# Patient Record
Sex: FEMALE | Race: WHITE | NOT HISPANIC OR LATINO | Employment: OTHER | URBAN - NONMETROPOLITAN AREA
[De-identification: names, ages, dates, MRNs, and addresses within clinical notes are randomized per-mention and may not be internally consistent; named-entity substitution may affect disease eponyms.]

---

## 2020-02-18 ENCOUNTER — APPOINTMENT (OUTPATIENT)
Dept: GENERAL RADIOLOGY | Facility: HOSPITAL | Age: 70
End: 2020-02-18

## 2020-02-18 ENCOUNTER — HOSPITAL ENCOUNTER (EMERGENCY)
Facility: HOSPITAL | Age: 70
Discharge: HOME | End: 2020-02-18
Attending: EMERGENCY MEDICINE | Admitting: EMERGENCY MEDICINE

## 2020-02-18 VITALS
HEART RATE: 92 BPM | DIASTOLIC BLOOD PRESSURE: 83 MMHG | RESPIRATION RATE: 16 BRPM | SYSTOLIC BLOOD PRESSURE: 153 MMHG | HEIGHT: 67 IN | OXYGEN SATURATION: 93 % | BODY MASS INDEX: 40.02 KG/M2 | WEIGHT: 255 LBS | TEMPERATURE: 98.1 F

## 2020-02-18 DIAGNOSIS — M25.551 PAIN OF RIGHT HIP JOINT: Primary | ICD-10-CM

## 2020-02-18 PROCEDURE — 25010000002 KETOROLAC TROMETHAMINE PER 15 MG: Performed by: EMERGENCY MEDICINE

## 2020-02-18 PROCEDURE — 96374 THER/PROPH/DIAG INJ IV PUSH: CPT

## 2020-02-18 PROCEDURE — 25010000002 ORPHENADRINE CITRATE PER 60 MG: Performed by: EMERGENCY MEDICINE

## 2020-02-18 PROCEDURE — 99284 EMERGENCY DEPT VISIT MOD MDM: CPT

## 2020-02-18 PROCEDURE — 96375 TX/PRO/DX INJ NEW DRUG ADDON: CPT

## 2020-02-18 PROCEDURE — 25010000002 MORPHINE PER 10 MG: Performed by: EMERGENCY MEDICINE

## 2020-02-18 PROCEDURE — 73502 X-RAY EXAM HIP UNI 2-3 VIEWS: CPT

## 2020-02-18 RX ORDER — KETOROLAC TROMETHAMINE 30 MG/ML
10 INJECTION, SOLUTION INTRAMUSCULAR; INTRAVENOUS ONCE
Status: COMPLETED | OUTPATIENT
Start: 2020-02-18 | End: 2020-02-18

## 2020-02-18 RX ORDER — LIDOCAINE 50 MG/G
1 PATCH TOPICAL ONCE
Status: DISCONTINUED | OUTPATIENT
Start: 2020-02-18 | End: 2020-02-18 | Stop reason: HOSPADM

## 2020-02-18 RX ORDER — HYDROCODONE BITARTRATE AND ACETAMINOPHEN 5; 325 MG/1; MG/1
1 TABLET ORAL EVERY 6 HOURS PRN
Qty: 10 TABLET | Refills: 0 | Status: ON HOLD | OUTPATIENT
Start: 2020-02-18 | End: 2020-02-20

## 2020-02-18 RX ORDER — ORPHENADRINE CITRATE 30 MG/ML
30 INJECTION INTRAMUSCULAR; INTRAVENOUS ONCE
Status: COMPLETED | OUTPATIENT
Start: 2020-02-18 | End: 2020-02-18

## 2020-02-18 RX ORDER — TIZANIDINE 4 MG/1
8 TABLET ORAL NIGHTLY PRN
COMMUNITY
End: 2020-03-02 | Stop reason: HOSPADM

## 2020-02-18 RX ORDER — CYCLOBENZAPRINE HCL 10 MG
10 TABLET ORAL 3 TIMES DAILY PRN
COMMUNITY
End: 2020-02-18

## 2020-02-18 RX ORDER — AMITRIPTYLINE HYDROCHLORIDE 50 MG/1
75 TABLET, FILM COATED ORAL NIGHTLY
COMMUNITY
End: 2020-03-30

## 2020-02-18 RX ORDER — CYCLOBENZAPRINE HCL 5 MG
5 TABLET ORAL 3 TIMES DAILY PRN
Qty: 15 TABLET | Refills: 0 | Status: ON HOLD | OUTPATIENT
Start: 2020-02-18 | End: 2020-02-20

## 2020-02-18 RX ORDER — AMLODIPINE BESYLATE 10 MG/1
5 TABLET ORAL DAILY
COMMUNITY
End: 2020-03-30

## 2020-02-18 RX ORDER — LIDOCAINE 50 MG/G
1 PATCH TOPICAL EVERY 24 HOURS
Qty: 6 EACH | Refills: 0 | Status: SHIPPED | OUTPATIENT
Start: 2020-02-18 | End: 2020-03-02 | Stop reason: HOSPADM

## 2020-02-18 RX ORDER — PROPRANOLOL HYDROCHLORIDE 20 MG/1
80 TABLET ORAL 2 TIMES DAILY
COMMUNITY
End: 2020-03-02 | Stop reason: HOSPADM

## 2020-02-18 RX ORDER — MORPHINE SULFATE 4 MG/ML
4 INJECTION, SOLUTION INTRAMUSCULAR; INTRAVENOUS ONCE
Status: COMPLETED | OUTPATIENT
Start: 2020-02-18 | End: 2020-02-18

## 2020-02-18 RX ADMIN — LIDOCAINE 1 PATCH: 50 PATCH CUTANEOUS at 05:52

## 2020-02-18 RX ADMIN — MORPHINE SULFATE 4 MG: 4 INJECTION, SOLUTION INTRAMUSCULAR; INTRAVENOUS at 06:39

## 2020-02-18 RX ADMIN — ORPHENADRINE CITRATE 30 MG: 30 INJECTION INTRAMUSCULAR; INTRAVENOUS at 08:09

## 2020-02-18 RX ADMIN — KETOROLAC TROMETHAMINE 10 MG: 30 INJECTION, SOLUTION INTRAMUSCULAR; INTRAVENOUS at 06:38

## 2020-02-18 NOTE — ED PROVIDER NOTES
TRIAGE CHIEF COMPLAINT:     Nursing and triage notes reviewed    Chief Complaint   Patient presents with   • Hip Pain      HPI: Shannon Servin is a 69 y.o. female who presents to the emergency department complaining of right hip pain.  Patient states she was at home when she bent over and felt a pop in her right hip and had a sudden onset of pain.  Patient has not been able to walk since this occurred and has had severe pain with any movement of her hip.  She denies any trauma.  Denies any numbness or tingling.    REVIEW OF SYSTEMS: All other systems reviewed and are negative     PAST MEDICAL HISTORY:   Past Medical History:   Diagnosis Date   • Arthritis    • Diabetes mellitus (CMS/HCC)    • Hypertension         FAMILY HISTORY:   History reviewed. No pertinent family history.     SOCIAL HISTORY:   Social History     Tobacco Use   • Smoking status: Never Smoker   Substance and Sexual Activity   • Alcohol use: Never     Frequency: Never   • Drug use: Never   • Sexual activity: Defer        SURGICAL HISTORY:   Past Surgical History:   Procedure Laterality Date   • HYSTERECTOMY     • TOTAL ABDOMINAL HYSTERECTOMY WITH SALPINGO OOPHORECTOMY          CURRENT MEDICATIONS:      Medication List      ASK your doctor about these medications    amitriptyline 50 MG tablet  Commonly known as:  ELAVIL     amLODIPine 10 MG tablet  Commonly known as:  NORVASC     cyclobenzaprine 10 MG tablet  Commonly known as:  FLEXERIL     insulin regular 100 UNIT/ML injection  Commonly known as:  humuLIN R,novoLIN R     propranolol 20 MG tablet  Commonly known as:  INDERAL           ALLERGIES: Patient has no known allergies.     PHYSICAL EXAM:   VITAL SIGNS:   Vitals:    02/18/20 0438   BP: 153/83   Pulse: 92   Resp: 16   Temp: 98.1 °F (36.7 °C)   SpO2: 92%      CONSTITUTIONAL: Awake, oriented, appears non-toxic   HENT: Atraumatic, normocephalic, oral mucosa pink and moist, airway patent. Nares patent without drainage. External ears normal.    EYES: Conjunctiva clear   NECK: Trachea midline, non-tender, supple   CARDIOVASCULAR: Normal heart rate, Normal rhythm, No murmurs, rubs, gallops   PULMONARY/CHEST: Clear to auscultation, no rhonchi, wheezes, or rales. Symmetrical breath sounds.  ABDOMINAL: Non-distended, soft, non-tender - no rebound or guarding.   NEUROLOGIC: Non-focal, moving all four extremities, no gross sensory or motor deficits.   EXTREMITIES: No clubbing, cyanosis, or edema.  There is mild pain with palpation of the greater trochanter, there is significant pain with any attempted range of motion of the right hip.  Distal pulses are intact.  There is normal strength in the lower extremity.  SKIN: Warm, Dry, No erythema, No rash     ED COURSE / MEDICAL DECISION MAKING:   Shannon Servin is a 69 y.o. female who presents to the emergency department for evaluation of right hip discomfort.  Patient is uncomfortable appearing on arrival but nontoxic.  Exam reveals pain with range of motion.  The leg is neurovascularly intact.  It does not appear shortened or rotated.  X-rays were obtained and per radiology interpretation does not reveal any acute bony abnormality, only some degenerative changes.  Patient was given several doses of pain medication with improvement in her symptoms.  Will continue to treat her symptomatically with close outpatient follow-up.    DECISION TO DISCHARGE/ADMIT: see ED care timeline     FINAL IMPRESSION:   1 --hip pain  2 --   3 --     Electronically signed by: Ashleigh Delatorre MD, 2/18/2020 4:50 AM       Ashleigh Delatorre MD  02/18/20 0758

## 2020-02-19 ENCOUNTER — HOSPITAL ENCOUNTER (INPATIENT)
Facility: HOSPITAL | Age: 70
LOS: 11 days | Discharge: NURSING, REHAB OR LONG-TERM CARE | End: 2020-03-02
Attending: STUDENT IN AN ORGANIZED HEALTH CARE EDUCATION/TRAINING PROGRAM | Admitting: UROLOGY

## 2020-02-19 ENCOUNTER — APPOINTMENT (OUTPATIENT)
Dept: CT IMAGING | Facility: HOSPITAL | Age: 70
End: 2020-02-19

## 2020-02-19 ENCOUNTER — APPOINTMENT (OUTPATIENT)
Dept: GENERAL RADIOLOGY | Facility: HOSPITAL | Age: 70
End: 2020-02-19

## 2020-02-19 DIAGNOSIS — A41.9 SEPSIS DUE TO URINARY TRACT INFECTION (HCC): Primary | ICD-10-CM

## 2020-02-19 DIAGNOSIS — N39.0 SEPSIS DUE TO URINARY TRACT INFECTION (HCC): Primary | ICD-10-CM

## 2020-02-19 DIAGNOSIS — Z74.09 IMPAIRED MOBILITY AND ADLS: ICD-10-CM

## 2020-02-19 DIAGNOSIS — Z78.9 IMPAIRED MOBILITY AND ADLS: ICD-10-CM

## 2020-02-19 DIAGNOSIS — D70.9 NEUTROPENIA, UNSPECIFIED TYPE (HCC): ICD-10-CM

## 2020-02-19 DIAGNOSIS — N20.1 LEFT URETERAL STONE: ICD-10-CM

## 2020-02-19 LAB
A-A DO2: 30.2 MMHG
ALBUMIN SERPL-MCNC: 4 G/DL (ref 3.5–5.2)
ALBUMIN/GLOB SERPL: 1.2 G/DL
ALP SERPL-CCNC: 69 U/L (ref 39–117)
ALT SERPL W P-5'-P-CCNC: 23 U/L (ref 1–33)
ANION GAP SERPL CALCULATED.3IONS-SCNC: 12.7 MMOL/L (ref 5–15)
ARTERIAL PATENCY WRIST A: POSITIVE
AST SERPL-CCNC: 25 U/L (ref 1–32)
ATMOSPHERIC PRESS: 745 MMHG
BACTERIA UR QL AUTO: ABNORMAL /HPF
BASE EXCESS BLDA CALC-SCNC: -1.2 MMOL/L (ref 0–2)
BDY SITE: ABNORMAL
BILIRUB SERPL-MCNC: 0.7 MG/DL (ref 0.2–1.2)
BILIRUB UR QL STRIP: NEGATIVE
BUN BLD-MCNC: 18 MG/DL (ref 8–23)
BUN/CREAT SERPL: 11.4 (ref 7–25)
CALCIUM SPEC-SCNC: 9.6 MG/DL (ref 8.6–10.5)
CHLORIDE SERPL-SCNC: 97 MMOL/L (ref 98–107)
CLARITY UR: ABNORMAL
CO2 SERPL-SCNC: 24.3 MMOL/L (ref 22–29)
COHGB MFR BLD: 0.8 % (ref 0–2)
COLOR UR: YELLOW
CREAT BLD-MCNC: 1.58 MG/DL (ref 0.57–1)
D-LACTATE SERPL-SCNC: 2.1 MMOL/L (ref 0.5–2)
DEPRECATED RDW RBC AUTO: 42.1 FL (ref 37–54)
EOSINOPHIL # BLD MANUAL: 0.02 10*3/MM3 (ref 0–0.4)
EOSINOPHIL NFR BLD MANUAL: 1 % (ref 0.3–6.2)
ERYTHROCYTE [DISTWIDTH] IN BLOOD BY AUTOMATED COUNT: 12.3 % (ref 12.3–15.4)
FLUAV AG NPH QL: NEGATIVE
FLUBV AG NPH QL IA: NEGATIVE
GAS FLOW AIRWAY: 2 LPM
GFR SERPL CREATININE-BSD FRML MDRD: 32 ML/MIN/1.73
GLOBULIN UR ELPH-MCNC: 3.3 GM/DL
GLUCOSE BLD-MCNC: 142 MG/DL (ref 65–99)
GLUCOSE UR STRIP-MCNC: NEGATIVE MG/DL
HCO3 BLDA-SCNC: 24.5 MMOL/L (ref 22–28)
HCT VFR BLD AUTO: 45 % (ref 34–46.6)
HCT VFR BLD CALC: 46.8 %
HGB BLD-MCNC: 15.1 G/DL (ref 12–15.9)
HGB BLDA-MCNC: 15.3 G/DL (ref 12–18)
HGB UR QL STRIP.AUTO: ABNORMAL
HYALINE CASTS UR QL AUTO: ABNORMAL /LPF
KETONES UR QL STRIP: NEGATIVE
LEUKOCYTE ESTERASE UR QL STRIP.AUTO: ABNORMAL
LYMPHOCYTES # BLD MANUAL: 0.37 10*3/MM3 (ref 0.7–3.1)
LYMPHOCYTES NFR BLD MANUAL: 23 % (ref 19.6–45.3)
LYMPHOCYTES NFR BLD MANUAL: 3 % (ref 5–12)
MCH RBC QN AUTO: 30.9 PG (ref 26.6–33)
MCHC RBC AUTO-ENTMCNC: 33.6 G/DL (ref 31.5–35.7)
MCV RBC AUTO: 92.2 FL (ref 79–97)
METAMYELOCYTES NFR BLD MANUAL: 1 % (ref 0–0)
METHGB BLD QL: 1 % (ref 0–1.5)
MODALITY: ABNORMAL
MONOCYTES # BLD AUTO: 0.05 10*3/MM3 (ref 0.1–0.9)
NEUTROPHILS # BLD AUTO: 1.16 10*3/MM3 (ref 1.7–7)
NEUTROPHILS NFR BLD MANUAL: 52 % (ref 42.7–76)
NEUTS BAND NFR BLD MANUAL: 20 % (ref 0–5)
NITRITE UR QL STRIP: POSITIVE
NOTE: ABNORMAL
NT-PROBNP SERPL-MCNC: 873.7 PG/ML (ref 5–900)
OXYHGB MFR BLDV: 91.2 % (ref 94–99)
PCO2 BLDA: 43.4 MM HG (ref 35–45)
PCO2 TEMP ADJ BLD: ABNORMAL MM[HG]
PH BLDA: 7.36 PH UNITS (ref 7.3–7.5)
PH UR STRIP.AUTO: 5.5 [PH] (ref 5–8)
PH, TEMP CORRECTED: ABNORMAL
PLATELET # BLD AUTO: 91 10*3/MM3 (ref 140–450)
PMV BLD AUTO: 10.5 FL (ref 6–12)
PO2 BLDA: 67.4 MM HG (ref 75–100)
PO2 TEMP ADJ BLD: ABNORMAL MM[HG]
POTASSIUM BLD-SCNC: 4.3 MMOL/L (ref 3.5–5.2)
PROCALCITONIN SERPL-MCNC: 6.39 NG/ML (ref 0.1–0.25)
PROT SERPL-MCNC: 7.3 G/DL (ref 6–8.5)
PROT UR QL STRIP: ABNORMAL
RBC # BLD AUTO: 4.88 10*6/MM3 (ref 3.77–5.28)
RBC # UR: ABNORMAL /HPF
RBC MORPH BLD: NORMAL
REF LAB TEST METHOD: ABNORMAL
SAO2 % BLDCOA: 92.9 % (ref 94–100)
SCAN SLIDE: NORMAL
SMALL PLATELETS BLD QL SMEAR: ABNORMAL
SODIUM BLD-SCNC: 134 MMOL/L (ref 136–145)
SP GR UR STRIP: 1.02 (ref 1–1.03)
SQUAMOUS #/AREA URNS HPF: ABNORMAL /HPF
TROPONIN T SERPL-MCNC: <0.01 NG/ML (ref 0–0.03)
UROBILINOGEN UR QL STRIP: ABNORMAL
VENTILATOR MODE: ABNORMAL
WBC MORPH BLD: NORMAL
WBC NRBC COR # BLD: 1.61 10*3/MM3 (ref 3.4–10.8)
WBC UR QL AUTO: ABNORMAL /HPF

## 2020-02-19 PROCEDURE — 82375 ASSAY CARBOXYHB QUANT: CPT

## 2020-02-19 PROCEDURE — 36600 WITHDRAWAL OF ARTERIAL BLOOD: CPT

## 2020-02-19 PROCEDURE — 70450 CT HEAD/BRAIN W/O DYE: CPT

## 2020-02-19 PROCEDURE — 71250 CT THORAX DX C-: CPT

## 2020-02-19 PROCEDURE — 80053 COMPREHEN METABOLIC PANEL: CPT | Performed by: STUDENT IN AN ORGANIZED HEALTH CARE EDUCATION/TRAINING PROGRAM

## 2020-02-19 PROCEDURE — 87804 INFLUENZA ASSAY W/OPTIC: CPT | Performed by: STUDENT IN AN ORGANIZED HEALTH CARE EDUCATION/TRAINING PROGRAM

## 2020-02-19 PROCEDURE — P9612 CATHETERIZE FOR URINE SPEC: HCPCS

## 2020-02-19 PROCEDURE — 93005 ELECTROCARDIOGRAM TRACING: CPT | Performed by: STUDENT IN AN ORGANIZED HEALTH CARE EDUCATION/TRAINING PROGRAM

## 2020-02-19 PROCEDURE — 74176 CT ABD & PELVIS W/O CONTRAST: CPT

## 2020-02-19 PROCEDURE — 83880 ASSAY OF NATRIURETIC PEPTIDE: CPT | Performed by: STUDENT IN AN ORGANIZED HEALTH CARE EDUCATION/TRAINING PROGRAM

## 2020-02-19 PROCEDURE — 87086 URINE CULTURE/COLONY COUNT: CPT | Performed by: STUDENT IN AN ORGANIZED HEALTH CARE EDUCATION/TRAINING PROGRAM

## 2020-02-19 PROCEDURE — 87040 BLOOD CULTURE FOR BACTERIA: CPT | Performed by: STUDENT IN AN ORGANIZED HEALTH CARE EDUCATION/TRAINING PROGRAM

## 2020-02-19 PROCEDURE — 83050 HGB METHEMOGLOBIN QUAN: CPT

## 2020-02-19 PROCEDURE — 84484 ASSAY OF TROPONIN QUANT: CPT | Performed by: STUDENT IN AN ORGANIZED HEALTH CARE EDUCATION/TRAINING PROGRAM

## 2020-02-19 PROCEDURE — 87077 CULTURE AEROBIC IDENTIFY: CPT | Performed by: STUDENT IN AN ORGANIZED HEALTH CARE EDUCATION/TRAINING PROGRAM

## 2020-02-19 PROCEDURE — 99285 EMERGENCY DEPT VISIT HI MDM: CPT

## 2020-02-19 PROCEDURE — 87186 SC STD MICRODIL/AGAR DIL: CPT | Performed by: STUDENT IN AN ORGANIZED HEALTH CARE EDUCATION/TRAINING PROGRAM

## 2020-02-19 PROCEDURE — 81001 URINALYSIS AUTO W/SCOPE: CPT | Performed by: STUDENT IN AN ORGANIZED HEALTH CARE EDUCATION/TRAINING PROGRAM

## 2020-02-19 PROCEDURE — 84145 PROCALCITONIN (PCT): CPT | Performed by: STUDENT IN AN ORGANIZED HEALTH CARE EDUCATION/TRAINING PROGRAM

## 2020-02-19 PROCEDURE — 71045 X-RAY EXAM CHEST 1 VIEW: CPT

## 2020-02-19 PROCEDURE — 85007 BL SMEAR W/DIFF WBC COUNT: CPT | Performed by: STUDENT IN AN ORGANIZED HEALTH CARE EDUCATION/TRAINING PROGRAM

## 2020-02-19 PROCEDURE — 25010000002 CEFTRIAXONE SODIUM-DEXTROSE 1-3.74 GM-%(50ML) RECONSTITUTED SOLUTION: Performed by: STUDENT IN AN ORGANIZED HEALTH CARE EDUCATION/TRAINING PROGRAM

## 2020-02-19 PROCEDURE — 82805 BLOOD GASES W/O2 SATURATION: CPT

## 2020-02-19 PROCEDURE — 85025 COMPLETE CBC W/AUTO DIFF WBC: CPT | Performed by: STUDENT IN AN ORGANIZED HEALTH CARE EDUCATION/TRAINING PROGRAM

## 2020-02-19 PROCEDURE — 83605 ASSAY OF LACTIC ACID: CPT | Performed by: STUDENT IN AN ORGANIZED HEALTH CARE EDUCATION/TRAINING PROGRAM

## 2020-02-19 RX ORDER — ACETAMINOPHEN 650 MG/1
650 SUPPOSITORY RECTAL ONCE
Status: COMPLETED | OUTPATIENT
Start: 2020-02-19 | End: 2020-02-19

## 2020-02-19 RX ORDER — CEFTRIAXONE 1 G/50ML
1 INJECTION, SOLUTION INTRAVENOUS ONCE
Status: COMPLETED | OUTPATIENT
Start: 2020-02-19 | End: 2020-02-19

## 2020-02-19 RX ADMIN — SODIUM CHLORIDE 1000 ML: 9 INJECTION, SOLUTION INTRAVENOUS at 22:43

## 2020-02-19 RX ADMIN — CEFTRIAXONE 1 G: 1 INJECTION, SOLUTION INTRAVENOUS at 23:13

## 2020-02-19 RX ADMIN — ACETAMINOPHEN 650 MG: 650 SUPPOSITORY RECTAL at 22:35

## 2020-02-19 RX ADMIN — SODIUM CHLORIDE 1000 ML: 9 INJECTION, SOLUTION INTRAVENOUS at 23:45

## 2020-02-20 ENCOUNTER — ANESTHESIA EVENT (OUTPATIENT)
Dept: PERIOP | Facility: HOSPITAL | Age: 70
End: 2020-02-20

## 2020-02-20 ENCOUNTER — ANESTHESIA (OUTPATIENT)
Dept: PERIOP | Facility: HOSPITAL | Age: 70
End: 2020-02-20

## 2020-02-20 PROBLEM — N39.0 SEPSIS DUE TO URINARY TRACT INFECTION (HCC): Status: ACTIVE | Noted: 2020-02-20

## 2020-02-20 PROBLEM — A41.9 SEPSIS DUE TO URINARY TRACT INFECTION: Status: ACTIVE | Noted: 2020-02-20

## 2020-02-20 PROBLEM — N20.1 LEFT URETERAL STONE: Status: ACTIVE | Noted: 2020-02-19

## 2020-02-20 LAB
ALBUMIN SERPL-MCNC: 3.4 G/DL (ref 3.5–5.2)
ALBUMIN/GLOB SERPL: 1.2 G/DL
ALP SERPL-CCNC: 63 U/L (ref 39–117)
ALT SERPL W P-5'-P-CCNC: 19 U/L (ref 1–33)
ANION GAP SERPL CALCULATED.3IONS-SCNC: 12.9 MMOL/L (ref 5–15)
AST SERPL-CCNC: 23 U/L (ref 1–32)
BACTERIA UR QL AUTO: ABNORMAL /HPF
BILIRUB SERPL-MCNC: 0.4 MG/DL (ref 0.2–1.2)
BILIRUB UR QL STRIP: ABNORMAL
BUN BLD-MCNC: 19 MG/DL (ref 8–23)
BUN/CREAT SERPL: 11.6 (ref 7–25)
CALCIUM SPEC-SCNC: 8.4 MG/DL (ref 8.6–10.5)
CHLORIDE SERPL-SCNC: 101 MMOL/L (ref 98–107)
CLARITY UR: ABNORMAL
CO2 SERPL-SCNC: 23.1 MMOL/L (ref 22–29)
COARSE GRAN CASTS URNS QL MICRO: ABNORMAL /LPF
COLOR UR: YELLOW
CREAT BLD-MCNC: 1.64 MG/DL (ref 0.57–1)
D-LACTATE SERPL-SCNC: 1.4 MMOL/L (ref 0.5–2)
D-LACTATE SERPL-SCNC: 2.1 MMOL/L (ref 0.5–2)
DEPRECATED RDW RBC AUTO: 43.9 FL (ref 37–54)
EOSINOPHIL # BLD MANUAL: 0.12 10*3/MM3 (ref 0–0.4)
EOSINOPHIL NFR BLD MANUAL: 1 % (ref 0.3–6.2)
ERYTHROCYTE [DISTWIDTH] IN BLOOD BY AUTOMATED COUNT: 12.8 % (ref 12.3–15.4)
GFR SERPL CREATININE-BSD FRML MDRD: 31 ML/MIN/1.73
GLOBULIN UR ELPH-MCNC: 2.9 GM/DL
GLUCOSE BLD-MCNC: 151 MG/DL (ref 65–99)
GLUCOSE BLDC GLUCOMTR-MCNC: 116 MG/DL (ref 70–130)
GLUCOSE BLDC GLUCOMTR-MCNC: 125 MG/DL (ref 70–130)
GLUCOSE BLDC GLUCOMTR-MCNC: 132 MG/DL (ref 70–130)
GLUCOSE BLDC GLUCOMTR-MCNC: 146 MG/DL (ref 70–130)
GLUCOSE UR STRIP-MCNC: NEGATIVE MG/DL
HCT VFR BLD AUTO: 41 % (ref 34–46.6)
HGB BLD-MCNC: 13.4 G/DL (ref 12–15.9)
HGB UR QL STRIP.AUTO: ABNORMAL
HYALINE CASTS UR QL AUTO: ABNORMAL /LPF
KETONES UR QL STRIP: ABNORMAL
LACTATE HOLD SPECIMEN: NORMAL
LEUKOCYTE ESTERASE UR QL STRIP.AUTO: ABNORMAL
LYMPHOCYTES # BLD MANUAL: 0.81 10*3/MM3 (ref 0.7–3.1)
LYMPHOCYTES NFR BLD MANUAL: 3 % (ref 5–12)
LYMPHOCYTES NFR BLD MANUAL: 7 % (ref 19.6–45.3)
MCH RBC QN AUTO: 30.5 PG (ref 26.6–33)
MCHC RBC AUTO-ENTMCNC: 32.7 G/DL (ref 31.5–35.7)
MCV RBC AUTO: 93.2 FL (ref 79–97)
METAMYELOCYTES NFR BLD MANUAL: 8 % (ref 0–0)
MONOCYTES # BLD AUTO: 0.35 10*3/MM3 (ref 0.1–0.9)
NEUTROPHILS # BLD AUTO: 9.37 10*3/MM3 (ref 1.7–7)
NEUTROPHILS NFR BLD MANUAL: 64 % (ref 42.7–76)
NEUTS BAND NFR BLD MANUAL: 17 % (ref 0–5)
NITRITE UR QL STRIP: NEGATIVE
PH UR STRIP.AUTO: 6 [PH] (ref 5–8)
PLATELET # BLD AUTO: 83 10*3/MM3 (ref 140–450)
PMV BLD AUTO: 10.6 FL (ref 6–12)
POTASSIUM BLD-SCNC: 3.9 MMOL/L (ref 3.5–5.2)
PROCALCITONIN SERPL-MCNC: 25.04 NG/ML (ref 0.1–0.25)
PROT SERPL-MCNC: 6.3 G/DL (ref 6–8.5)
PROT UR QL STRIP: ABNORMAL
RBC # BLD AUTO: 4.4 10*6/MM3 (ref 3.77–5.28)
RBC # UR: ABNORMAL /HPF
RBC MORPH BLD: NORMAL
REF LAB TEST METHOD: ABNORMAL
RENAL EPI CELLS #/AREA URNS HPF: ABNORMAL /HPF
SCAN SLIDE: NORMAL
SMALL PLATELETS BLD QL SMEAR: ABNORMAL
SODIUM BLD-SCNC: 137 MMOL/L (ref 136–145)
SP GR UR STRIP: 1.03 (ref 1–1.03)
SQUAMOUS #/AREA URNS HPF: ABNORMAL /HPF
TRANS CELLS #/AREA URNS HPF: ABNORMAL /HPF
UROBILINOGEN UR QL STRIP: ABNORMAL
WBC CLUMPS # UR AUTO: ABNORMAL /HPF
WBC MORPH BLD: NORMAL
WBC NRBC COR # BLD: 11.57 10*3/MM3 (ref 3.4–10.8)
WBC UR QL AUTO: ABNORMAL /HPF

## 2020-02-20 PROCEDURE — C2617 STENT, NON-COR, TEM W/O DEL: HCPCS | Performed by: UROLOGY

## 2020-02-20 PROCEDURE — 25010000002 MORPHINE PER 10 MG: Performed by: UROLOGY

## 2020-02-20 PROCEDURE — 82962 GLUCOSE BLOOD TEST: CPT

## 2020-02-20 PROCEDURE — 87086 URINE CULTURE/COLONY COUNT: CPT | Performed by: UROLOGY

## 2020-02-20 PROCEDURE — 80053 COMPREHEN METABOLIC PANEL: CPT | Performed by: INTERNAL MEDICINE

## 2020-02-20 PROCEDURE — 83605 ASSAY OF LACTIC ACID: CPT | Performed by: STUDENT IN AN ORGANIZED HEALTH CARE EDUCATION/TRAINING PROGRAM

## 2020-02-20 PROCEDURE — 52332 CYSTOSCOPY AND TREATMENT: CPT | Performed by: UROLOGY

## 2020-02-20 PROCEDURE — 99222 1ST HOSP IP/OBS MODERATE 55: CPT | Performed by: INTERNAL MEDICINE

## 2020-02-20 PROCEDURE — 25010000002 IOPAMIDOL 61 % SOLUTION: Performed by: UROLOGY

## 2020-02-20 PROCEDURE — 84145 PROCALCITONIN (PCT): CPT | Performed by: INTERNAL MEDICINE

## 2020-02-20 PROCEDURE — 99221 1ST HOSP IP/OBS SF/LOW 40: CPT | Performed by: UROLOGY

## 2020-02-20 PROCEDURE — 94660 CPAP INITIATION&MGMT: CPT

## 2020-02-20 PROCEDURE — 81001 URINALYSIS AUTO W/SCOPE: CPT | Performed by: UROLOGY

## 2020-02-20 PROCEDURE — 63710000001 INSULIN REGULAR HUMAN PER 5 UNITS: Performed by: INTERNAL MEDICINE

## 2020-02-20 PROCEDURE — 25010000002 LEVOFLOXACIN PER 250 MG: Performed by: UROLOGY

## 2020-02-20 PROCEDURE — 25010000002 MIDAZOLAM PER 1MG: Performed by: NURSE ANESTHETIST, CERTIFIED REGISTERED

## 2020-02-20 PROCEDURE — 94799 UNLISTED PULMONARY SVC/PX: CPT

## 2020-02-20 PROCEDURE — BT1F1ZZ FLUOROSCOPY OF LEFT KIDNEY, URETER AND BLADDER USING LOW OSMOLAR CONTRAST: ICD-10-PCS | Performed by: UROLOGY

## 2020-02-20 PROCEDURE — C1758 CATHETER, URETERAL: HCPCS | Performed by: UROLOGY

## 2020-02-20 PROCEDURE — 0T778DZ DILATION OF LEFT URETER WITH INTRALUMINAL DEVICE, VIA NATURAL OR ARTIFICIAL OPENING ENDOSCOPIC: ICD-10-PCS | Performed by: UROLOGY

## 2020-02-20 PROCEDURE — 25010000002 PIPERACILLIN SOD-TAZOBACTAM PER 1 G: Performed by: INTERNAL MEDICINE

## 2020-02-20 PROCEDURE — 83605 ASSAY OF LACTIC ACID: CPT | Performed by: INTERNAL MEDICINE

## 2020-02-20 PROCEDURE — 85025 COMPLETE CBC W/AUTO DIFF WBC: CPT | Performed by: INTERNAL MEDICINE

## 2020-02-20 DEVICE — URETERAL STENT
Type: IMPLANTABLE DEVICE | Site: URETER | Status: FUNCTIONAL
Brand: CONTOUR™

## 2020-02-20 RX ORDER — PROMETHAZINE HYDROCHLORIDE 25 MG/1
25 TABLET ORAL EVERY 6 HOURS PRN
COMMUNITY
End: 2020-03-02 | Stop reason: HOSPADM

## 2020-02-20 RX ORDER — MORPHINE SULFATE 4 MG/ML
4 INJECTION, SOLUTION INTRAMUSCULAR; INTRAVENOUS ONCE
Status: COMPLETED | OUTPATIENT
Start: 2020-02-20 | End: 2020-02-20

## 2020-02-20 RX ORDER — LEVOCETIRIZINE DIHYDROCHLORIDE 5 MG/1
5 TABLET, FILM COATED ORAL EVERY EVENING
COMMUNITY
End: 2020-03-02 | Stop reason: HOSPADM

## 2020-02-20 RX ORDER — SODIUM CHLORIDE 9 MG/ML
100 INJECTION, SOLUTION INTRAVENOUS CONTINUOUS
Status: DISCONTINUED | OUTPATIENT
Start: 2020-02-20 | End: 2020-02-25

## 2020-02-20 RX ORDER — SODIUM CHLORIDE, SODIUM LACTATE, POTASSIUM CHLORIDE, CALCIUM CHLORIDE 600; 310; 30; 20 MG/100ML; MG/100ML; MG/100ML; MG/100ML
1000 INJECTION, SOLUTION INTRAVENOUS CONTINUOUS
Status: DISCONTINUED | OUTPATIENT
Start: 2020-02-20 | End: 2020-02-20

## 2020-02-20 RX ORDER — CHOLECALCIFEROL (VITAMIN D3) 125 MCG
5 CAPSULE ORAL NIGHTLY PRN
Status: DISCONTINUED | OUTPATIENT
Start: 2020-02-20 | End: 2020-03-02 | Stop reason: HOSPADM

## 2020-02-20 RX ORDER — KETAMINE HCL IN NACL, ISO-OSM 100MG/10ML
SYRINGE (ML) INJECTION AS NEEDED
Status: DISCONTINUED | OUTPATIENT
Start: 2020-02-20 | End: 2020-02-20 | Stop reason: SURG

## 2020-02-20 RX ORDER — LIDOCAINE 50 MG/G
1 PATCH TOPICAL EVERY 24 HOURS
Status: DISCONTINUED | OUTPATIENT
Start: 2020-02-20 | End: 2020-02-24

## 2020-02-20 RX ORDER — SODIUM CHLORIDE 0.9 % (FLUSH) 0.9 %
10 SYRINGE (ML) INJECTION EVERY 12 HOURS SCHEDULED
Status: DISCONTINUED | OUTPATIENT
Start: 2020-02-20 | End: 2020-03-02 | Stop reason: HOSPADM

## 2020-02-20 RX ORDER — FLUCONAZOLE 150 MG/1
150 TABLET ORAL ONCE
Status: ON HOLD | COMMUNITY
End: 2020-02-20

## 2020-02-20 RX ORDER — MIDAZOLAM HYDROCHLORIDE 2 MG/2ML
INJECTION, SOLUTION INTRAMUSCULAR; INTRAVENOUS AS NEEDED
Status: DISCONTINUED | OUTPATIENT
Start: 2020-02-20 | End: 2020-02-20 | Stop reason: SURG

## 2020-02-20 RX ORDER — TRAMADOL HYDROCHLORIDE 50 MG/1
50 TABLET ORAL EVERY 6 HOURS PRN
Status: DISCONTINUED | OUTPATIENT
Start: 2020-02-20 | End: 2020-02-24

## 2020-02-20 RX ORDER — SODIUM CHLORIDE 0.9 % (FLUSH) 0.9 %
10 SYRINGE (ML) INJECTION AS NEEDED
Status: DISCONTINUED | OUTPATIENT
Start: 2020-02-20 | End: 2020-03-02 | Stop reason: HOSPADM

## 2020-02-20 RX ORDER — FLUCONAZOLE 100 MG/1
150 TABLET ORAL ONCE
Status: COMPLETED | OUTPATIENT
Start: 2020-02-20 | End: 2020-02-20

## 2020-02-20 RX ORDER — NICOTINE POLACRILEX 4 MG
1 LOZENGE BUCCAL
Status: DISCONTINUED | OUTPATIENT
Start: 2020-02-20 | End: 2020-03-02 | Stop reason: HOSPADM

## 2020-02-20 RX ORDER — TRAMADOL HYDROCHLORIDE 50 MG/1
50 TABLET ORAL EVERY 6 HOURS PRN
COMMUNITY
End: 2022-06-20 | Stop reason: SDUPTHER

## 2020-02-20 RX ORDER — MORPHINE SULFATE 4 MG/ML
4 INJECTION, SOLUTION INTRAMUSCULAR; INTRAVENOUS EVERY 4 HOURS PRN
Status: DISCONTINUED | OUTPATIENT
Start: 2020-02-20 | End: 2020-02-24

## 2020-02-20 RX ORDER — LEVOFLOXACIN 5 MG/ML
500 INJECTION, SOLUTION INTRAVENOUS
Status: DISCONTINUED | OUTPATIENT
Start: 2020-02-21 | End: 2020-02-20 | Stop reason: SDUPTHER

## 2020-02-20 RX ORDER — MAGNESIUM HYDROXIDE 1200 MG/15ML
LIQUID ORAL AS NEEDED
Status: DISCONTINUED | OUTPATIENT
Start: 2020-02-20 | End: 2020-03-02 | Stop reason: HOSPADM

## 2020-02-20 RX ORDER — SODIUM CHLORIDE 0.9 % (FLUSH) 0.9 %
10 SYRINGE (ML) INJECTION AS NEEDED
Status: DISCONTINUED | OUTPATIENT
Start: 2020-02-20 | End: 2020-02-20 | Stop reason: HOSPADM

## 2020-02-20 RX ORDER — LEVOFLOXACIN 5 MG/ML
500 INJECTION, SOLUTION INTRAVENOUS
Status: COMPLETED | OUTPATIENT
Start: 2020-02-20 | End: 2020-02-20

## 2020-02-20 RX ORDER — NEOMYCIN AND POLYMYXIN B SULFATES 40; 200000 MG/ML; [USP'U]/ML
SOLUTION IRRIGATION AS NEEDED
Status: DISCONTINUED | OUTPATIENT
Start: 2020-02-20 | End: 2020-02-20 | Stop reason: HOSPADM

## 2020-02-20 RX ORDER — ACETAMINOPHEN 650 MG/1
650 SUPPOSITORY RECTAL EVERY 4 HOURS PRN
Status: DISCONTINUED | OUTPATIENT
Start: 2020-02-20 | End: 2020-03-02 | Stop reason: HOSPADM

## 2020-02-20 RX ORDER — DOCUSATE SODIUM 100 MG/1
100 CAPSULE, LIQUID FILLED ORAL 2 TIMES DAILY
Status: DISCONTINUED | OUTPATIENT
Start: 2020-02-20 | End: 2020-02-24

## 2020-02-20 RX ORDER — CETIRIZINE HYDROCHLORIDE 10 MG/1
5 TABLET ORAL DAILY
Status: DISCONTINUED | OUTPATIENT
Start: 2020-02-20 | End: 2020-02-25

## 2020-02-20 RX ORDER — ACETAMINOPHEN 325 MG/1
650 TABLET ORAL EVERY 4 HOURS PRN
Status: DISCONTINUED | OUTPATIENT
Start: 2020-02-20 | End: 2020-03-02 | Stop reason: HOSPADM

## 2020-02-20 RX ORDER — PROPRANOLOL HYDROCHLORIDE 20 MG/1
80 TABLET ORAL 2 TIMES DAILY
Status: DISCONTINUED | OUTPATIENT
Start: 2020-02-20 | End: 2020-02-25

## 2020-02-20 RX ORDER — SODIUM CHLORIDE 9 MG/ML
INJECTION, SOLUTION INTRAVENOUS CONTINUOUS PRN
Status: DISCONTINUED | OUTPATIENT
Start: 2020-02-20 | End: 2020-02-20 | Stop reason: SURG

## 2020-02-20 RX ORDER — ACETAMINOPHEN 160 MG/5ML
650 SOLUTION ORAL EVERY 4 HOURS PRN
Status: DISCONTINUED | OUTPATIENT
Start: 2020-02-20 | End: 2020-03-02 | Stop reason: HOSPADM

## 2020-02-20 RX ORDER — ONDANSETRON 2 MG/ML
4 INJECTION INTRAMUSCULAR; INTRAVENOUS EVERY 6 HOURS PRN
Status: DISCONTINUED | OUTPATIENT
Start: 2020-02-20 | End: 2020-03-02 | Stop reason: HOSPADM

## 2020-02-20 RX ORDER — ONDANSETRON 4 MG/1
4 TABLET, FILM COATED ORAL EVERY 6 HOURS PRN
Status: DISCONTINUED | OUTPATIENT
Start: 2020-02-20 | End: 2020-03-02 | Stop reason: HOSPADM

## 2020-02-20 RX ORDER — DEXTROSE MONOHYDRATE 25 G/50ML
25 INJECTION, SOLUTION INTRAVENOUS
Status: DISCONTINUED | OUTPATIENT
Start: 2020-02-20 | End: 2020-03-02 | Stop reason: HOSPADM

## 2020-02-20 RX ORDER — CEFTRIAXONE 2 G/50ML
2 INJECTION, SOLUTION INTRAVENOUS EVERY 24 HOURS
Status: DISCONTINUED | OUTPATIENT
Start: 2020-02-20 | End: 2020-02-20

## 2020-02-20 RX ADMIN — Medication 10 MG: at 12:54

## 2020-02-20 RX ADMIN — SODIUM CHLORIDE 100 ML/HR: 9 INJECTION, SOLUTION INTRAVENOUS at 04:26

## 2020-02-20 RX ADMIN — LIDOCAINE 1 PATCH: 50 PATCH CUTANEOUS at 04:27

## 2020-02-20 RX ADMIN — ACETAMINOPHEN 650 MG: 325 TABLET, FILM COATED ORAL at 04:35

## 2020-02-20 RX ADMIN — SODIUM CHLORIDE 1000 ML: 9 INJECTION, SOLUTION INTRAVENOUS at 16:00

## 2020-02-20 RX ADMIN — MIDAZOLAM HYDROCHLORIDE 1 MG: 1 INJECTION, SOLUTION INTRAMUSCULAR; INTRAVENOUS at 13:06

## 2020-02-20 RX ADMIN — SODIUM CHLORIDE, POTASSIUM CHLORIDE, SODIUM LACTATE AND CALCIUM CHLORIDE 1000 ML: 600; 310; 30; 20 INJECTION, SOLUTION INTRAVENOUS at 11:02

## 2020-02-20 RX ADMIN — FLUCONAZOLE 150 MG: 100 TABLET ORAL at 04:26

## 2020-02-20 RX ADMIN — SODIUM CHLORIDE, PRESERVATIVE FREE 10 ML: 5 INJECTION INTRAVENOUS at 20:46

## 2020-02-20 RX ADMIN — TRAMADOL HYDROCHLORIDE 50 MG: 50 TABLET, FILM COATED ORAL at 04:26

## 2020-02-20 RX ADMIN — Medication 10 MG: at 13:06

## 2020-02-20 RX ADMIN — SODIUM CHLORIDE: 9 INJECTION, SOLUTION INTRAVENOUS at 12:50

## 2020-02-20 RX ADMIN — MORPHINE SULFATE 4 MG: 4 INJECTION INTRAVENOUS at 09:59

## 2020-02-20 RX ADMIN — TAZOBACTAM SODIUM AND PIPERACILLIN SODIUM 4.5 G: 500; 4 INJECTION, SOLUTION INTRAVENOUS at 17:07

## 2020-02-20 RX ADMIN — MIDAZOLAM HYDROCHLORIDE 1 MG: 1 INJECTION, SOLUTION INTRAMUSCULAR; INTRAVENOUS at 12:54

## 2020-02-20 RX ADMIN — LEVOFLOXACIN 500 MG: 5 INJECTION, SOLUTION INTRAVENOUS at 12:53

## 2020-02-20 RX ADMIN — DOCUSATE SODIUM 100 MG: 100 CAPSULE, LIQUID FILLED ORAL at 20:41

## 2020-02-20 RX ADMIN — SODIUM CHLORIDE 100 ML/HR: 9 INJECTION, SOLUTION INTRAVENOUS at 17:07

## 2020-02-20 RX ADMIN — HUMAN INSULIN 15 UNITS: 100 INJECTION, SOLUTION SUBCUTANEOUS at 06:39

## 2020-02-20 RX ADMIN — MORPHINE SULFATE 4 MG: 4 INJECTION, SOLUTION INTRAMUSCULAR; INTRAVENOUS at 20:35

## 2020-02-20 RX ADMIN — SODIUM CHLORIDE, PRESERVATIVE FREE 10 ML: 5 INJECTION INTRAVENOUS at 09:50

## 2020-02-20 NOTE — ANESTHESIA PREPROCEDURE EVALUATION
Anesthesia Evaluation     Patient summary reviewed and Nursing notes reviewed   NPO Solid Status: > 8 hours  NPO Liquid Status: > 8 hours           Airway   Mallampati: II  TM distance: >3 FB  Neck ROM: limited  Possible difficult intubation  Dental - normal exam   (+) poor dentition    Pulmonary - normal exam   (+) shortness of breath,   Cardiovascular - normal exam  Exercise tolerance: good (4-7 METS)    (+) hypertension,       Neuro/Psych- negative ROS  GI/Hepatic/Renal/Endo    (+) morbid obesity,  renal disease ARF, diabetes mellitus,     Musculoskeletal     (+) back pain, chronic pain,   Abdominal   (+) obese,     Bowel sounds: normal.   Substance History - negative use     OB/GYN negative ob/gyn ROS         Other   arthritis,      ROS/Med Hx Other: Sepsis due to urinary tract infection (CMS/HCC)     1. Sepsis, due to unknown infectious organism, POA  2. Complicated UTI, due to # 3, POA  3. Left ureteral stone with mild hydronephrosis, POA  4. Acute kidney injury, likely related to # 1, POA  5. Leukopenia/thrombocytopenia, likely related to # 1  6. Acute hypoxic respiratory failure, likely multifactorial in the setting of sepsis and obesity hypoventilation syndrome  7. Type 2 DM, insulin dependent  8. Obesity  9. Chronic debility  10. Chronic back pain                Anesthesia Plan    ASA 3 - emergent     general     intravenous induction     Anesthetic plan, all risks, benefits, and alternatives have been provided, discussed and informed consent has been obtained with: patient.    Plan discussed with attending.

## 2020-02-20 NOTE — ANESTHESIA POSTPROCEDURE EVALUATION
Patient: Shannon Servin    Procedure Summary     Date:  02/20/20 Room / Location:  [facility] /  [facility]    Anesthesia Start:  1245 Anesthesia Stop:  1339    Procedure:  URETEROSCOPY, LEFT RETROGRADE PYLEOGERAM WITH STENT INSERTION (Left ) Diagnosis:       Sepsis due to urinary tract infection (CMS/HCC)      Left ureteral stone      (Sepsis due to urinary tract infection (CMS/HCC) [A41.9, N39.0])      (Left ureteral stone [N20.1])    Surgeon:  Mulugeta Saldana MD Provider:  Daron Viera CRNA    Anesthesia Type:  general ASA Status:  3 - Emergent          Anesthesia Type: general    Vitals  Vitals Value Taken Time   /35 2/20/2020  1:36 PM   Temp     Pulse 117 2/20/2020  1:39 PM   Resp     SpO2 99 % 2/20/2020  1:39 PM   Vitals shown include unvalidated device data.        Post Anesthesia Care and Evaluation    Patient location during evaluation: bedside  Patient participation: complete - patient participated  Level of consciousness: awake and alert  Pain score: 0  Pain management: adequate  Airway patency: patent  Anesthetic complications: No anesthetic complications  PONV Status: none  Cardiovascular status: acceptable  Respiratory status: acceptable  Hydration status: acceptable

## 2020-02-21 ENCOUNTER — APPOINTMENT (OUTPATIENT)
Dept: GENERAL RADIOLOGY | Facility: HOSPITAL | Age: 70
End: 2020-02-21

## 2020-02-21 LAB
A-A DO2: 81.7 MMHG
ALBUMIN SERPL-MCNC: 3.1 G/DL (ref 3.5–5.2)
ALBUMIN/GLOB SERPL: 1 G/DL
ALP SERPL-CCNC: 91 U/L (ref 39–117)
ALT SERPL W P-5'-P-CCNC: 18 U/L (ref 1–33)
ANION GAP SERPL CALCULATED.3IONS-SCNC: 13.5 MMOL/L (ref 5–15)
ANISOCYTOSIS BLD QL: ABNORMAL
ARTERIAL PATENCY WRIST A: POSITIVE
AST SERPL-CCNC: 23 U/L (ref 1–32)
ATMOSPHERIC PRESS: 749 MMHG
BACTERIA SPEC AEROBE CULT: ABNORMAL
BACTERIA SPEC AEROBE CULT: NO GROWTH
BASE EXCESS BLDA CALC-SCNC: -3.9 MMOL/L (ref 0–2)
BDY SITE: ABNORMAL
BILIRUB SERPL-MCNC: 0.3 MG/DL (ref 0.2–1.2)
BUN BLD-MCNC: 22 MG/DL (ref 8–23)
BUN/CREAT SERPL: 19.8 (ref 7–25)
CALCIUM SPEC-SCNC: 7.9 MG/DL (ref 8.6–10.5)
CHLORIDE SERPL-SCNC: 105 MMOL/L (ref 98–107)
CO2 SERPL-SCNC: 20.5 MMOL/L (ref 22–29)
COHGB MFR BLD: 0.6 % (ref 0–2)
CREAT BLD-MCNC: 1.11 MG/DL (ref 0.57–1)
DEPRECATED RDW RBC AUTO: 47.8 FL (ref 37–54)
EPAP: 8
ERYTHROCYTE [DISTWIDTH] IN BLOOD BY AUTOMATED COUNT: 13.2 % (ref 12.3–15.4)
GFR SERPL CREATININE-BSD FRML MDRD: 49 ML/MIN/1.73
GLOBULIN UR ELPH-MCNC: 3.1 GM/DL
GLUCOSE BLD-MCNC: 127 MG/DL (ref 65–99)
GLUCOSE BLDC GLUCOMTR-MCNC: 113 MG/DL (ref 70–130)
GLUCOSE BLDC GLUCOMTR-MCNC: 119 MG/DL (ref 70–130)
GLUCOSE BLDC GLUCOMTR-MCNC: 124 MG/DL (ref 70–130)
GLUCOSE BLDC GLUCOMTR-MCNC: 133 MG/DL (ref 70–130)
HCO3 BLDA-SCNC: 23.9 MMOL/L (ref 22–28)
HCT VFR BLD AUTO: 41 % (ref 34–46.6)
HCT VFR BLD CALC: 40.3 %
HGB BLD-MCNC: 13.1 G/DL (ref 12–15.9)
HGB BLDA-MCNC: 13.1 G/DL (ref 12–18)
HOROWITZ INDEX BLD+IHG-RTO: 30 %
IPAP: 16
LYMPHOCYTES # BLD MANUAL: 0.92 10*3/MM3 (ref 0.7–3.1)
LYMPHOCYTES NFR BLD MANUAL: 1 % (ref 5–12)
LYMPHOCYTES NFR BLD MANUAL: 5 % (ref 19.6–45.3)
MCH RBC QN AUTO: 31.2 PG (ref 26.6–33)
MCHC RBC AUTO-ENTMCNC: 32 G/DL (ref 31.5–35.7)
MCV RBC AUTO: 97.6 FL (ref 79–97)
METAMYELOCYTES NFR BLD MANUAL: 1 % (ref 0–0)
METHGB BLD QL: 1.1 % (ref 0–1.5)
MODALITY: ABNORMAL
MONOCYTES # BLD AUTO: 0.18 10*3/MM3 (ref 0.1–0.9)
MYELOCYTES NFR BLD MANUAL: 1 % (ref 0–0)
NEUTROPHILS # BLD AUTO: 16.98 10*3/MM3 (ref 1.7–7)
NEUTROPHILS NFR BLD MANUAL: 73 % (ref 42.7–76)
NEUTS BAND NFR BLD MANUAL: 19 % (ref 0–5)
NOTE: ABNORMAL
OXYHGB MFR BLDV: 92.3 % (ref 94–99)
PCO2 BLDA: 53.9 MM HG (ref 35–45)
PCO2 TEMP ADJ BLD: ABNORMAL MM[HG]
PH BLDA: 7.25 PH UNITS (ref 7.3–7.5)
PH, TEMP CORRECTED: ABNORMAL
PLATELET # BLD AUTO: 56 10*3/MM3 (ref 140–450)
PMV BLD AUTO: 11.5 FL (ref 6–12)
PO2 BLDA: 68.9 MM HG (ref 75–100)
PO2 TEMP ADJ BLD: ABNORMAL MM[HG]
POTASSIUM BLD-SCNC: 4.8 MMOL/L (ref 3.5–5.2)
PROT SERPL-MCNC: 6.2 G/DL (ref 6–8.5)
RBC # BLD AUTO: 4.2 10*6/MM3 (ref 3.77–5.28)
SAO2 % BLDCOA: 93.9 % (ref 94–100)
SCAN SLIDE: NORMAL
SET MECH RESP RATE: 14
SMALL PLATELETS BLD QL SMEAR: ABNORMAL
SODIUM BLD-SCNC: 139 MMOL/L (ref 136–145)
VENTILATOR MODE: ABNORMAL
WBC MORPH BLD: NORMAL
WBC NRBC COR # BLD: 18.46 10*3/MM3 (ref 3.4–10.8)

## 2020-02-21 PROCEDURE — 71045 X-RAY EXAM CHEST 1 VIEW: CPT

## 2020-02-21 PROCEDURE — 36600 WITHDRAWAL OF ARTERIAL BLOOD: CPT

## 2020-02-21 PROCEDURE — 94799 UNLISTED PULMONARY SVC/PX: CPT

## 2020-02-21 PROCEDURE — 83050 HGB METHEMOGLOBIN QUAN: CPT

## 2020-02-21 PROCEDURE — 85007 BL SMEAR W/DIFF WBC COUNT: CPT | Performed by: INTERNAL MEDICINE

## 2020-02-21 PROCEDURE — 80053 COMPREHEN METABOLIC PANEL: CPT | Performed by: INTERNAL MEDICINE

## 2020-02-21 PROCEDURE — 25010000002 PIPERACILLIN SOD-TAZOBACTAM PER 1 G: Performed by: INTERNAL MEDICINE

## 2020-02-21 PROCEDURE — 82962 GLUCOSE BLOOD TEST: CPT

## 2020-02-21 PROCEDURE — 25010000002 MORPHINE PER 10 MG: Performed by: INTERNAL MEDICINE

## 2020-02-21 PROCEDURE — 82375 ASSAY CARBOXYHB QUANT: CPT

## 2020-02-21 PROCEDURE — 85025 COMPLETE CBC W/AUTO DIFF WBC: CPT | Performed by: INTERNAL MEDICINE

## 2020-02-21 PROCEDURE — 82805 BLOOD GASES W/O2 SATURATION: CPT

## 2020-02-21 PROCEDURE — 97162 PT EVAL MOD COMPLEX 30 MIN: CPT

## 2020-02-21 PROCEDURE — 99232 SBSQ HOSP IP/OBS MODERATE 35: CPT | Performed by: UROLOGY

## 2020-02-21 PROCEDURE — 25010000002 MORPHINE PER 10 MG: Performed by: UROLOGY

## 2020-02-21 PROCEDURE — 99232 SBSQ HOSP IP/OBS MODERATE 35: CPT | Performed by: INTERNAL MEDICINE

## 2020-02-21 PROCEDURE — 97166 OT EVAL MOD COMPLEX 45 MIN: CPT

## 2020-02-21 PROCEDURE — 94660 CPAP INITIATION&MGMT: CPT

## 2020-02-21 RX ORDER — METOPROLOL TARTRATE 5 MG/5ML
5 INJECTION INTRAVENOUS EVERY 6 HOURS PRN
Status: DISCONTINUED | OUTPATIENT
Start: 2020-02-21 | End: 2020-03-02 | Stop reason: HOSPADM

## 2020-02-21 RX ORDER — SUMATRIPTAN 50 MG/1
50 TABLET, FILM COATED ORAL
Status: DISCONTINUED | OUTPATIENT
Start: 2020-02-21 | End: 2020-02-25

## 2020-02-21 RX ORDER — RIZATRIPTAN BENZOATE 10 MG/1
10 TABLET ORAL
Status: DISCONTINUED | OUTPATIENT
Start: 2020-02-21 | End: 2020-02-21

## 2020-02-21 RX ORDER — SUMATRIPTAN 50 MG/1
50 TABLET, FILM COATED ORAL
Status: DISCONTINUED | OUTPATIENT
Start: 2020-02-21 | End: 2020-02-21 | Stop reason: CLARIF

## 2020-02-21 RX ADMIN — DOCUSATE SODIUM 100 MG: 100 CAPSULE, LIQUID FILLED ORAL at 22:01

## 2020-02-21 RX ADMIN — TAZOBACTAM SODIUM AND PIPERACILLIN SODIUM 4.5 G: 500; 4 INJECTION, SOLUTION INTRAVENOUS at 00:06

## 2020-02-21 RX ADMIN — PROPRANOLOL HYDROCHLORIDE 80 MG: 20 TABLET ORAL at 22:01

## 2020-02-21 RX ADMIN — TAZOBACTAM SODIUM AND PIPERACILLIN SODIUM 4.5 G: 500; 4 INJECTION, SOLUTION INTRAVENOUS at 06:17

## 2020-02-21 RX ADMIN — SODIUM CHLORIDE, PRESERVATIVE FREE 10 ML: 5 INJECTION INTRAVENOUS at 08:15

## 2020-02-21 RX ADMIN — LIDOCAINE 1 PATCH: 50 PATCH CUTANEOUS at 05:38

## 2020-02-21 RX ADMIN — SODIUM CHLORIDE 100 ML/HR: 9 INJECTION, SOLUTION INTRAVENOUS at 02:45

## 2020-02-21 RX ADMIN — TAZOBACTAM SODIUM AND PIPERACILLIN SODIUM 4.5 G: 500; 4 INJECTION, SOLUTION INTRAVENOUS at 16:00

## 2020-02-21 RX ADMIN — METOPROLOL TARTRATE 5 MG: 1 INJECTION, SOLUTION INTRAVENOUS at 09:55

## 2020-02-21 RX ADMIN — SODIUM CHLORIDE 50 ML/HR: 9 INJECTION, SOLUTION INTRAVENOUS at 16:32

## 2020-02-21 RX ADMIN — TAZOBACTAM SODIUM AND PIPERACILLIN SODIUM 4.5 G: 500; 4 INJECTION, SOLUTION INTRAVENOUS at 22:12

## 2020-02-21 RX ADMIN — MORPHINE SULFATE 4 MG: 4 INJECTION, SOLUTION INTRAMUSCULAR; INTRAVENOUS at 10:46

## 2020-02-21 RX ADMIN — Medication 1 SPRAY: at 05:38

## 2020-02-21 RX ADMIN — MORPHINE SULFATE 4 MG: 4 INJECTION, SOLUTION INTRAMUSCULAR; INTRAVENOUS at 04:05

## 2020-02-21 RX ADMIN — MORPHINE SULFATE 4 MG: 4 INJECTION, SOLUTION INTRAMUSCULAR; INTRAVENOUS at 16:29

## 2020-02-21 RX ADMIN — SODIUM CHLORIDE, PRESERVATIVE FREE 10 ML: 5 INJECTION INTRAVENOUS at 22:02

## 2020-02-22 LAB
ANION GAP SERPL CALCULATED.3IONS-SCNC: 9.8 MMOL/L (ref 5–15)
BACTERIA SPEC AEROBE CULT: ABNORMAL
BACTERIA SPEC AEROBE CULT: ABNORMAL
BUN BLD-MCNC: 22 MG/DL (ref 8–23)
BUN/CREAT SERPL: 23.7 (ref 7–25)
CALCIUM SPEC-SCNC: 8.7 MG/DL (ref 8.6–10.5)
CHLORIDE SERPL-SCNC: 105 MMOL/L (ref 98–107)
CO2 SERPL-SCNC: 25.2 MMOL/L (ref 22–29)
CREAT BLD-MCNC: 0.93 MG/DL (ref 0.57–1)
DEPRECATED RDW RBC AUTO: 47.7 FL (ref 37–54)
ERYTHROCYTE [DISTWIDTH] IN BLOOD BY AUTOMATED COUNT: 13.4 % (ref 12.3–15.4)
GFR SERPL CREATININE-BSD FRML MDRD: 60 ML/MIN/1.73
GLUCOSE BLD-MCNC: 143 MG/DL (ref 65–99)
GLUCOSE BLDC GLUCOMTR-MCNC: 121 MG/DL (ref 70–130)
GLUCOSE BLDC GLUCOMTR-MCNC: 122 MG/DL (ref 70–130)
GLUCOSE BLDC GLUCOMTR-MCNC: 127 MG/DL (ref 70–130)
GLUCOSE BLDC GLUCOMTR-MCNC: 135 MG/DL (ref 70–130)
GRAM STN SPEC: ABNORMAL
HCT VFR BLD AUTO: 41.1 % (ref 34–46.6)
HGB BLD-MCNC: 13 G/DL (ref 12–15.9)
ISOLATED FROM: ABNORMAL
ISOLATED FROM: ABNORMAL
LYMPHOCYTES # BLD MANUAL: 1 10*3/MM3 (ref 0.7–3.1)
LYMPHOCYTES NFR BLD MANUAL: 3 % (ref 5–12)
LYMPHOCYTES NFR BLD MANUAL: 6 % (ref 19.6–45.3)
MCH RBC QN AUTO: 30.5 PG (ref 26.6–33)
MCHC RBC AUTO-ENTMCNC: 31.6 G/DL (ref 31.5–35.7)
MCV RBC AUTO: 96.5 FL (ref 79–97)
METAMYELOCYTES NFR BLD MANUAL: 7 % (ref 0–0)
MONOCYTES # BLD AUTO: 0.5 10*3/MM3 (ref 0.1–0.9)
NEUTROPHILS # BLD AUTO: 13.87 10*3/MM3 (ref 1.7–7)
NEUTROPHILS NFR BLD MANUAL: 70 % (ref 42.7–76)
NEUTS BAND NFR BLD MANUAL: 13 % (ref 0–5)
PLATELET # BLD AUTO: 74 10*3/MM3 (ref 140–450)
PMV BLD AUTO: 11.7 FL (ref 6–12)
POTASSIUM BLD-SCNC: 4.7 MMOL/L (ref 3.5–5.2)
RBC # BLD AUTO: 4.26 10*6/MM3 (ref 3.77–5.28)
RBC MORPH BLD: NORMAL
SCAN SLIDE: NORMAL
SMALL PLATELETS BLD QL SMEAR: ABNORMAL
SODIUM BLD-SCNC: 140 MMOL/L (ref 136–145)
VARIANT LYMPHS NFR BLD MANUAL: 1 % (ref 0–5)
WBC MORPH BLD: NORMAL
WBC NRBC COR # BLD: 16.71 10*3/MM3 (ref 3.4–10.8)

## 2020-02-22 PROCEDURE — 85007 BL SMEAR W/DIFF WBC COUNT: CPT | Performed by: INTERNAL MEDICINE

## 2020-02-22 PROCEDURE — 94660 CPAP INITIATION&MGMT: CPT

## 2020-02-22 PROCEDURE — 94799 UNLISTED PULMONARY SVC/PX: CPT

## 2020-02-22 PROCEDURE — 99232 SBSQ HOSP IP/OBS MODERATE 35: CPT | Performed by: INTERNAL MEDICINE

## 2020-02-22 PROCEDURE — 85025 COMPLETE CBC W/AUTO DIFF WBC: CPT | Performed by: INTERNAL MEDICINE

## 2020-02-22 PROCEDURE — 80048 BASIC METABOLIC PNL TOTAL CA: CPT | Performed by: INTERNAL MEDICINE

## 2020-02-22 PROCEDURE — 25010000002 PIPERACILLIN SOD-TAZOBACTAM PER 1 G: Performed by: INTERNAL MEDICINE

## 2020-02-22 PROCEDURE — 82962 GLUCOSE BLOOD TEST: CPT

## 2020-02-22 RX ORDER — IPRATROPIUM BROMIDE AND ALBUTEROL SULFATE 2.5; .5 MG/3ML; MG/3ML
3 SOLUTION RESPIRATORY (INHALATION) EVERY 6 HOURS PRN
Status: DISCONTINUED | OUTPATIENT
Start: 2020-02-22 | End: 2020-03-02 | Stop reason: HOSPADM

## 2020-02-22 RX ADMIN — TAZOBACTAM SODIUM AND PIPERACILLIN SODIUM 4.5 G: 500; 4 INJECTION, SOLUTION INTRAVENOUS at 23:00

## 2020-02-22 RX ADMIN — IPRATROPIUM BROMIDE AND ALBUTEROL SULFATE 3 ML: .5; 3 SOLUTION RESPIRATORY (INHALATION) at 18:25

## 2020-02-22 RX ADMIN — SODIUM CHLORIDE, PRESERVATIVE FREE 10 ML: 5 INJECTION INTRAVENOUS at 09:42

## 2020-02-22 RX ADMIN — TAZOBACTAM SODIUM AND PIPERACILLIN SODIUM 4.5 G: 500; 4 INJECTION, SOLUTION INTRAVENOUS at 06:05

## 2020-02-22 RX ADMIN — TRAMADOL HYDROCHLORIDE 50 MG: 50 TABLET, FILM COATED ORAL at 06:05

## 2020-02-22 RX ADMIN — LIDOCAINE 1 PATCH: 50 PATCH CUTANEOUS at 04:26

## 2020-02-22 RX ADMIN — TAZOBACTAM SODIUM AND PIPERACILLIN SODIUM 4.5 G: 500; 4 INJECTION, SOLUTION INTRAVENOUS at 16:26

## 2020-02-22 RX ADMIN — SODIUM CHLORIDE, PRESERVATIVE FREE 10 ML: 5 INJECTION INTRAVENOUS at 21:15

## 2020-02-23 ENCOUNTER — APPOINTMENT (OUTPATIENT)
Dept: CT IMAGING | Facility: HOSPITAL | Age: 70
End: 2020-02-23

## 2020-02-23 LAB
A-A DO2: 100.7 MMHG
ANION GAP SERPL CALCULATED.3IONS-SCNC: 10.3 MMOL/L (ref 5–15)
ARTERIAL PATENCY WRIST A: ABNORMAL
ATMOSPHERIC PRESS: 738 MMHG
BASE EXCESS BLDA CALC-SCNC: 5.6 MMOL/L (ref 0–2)
BASOPHILS # BLD AUTO: 0.05 10*3/MM3 (ref 0–0.2)
BASOPHILS NFR BLD AUTO: 0.3 % (ref 0–1.5)
BDY SITE: ABNORMAL
BUN BLD-MCNC: 17 MG/DL (ref 8–23)
BUN/CREAT SERPL: 22.7 (ref 7–25)
CALCIUM SPEC-SCNC: 8.9 MG/DL (ref 8.6–10.5)
CHLORIDE SERPL-SCNC: 106 MMOL/L (ref 98–107)
CO2 SERPL-SCNC: 27.7 MMOL/L (ref 22–29)
COHGB MFR BLD: 1.3 % (ref 0–2)
CREAT BLD-MCNC: 0.75 MG/DL (ref 0.57–1)
DEPRECATED RDW RBC AUTO: 43.7 FL (ref 37–54)
EOSINOPHIL # BLD AUTO: 0.01 10*3/MM3 (ref 0–0.4)
EOSINOPHIL NFR BLD AUTO: 0.1 % (ref 0.3–6.2)
ERYTHROCYTE [DISTWIDTH] IN BLOOD BY AUTOMATED COUNT: 13 % (ref 12.3–15.4)
GAS FLOW AIRWAY: 3 LPM
GFR SERPL CREATININE-BSD FRML MDRD: 76 ML/MIN/1.73
GLUCOSE BLD-MCNC: 152 MG/DL (ref 65–99)
GLUCOSE BLDC GLUCOMTR-MCNC: 122 MG/DL (ref 70–130)
GLUCOSE BLDC GLUCOMTR-MCNC: 130 MG/DL (ref 70–130)
GLUCOSE BLDC GLUCOMTR-MCNC: 133 MG/DL (ref 70–130)
GLUCOSE BLDC GLUCOMTR-MCNC: 143 MG/DL (ref 70–130)
HCO3 BLDA-SCNC: 32 MMOL/L (ref 22–28)
HCT VFR BLD AUTO: 39.9 % (ref 34–46.6)
HCT VFR BLD CALC: 39.2 %
HGB BLD-MCNC: 13.2 G/DL (ref 12–15.9)
HGB BLDA-MCNC: 12.8 G/DL (ref 12–18)
HOROWITZ INDEX BLD+IHG-RTO: 32 %
IMM GRANULOCYTES # BLD AUTO: 0.07 10*3/MM3 (ref 0–0.05)
IMM GRANULOCYTES NFR BLD AUTO: 0.4 % (ref 0–0.5)
LYMPHOCYTES # BLD AUTO: 1.37 10*3/MM3 (ref 0.7–3.1)
LYMPHOCYTES NFR BLD AUTO: 8.6 % (ref 19.6–45.3)
MCH RBC QN AUTO: 30.2 PG (ref 26.6–33)
MCHC RBC AUTO-ENTMCNC: 33.1 G/DL (ref 31.5–35.7)
MCV RBC AUTO: 91.3 FL (ref 79–97)
METHGB BLD QL: 0.8 % (ref 0–1.5)
MODALITY: ABNORMAL
MONOCYTES # BLD AUTO: 1.04 10*3/MM3 (ref 0.1–0.9)
MONOCYTES NFR BLD AUTO: 6.5 % (ref 5–12)
NEUTROPHILS # BLD AUTO: 13.38 10*3/MM3 (ref 1.7–7)
NEUTROPHILS NFR BLD AUTO: 84.1 % (ref 42.7–76)
NOTE: ABNORMAL
NRBC BLD AUTO-RTO: 0 /100 WBC (ref 0–0.2)
OXYHGB MFR BLDV: 90.9 % (ref 94–99)
PCO2 BLDA: 54 MM HG (ref 35–45)
PCO2 TEMP ADJ BLD: ABNORMAL MM[HG]
PH BLDA: 7.38 PH UNITS (ref 7.3–7.5)
PH, TEMP CORRECTED: ABNORMAL
PLATELET # BLD AUTO: 54 10*3/MM3 (ref 140–450)
PMV BLD AUTO: 11.8 FL (ref 6–12)
PO2 BLDA: 60.4 MM HG (ref 75–100)
PO2 TEMP ADJ BLD: ABNORMAL MM[HG]
POTASSIUM BLD-SCNC: 4 MMOL/L (ref 3.5–5.2)
RBC # BLD AUTO: 4.37 10*6/MM3 (ref 3.77–5.28)
RBC MORPH BLD: NORMAL
SAO2 % BLDCOA: 92.8 % (ref 94–100)
SMALL PLATELETS BLD QL SMEAR: NORMAL
SODIUM BLD-SCNC: 144 MMOL/L (ref 136–145)
VENTILATOR MODE: ABNORMAL
WBC MORPH BLD: NORMAL
WBC NRBC COR # BLD: 15.92 10*3/MM3 (ref 3.4–10.8)

## 2020-02-23 PROCEDURE — 85025 COMPLETE CBC W/AUTO DIFF WBC: CPT | Performed by: INTERNAL MEDICINE

## 2020-02-23 PROCEDURE — 25010000002 MORPHINE PER 10 MG: Performed by: INTERNAL MEDICINE

## 2020-02-23 PROCEDURE — 83050 HGB METHEMOGLOBIN QUAN: CPT

## 2020-02-23 PROCEDURE — 36600 WITHDRAWAL OF ARTERIAL BLOOD: CPT

## 2020-02-23 PROCEDURE — 94660 CPAP INITIATION&MGMT: CPT

## 2020-02-23 PROCEDURE — 70450 CT HEAD/BRAIN W/O DYE: CPT

## 2020-02-23 PROCEDURE — 94799 UNLISTED PULMONARY SVC/PX: CPT

## 2020-02-23 PROCEDURE — 82962 GLUCOSE BLOOD TEST: CPT

## 2020-02-23 PROCEDURE — 99232 SBSQ HOSP IP/OBS MODERATE 35: CPT | Performed by: INTERNAL MEDICINE

## 2020-02-23 PROCEDURE — 71250 CT THORAX DX C-: CPT

## 2020-02-23 PROCEDURE — 25010000002 PIPERACILLIN SOD-TAZOBACTAM PER 1 G: Performed by: INTERNAL MEDICINE

## 2020-02-23 PROCEDURE — 82805 BLOOD GASES W/O2 SATURATION: CPT

## 2020-02-23 PROCEDURE — 85007 BL SMEAR W/DIFF WBC COUNT: CPT | Performed by: INTERNAL MEDICINE

## 2020-02-23 PROCEDURE — 74176 CT ABD & PELVIS W/O CONTRAST: CPT

## 2020-02-23 PROCEDURE — 82375 ASSAY CARBOXYHB QUANT: CPT

## 2020-02-23 PROCEDURE — 80048 BASIC METABOLIC PNL TOTAL CA: CPT | Performed by: INTERNAL MEDICINE

## 2020-02-23 PROCEDURE — 25010000002 CEFTRIAXONE SODIUM-DEXTROSE 1-3.74 GM-%(50ML) RECONSTITUTED SOLUTION: Performed by: INTERNAL MEDICINE

## 2020-02-23 RX ORDER — LEVOFLOXACIN 5 MG/ML
750 INJECTION, SOLUTION INTRAVENOUS EVERY 24 HOURS
Status: DISCONTINUED | OUTPATIENT
Start: 2020-02-23 | End: 2020-02-23

## 2020-02-23 RX ORDER — CEFTRIAXONE 1 G/50ML
1 INJECTION, SOLUTION INTRAVENOUS EVERY 24 HOURS
Status: DISCONTINUED | OUTPATIENT
Start: 2020-02-23 | End: 2020-02-24

## 2020-02-23 RX ORDER — LEVOFLOXACIN 750 MG/1
750 TABLET ORAL EVERY 24 HOURS
Status: DISCONTINUED | OUTPATIENT
Start: 2020-02-23 | End: 2020-02-23

## 2020-02-23 RX ADMIN — CEFTRIAXONE 1 G: 1 INJECTION, SOLUTION INTRAVENOUS at 13:22

## 2020-02-23 RX ADMIN — LIDOCAINE 1 PATCH: 50 PATCH CUTANEOUS at 04:30

## 2020-02-23 RX ADMIN — IPRATROPIUM BROMIDE AND ALBUTEROL SULFATE 3 ML: .5; 3 SOLUTION RESPIRATORY (INHALATION) at 19:27

## 2020-02-23 RX ADMIN — SODIUM CHLORIDE 50 ML/HR: 9 INJECTION, SOLUTION INTRAVENOUS at 05:15

## 2020-02-23 RX ADMIN — MORPHINE SULFATE 4 MG: 4 INJECTION, SOLUTION INTRAMUSCULAR; INTRAVENOUS at 05:55

## 2020-02-23 RX ADMIN — IPRATROPIUM BROMIDE AND ALBUTEROL SULFATE 3 ML: .5; 3 SOLUTION RESPIRATORY (INHALATION) at 05:53

## 2020-02-23 RX ADMIN — MORPHINE SULFATE 4 MG: 4 INJECTION, SOLUTION INTRAMUSCULAR; INTRAVENOUS at 13:22

## 2020-02-23 RX ADMIN — TAZOBACTAM SODIUM AND PIPERACILLIN SODIUM 4.5 G: 500; 4 INJECTION, SOLUTION INTRAVENOUS at 06:30

## 2020-02-24 ENCOUNTER — APPOINTMENT (OUTPATIENT)
Dept: CT IMAGING | Facility: HOSPITAL | Age: 70
End: 2020-02-24

## 2020-02-24 ENCOUNTER — APPOINTMENT (OUTPATIENT)
Dept: MRI IMAGING | Facility: HOSPITAL | Age: 70
End: 2020-02-24

## 2020-02-24 LAB
A-A DO2: 97.3 MMHG
ANION GAP SERPL CALCULATED.3IONS-SCNC: 8.2 MMOL/L (ref 5–15)
ARTERIAL PATENCY WRIST A: ABNORMAL
ATMOSPHERIC PRESS: 728 MMHG
BASE EXCESS BLDA CALC-SCNC: 8 MMOL/L (ref 0–2)
BASOPHILS # BLD AUTO: 0.03 10*3/MM3 (ref 0–0.2)
BASOPHILS NFR BLD AUTO: 0.3 % (ref 0–1.5)
BDY SITE: ABNORMAL
BUN BLD-MCNC: 16 MG/DL (ref 8–23)
BUN/CREAT SERPL: 24.6 (ref 7–25)
CALCIUM SPEC-SCNC: 8.8 MG/DL (ref 8.6–10.5)
CHLORIDE SERPL-SCNC: 107 MMOL/L (ref 98–107)
CO2 SERPL-SCNC: 31.8 MMOL/L (ref 22–29)
COHGB MFR BLD: 1.6 % (ref 0–2)
CREAT BLD-MCNC: 0.65 MG/DL (ref 0.57–1)
DEPRECATED RDW RBC AUTO: 45 FL (ref 37–54)
EOSINOPHIL # BLD AUTO: 0.08 10*3/MM3 (ref 0–0.4)
EOSINOPHIL NFR BLD AUTO: 0.7 % (ref 0.3–6.2)
ERYTHROCYTE [DISTWIDTH] IN BLOOD BY AUTOMATED COUNT: 13.2 % (ref 12.3–15.4)
GAS FLOW AIRWAY: 4 LPM
GFR SERPL CREATININE-BSD FRML MDRD: 90 ML/MIN/1.73
GLUCOSE BLD-MCNC: 140 MG/DL (ref 65–99)
GLUCOSE BLDC GLUCOMTR-MCNC: 118 MG/DL (ref 70–130)
GLUCOSE BLDC GLUCOMTR-MCNC: 121 MG/DL (ref 70–130)
GLUCOSE BLDC GLUCOMTR-MCNC: 123 MG/DL (ref 70–130)
HCO3 BLDA-SCNC: 36.5 MMOL/L (ref 22–28)
HCT VFR BLD AUTO: 38.8 % (ref 34–46.6)
HCT VFR BLD CALC: 42.7 %
HGB BLD-MCNC: 12.8 G/DL (ref 12–15.9)
HGB BLDA-MCNC: 13.9 G/DL (ref 12–18)
HOROWITZ INDEX BLD+IHG-RTO: 36 %
IMM GRANULOCYTES # BLD AUTO: 0.14 10*3/MM3 (ref 0–0.05)
IMM GRANULOCYTES NFR BLD AUTO: 1.3 % (ref 0–0.5)
LYMPHOCYTES # BLD AUTO: 1.82 10*3/MM3 (ref 0.7–3.1)
LYMPHOCYTES NFR BLD AUTO: 16.8 % (ref 19.6–45.3)
MCH RBC QN AUTO: 30.4 PG (ref 26.6–33)
MCHC RBC AUTO-ENTMCNC: 33 G/DL (ref 31.5–35.7)
MCV RBC AUTO: 92.2 FL (ref 79–97)
METHGB BLD QL: 0.7 % (ref 0–1.5)
MODALITY: ABNORMAL
MONOCYTES # BLD AUTO: 1.21 10*3/MM3 (ref 0.1–0.9)
MONOCYTES NFR BLD AUTO: 11.1 % (ref 5–12)
NEUTROPHILS # BLD AUTO: 7.58 10*3/MM3 (ref 1.7–7)
NEUTROPHILS NFR BLD AUTO: 69.8 % (ref 42.7–76)
NOTE: ABNORMAL
NRBC BLD AUTO-RTO: 0 /100 WBC (ref 0–0.2)
OXYHGB MFR BLDV: 92.3 % (ref 94–99)
PCO2 BLDA: 68.3 MM HG (ref 35–45)
PCO2 TEMP ADJ BLD: ABNORMAL MM[HG]
PH BLDA: 7.34 PH UNITS (ref 7.3–7.5)
PH, TEMP CORRECTED: ABNORMAL
PLATELET # BLD AUTO: 87 10*3/MM3 (ref 140–450)
PMV BLD AUTO: 11.2 FL (ref 6–12)
PO2 BLDA: 72.6 MM HG (ref 75–100)
PO2 TEMP ADJ BLD: ABNORMAL MM[HG]
POTASSIUM BLD-SCNC: 3.6 MMOL/L (ref 3.5–5.2)
RBC # BLD AUTO: 4.21 10*6/MM3 (ref 3.77–5.28)
SAO2 % BLDCOA: 94.5 % (ref 94–100)
SODIUM BLD-SCNC: 147 MMOL/L (ref 136–145)
VENTILATOR MODE: ABNORMAL
WBC NRBC COR # BLD: 10.86 10*3/MM3 (ref 3.4–10.8)

## 2020-02-24 PROCEDURE — 94799 UNLISTED PULMONARY SVC/PX: CPT

## 2020-02-24 PROCEDURE — 82962 GLUCOSE BLOOD TEST: CPT

## 2020-02-24 PROCEDURE — 99232 SBSQ HOSP IP/OBS MODERATE 35: CPT | Performed by: FAMILY MEDICINE

## 2020-02-24 PROCEDURE — 36600 WITHDRAWAL OF ARTERIAL BLOOD: CPT

## 2020-02-24 PROCEDURE — 25010000002 MORPHINE PER 10 MG: Performed by: INTERNAL MEDICINE

## 2020-02-24 PROCEDURE — 80048 BASIC METABOLIC PNL TOTAL CA: CPT | Performed by: INTERNAL MEDICINE

## 2020-02-24 PROCEDURE — 83050 HGB METHEMOGLOBIN QUAN: CPT

## 2020-02-24 PROCEDURE — 85025 COMPLETE CBC W/AUTO DIFF WBC: CPT | Performed by: INTERNAL MEDICINE

## 2020-02-24 PROCEDURE — 87040 BLOOD CULTURE FOR BACTERIA: CPT | Performed by: FAMILY MEDICINE

## 2020-02-24 PROCEDURE — 94660 CPAP INITIATION&MGMT: CPT

## 2020-02-24 PROCEDURE — 25010000002 CEFTRIAXONE SODIUM-DEXTROSE 2-2.22 GM-%(50ML) RECONSTITUTED SOLUTION: Performed by: FAMILY MEDICINE

## 2020-02-24 PROCEDURE — 82805 BLOOD GASES W/O2 SATURATION: CPT

## 2020-02-24 PROCEDURE — 70450 CT HEAD/BRAIN W/O DYE: CPT

## 2020-02-24 PROCEDURE — 82375 ASSAY CARBOXYHB QUANT: CPT

## 2020-02-24 PROCEDURE — 25010000002 ONDANSETRON PER 1 MG: Performed by: INTERNAL MEDICINE

## 2020-02-24 RX ORDER — POLYETHYLENE GLYCOL 3350 17 G/17G
17 POWDER, FOR SOLUTION ORAL DAILY PRN
Status: DISCONTINUED | OUTPATIENT
Start: 2020-02-24 | End: 2020-03-02 | Stop reason: HOSPADM

## 2020-02-24 RX ORDER — MORPHINE SULFATE 2 MG/ML
1 INJECTION, SOLUTION INTRAMUSCULAR; INTRAVENOUS EVERY 4 HOURS PRN
Status: DISCONTINUED | OUTPATIENT
Start: 2020-02-24 | End: 2020-02-24

## 2020-02-24 RX ORDER — CEFTRIAXONE 2 G/50ML
2 INJECTION, SOLUTION INTRAVENOUS EVERY 24 HOURS
Status: DISCONTINUED | OUTPATIENT
Start: 2020-02-24 | End: 2020-02-25

## 2020-02-24 RX ORDER — MENTHOL AND METHYL SALICYLATE 10; 30 G/100G; G/100G
CREAM TOPICAL 3 TIMES DAILY PRN
Status: DISCONTINUED | OUTPATIENT
Start: 2020-02-24 | End: 2020-03-02 | Stop reason: HOSPADM

## 2020-02-24 RX ORDER — BISACODYL 10 MG
10 SUPPOSITORY, RECTAL RECTAL DAILY PRN
Status: DISCONTINUED | OUTPATIENT
Start: 2020-02-24 | End: 2020-03-02 | Stop reason: HOSPADM

## 2020-02-24 RX ORDER — TRAMADOL HYDROCHLORIDE 50 MG/1
25 TABLET ORAL EVERY 6 HOURS PRN
Status: DISCONTINUED | OUTPATIENT
Start: 2020-02-24 | End: 2020-02-24

## 2020-02-24 RX ADMIN — ONDANSETRON 4 MG: 2 INJECTION INTRAMUSCULAR; INTRAVENOUS at 09:15

## 2020-02-24 RX ADMIN — CEFTRIAXONE 2 G: 2 INJECTION, SOLUTION INTRAVENOUS at 12:26

## 2020-02-24 RX ADMIN — SODIUM CHLORIDE, PRESERVATIVE FREE 10 ML: 5 INJECTION INTRAVENOUS at 09:07

## 2020-02-24 RX ADMIN — SODIUM CHLORIDE 100 ML/HR: 9 INJECTION, SOLUTION INTRAVENOUS at 17:49

## 2020-02-24 RX ADMIN — MORPHINE SULFATE 4 MG: 4 INJECTION, SOLUTION INTRAMUSCULAR; INTRAVENOUS at 05:33

## 2020-02-24 RX ADMIN — DOCUSATE SODIUM 100 MG: 100 CAPSULE, LIQUID FILLED ORAL at 09:06

## 2020-02-24 RX ADMIN — CETIRIZINE HYDROCHLORIDE 5 MG: 10 TABLET, FILM COATED ORAL at 09:06

## 2020-02-24 RX ADMIN — MORPHINE SULFATE 4 MG: 4 INJECTION, SOLUTION INTRAMUSCULAR; INTRAVENOUS at 09:15

## 2020-02-24 RX ADMIN — MORPHINE SULFATE 4 MG: 4 INJECTION, SOLUTION INTRAMUSCULAR; INTRAVENOUS at 01:13

## 2020-02-24 RX ADMIN — LIDOCAINE 1 PATCH: 50 PATCH CUTANEOUS at 05:45

## 2020-02-24 RX ADMIN — PROPRANOLOL HYDROCHLORIDE 80 MG: 20 TABLET ORAL at 09:06

## 2020-02-24 RX ADMIN — IPRATROPIUM BROMIDE AND ALBUTEROL SULFATE 3 ML: .5; 3 SOLUTION RESPIRATORY (INHALATION) at 07:26

## 2020-02-25 ENCOUNTER — APPOINTMENT (OUTPATIENT)
Dept: GENERAL RADIOLOGY | Facility: HOSPITAL | Age: 70
End: 2020-02-25

## 2020-02-25 ENCOUNTER — APPOINTMENT (OUTPATIENT)
Dept: CT IMAGING | Facility: HOSPITAL | Age: 70
End: 2020-02-25

## 2020-02-25 PROBLEM — M54.50 ACUTE LOW BACK PAIN: Status: ACTIVE | Noted: 2020-02-25

## 2020-02-25 PROBLEM — G93.41 METABOLIC ENCEPHALOPATHY: Status: ACTIVE | Noted: 2020-02-25

## 2020-02-25 LAB
ALBUMIN SERPL-MCNC: 3 G/DL (ref 3.5–5.2)
ALBUMIN/GLOB SERPL: 0.9 G/DL
ALP SERPL-CCNC: 76 U/L (ref 39–117)
ALT SERPL W P-5'-P-CCNC: 32 U/L (ref 1–33)
ANION GAP SERPL CALCULATED.3IONS-SCNC: 7.5 MMOL/L (ref 5–15)
AST SERPL-CCNC: 50 U/L (ref 1–32)
BILIRUB SERPL-MCNC: 1.1 MG/DL (ref 0.2–1.2)
BUN BLD-MCNC: 16 MG/DL (ref 8–23)
BUN/CREAT SERPL: 24.2 (ref 7–25)
CALCIUM SPEC-SCNC: 8.4 MG/DL (ref 8.6–10.5)
CHLORIDE SERPL-SCNC: 108 MMOL/L (ref 98–107)
CO2 SERPL-SCNC: 35.5 MMOL/L (ref 22–29)
CREAT BLD-MCNC: 0.66 MG/DL (ref 0.57–1)
D DIMER PPP FEU-MCNC: 9.88 MCGFEU/ML (ref 0–0.57)
DEPRECATED RDW RBC AUTO: 47 FL (ref 37–54)
EOSINOPHIL # BLD MANUAL: 0.11 10*3/MM3 (ref 0–0.4)
EOSINOPHIL NFR BLD MANUAL: 1 % (ref 0.3–6.2)
ERYTHROCYTE [DISTWIDTH] IN BLOOD BY AUTOMATED COUNT: 13.4 % (ref 12.3–15.4)
GFR SERPL CREATININE-BSD FRML MDRD: 89 ML/MIN/1.73
GLOBULIN UR ELPH-MCNC: 3.3 GM/DL
GLUCOSE BLD-MCNC: 138 MG/DL (ref 65–99)
GLUCOSE BLDC GLUCOMTR-MCNC: 112 MG/DL (ref 70–130)
GLUCOSE BLDC GLUCOMTR-MCNC: 117 MG/DL (ref 70–130)
GLUCOSE BLDC GLUCOMTR-MCNC: 126 MG/DL (ref 70–130)
GLUCOSE BLDC GLUCOMTR-MCNC: 128 MG/DL (ref 70–130)
GLUCOSE BLDC GLUCOMTR-MCNC: 151 MG/DL (ref 70–130)
HCT VFR BLD AUTO: 40.4 % (ref 34–46.6)
HGB BLD-MCNC: 13 G/DL (ref 12–15.9)
L PNEUMO1 AG UR QL IA: NEGATIVE
LYMPHOCYTES # BLD MANUAL: 1.85 10*3/MM3 (ref 0.7–3.1)
LYMPHOCYTES NFR BLD MANUAL: 17 % (ref 19.6–45.3)
LYMPHOCYTES NFR BLD MANUAL: 4 % (ref 5–12)
MCH RBC QN AUTO: 30.5 PG (ref 26.6–33)
MCHC RBC AUTO-ENTMCNC: 32.2 G/DL (ref 31.5–35.7)
MCV RBC AUTO: 94.8 FL (ref 79–97)
MONOCYTES # BLD AUTO: 0.43 10*3/MM3 (ref 0.1–0.9)
NEUTROPHILS # BLD AUTO: 8.47 10*3/MM3 (ref 1.7–7)
NEUTROPHILS NFR BLD MANUAL: 71 % (ref 42.7–76)
NEUTS BAND NFR BLD MANUAL: 7 % (ref 0–5)
PLATELET # BLD AUTO: 107 10*3/MM3 (ref 140–450)
PMV BLD AUTO: 11.2 FL (ref 6–12)
POTASSIUM BLD-SCNC: 3.7 MMOL/L (ref 3.5–5.2)
PROCALCITONIN SERPL-MCNC: 1.65 NG/ML (ref 0.1–0.25)
PROT SERPL-MCNC: 6.3 G/DL (ref 6–8.5)
RBC # BLD AUTO: 4.26 10*6/MM3 (ref 3.77–5.28)
RBC MORPH BLD: NORMAL
S PNEUM AG SPEC QL LA: NEGATIVE
SCAN SLIDE: NORMAL
SMALL PLATELETS BLD QL SMEAR: ADEQUATE
SODIUM BLD-SCNC: 151 MMOL/L (ref 136–145)
WBC MORPH BLD: NORMAL
WBC NRBC COR # BLD: 10.86 10*3/MM3 (ref 3.4–10.8)

## 2020-02-25 PROCEDURE — 85379 FIBRIN DEGRADATION QUANT: CPT | Performed by: FAMILY MEDICINE

## 2020-02-25 PROCEDURE — 25010000002 PIPERACILLIN SOD-TAZOBACTAM PER 1 G: Performed by: FAMILY MEDICINE

## 2020-02-25 PROCEDURE — 85025 COMPLETE CBC W/AUTO DIFF WBC: CPT | Performed by: FAMILY MEDICINE

## 2020-02-25 PROCEDURE — 25010000002 ENOXAPARIN PER 10 MG: Performed by: FAMILY MEDICINE

## 2020-02-25 PROCEDURE — 71046 X-RAY EXAM CHEST 2 VIEWS: CPT

## 2020-02-25 PROCEDURE — 85007 BL SMEAR W/DIFF WBC COUNT: CPT | Performed by: FAMILY MEDICINE

## 2020-02-25 PROCEDURE — 97530 THERAPEUTIC ACTIVITIES: CPT

## 2020-02-25 PROCEDURE — 72220 X-RAY EXAM SACRUM TAILBONE: CPT

## 2020-02-25 PROCEDURE — 25010000002 FUROSEMIDE PER 20 MG: Performed by: FAMILY MEDICINE

## 2020-02-25 PROCEDURE — 99232 SBSQ HOSP IP/OBS MODERATE 35: CPT | Performed by: FAMILY MEDICINE

## 2020-02-25 PROCEDURE — 94799 UNLISTED PULMONARY SVC/PX: CPT

## 2020-02-25 PROCEDURE — 25010000002 CEFTRIAXONE SODIUM-DEXTROSE 2-2.22 GM-%(50ML) RECONSTITUTED SOLUTION: Performed by: FAMILY MEDICINE

## 2020-02-25 PROCEDURE — 87899 AGENT NOS ASSAY W/OPTIC: CPT | Performed by: FAMILY MEDICINE

## 2020-02-25 PROCEDURE — 87641 MR-STAPH DNA AMP PROBE: CPT | Performed by: FAMILY MEDICINE

## 2020-02-25 PROCEDURE — 84145 PROCALCITONIN (PCT): CPT | Performed by: FAMILY MEDICINE

## 2020-02-25 PROCEDURE — 25010000002 IOPAMIDOL 61 % SOLUTION: Performed by: FAMILY MEDICINE

## 2020-02-25 PROCEDURE — 71275 CT ANGIOGRAPHY CHEST: CPT

## 2020-02-25 PROCEDURE — 72100 X-RAY EXAM L-S SPINE 2/3 VWS: CPT

## 2020-02-25 PROCEDURE — 94660 CPAP INITIATION&MGMT: CPT

## 2020-02-25 PROCEDURE — 80053 COMPREHEN METABOLIC PANEL: CPT | Performed by: FAMILY MEDICINE

## 2020-02-25 PROCEDURE — 25010000002 AZITHROMYCIN 500 MG/250 ML: Performed by: FAMILY MEDICINE

## 2020-02-25 PROCEDURE — 82962 GLUCOSE BLOOD TEST: CPT

## 2020-02-25 RX ORDER — IPRATROPIUM BROMIDE AND ALBUTEROL SULFATE 2.5; .5 MG/3ML; MG/3ML
3 SOLUTION RESPIRATORY (INHALATION)
Status: DISCONTINUED | OUTPATIENT
Start: 2020-02-25 | End: 2020-02-26

## 2020-02-25 RX ORDER — IPRATROPIUM BROMIDE AND ALBUTEROL SULFATE 2.5; .5 MG/3ML; MG/3ML
3 SOLUTION RESPIRATORY (INHALATION) EVERY 4 HOURS PRN
Status: DISCONTINUED | OUTPATIENT
Start: 2020-02-25 | End: 2020-03-02 | Stop reason: HOSPADM

## 2020-02-25 RX ORDER — DEXTROSE MONOHYDRATE 50 MG/ML
50 INJECTION, SOLUTION INTRAVENOUS CONTINUOUS
Status: DISCONTINUED | OUTPATIENT
Start: 2020-02-25 | End: 2020-02-26

## 2020-02-25 RX ORDER — SODIUM CHLORIDE FOR INHALATION 3 %
4 VIAL, NEBULIZER (ML) INHALATION ONCE
Status: COMPLETED | OUTPATIENT
Start: 2020-02-25 | End: 2020-02-25

## 2020-02-25 RX ORDER — DEXTROSE AND SODIUM CHLORIDE 5; .9 G/100ML; G/100ML
50 INJECTION, SOLUTION INTRAVENOUS CONTINUOUS
Status: DISCONTINUED | OUTPATIENT
Start: 2020-02-25 | End: 2020-02-25

## 2020-02-25 RX ORDER — FUROSEMIDE 10 MG/ML
20 INJECTION INTRAMUSCULAR; INTRAVENOUS ONCE
Status: COMPLETED | OUTPATIENT
Start: 2020-02-25 | End: 2020-02-25

## 2020-02-25 RX ORDER — NICOTINE POLACRILEX 4 MG
1 LOZENGE BUCCAL
Status: DISCONTINUED | OUTPATIENT
Start: 2020-02-25 | End: 2020-03-02 | Stop reason: HOSPADM

## 2020-02-25 RX ORDER — DEXTROSE MONOHYDRATE 25 G/50ML
25 INJECTION, SOLUTION INTRAVENOUS
Status: DISCONTINUED | OUTPATIENT
Start: 2020-02-25 | End: 2020-03-02 | Stop reason: HOSPADM

## 2020-02-25 RX ORDER — DEXTROSE AND SODIUM CHLORIDE 5; .45 G/100ML; G/100ML
50 INJECTION, SOLUTION INTRAVENOUS CONTINUOUS
Status: DISCONTINUED | OUTPATIENT
Start: 2020-02-25 | End: 2020-02-25

## 2020-02-25 RX ORDER — ALBUTEROL SULFATE 2.5 MG/3ML
2.5 SOLUTION RESPIRATORY (INHALATION) ONCE AS NEEDED
Status: DISCONTINUED | OUTPATIENT
Start: 2020-02-25 | End: 2020-03-02 | Stop reason: HOSPADM

## 2020-02-25 RX ORDER — PROPRANOLOL HYDROCHLORIDE 20 MG/1
40 TABLET ORAL 2 TIMES DAILY
Status: DISCONTINUED | OUTPATIENT
Start: 2020-02-25 | End: 2020-02-25

## 2020-02-25 RX ADMIN — IPRATROPIUM BROMIDE AND ALBUTEROL SULFATE 3 ML: .5; 3 SOLUTION RESPIRATORY (INHALATION) at 06:54

## 2020-02-25 RX ADMIN — TAZOBACTAM SODIUM AND PIPERACILLIN SODIUM 4.5 G: 500; 4 INJECTION, SOLUTION INTRAVENOUS at 18:56

## 2020-02-25 RX ADMIN — SODIUM CHLORIDE, PRESERVATIVE FREE 10 ML: 5 INJECTION INTRAVENOUS at 09:47

## 2020-02-25 RX ADMIN — IPRATROPIUM BROMIDE AND ALBUTEROL SULFATE 3 ML: .5; 3 SOLUTION RESPIRATORY (INHALATION) at 19:04

## 2020-02-25 RX ADMIN — DEXTROSE MONOHYDRATE 50 ML/HR: 50 INJECTION, SOLUTION INTRAVENOUS at 11:25

## 2020-02-25 RX ADMIN — AZITHROMYCIN 500 MG: 500 INJECTION, POWDER, LYOPHILIZED, FOR SOLUTION INTRAVENOUS at 20:28

## 2020-02-25 RX ADMIN — CEFTRIAXONE 2 G: 2 INJECTION, SOLUTION INTRAVENOUS at 11:25

## 2020-02-25 RX ADMIN — ENOXAPARIN SODIUM 40 MG: 40 INJECTION SUBCUTANEOUS at 11:25

## 2020-02-25 RX ADMIN — FUROSEMIDE 20 MG: 10 INJECTION, SOLUTION INTRAMUSCULAR; INTRAVENOUS at 09:47

## 2020-02-25 RX ADMIN — TAZOBACTAM SODIUM AND PIPERACILLIN SODIUM 4.5 G: 500; 4 INJECTION, SOLUTION INTRAVENOUS at 14:12

## 2020-02-25 RX ADMIN — ACETAMINOPHEN 650 MG: 325 TABLET, FILM COATED ORAL at 11:25

## 2020-02-25 RX ADMIN — DEXTROSE AND SODIUM CHLORIDE 50 ML/HR: 5; 450 INJECTION, SOLUTION INTRAVENOUS at 09:52

## 2020-02-25 RX ADMIN — IOPAMIDOL 100 ML: 612 INJECTION, SOLUTION INTRAVENOUS at 13:30

## 2020-02-25 RX ADMIN — SODIUM CHLORIDE 30 MG/ML INHALATION SOLUTION 4 ML: 30 SOLUTION INHALANT at 19:04

## 2020-02-26 ENCOUNTER — APPOINTMENT (OUTPATIENT)
Dept: CARDIOLOGY | Facility: HOSPITAL | Age: 70
End: 2020-02-26

## 2020-02-26 ENCOUNTER — APPOINTMENT (OUTPATIENT)
Dept: GENERAL RADIOLOGY | Facility: HOSPITAL | Age: 70
End: 2020-02-26

## 2020-02-26 LAB
ALBUMIN SERPL-MCNC: 2.8 G/DL (ref 3.5–5.2)
ALBUMIN/GLOB SERPL: 0.8 G/DL
ALP SERPL-CCNC: 72 U/L (ref 39–117)
ALT SERPL W P-5'-P-CCNC: 51 U/L (ref 1–33)
ANION GAP SERPL CALCULATED.3IONS-SCNC: 9.7 MMOL/L (ref 5–15)
AST SERPL-CCNC: 60 U/L (ref 1–32)
BASOPHILS # BLD MANUAL: 0.13 10*3/MM3 (ref 0–0.2)
BASOPHILS NFR BLD AUTO: 1 % (ref 0–1.5)
BILIRUB SERPL-MCNC: 0.8 MG/DL (ref 0.2–1.2)
BUN BLD-MCNC: 12 MG/DL (ref 8–23)
BUN/CREAT SERPL: 19 (ref 7–25)
CALCIUM SPEC-SCNC: 8.8 MG/DL (ref 8.6–10.5)
CHLORIDE SERPL-SCNC: 98 MMOL/L (ref 98–107)
CO2 SERPL-SCNC: 36.3 MMOL/L (ref 22–29)
CREAT BLD-MCNC: 0.63 MG/DL (ref 0.57–1)
DEPRECATED RDW RBC AUTO: 44.8 FL (ref 37–54)
EOSINOPHIL # BLD MANUAL: 0.5 10*3/MM3 (ref 0–0.4)
EOSINOPHIL NFR BLD MANUAL: 4 % (ref 0.3–6.2)
ERYTHROCYTE [DISTWIDTH] IN BLOOD BY AUTOMATED COUNT: 13 % (ref 12.3–15.4)
GFR SERPL CREATININE-BSD FRML MDRD: 93 ML/MIN/1.73
GLOBULIN UR ELPH-MCNC: 3.3 GM/DL
GLUCOSE BLD-MCNC: 177 MG/DL (ref 65–99)
GLUCOSE BLDC GLUCOMTR-MCNC: 141 MG/DL (ref 70–130)
GLUCOSE BLDC GLUCOMTR-MCNC: 173 MG/DL (ref 70–130)
GLUCOSE BLDC GLUCOMTR-MCNC: 182 MG/DL (ref 70–130)
GLUCOSE BLDC GLUCOMTR-MCNC: 204 MG/DL (ref 70–130)
HCT VFR BLD AUTO: 41.4 % (ref 34–46.6)
HGB BLD-MCNC: 13.7 G/DL (ref 12–15.9)
LV EF 2D ECHO EST: 55 %
LYMPHOCYTES # BLD MANUAL: 1.13 10*3/MM3 (ref 0.7–3.1)
LYMPHOCYTES NFR BLD MANUAL: 3 % (ref 5–12)
LYMPHOCYTES NFR BLD MANUAL: 9 % (ref 19.6–45.3)
MAGNESIUM SERPL-MCNC: 1.8 MG/DL (ref 1.6–2.4)
MAXIMAL PREDICTED HEART RATE: 150 BPM
MCH RBC QN AUTO: 31.1 PG (ref 26.6–33)
MCHC RBC AUTO-ENTMCNC: 33.1 G/DL (ref 31.5–35.7)
MCV RBC AUTO: 93.9 FL (ref 79–97)
MONOCYTES # BLD AUTO: 0.38 10*3/MM3 (ref 0.1–0.9)
MRSA DNA SPEC QL NAA+PROBE: NORMAL
NEUTROPHILS # BLD AUTO: 9.91 10*3/MM3 (ref 1.7–7)
NEUTROPHILS NFR BLD MANUAL: 77 % (ref 42.7–76)
NEUTS BAND NFR BLD MANUAL: 2 % (ref 0–5)
NT-PROBNP SERPL-MCNC: 2428 PG/ML (ref 5–900)
PLATELET # BLD AUTO: 128 10*3/MM3 (ref 140–450)
PMV BLD AUTO: 11.3 FL (ref 6–12)
POTASSIUM BLD-SCNC: 3.1 MMOL/L (ref 3.5–5.2)
PROT SERPL-MCNC: 6.1 G/DL (ref 6–8.5)
RBC # BLD AUTO: 4.41 10*6/MM3 (ref 3.77–5.28)
RBC MORPH BLD: NORMAL
SCAN SLIDE: NORMAL
SMALL PLATELETS BLD QL SMEAR: ABNORMAL
SMALL PLATELETS BLD QL SMEAR: NORMAL
SODIUM BLD-SCNC: 144 MMOL/L (ref 136–145)
STRESS TARGET HR: 128 BPM
TROPONIN T SERPL-MCNC: <0.01 NG/ML (ref 0–0.03)
VARIANT LYMPHS NFR BLD MANUAL: 4 % (ref 0–5)
WBC MORPH BLD: NORMAL
WBC NRBC COR # BLD: 12.54 10*3/MM3 (ref 3.4–10.8)

## 2020-02-26 PROCEDURE — 83735 ASSAY OF MAGNESIUM: CPT | Performed by: FAMILY MEDICINE

## 2020-02-26 PROCEDURE — 93307 TTE W/O DOPPLER COMPLETE: CPT | Performed by: INTERNAL MEDICINE

## 2020-02-26 PROCEDURE — 97530 THERAPEUTIC ACTIVITIES: CPT

## 2020-02-26 PROCEDURE — 94660 CPAP INITIATION&MGMT: CPT

## 2020-02-26 PROCEDURE — 25010000002 ENOXAPARIN PER 10 MG: Performed by: INTERNAL MEDICINE

## 2020-02-26 PROCEDURE — 25010000002 PIPERACILLIN SOD-TAZOBACTAM PER 1 G: Performed by: FAMILY MEDICINE

## 2020-02-26 PROCEDURE — 94799 UNLISTED PULMONARY SVC/PX: CPT

## 2020-02-26 PROCEDURE — 83880 ASSAY OF NATRIURETIC PEPTIDE: CPT | Performed by: INTERNAL MEDICINE

## 2020-02-26 PROCEDURE — 80053 COMPREHEN METABOLIC PANEL: CPT | Performed by: FAMILY MEDICINE

## 2020-02-26 PROCEDURE — 63710000001 INSULIN ASPART PER 5 UNITS: Performed by: INTERNAL MEDICINE

## 2020-02-26 PROCEDURE — 71045 X-RAY EXAM CHEST 1 VIEW: CPT

## 2020-02-26 PROCEDURE — 25010000002 ENOXAPARIN PER 10 MG: Performed by: FAMILY MEDICINE

## 2020-02-26 PROCEDURE — 93306 TTE W/DOPPLER COMPLETE: CPT

## 2020-02-26 PROCEDURE — 84484 ASSAY OF TROPONIN QUANT: CPT | Performed by: INTERNAL MEDICINE

## 2020-02-26 PROCEDURE — 99222 1ST HOSP IP/OBS MODERATE 55: CPT | Performed by: INTERNAL MEDICINE

## 2020-02-26 PROCEDURE — 25010000002 PIPERACILLIN SOD-TAZOBACTAM PER 1 G: Performed by: INTERNAL MEDICINE

## 2020-02-26 PROCEDURE — 85007 BL SMEAR W/DIFF WBC COUNT: CPT | Performed by: FAMILY MEDICINE

## 2020-02-26 PROCEDURE — 82962 GLUCOSE BLOOD TEST: CPT

## 2020-02-26 PROCEDURE — 99291 CRITICAL CARE FIRST HOUR: CPT | Performed by: INTERNAL MEDICINE

## 2020-02-26 PROCEDURE — 99231 SBSQ HOSP IP/OBS SF/LOW 25: CPT | Performed by: UROLOGY

## 2020-02-26 PROCEDURE — 85025 COMPLETE CBC W/AUTO DIFF WBC: CPT | Performed by: FAMILY MEDICINE

## 2020-02-26 PROCEDURE — 93005 ELECTROCARDIOGRAM TRACING: CPT | Performed by: INTERNAL MEDICINE

## 2020-02-26 RX ORDER — ASPIRIN 81 MG/1
324 TABLET, CHEWABLE ORAL DAILY
Status: DISCONTINUED | OUTPATIENT
Start: 2020-02-26 | End: 2020-02-28

## 2020-02-26 RX ORDER — SODIUM CHLORIDE FOR INHALATION 3 %
4 VIAL, NEBULIZER (ML) INHALATION ONCE
Status: COMPLETED | OUTPATIENT
Start: 2020-02-26 | End: 2020-02-26

## 2020-02-26 RX ORDER — HYDROCODONE BITARTRATE AND ACETAMINOPHEN 5; 325 MG/1; MG/1
1 TABLET ORAL EVERY 6 HOURS PRN
Status: DISCONTINUED | OUTPATIENT
Start: 2020-02-26 | End: 2020-02-26

## 2020-02-26 RX ORDER — POTASSIUM CHLORIDE 1.5 G/1.77G
40 POWDER, FOR SOLUTION ORAL ONCE
Status: COMPLETED | OUTPATIENT
Start: 2020-02-26 | End: 2020-02-26

## 2020-02-26 RX ORDER — HYDROCODONE BITARTRATE AND ACETAMINOPHEN 5; 325 MG/1; MG/1
0.5 TABLET ORAL EVERY 6 HOURS PRN
Status: DISCONTINUED | OUTPATIENT
Start: 2020-02-26 | End: 2020-03-02 | Stop reason: HOSPADM

## 2020-02-26 RX ORDER — HYDRALAZINE HYDROCHLORIDE 25 MG/1
25 TABLET, FILM COATED ORAL ONCE
Status: COMPLETED | OUTPATIENT
Start: 2020-02-26 | End: 2020-02-26

## 2020-02-26 RX ORDER — SOTALOL HYDROCHLORIDE 80 MG/1
80 TABLET ORAL EVERY 12 HOURS SCHEDULED
Status: DISCONTINUED | OUTPATIENT
Start: 2020-02-26 | End: 2020-03-02 | Stop reason: HOSPADM

## 2020-02-26 RX ADMIN — INSULIN ASPART 3 UNITS: 100 INJECTION, SOLUTION INTRAVENOUS; SUBCUTANEOUS at 12:06

## 2020-02-26 RX ADMIN — INSULIN ASPART 2 UNITS: 100 INJECTION, SOLUTION INTRAVENOUS; SUBCUTANEOUS at 21:11

## 2020-02-26 RX ADMIN — SOTALOL HYDROCHLORIDE 80 MG: 80 TABLET ORAL at 21:07

## 2020-02-26 RX ADMIN — IPRATROPIUM BROMIDE 0.5 MG: 0.5 SOLUTION RESPIRATORY (INHALATION) at 15:38

## 2020-02-26 RX ADMIN — METOPROLOL TARTRATE 5 MG: 1 INJECTION, SOLUTION INTRAVENOUS at 02:38

## 2020-02-26 RX ADMIN — HYDRALAZINE HYDROCHLORIDE 25 MG: 25 TABLET ORAL at 20:08

## 2020-02-26 RX ADMIN — HYDROCODONE BITARTRATE AND ACETAMINOPHEN 1 TABLET: 5; 325 TABLET ORAL at 12:29

## 2020-02-26 RX ADMIN — ASPIRIN 81 MG 324 MG: 81 TABLET ORAL at 09:22

## 2020-02-26 RX ADMIN — DEXTROSE MONOHYDRATE 50 ML/HR: 50 INJECTION, SOLUTION INTRAVENOUS at 13:17

## 2020-02-26 RX ADMIN — POLYETHYLENE GLYCOL 3350 17 G: 17 POWDER, FOR SOLUTION ORAL at 09:27

## 2020-02-26 RX ADMIN — HYDROCODONE BITARTRATE AND ACETAMINOPHEN 0.5 TABLET: 5; 325 TABLET ORAL at 19:07

## 2020-02-26 RX ADMIN — ENOXAPARIN SODIUM 80 MG: 80 INJECTION SUBCUTANEOUS at 06:44

## 2020-02-26 RX ADMIN — TAZOBACTAM SODIUM AND PIPERACILLIN SODIUM 4.5 G: 500; 4 INJECTION, SOLUTION INTRAVENOUS at 03:30

## 2020-02-26 RX ADMIN — SODIUM CHLORIDE, PRESERVATIVE FREE 10 ML: 5 INJECTION INTRAVENOUS at 21:20

## 2020-02-26 RX ADMIN — TAZOBACTAM SODIUM AND PIPERACILLIN SODIUM 4.5 G: 500; 4 INJECTION, SOLUTION INTRAVENOUS at 17:53

## 2020-02-26 RX ADMIN — METOPROLOL TARTRATE 5 MG: 1 INJECTION, SOLUTION INTRAVENOUS at 18:55

## 2020-02-26 RX ADMIN — SODIUM CHLORIDE 30 MG/ML INHALATION SOLUTION 4 ML: 30 SOLUTION INHALANT at 06:49

## 2020-02-26 RX ADMIN — IPRATROPIUM BROMIDE 0.5 MG: 0.5 SOLUTION RESPIRATORY (INHALATION) at 06:49

## 2020-02-26 RX ADMIN — TAZOBACTAM SODIUM AND PIPERACILLIN SODIUM 4.5 G: 500; 4 INJECTION, SOLUTION INTRAVENOUS at 11:09

## 2020-02-26 RX ADMIN — POTASSIUM CHLORIDE 40 MEQ: 1.5 POWDER, FOR SOLUTION ORAL at 12:11

## 2020-02-26 RX ADMIN — SOTALOL HYDROCHLORIDE 80 MG: 80 TABLET ORAL at 09:22

## 2020-02-26 RX ADMIN — ENOXAPARIN SODIUM 40 MG: 40 INJECTION SUBCUTANEOUS at 01:37

## 2020-02-26 RX ADMIN — ENOXAPARIN SODIUM 120 MG: 120 INJECTION SUBCUTANEOUS at 17:53

## 2020-02-26 RX ADMIN — IPRATROPIUM BROMIDE AND ALBUTEROL SULFATE 3 ML: .5; 3 SOLUTION RESPIRATORY (INHALATION) at 00:19

## 2020-02-26 RX ADMIN — INSULIN ASPART 2 UNITS: 100 INJECTION, SOLUTION INTRAVENOUS; SUBCUTANEOUS at 17:54

## 2020-02-27 ENCOUNTER — APPOINTMENT (OUTPATIENT)
Dept: CT IMAGING | Facility: HOSPITAL | Age: 70
End: 2020-02-27

## 2020-02-27 LAB
ANION GAP SERPL CALCULATED.3IONS-SCNC: 8.7 MMOL/L (ref 5–15)
BASOPHILS # BLD AUTO: 0.04 10*3/MM3 (ref 0–0.2)
BASOPHILS NFR BLD AUTO: 0.3 % (ref 0–1.5)
BUN BLD-MCNC: 9 MG/DL (ref 8–23)
BUN/CREAT SERPL: 15.3 (ref 7–25)
CALCIUM SPEC-SCNC: 8.7 MG/DL (ref 8.6–10.5)
CHLORIDE SERPL-SCNC: 97 MMOL/L (ref 98–107)
CO2 SERPL-SCNC: 37.3 MMOL/L (ref 22–29)
CREAT BLD-MCNC: 0.59 MG/DL (ref 0.57–1)
DEPRECATED RDW RBC AUTO: 44.3 FL (ref 37–54)
EOSINOPHIL # BLD AUTO: 0.24 10*3/MM3 (ref 0–0.4)
EOSINOPHIL NFR BLD AUTO: 1.8 % (ref 0.3–6.2)
ERYTHROCYTE [DISTWIDTH] IN BLOOD BY AUTOMATED COUNT: 12.9 % (ref 12.3–15.4)
GFR SERPL CREATININE-BSD FRML MDRD: 101 ML/MIN/1.73
GLUCOSE BLD-MCNC: 172 MG/DL (ref 65–99)
GLUCOSE BLDC GLUCOMTR-MCNC: 127 MG/DL (ref 70–130)
GLUCOSE BLDC GLUCOMTR-MCNC: 151 MG/DL (ref 70–130)
GLUCOSE BLDC GLUCOMTR-MCNC: 168 MG/DL (ref 70–130)
GLUCOSE BLDC GLUCOMTR-MCNC: 191 MG/DL (ref 70–130)
HCT VFR BLD AUTO: 42.1 % (ref 34–46.6)
HGB BLD-MCNC: 13.6 G/DL (ref 12–15.9)
IMM GRANULOCYTES # BLD AUTO: 0.14 10*3/MM3 (ref 0–0.05)
IMM GRANULOCYTES NFR BLD AUTO: 1.1 % (ref 0–0.5)
LYMPHOCYTES # BLD AUTO: 2.12 10*3/MM3 (ref 0.7–3.1)
LYMPHOCYTES NFR BLD AUTO: 16 % (ref 19.6–45.3)
MCH RBC QN AUTO: 30.2 PG (ref 26.6–33)
MCHC RBC AUTO-ENTMCNC: 32.3 G/DL (ref 31.5–35.7)
MCV RBC AUTO: 93.6 FL (ref 79–97)
MONOCYTES # BLD AUTO: 0.64 10*3/MM3 (ref 0.1–0.9)
MONOCYTES NFR BLD AUTO: 4.8 % (ref 5–12)
NEUTROPHILS # BLD AUTO: 10.05 10*3/MM3 (ref 1.7–7)
NEUTROPHILS NFR BLD AUTO: 76 % (ref 42.7–76)
NRBC BLD AUTO-RTO: 0 /100 WBC (ref 0–0.2)
PLATELET # BLD AUTO: 155 10*3/MM3 (ref 140–450)
PMV BLD AUTO: 11.5 FL (ref 6–12)
POTASSIUM BLD-SCNC: 3.6 MMOL/L (ref 3.5–5.2)
RBC # BLD AUTO: 4.5 10*6/MM3 (ref 3.77–5.28)
SODIUM BLD-SCNC: 143 MMOL/L (ref 136–145)
WBC NRBC COR # BLD: 13.23 10*3/MM3 (ref 3.4–10.8)

## 2020-02-27 PROCEDURE — 80048 BASIC METABOLIC PNL TOTAL CA: CPT | Performed by: FAMILY MEDICINE

## 2020-02-27 PROCEDURE — 97535 SELF CARE MNGMENT TRAINING: CPT

## 2020-02-27 PROCEDURE — 94799 UNLISTED PULMONARY SVC/PX: CPT

## 2020-02-27 PROCEDURE — 72131 CT LUMBAR SPINE W/O DYE: CPT

## 2020-02-27 PROCEDURE — 97110 THERAPEUTIC EXERCISES: CPT

## 2020-02-27 PROCEDURE — 97530 THERAPEUTIC ACTIVITIES: CPT

## 2020-02-27 PROCEDURE — 93005 ELECTROCARDIOGRAM TRACING: CPT | Performed by: INTERNAL MEDICINE

## 2020-02-27 PROCEDURE — 82962 GLUCOSE BLOOD TEST: CPT

## 2020-02-27 PROCEDURE — 99232 SBSQ HOSP IP/OBS MODERATE 35: CPT | Performed by: FAMILY MEDICINE

## 2020-02-27 PROCEDURE — 25010000002 ENOXAPARIN PER 10 MG: Performed by: INTERNAL MEDICINE

## 2020-02-27 PROCEDURE — 85025 COMPLETE CBC W/AUTO DIFF WBC: CPT | Performed by: FAMILY MEDICINE

## 2020-02-27 PROCEDURE — 93005 ELECTROCARDIOGRAM TRACING: CPT | Performed by: FAMILY MEDICINE

## 2020-02-27 PROCEDURE — 25010000002 PIPERACILLIN SOD-TAZOBACTAM PER 1 G: Performed by: INTERNAL MEDICINE

## 2020-02-27 PROCEDURE — 63710000001 INSULIN ASPART PER 5 UNITS: Performed by: INTERNAL MEDICINE

## 2020-02-27 PROCEDURE — 25010000002 FUROSEMIDE PER 20 MG: Performed by: FAMILY MEDICINE

## 2020-02-27 PROCEDURE — 94660 CPAP INITIATION&MGMT: CPT

## 2020-02-27 RX ORDER — AMLODIPINE BESYLATE 5 MG/1
10 TABLET ORAL
Status: DISCONTINUED | OUTPATIENT
Start: 2020-02-27 | End: 2020-03-02 | Stop reason: HOSPADM

## 2020-02-27 RX ORDER — ALPRAZOLAM 0.25 MG/1
0.25 TABLET ORAL 3 TIMES DAILY PRN
Status: DISCONTINUED | OUTPATIENT
Start: 2020-02-27 | End: 2020-03-02 | Stop reason: HOSPADM

## 2020-02-27 RX ORDER — SPIRONOLACTONE 25 MG/1
25 TABLET ORAL DAILY
Status: DISCONTINUED | OUTPATIENT
Start: 2020-02-28 | End: 2020-03-02 | Stop reason: HOSPADM

## 2020-02-27 RX ORDER — HYDRALAZINE HYDROCHLORIDE 25 MG/1
25 TABLET, FILM COATED ORAL EVERY 6 HOURS PRN
Status: DISCONTINUED | OUTPATIENT
Start: 2020-02-27 | End: 2020-03-02 | Stop reason: HOSPADM

## 2020-02-27 RX ORDER — POTASSIUM CHLORIDE 1.5 G/1.77G
40 POWDER, FOR SOLUTION ORAL ONCE
Status: COMPLETED | OUTPATIENT
Start: 2020-02-27 | End: 2020-02-27

## 2020-02-27 RX ORDER — FUROSEMIDE 10 MG/ML
40 INJECTION INTRAMUSCULAR; INTRAVENOUS ONCE
Status: COMPLETED | OUTPATIENT
Start: 2020-02-27 | End: 2020-02-27

## 2020-02-27 RX ADMIN — IPRATROPIUM BROMIDE 0.5 MG: 0.5 SOLUTION RESPIRATORY (INHALATION) at 20:18

## 2020-02-27 RX ADMIN — ENOXAPARIN SODIUM 120 MG: 120 INJECTION SUBCUTANEOUS at 06:27

## 2020-02-27 RX ADMIN — SOTALOL HYDROCHLORIDE 80 MG: 80 TABLET ORAL at 20:34

## 2020-02-27 RX ADMIN — INSULIN ASPART 2 UNITS: 100 INJECTION, SOLUTION INTRAVENOUS; SUBCUTANEOUS at 21:07

## 2020-02-27 RX ADMIN — ASPIRIN 81 MG 324 MG: 81 TABLET ORAL at 08:37

## 2020-02-27 RX ADMIN — FUROSEMIDE 40 MG: 10 INJECTION, SOLUTION INTRAMUSCULAR; INTRAVENOUS at 09:05

## 2020-02-27 RX ADMIN — POTASSIUM CHLORIDE 40 MEQ: 1.5 POWDER, FOR SOLUTION ORAL at 09:05

## 2020-02-27 RX ADMIN — SOTALOL HYDROCHLORIDE 80 MG: 80 TABLET ORAL at 08:37

## 2020-02-27 RX ADMIN — ENOXAPARIN SODIUM 120 MG: 120 INJECTION SUBCUTANEOUS at 17:52

## 2020-02-27 RX ADMIN — IPRATROPIUM BROMIDE 0.5 MG: 0.5 SOLUTION RESPIRATORY (INHALATION) at 11:48

## 2020-02-27 RX ADMIN — TAZOBACTAM SODIUM AND PIPERACILLIN SODIUM 4.5 G: 500; 4 INJECTION, SOLUTION INTRAVENOUS at 17:53

## 2020-02-27 RX ADMIN — TAZOBACTAM SODIUM AND PIPERACILLIN SODIUM 4.5 G: 500; 4 INJECTION, SOLUTION INTRAVENOUS at 10:37

## 2020-02-27 RX ADMIN — TAZOBACTAM SODIUM AND PIPERACILLIN SODIUM 4.5 G: 500; 4 INJECTION, SOLUTION INTRAVENOUS at 03:00

## 2020-02-27 RX ADMIN — IPRATROPIUM BROMIDE 0.5 MG: 0.5 SOLUTION RESPIRATORY (INHALATION) at 07:02

## 2020-02-27 RX ADMIN — HYDRALAZINE HYDROCHLORIDE 25 MG: 25 TABLET, FILM COATED ORAL at 00:50

## 2020-02-27 RX ADMIN — METOPROLOL TARTRATE 5 MG: 1 INJECTION, SOLUTION INTRAVENOUS at 03:39

## 2020-02-27 RX ADMIN — AMLODIPINE BESYLATE 10 MG: 5 TABLET ORAL at 17:52

## 2020-02-27 RX ADMIN — INSULIN ASPART 2 UNITS: 100 INJECTION, SOLUTION INTRAVENOUS; SUBCUTANEOUS at 11:44

## 2020-02-27 RX ADMIN — SODIUM CHLORIDE, PRESERVATIVE FREE 10 ML: 5 INJECTION INTRAVENOUS at 20:34

## 2020-02-27 RX ADMIN — HYDRALAZINE HYDROCHLORIDE 25 MG: 25 TABLET, FILM COATED ORAL at 08:41

## 2020-02-27 RX ADMIN — SODIUM CHLORIDE, PRESERVATIVE FREE 10 ML: 5 INJECTION INTRAVENOUS at 08:42

## 2020-02-27 RX ADMIN — INSULIN ASPART 2 UNITS: 100 INJECTION, SOLUTION INTRAVENOUS; SUBCUTANEOUS at 06:30

## 2020-02-27 RX ADMIN — HYDROCODONE BITARTRATE AND ACETAMINOPHEN 0.5 TABLET: 5; 325 TABLET ORAL at 08:41

## 2020-02-27 RX ADMIN — HYDROCODONE BITARTRATE AND ACETAMINOPHEN 0.5 TABLET: 5; 325 TABLET ORAL at 20:33

## 2020-02-28 LAB
ALBUMIN SERPL-MCNC: 2.9 G/DL (ref 3.5–5.2)
ALBUMIN/GLOB SERPL: 0.8 G/DL
ALP SERPL-CCNC: 64 U/L (ref 39–117)
ALT SERPL W P-5'-P-CCNC: 31 U/L (ref 1–33)
ANION GAP SERPL CALCULATED.3IONS-SCNC: 10 MMOL/L (ref 5–15)
AST SERPL-CCNC: 22 U/L (ref 1–32)
BASOPHILS # BLD AUTO: 0.03 10*3/MM3 (ref 0–0.2)
BASOPHILS NFR BLD AUTO: 0.3 % (ref 0–1.5)
BILIRUB SERPL-MCNC: 0.5 MG/DL (ref 0.2–1.2)
BUN BLD-MCNC: 8 MG/DL (ref 8–23)
BUN/CREAT SERPL: 12.5 (ref 7–25)
CALCIUM SPEC-SCNC: 8.9 MG/DL (ref 8.6–10.5)
CHLORIDE SERPL-SCNC: 97 MMOL/L (ref 98–107)
CO2 SERPL-SCNC: 35 MMOL/L (ref 22–29)
CREAT BLD-MCNC: 0.64 MG/DL (ref 0.57–1)
DEPRECATED RDW RBC AUTO: 42.8 FL (ref 37–54)
EOSINOPHIL # BLD AUTO: 0.2 10*3/MM3 (ref 0–0.4)
EOSINOPHIL NFR BLD AUTO: 1.7 % (ref 0.3–6.2)
ERYTHROCYTE [DISTWIDTH] IN BLOOD BY AUTOMATED COUNT: 12.7 % (ref 12.3–15.4)
GFR SERPL CREATININE-BSD FRML MDRD: 92 ML/MIN/1.73
GLOBULIN UR ELPH-MCNC: 3.7 GM/DL
GLUCOSE BLD-MCNC: 157 MG/DL (ref 65–99)
GLUCOSE BLDC GLUCOMTR-MCNC: 141 MG/DL (ref 70–130)
GLUCOSE BLDC GLUCOMTR-MCNC: 159 MG/DL (ref 70–130)
GLUCOSE BLDC GLUCOMTR-MCNC: 180 MG/DL (ref 70–130)
GLUCOSE BLDC GLUCOMTR-MCNC: 259 MG/DL (ref 70–130)
HCT VFR BLD AUTO: 39.8 % (ref 34–46.6)
HGB BLD-MCNC: 13 G/DL (ref 12–15.9)
IMM GRANULOCYTES # BLD AUTO: 0.11 10*3/MM3 (ref 0–0.05)
IMM GRANULOCYTES NFR BLD AUTO: 0.9 % (ref 0–0.5)
LYMPHOCYTES # BLD AUTO: 1.58 10*3/MM3 (ref 0.7–3.1)
LYMPHOCYTES NFR BLD AUTO: 13.4 % (ref 19.6–45.3)
MAGNESIUM SERPL-MCNC: 2.1 MG/DL (ref 1.6–2.4)
MCH RBC QN AUTO: 30.2 PG (ref 26.6–33)
MCHC RBC AUTO-ENTMCNC: 32.7 G/DL (ref 31.5–35.7)
MCV RBC AUTO: 92.3 FL (ref 79–97)
MONOCYTES # BLD AUTO: 0.58 10*3/MM3 (ref 0.1–0.9)
MONOCYTES NFR BLD AUTO: 4.9 % (ref 5–12)
NEUTROPHILS # BLD AUTO: 9.27 10*3/MM3 (ref 1.7–7)
NEUTROPHILS NFR BLD AUTO: 78.8 % (ref 42.7–76)
NRBC BLD AUTO-RTO: 0 /100 WBC (ref 0–0.2)
PLATELET # BLD AUTO: 175 10*3/MM3 (ref 140–450)
PMV BLD AUTO: 11.2 FL (ref 6–12)
POTASSIUM BLD-SCNC: 3.5 MMOL/L (ref 3.5–5.2)
PROCALCITONIN SERPL-MCNC: 0.51 NG/ML (ref 0.1–0.25)
PROT SERPL-MCNC: 6.6 G/DL (ref 6–8.5)
RBC # BLD AUTO: 4.31 10*6/MM3 (ref 3.77–5.28)
SODIUM BLD-SCNC: 142 MMOL/L (ref 136–145)
WBC NRBC COR # BLD: 11.77 10*3/MM3 (ref 3.4–10.8)

## 2020-02-28 PROCEDURE — 63710000001 INSULIN ASPART PER 5 UNITS: Performed by: INTERNAL MEDICINE

## 2020-02-28 PROCEDURE — 84145 PROCALCITONIN (PCT): CPT | Performed by: FAMILY MEDICINE

## 2020-02-28 PROCEDURE — 97116 GAIT TRAINING THERAPY: CPT

## 2020-02-28 PROCEDURE — 97535 SELF CARE MNGMENT TRAINING: CPT

## 2020-02-28 PROCEDURE — 94799 UNLISTED PULMONARY SVC/PX: CPT

## 2020-02-28 PROCEDURE — 83735 ASSAY OF MAGNESIUM: CPT | Performed by: FAMILY MEDICINE

## 2020-02-28 PROCEDURE — 99232 SBSQ HOSP IP/OBS MODERATE 35: CPT | Performed by: FAMILY MEDICINE

## 2020-02-28 PROCEDURE — 80053 COMPREHEN METABOLIC PANEL: CPT | Performed by: FAMILY MEDICINE

## 2020-02-28 PROCEDURE — 85025 COMPLETE CBC W/AUTO DIFF WBC: CPT | Performed by: FAMILY MEDICINE

## 2020-02-28 PROCEDURE — 99231 SBSQ HOSP IP/OBS SF/LOW 25: CPT | Performed by: INTERNAL MEDICINE

## 2020-02-28 PROCEDURE — 25010000002 ENOXAPARIN PER 10 MG: Performed by: INTERNAL MEDICINE

## 2020-02-28 PROCEDURE — 97530 THERAPEUTIC ACTIVITIES: CPT

## 2020-02-28 PROCEDURE — 82962 GLUCOSE BLOOD TEST: CPT

## 2020-02-28 PROCEDURE — 25010000002 PIPERACILLIN SOD-TAZOBACTAM PER 1 G: Performed by: INTERNAL MEDICINE

## 2020-02-28 PROCEDURE — 93005 ELECTROCARDIOGRAM TRACING: CPT | Performed by: INTERNAL MEDICINE

## 2020-02-28 RX ORDER — ENALAPRIL MALEATE 5 MG/1
10 TABLET ORAL EVERY 12 HOURS SCHEDULED
Status: DISCONTINUED | OUTPATIENT
Start: 2020-02-28 | End: 2020-02-29

## 2020-02-28 RX ORDER — ASPIRIN 81 MG/1
81 TABLET, CHEWABLE ORAL DAILY
Status: DISCONTINUED | OUTPATIENT
Start: 2020-02-29 | End: 2020-03-02 | Stop reason: HOSPADM

## 2020-02-28 RX ADMIN — ENOXAPARIN SODIUM 120 MG: 120 INJECTION SUBCUTANEOUS at 06:46

## 2020-02-28 RX ADMIN — HYDROCODONE BITARTRATE AND ACETAMINOPHEN 0.5 TABLET: 5; 325 TABLET ORAL at 21:13

## 2020-02-28 RX ADMIN — INSULIN ASPART 2 UNITS: 100 INJECTION, SOLUTION INTRAVENOUS; SUBCUTANEOUS at 11:50

## 2020-02-28 RX ADMIN — SOTALOL HYDROCHLORIDE 80 MG: 80 TABLET ORAL at 21:13

## 2020-02-28 RX ADMIN — HYDROCODONE BITARTRATE AND ACETAMINOPHEN 0.5 TABLET: 5; 325 TABLET ORAL at 04:43

## 2020-02-28 RX ADMIN — IPRATROPIUM BROMIDE 0.5 MG: 0.5 SOLUTION RESPIRATORY (INHALATION) at 12:57

## 2020-02-28 RX ADMIN — TAZOBACTAM SODIUM AND PIPERACILLIN SODIUM 4.5 G: 500; 4 INJECTION, SOLUTION INTRAVENOUS at 02:50

## 2020-02-28 RX ADMIN — MELATONIN TAB 5 MG 5 MG: 5 TAB at 21:13

## 2020-02-28 RX ADMIN — TAZOBACTAM SODIUM AND PIPERACILLIN SODIUM 4.5 G: 500; 4 INJECTION, SOLUTION INTRAVENOUS at 10:00

## 2020-02-28 RX ADMIN — IPRATROPIUM BROMIDE 0.5 MG: 0.5 SOLUTION RESPIRATORY (INHALATION) at 06:54

## 2020-02-28 RX ADMIN — APIXABAN 5 MG: 5 TABLET, FILM COATED ORAL at 21:13

## 2020-02-28 RX ADMIN — ASPIRIN 81 MG 324 MG: 81 TABLET ORAL at 09:54

## 2020-02-28 RX ADMIN — IPRATROPIUM BROMIDE 0.5 MG: 0.5 SOLUTION RESPIRATORY (INHALATION) at 19:53

## 2020-02-28 RX ADMIN — SPIRONOLACTONE 25 MG: 25 TABLET ORAL at 09:56

## 2020-02-28 RX ADMIN — INSULIN ASPART 2 UNITS: 100 INJECTION, SOLUTION INTRAVENOUS; SUBCUTANEOUS at 17:45

## 2020-02-28 RX ADMIN — INSULIN ASPART 4 UNITS: 100 INJECTION, SOLUTION INTRAVENOUS; SUBCUTANEOUS at 21:38

## 2020-02-28 RX ADMIN — SODIUM CHLORIDE, PRESERVATIVE FREE 10 ML: 5 INJECTION INTRAVENOUS at 09:56

## 2020-02-28 RX ADMIN — ENALAPRIL MALEATE 10 MG: 5 TABLET ORAL at 21:13

## 2020-02-28 RX ADMIN — SOTALOL HYDROCHLORIDE 80 MG: 80 TABLET ORAL at 10:00

## 2020-02-28 RX ADMIN — AMLODIPINE BESYLATE 10 MG: 5 TABLET ORAL at 09:54

## 2020-02-28 RX ADMIN — SODIUM CHLORIDE, PRESERVATIVE FREE 10 ML: 5 INJECTION INTRAVENOUS at 21:15

## 2020-02-28 RX ADMIN — TAZOBACTAM SODIUM AND PIPERACILLIN SODIUM 4.5 G: 500; 4 INJECTION, SOLUTION INTRAVENOUS at 17:45

## 2020-02-29 LAB
BACTERIA SPEC AEROBE CULT: NORMAL
BACTERIA SPEC AEROBE CULT: NORMAL
GLUCOSE BLDC GLUCOMTR-MCNC: 132 MG/DL (ref 70–130)
GLUCOSE BLDC GLUCOMTR-MCNC: 136 MG/DL (ref 70–130)
GLUCOSE BLDC GLUCOMTR-MCNC: 180 MG/DL (ref 70–130)
GLUCOSE BLDC GLUCOMTR-MCNC: 181 MG/DL (ref 70–130)

## 2020-02-29 PROCEDURE — 82962 GLUCOSE BLOOD TEST: CPT

## 2020-02-29 PROCEDURE — 25010000002 PIPERACILLIN SOD-TAZOBACTAM PER 1 G: Performed by: INTERNAL MEDICINE

## 2020-02-29 PROCEDURE — 94799 UNLISTED PULMONARY SVC/PX: CPT

## 2020-02-29 PROCEDURE — 93005 ELECTROCARDIOGRAM TRACING: CPT | Performed by: INTERNAL MEDICINE

## 2020-02-29 PROCEDURE — 63710000001 INSULIN ASPART PER 5 UNITS: Performed by: INTERNAL MEDICINE

## 2020-02-29 PROCEDURE — 94660 CPAP INITIATION&MGMT: CPT

## 2020-02-29 PROCEDURE — 97530 THERAPEUTIC ACTIVITIES: CPT

## 2020-02-29 PROCEDURE — 99232 SBSQ HOSP IP/OBS MODERATE 35: CPT | Performed by: FAMILY MEDICINE

## 2020-02-29 RX ORDER — FUROSEMIDE 20 MG/1
20 TABLET ORAL DAILY
Status: DISCONTINUED | OUTPATIENT
Start: 2020-02-29 | End: 2020-03-02 | Stop reason: HOSPADM

## 2020-02-29 RX ORDER — ENALAPRIL MALEATE 20 MG/1
20 TABLET ORAL EVERY 12 HOURS SCHEDULED
Status: DISCONTINUED | OUTPATIENT
Start: 2020-02-29 | End: 2020-03-02 | Stop reason: HOSPADM

## 2020-02-29 RX ORDER — POTASSIUM CHLORIDE 750 MG/1
20 CAPSULE, EXTENDED RELEASE ORAL ONCE
Status: COMPLETED | OUTPATIENT
Start: 2020-02-29 | End: 2020-02-29

## 2020-02-29 RX ORDER — MENTHOL AND METHYL SALICYLATE 10; 30 G/100G; G/100G
CREAM TOPICAL 3 TIMES DAILY PRN
Status: DISCONTINUED | OUTPATIENT
Start: 2020-02-29 | End: 2020-02-29

## 2020-02-29 RX ORDER — ENALAPRIL MALEATE 5 MG/1
10 TABLET ORAL ONCE
Status: COMPLETED | OUTPATIENT
Start: 2020-02-29 | End: 2020-02-29

## 2020-02-29 RX ADMIN — MENTHOL AND METHYL SALICYLATE: 10; 30 CREAM TOPICAL at 15:47

## 2020-02-29 RX ADMIN — INSULIN ASPART 2 UNITS: 100 INJECTION, SOLUTION INTRAVENOUS; SUBCUTANEOUS at 18:30

## 2020-02-29 RX ADMIN — IPRATROPIUM BROMIDE 0.5 MG: 0.5 SOLUTION RESPIRATORY (INHALATION) at 07:08

## 2020-02-29 RX ADMIN — MELATONIN TAB 5 MG 5 MG: 5 TAB at 21:00

## 2020-02-29 RX ADMIN — ENALAPRIL MALEATE 10 MG: 5 TABLET ORAL at 09:03

## 2020-02-29 RX ADMIN — POTASSIUM CHLORIDE 20 MEQ: 750 CAPSULE, EXTENDED RELEASE ORAL at 12:08

## 2020-02-29 RX ADMIN — ENALAPRIL MALEATE 10 MG: 5 TABLET ORAL at 12:07

## 2020-02-29 RX ADMIN — AMLODIPINE BESYLATE 10 MG: 5 TABLET ORAL at 09:04

## 2020-02-29 RX ADMIN — SOTALOL HYDROCHLORIDE 80 MG: 80 TABLET ORAL at 21:01

## 2020-02-29 RX ADMIN — SODIUM CHLORIDE, PRESERVATIVE FREE 10 ML: 5 INJECTION INTRAVENOUS at 21:01

## 2020-02-29 RX ADMIN — IPRATROPIUM BROMIDE 0.5 MG: 0.5 SOLUTION RESPIRATORY (INHALATION) at 20:02

## 2020-02-29 RX ADMIN — APIXABAN 5 MG: 5 TABLET, FILM COATED ORAL at 09:04

## 2020-02-29 RX ADMIN — HYDROCODONE BITARTRATE AND ACETAMINOPHEN 0.5 TABLET: 5; 325 TABLET ORAL at 11:00

## 2020-02-29 RX ADMIN — IPRATROPIUM BROMIDE 0.5 MG: 0.5 SOLUTION RESPIRATORY (INHALATION) at 00:19

## 2020-02-29 RX ADMIN — SPIRONOLACTONE 25 MG: 25 TABLET ORAL at 09:04

## 2020-02-29 RX ADMIN — ENALAPRIL MALEATE 20 MG: 20 TABLET ORAL at 21:01

## 2020-02-29 RX ADMIN — SOTALOL HYDROCHLORIDE 80 MG: 80 TABLET ORAL at 09:03

## 2020-02-29 RX ADMIN — ASPIRIN 81 MG 81 MG: 81 TABLET ORAL at 09:04

## 2020-02-29 RX ADMIN — INSULIN ASPART 2 UNITS: 100 INJECTION, SOLUTION INTRAVENOUS; SUBCUTANEOUS at 12:08

## 2020-02-29 RX ADMIN — TAZOBACTAM SODIUM AND PIPERACILLIN SODIUM 4.5 G: 500; 4 INJECTION, SOLUTION INTRAVENOUS at 17:59

## 2020-02-29 RX ADMIN — TAZOBACTAM SODIUM AND PIPERACILLIN SODIUM 4.5 G: 500; 4 INJECTION, SOLUTION INTRAVENOUS at 02:41

## 2020-02-29 RX ADMIN — SODIUM CHLORIDE, PRESERVATIVE FREE 10 ML: 5 INJECTION INTRAVENOUS at 09:04

## 2020-02-29 RX ADMIN — APIXABAN 5 MG: 5 TABLET, FILM COATED ORAL at 21:01

## 2020-02-29 RX ADMIN — FUROSEMIDE 20 MG: 20 TABLET ORAL at 09:03

## 2020-02-29 RX ADMIN — TAZOBACTAM SODIUM AND PIPERACILLIN SODIUM 4.5 G: 500; 4 INJECTION, SOLUTION INTRAVENOUS at 11:00

## 2020-02-29 RX ADMIN — HYDROCODONE BITARTRATE AND ACETAMINOPHEN 0.5 TABLET: 5; 325 TABLET ORAL at 17:59

## 2020-02-29 RX ADMIN — IPRATROPIUM BROMIDE 0.5 MG: 0.5 SOLUTION RESPIRATORY (INHALATION) at 13:51

## 2020-03-01 LAB
ANION GAP SERPL CALCULATED.3IONS-SCNC: 8.2 MMOL/L (ref 5–15)
BASOPHILS # BLD AUTO: 0.02 10*3/MM3 (ref 0–0.2)
BASOPHILS NFR BLD AUTO: 0.2 % (ref 0–1.5)
BUN BLD-MCNC: 10 MG/DL (ref 8–23)
BUN/CREAT SERPL: 14.5 (ref 7–25)
CALCIUM SPEC-SCNC: 8.9 MG/DL (ref 8.6–10.5)
CHLORIDE SERPL-SCNC: 100 MMOL/L (ref 98–107)
CO2 SERPL-SCNC: 31.8 MMOL/L (ref 22–29)
CREAT BLD-MCNC: 0.69 MG/DL (ref 0.57–1)
DEPRECATED RDW RBC AUTO: 43.3 FL (ref 37–54)
EOSINOPHIL # BLD AUTO: 0.22 10*3/MM3 (ref 0–0.4)
EOSINOPHIL NFR BLD AUTO: 2.1 % (ref 0.3–6.2)
ERYTHROCYTE [DISTWIDTH] IN BLOOD BY AUTOMATED COUNT: 12.8 % (ref 12.3–15.4)
GFR SERPL CREATININE-BSD FRML MDRD: 84 ML/MIN/1.73
GLUCOSE BLD-MCNC: 178 MG/DL (ref 65–99)
GLUCOSE BLDC GLUCOMTR-MCNC: 121 MG/DL (ref 70–130)
GLUCOSE BLDC GLUCOMTR-MCNC: 147 MG/DL (ref 70–130)
GLUCOSE BLDC GLUCOMTR-MCNC: 204 MG/DL (ref 70–130)
GLUCOSE BLDC GLUCOMTR-MCNC: 219 MG/DL (ref 70–130)
HCT VFR BLD AUTO: 39 % (ref 34–46.6)
HGB BLD-MCNC: 12.5 G/DL (ref 12–15.9)
IMM GRANULOCYTES # BLD AUTO: 0.03 10*3/MM3 (ref 0–0.05)
IMM GRANULOCYTES NFR BLD AUTO: 0.3 % (ref 0–0.5)
LYMPHOCYTES # BLD AUTO: 1.37 10*3/MM3 (ref 0.7–3.1)
LYMPHOCYTES NFR BLD AUTO: 13.3 % (ref 19.6–45.3)
MCH RBC QN AUTO: 29.8 PG (ref 26.6–33)
MCHC RBC AUTO-ENTMCNC: 32.1 G/DL (ref 31.5–35.7)
MCV RBC AUTO: 92.9 FL (ref 79–97)
MONOCYTES # BLD AUTO: 0.63 10*3/MM3 (ref 0.1–0.9)
MONOCYTES NFR BLD AUTO: 6.1 % (ref 5–12)
NEUTROPHILS # BLD AUTO: 8.02 10*3/MM3 (ref 1.7–7)
NEUTROPHILS NFR BLD AUTO: 78 % (ref 42.7–76)
NRBC BLD AUTO-RTO: 0 /100 WBC (ref 0–0.2)
PLATELET # BLD AUTO: 189 10*3/MM3 (ref 140–450)
PMV BLD AUTO: 11.3 FL (ref 6–12)
POTASSIUM BLD-SCNC: 3.9 MMOL/L (ref 3.5–5.2)
RBC # BLD AUTO: 4.2 10*6/MM3 (ref 3.77–5.28)
SODIUM BLD-SCNC: 140 MMOL/L (ref 136–145)
WBC NRBC COR # BLD: 10.29 10*3/MM3 (ref 3.4–10.8)

## 2020-03-01 PROCEDURE — 82962 GLUCOSE BLOOD TEST: CPT

## 2020-03-01 PROCEDURE — 25010000002 PIPERACILLIN SOD-TAZOBACTAM PER 1 G: Performed by: INTERNAL MEDICINE

## 2020-03-01 PROCEDURE — 99232 SBSQ HOSP IP/OBS MODERATE 35: CPT | Performed by: FAMILY MEDICINE

## 2020-03-01 PROCEDURE — 94799 UNLISTED PULMONARY SVC/PX: CPT

## 2020-03-01 PROCEDURE — 63710000001 INSULIN ASPART PER 5 UNITS: Performed by: INTERNAL MEDICINE

## 2020-03-01 PROCEDURE — 80048 BASIC METABOLIC PNL TOTAL CA: CPT | Performed by: FAMILY MEDICINE

## 2020-03-01 PROCEDURE — 85025 COMPLETE CBC W/AUTO DIFF WBC: CPT | Performed by: FAMILY MEDICINE

## 2020-03-01 RX ADMIN — AMLODIPINE BESYLATE 10 MG: 5 TABLET ORAL at 08:49

## 2020-03-01 RX ADMIN — TAZOBACTAM SODIUM AND PIPERACILLIN SODIUM 4.5 G: 500; 4 INJECTION, SOLUTION INTRAVENOUS at 02:40

## 2020-03-01 RX ADMIN — INSULIN ASPART 3 UNITS: 100 INJECTION, SOLUTION INTRAVENOUS; SUBCUTANEOUS at 11:28

## 2020-03-01 RX ADMIN — ACETAMINOPHEN 650 MG: 325 TABLET, FILM COATED ORAL at 12:45

## 2020-03-01 RX ADMIN — SPIRONOLACTONE 25 MG: 25 TABLET ORAL at 08:49

## 2020-03-01 RX ADMIN — SOTALOL HYDROCHLORIDE 80 MG: 80 TABLET ORAL at 08:49

## 2020-03-01 RX ADMIN — IPRATROPIUM BROMIDE 0.5 MG: 0.5 SOLUTION RESPIRATORY (INHALATION) at 19:21

## 2020-03-01 RX ADMIN — IPRATROPIUM BROMIDE 0.5 MG: 0.5 SOLUTION RESPIRATORY (INHALATION) at 07:31

## 2020-03-01 RX ADMIN — HYDROCODONE BITARTRATE AND ACETAMINOPHEN 0.5 TABLET: 5; 325 TABLET ORAL at 09:14

## 2020-03-01 RX ADMIN — SODIUM CHLORIDE, PRESERVATIVE FREE 10 ML: 5 INJECTION INTRAVENOUS at 08:49

## 2020-03-01 RX ADMIN — ENALAPRIL MALEATE 20 MG: 20 TABLET ORAL at 20:52

## 2020-03-01 RX ADMIN — FUROSEMIDE 20 MG: 20 TABLET ORAL at 08:49

## 2020-03-01 RX ADMIN — TAZOBACTAM SODIUM AND PIPERACILLIN SODIUM 4.5 G: 500; 4 INJECTION, SOLUTION INTRAVENOUS at 11:53

## 2020-03-01 RX ADMIN — APIXABAN 5 MG: 5 TABLET, FILM COATED ORAL at 20:52

## 2020-03-01 RX ADMIN — ENALAPRIL MALEATE 20 MG: 20 TABLET ORAL at 08:49

## 2020-03-01 RX ADMIN — HYDROCODONE BITARTRATE AND ACETAMINOPHEN 0.5 TABLET: 5; 325 TABLET ORAL at 17:11

## 2020-03-01 RX ADMIN — IPRATROPIUM BROMIDE 0.5 MG: 0.5 SOLUTION RESPIRATORY (INHALATION) at 12:39

## 2020-03-01 RX ADMIN — MENTHOL AND METHYL SALICYLATE: 10; 30 CREAM TOPICAL at 04:40

## 2020-03-01 RX ADMIN — SODIUM CHLORIDE, PRESERVATIVE FREE 10 ML: 5 INJECTION INTRAVENOUS at 20:52

## 2020-03-01 RX ADMIN — ASPIRIN 81 MG 81 MG: 81 TABLET ORAL at 08:49

## 2020-03-01 RX ADMIN — INSULIN ASPART 3 UNITS: 100 INJECTION, SOLUTION INTRAVENOUS; SUBCUTANEOUS at 20:52

## 2020-03-01 RX ADMIN — SOTALOL HYDROCHLORIDE 80 MG: 80 TABLET ORAL at 20:52

## 2020-03-01 RX ADMIN — APIXABAN 5 MG: 5 TABLET, FILM COATED ORAL at 08:49

## 2020-03-01 NOTE — PLAN OF CARE
Vss on RA today.  Pt c/o headache.  Refused PT and refused to get up to chair today.  Tolerating meals.  No adverse events.  Will  continue to monitor.

## 2020-03-01 NOTE — PLAN OF CARE
Problem: NPPV/CPAP (Adult)  Goal: Signs and Symptoms of Listed Potential Problems Will be Absent, Minimized or Managed (NPPV/CPAP)  3/1/2020 1715 by Des George, RRT  Outcome: Ongoing (interventions implemented as appropriate)  Flowsheets (Taken 3/1/2020 1715)  Problems Assessed (NPPV/CPAP): all  Problems Present (NPPV/CPAP): none  3/1/2020 1050 by Des George, RRT  Outcome: Ongoing (interventions implemented as appropriate)  Flowsheets (Taken 3/1/2020 1050)  Problems Assessed (NPPV/CPAP): all  Problems Present (NPPV/CPAP): none

## 2020-03-01 NOTE — THERAPY TREATMENT NOTE
PTA attempt x2 to see pt with pt stating this AM at 1105 back was hurting and to return in couple hrs PTA returned at 12:27 and pt stated she had a headache and requested medicine from nurse and pt to have nursing to assist OOB to chair later today if PTA unavailable. PT to f/u with pt tomorrow

## 2020-03-01 NOTE — PROGRESS NOTES
[facility]    Name:  Shannon Servin   Age:  69 y.o.  Sex:  female  :  1950  MRN:  [id number]   Visit Number:  [id number]  Admission Date:  2020  Date Of Service:  20  Primary Care Physician:  Marichuy Cordova MD     LOS: 10 days :  Patient Care Team:  Marichuy Cordova MD as PCP - General (Internal Medicine):    Chief Complaint: Follow-up  respiratory failure with pneumonia  Subjective / Interval History:       The patient was seen again today.  She was sitting up in bed watching television and talking to her .  Her cheeks are no longer flushed and overall her blood pressure is improving.  She is less edematous after Lasix.  She states she is feeling better and has been getting stronger over the past 2 days.  She is still requiring increased nursing assistance though and would benefit from further inpatient physical and occupational therapy and acute rehab stay.  Case management is following.  Patient denies chest pain, shortness of breath, abdominal pain.  She is having improved hypoxia and when she accidentally removes her oxygen she is still intermittently only 90% on room air.    Patient is a 69 year old female with medical history of insulin dependent type 2 DM, obesity, diffuse osteoarthritis, sciatica, chronic debility and hypertension, who was brought in to the ER by EMS for evaluation of altered mentation.  Patient was found to have a left sided renal stone with evidence of a UTI.  Patient underwent cystoscopy 2020 per Dr. Saldana.  Cystoscopy with stent placement was successful.  Patient transferred to the ICU post procedure for observation overnight as she was febrile and slightly hypotensive though did respond to fluid challenge.    Patient did well and was transferred out of the ICU to medical floor.  She previously had several days of lethargy which has since resolved.  She required BiPAP initially and would  require a home trilogy unit on discharge.  She was initially requiring 5 L nasal cannula and has weaned down to 2 L nasal cannula.  She was treated for pneumonia.  Her bacteremia has resolved.  She is on her last day of Zosyn.    She denies chest pain, shortness of breath, cough.  Her hypoxic respiratory failure has been improving with treatment of pneumonia.  She will continue to require a noninvasive ventilator when she is discharged home.  A noninvasive ventilator machine as needed over the home BiPAP due to adjustable pressure support needed.  She has obesity hypoventilation and acute on chronic hypoxic respiratory failure.  She will need to use the trilogy machine during the nighttime and daytime with settings: Max pressure 32, PS min 6, PS max 26, EPAP 5 min, EPAP max 12, tidal volume 500, respiratory rate auto, with 2 L/min to be bled into the noninvasive ventilator machine to use both in the daytime and at night.    Patient remains in the hospital for further improvement of A. fib with RVR as well as elevated hypertension.  The patient was seen by cardiology and started on Eliquis with sotalol and continued on Lasix and Spironolactone for edema.  Overall she has continued to improve.  She is getting benefit out of inpatient physical and occupational therapy.    Review of Systems:     General ROS: Patient denies any fevers, chills or loss of consciousness.  Improved alertness and improving generalized weakness  Respiratory ROS: Denies cough or shortness of breath.  Improved oxygen needs  Cardiovascular ROS: Denies chest pain or palpitations. No history of exertional chest pain.  Gastrointestinal ROS: Denies nausea and vomiting. Denies any abdominal pain. No diarrhea.  Denies constipation  Neurological ROS: Denies any focal weakness. No loss of consciousness. Denies any numbness.  Stable improved chronic low back pain, resolved confusion  Dermatological ROS: Denies any redness or pruritis.  Back:  Improved  Vital Signs:    Temp:  [97.4 °F (36.3 °C)-98.9 °F (37.2 °C)] 98 °F (36.7 °C)  Heart Rate:  [66-74] 67  Resp:  [16-19] 18  BP: (147-165)/(58-84) 147/84    Intake and output:    I/O last 3 completed shifts:  In: 1000 [P.O.:1000]  Out: -   I/O this shift:  In: 360 [P.O.:360]  Out: -     Physical Examination: Examined again today    General Appearance:   Obese lady.  Sitting up in bed watching TV.  No acute distress   Head:  Atraumatic and normocephalic, without obvious abnormality.   Eyes:          PERRLA, EOMI, no conjunctivitis   Neck: Supple, trachea midline,short wide neck   Lungs:    Improved air entry.  No crackles.  No wheeze.  No distress   Heart:   Regular rate and rhythm without murmur r   Abdomen:    Obese, soft, nontender, nondistended, improved ascites, positive bowel sounds   Extremities:  Daily improving anasarca edema, without cyanosis, no clubbing.   Skin: No bleeding, bruising or rash.  Flushed face resolved   Neurologic:  Follows commands.  No tremor.  Moves all 4 extremities equally.  Diffuse lethargy        Laboratory results:    Results from last 7 days   Lab Units 03/01/20  0531 02/28/20  0545 02/27/20  0551 02/26/20  0405 02/25/20  0556   SODIUM mmol/L 140 142 143 144 151*   POTASSIUM mmol/L 3.9 3.5 3.6 3.1* 3.7   CHLORIDE mmol/L 100 97* 97* 98 108*   CO2 mmol/L 31.8* 35.0* 37.3* 36.3* 35.5*   BUN mg/dL 10 8 9 12 16   CREATININE mg/dL 0.69 0.64 0.59 0.63 0.66   CALCIUM mg/dL 8.9 8.9 8.7 8.8 8.4*   BILIRUBIN mg/dL  --  0.5  --  0.8 1.1   ALK PHOS U/L  --  64  --  72 76   ALT (SGPT) U/L  --  31  --  51* 32   AST (SGOT) U/L  --  22  --  60* 50*   GLUCOSE mg/dL 178* 157* 172* 177* 138*     Results from last 7 days   Lab Units 03/01/20  0531 02/28/20  0545 02/27/20  0551   WBC 10*3/mm3 10.29 11.77* 13.23*   HEMOGLOBIN g/dL 12.5 13.0 13.6   HEMATOCRIT % 39.0 39.8 42.1   PLATELETS 10*3/mm3 189 175 155         Results from last 7 days   Lab Units 02/26/20  1425 02/26/20  0757 02/26/20  0405    TROPONIN T ng/mL <0.010 <0.010 <0.010     Results from last 7 days   Lab Units 02/24/20  1520 02/24/20  1515   BLOODCX  No growth at 5 days No growth at 5 days       I have reviewed the patient's laboratory results.    Radiology results:    Imaging Results (Last 24 Hours)     ** No results found for the last 24 hours. **          I have reviewed the patient's radiology reports.    Medication Review:     I have reviewed the patients active and prn medications.       Sepsis due to urinary tract infection (CMS/HCC)    Left ureteral stone    Metabolic encephalopathy    Acute low back pain      Assessment:    Sepsis, due to gram-negative bacteremia Proteus, improved  Complicated UTI, due to # 3, POA, with Ecoli, treated  Bilateral lower lobe pneumonia, uncertain if prior to admission, consider possible aspiration, without sepsis, improving  acute on chronic hypoxic and hypercapnic respiratory failure, with pneumonia, and hypoventilation obesity syndrome, ordered her a home noninvasive ventilator machine  Left ureteral stone with mild hydronephrosis, POA, post stent placement  Acute kidney injury, likely related to # 1, POA, improved  Leukopenia/thrombocytopenia, likely related to # 1  Acute hypoxic respiratory failure, likely multifactorial in the setting of sepsis and obesity hypoventilation syndrome  Type 2 DM, insulin dependent  Obesity  Chronic debility  Acute back pain post fall prior to admission  Suspected severe sleep apnea with resultant severe respiratory failure and encephalopathy  Obesity hypoventilation syndrome  Fatty liver with anasarca  Acute fluid overload, improved  2D echo: EF 55%     Plan:  Continue to monitor inpatient.  Her blood pressure and heart rate are better controlled.  Dr Shirley and Zeeshan followed in consultation. Continue the higher dose of Vasotec with Lasix and Spironolactone.  She is getting her last dose of Zosyn today.  This will complete antibiotic therapy.  Repeat blood cultures  are negative.  The patient and  are very happy with the clinical improvement thus far.  As noted above, with obesity hypoventilation, severe respiratory failure acute on chronic hypoxia, a home noninvasive ventilator prescription has been requested and case management is assisting with this.  The patient was encouraged to go to acute rehabilitation but is unsure if she will want to go tomorrow.  She is considering whether she will go to rehab versus home with home health.  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  Additionally, Dr. Saldana did see the patient in consultation. He  recommended to see her outpatient for stent removal in 3 months or the stents will become encrusted and will not be able to be removed.  If the stent is removed without fragmenting the calculi she will most likely redevelop obstruction and possibly sepsis as noted by Dr. Saldana.    Previously she had cognitive dysfunction and palliative care had been initially recommended.  After starting the patient on BiPAP/noninvasive ventilator machine, the patient has improved alertness and has been treated for pneumonia with possible aspiration event as well.  Her clinical status also improved with diuresis of acute fluid overload in a patient that was cognitively impaired and requiring IV fluids as she was unable to take in adequate oral intake.    Karen Auguste,   03/01/20  1:08 PM    Dictated utilizing Dragon dictation.

## 2020-03-01 NOTE — PLAN OF CARE
Problem: NPPV/CPAP (Adult)  Goal: Signs and Symptoms of Listed Potential Problems Will be Absent, Minimized or Managed (NPPV/CPAP)  Outcome: Ongoing (interventions implemented as appropriate)  Flowsheets (Taken 3/1/2020 1050)  Problems Assessed (NPPV/CPAP): all  Problems Present (NPPV/CPAP): none

## 2020-03-01 NOTE — PLAN OF CARE
Problem: Patient Care Overview  Goal: Plan of Care Review  Outcome: Ongoing (interventions implemented as appropriate)  Flowsheets (Taken 3/1/2020 6423)  Plan of Care Reviewed With: patient  Outcome Summary: Pt much stronger this shift.Rested well.Pain control effective with po medication

## 2020-03-02 VITALS
WEIGHT: 257 LBS | SYSTOLIC BLOOD PRESSURE: 145 MMHG | TEMPERATURE: 98.6 F | HEART RATE: 76 BPM | DIASTOLIC BLOOD PRESSURE: 58 MMHG | RESPIRATION RATE: 18 BRPM | BODY MASS INDEX: 40.34 KG/M2 | HEIGHT: 67 IN | OXYGEN SATURATION: 92 %

## 2020-03-02 LAB
GLUCOSE BLDC GLUCOMTR-MCNC: 164 MG/DL (ref 70–130)
GLUCOSE BLDC GLUCOMTR-MCNC: 183 MG/DL (ref 70–130)

## 2020-03-02 PROCEDURE — 63710000001 INSULIN ASPART PER 5 UNITS: Performed by: INTERNAL MEDICINE

## 2020-03-02 PROCEDURE — 97535 SELF CARE MNGMENT TRAINING: CPT

## 2020-03-02 PROCEDURE — 97116 GAIT TRAINING THERAPY: CPT

## 2020-03-02 PROCEDURE — 94799 UNLISTED PULMONARY SVC/PX: CPT

## 2020-03-02 PROCEDURE — 99239 HOSP IP/OBS DSCHRG MGMT >30: CPT | Performed by: INTERNAL MEDICINE

## 2020-03-02 PROCEDURE — 97530 THERAPEUTIC ACTIVITIES: CPT

## 2020-03-02 PROCEDURE — 82962 GLUCOSE BLOOD TEST: CPT

## 2020-03-02 RX ORDER — HYDRALAZINE HYDROCHLORIDE 25 MG/1
25 TABLET, FILM COATED ORAL EVERY 6 HOURS PRN
Qty: 30 TABLET | Refills: 0 | Status: SHIPPED | OUTPATIENT
Start: 2020-03-02 | End: 2020-03-30

## 2020-03-02 RX ORDER — SOTALOL HYDROCHLORIDE 80 MG/1
80 TABLET ORAL EVERY 12 HOURS SCHEDULED
Qty: 60 TABLET | Refills: 0 | Status: SHIPPED | OUTPATIENT
Start: 2020-03-02 | End: 2022-10-26 | Stop reason: SDUPTHER

## 2020-03-02 RX ORDER — ASPIRIN 81 MG/1
81 TABLET, CHEWABLE ORAL DAILY
Qty: 30 TABLET | Refills: 0 | Status: SHIPPED | OUTPATIENT
Start: 2020-03-03 | End: 2021-04-07 | Stop reason: ALTCHOICE

## 2020-03-02 RX ORDER — FUROSEMIDE 20 MG/1
20 TABLET ORAL DAILY
Qty: 30 TABLET | Refills: 0 | Status: SHIPPED | OUTPATIENT
Start: 2020-03-03 | End: 2021-09-14 | Stop reason: SDUPTHER

## 2020-03-02 RX ORDER — SPIRONOLACTONE 25 MG/1
25 TABLET ORAL DAILY
Qty: 30 TABLET | Refills: 0 | Status: SHIPPED | OUTPATIENT
Start: 2020-03-03 | End: 2022-08-09 | Stop reason: SDUPTHER

## 2020-03-02 RX ORDER — ENALAPRIL MALEATE 20 MG/1
20 TABLET ORAL EVERY 12 HOURS SCHEDULED
Qty: 30 TABLET | Refills: 0 | Status: SHIPPED | OUTPATIENT
Start: 2020-03-02 | End: 2022-05-02 | Stop reason: SDUPTHER

## 2020-03-02 RX ADMIN — FUROSEMIDE 20 MG: 20 TABLET ORAL at 09:42

## 2020-03-02 RX ADMIN — ASPIRIN 81 MG 81 MG: 81 TABLET ORAL at 09:41

## 2020-03-02 RX ADMIN — ENALAPRIL MALEATE 20 MG: 20 TABLET ORAL at 09:42

## 2020-03-02 RX ADMIN — SOTALOL HYDROCHLORIDE 80 MG: 80 TABLET ORAL at 09:40

## 2020-03-02 RX ADMIN — IPRATROPIUM BROMIDE 0.5 MG: 0.5 SOLUTION RESPIRATORY (INHALATION) at 07:06

## 2020-03-02 RX ADMIN — APIXABAN 5 MG: 5 TABLET, FILM COATED ORAL at 09:41

## 2020-03-02 RX ADMIN — SODIUM CHLORIDE, PRESERVATIVE FREE 10 ML: 5 INJECTION INTRAVENOUS at 09:00

## 2020-03-02 RX ADMIN — HYDROCODONE BITARTRATE AND ACETAMINOPHEN 0.5 TABLET: 5; 325 TABLET ORAL at 09:43

## 2020-03-02 RX ADMIN — AMLODIPINE BESYLATE 10 MG: 5 TABLET ORAL at 09:39

## 2020-03-02 RX ADMIN — INSULIN ASPART 2 UNITS: 100 INJECTION, SOLUTION INTRAVENOUS; SUBCUTANEOUS at 12:15

## 2020-03-02 RX ADMIN — IPRATROPIUM BROMIDE 0.5 MG: 0.5 SOLUTION RESPIRATORY (INHALATION) at 00:26

## 2020-03-02 RX ADMIN — SPIRONOLACTONE 25 MG: 25 TABLET ORAL at 09:41

## 2020-03-02 RX ADMIN — INSULIN ASPART 2 UNITS: 100 INJECTION, SOLUTION INTRAVENOUS; SUBCUTANEOUS at 06:53

## 2020-03-02 NOTE — PLAN OF CARE
Problem: Patient Care Overview  Goal: Plan of Care Review  Flowsheets (Taken 3/2/2020 9769)  Outcome Summary: Pt tolerating increased activity with ambulating 59', 40'  and 23' cga with rw. Pt c/o dizziness and therefore requires 2 seated rests  that were of 2-3 min or more in time. Pt was encouraged to increase activity daily.  See flowsheet for details.

## 2020-03-02 NOTE — DISCHARGE SUMMARY
[facility]   DISCHARGE SUMMARY      Name:  Shannon Servin   Age:  69 y.o.  Sex:  female  :  1950  MRN:  [id number]   Visit Number:  [id number]    Admission Date:  2020  Date of Discharge:  3/2/2020  Primary Care Physician:  Marichuy Cordova MD    Discharge Diagnoses:       Sepsis due to urinary tract infection (CMS/[facility])    Left ureteral stone    Metabolic encephalopathy    Acute low back pain      Presenting Problem:    Sepsis due to urinary tract infection (CMS/HCC) [A41.9, N39.0]  A-fib (CMS/[facility]) [I48.91]     Consults:     Consults     Date and Time Order Name Status Description    2020 0732 Inpatient Cardiology Consult Completed     2020 0849 Inpatient Urology Consult          Consulting Physician(s)     Provider Relationship Specialty    Mulugeta Saldana MD Consulting Physician Urology    Kevin Shirley MD Consulting Physician Cardiology    Reddy Mitchell DO Consulting Physician Internal Medicine          Procedures Performed:    Procedure(s):  URETEROSCOPY, LEFT RETROGRADE PYLEOGERAM WITH STENT INSERTION       History of presenting illness:    Patient is a 69 year old female with medical history of insulin dependent type 2 DM, obesity, diffuse osteoarthritis, sciatica, chronic debility and hypertension, who was brought in to the ER by EMS for evaluation of altered mentation. Patient is very sleepy, only wakes up for a few seconds at a time. She is currently oriented but difficult to obtain history due to encephalopathy. Her  is at the bedside and history obtained from speaking to him and the ER endorsement/documentation. Patient was evaluated here in the ER on 20 for pain in her right hip which occurred while she was getting off the toilet at home. She heard a pop associated with sudden onset pain. She had xrays done which did not reveal any fracture so she was discharged home on pain medications. She took a couple of doses  of hydrocodone but felt poorly with burning sensation in her abdomen, so went to see her PCP. She had hydrocodone changed to tramadol as her PCP thought it may be too strong for her. They returned home and her  went to  her medications, when he returned home, he found her to be on the floor wedged between the bed and the wall. He was not able to get her up, so called EMS again. Patient apparently has been complaining of some dysuria over the last week or so for which she has been taking herbal supplements. She does have a history of frequent UTIs. She reports subjective fever and chills but did not check temperature at home. She has been slightly short of breath but this is a chronic issue for her. She denies any chest pain, palpitations, headache or dizziness. Her  has not noted any diarrhea, but she does have some constipation intermittently.      Patient noted to be febrile with temperature max at 104.7 rectally, tachycardic, /91 and hypoxic on RA to 89%. Labs notable for WBC 1.6, platelets 91, normal H &H, 20% bands. BUN/Cr 18/1.58, no known baseline. Procalcitonin 6.39. Negative troponin, normal BNP. ABG shows pH of 7.3, normal CO2 and pO2 67 on 2L. Rapid flu screen negative. Urinalysis shows moderate leukocytes, positive nitrite, 6-12 RBCs and 21-30 WBCs and 3+ bacteria. Lactic acid elevated at 2.1. CT head was negative. CT chest does not show any acute cardiopulmonary process. CT abdomen/pelvis shows a 6 mm proximal left ureteral stone with mild left hydronephrosis with multiple left renal stones. She was given a total of 2L NS boluses and Ceftriaxone.  Blood cultures were drawn. She is being admitted to the hospitalist service.  Per H&P    Hospital Course:    Patient is a 69 year old female with medical history of insulin dependent type 2 DM, obesity, diffuse osteoarthritis, sciatica, chronic debility and hypertension, who was brought in to the ER by EMS for evaluation of altered  mentation.  Patient was found to have a left sided renal stone with evidence of a UTI.  Patient underwent cystoscopy 2/20/2020 per Dr. Saldana.  Cystoscopy with stent placement was successful.  Patient transferred to the ICU post procedure for observation overnight as she was febrile and slightly hypotensive though did respond to fluid challenge.  Patient was transferred to the medical floor within 24 hours.  She remained hemodynamically stable however still with lethargy.  Patient did initially require BiPAP and was set up for home trilogy unit at discharge.  Patient will need trilogy unit as an outpatient.  Discussed with patient that she likely has obstructive sleep apnea and will need further studies done as an outpatient and needs follow-up with pulmonology.  Patient was also found to have pneumonia as well as Proteus bacteremia.  She was treated with antibiotics and completed a full course while inpatient for both pneumonia and bacteremia.  Her some more oxygen was able to be titrated down to 2 L and even room air at time of discharge.  Patient also developed A. fib with RVR and associated hypertension secondary to underlying infectious process.  She was seen by cardiology and started on Eliquis with sotalol and continued on Lasix and spironolactone for edema.  Patient has continued to improve and would greatly benefit from further physical and Occupational Therapy.  She will need to use the trilogy machine during the nighttime and daytime with settings: Max pressure 32, PS min 6, PS max 26, EPAP 5 min, EPAP max 12, tidal volume 500, respiratory rate auto, with 2 L/min to be bled into the noninvasive ventilator machine to use both in the daytime and at night.    Vital Signs:    Temp:  [97.9 °F (36.6 °C)-98.6 °F (37 °C)] 98.6 °F (37 °C)  Heart Rate:  [69-76] 76  Resp:  [16-20] 18  BP: (145-178)/(58-73) 145/58    Physical Exam:    General Appearance:  Alert and cooperative, not in any acute distress.   Head:   Atraumatic and normocephalic, without obvious abnormality.   Eyes:          conjunctivae and sclerae normal, no Icterus. No pallor. Extraocular movements are within normal limits.   Ears:  Ears appear intact with no abnormalities noted.   Throat: No oral lesions, no thrush,    Neck: Supple, trachea midline,        Lungs:   Chest shape is normal. Breath sounds heard bilaterally equally.  No crackles or wheezing.    Heart:  Normal S1 and S2, no murmur   Abdomen:   Normal bowel sounds, Soft, non-tender, non-distended, no guarding, no rebound tenderness.   Extremities: Moves all extremities well, no edema, no cyanosis, no clubbing.   Pulses: Pulses palpable and equal bilaterally.   Skin: No bleeding, bruising or rash.   Lymph nodes: No palpable adenopathy.   Neurologic: Alert and oriented x 3. Moves all four limbs equally. No tremors.      Pertinent Lab Results:     Results from last 7 days   Lab Units 03/01/20  0531 02/28/20  0545 02/27/20  0551 02/26/20  0405 02/25/20  0556   SODIUM mmol/L 140 142 143 144 151*   POTASSIUM mmol/L 3.9 3.5 3.6 3.1* 3.7   CHLORIDE mmol/L 100 97* 97* 98 108*   CO2 mmol/L 31.8* 35.0* 37.3* 36.3* 35.5*   BUN mg/dL 10 8 9 12 16   CREATININE mg/dL 0.69 0.64 0.59 0.63 0.66   CALCIUM mg/dL 8.9 8.9 8.7 8.8 8.4*   BILIRUBIN mg/dL  --  0.5  --  0.8 1.1   ALK PHOS U/L  --  64  --  72 76   ALT (SGPT) U/L  --  31  --  51* 32   AST (SGOT) U/L  --  22  --  60* 50*   GLUCOSE mg/dL 178* 157* 172* 177* 138*     Results from last 7 days   Lab Units 03/01/20  0531 02/28/20  0545 02/27/20  0551   WBC 10*3/mm3 10.29 11.77* 13.23*   HEMOGLOBIN g/dL 12.5 13.0 13.6   HEMATOCRIT % 39.0 39.8 42.1   PLATELETS 10*3/mm3 189 175 155         Results from last 7 days   Lab Units 02/26/20  1425 02/26/20  0757 02/26/20  0405   TROPONIN T ng/mL <0.010 <0.010 <0.010     Results from last 7 days   Lab Units 02/26/20  0405   PROBNP pg/mL 2,428.0*             Results from last 7 days   Lab Units 02/24/20  1501   PH, ARTERIAL pH  units 7.337   PO2 ART mm Hg 72.6*   PCO2, ARTERIAL mm Hg 68.3*   HCO3 ART mmol/L 36.5*           Invalid input(s): USDES,  BLOODU, NITRITITE, BACT, EP  Pain Management Panel     There is no flowsheet data to display.        Results from last 7 days   Lab Units 02/24/20  1520 02/24/20  1515   BLOODCX  No growth at 5 days No growth at 5 days       Pertinent Radiology Results:    Imaging Results (All)     Procedure Component Value Units Date/Time    CT Lumbar Spine Without Contrast [[id number]] Collected:  02/27/20 1816     Updated:  02/27/20 1817    Narrative:       FINAL REPORT    CLINICAL HISTORY:  Low back pain, prior surgery, new or progressive sx    FINDINGS:  CT LUMBAR SPINE  TECHNIQUE: Thin section axial CT with sagittal  and coronal reconstructions  FINDINGS: No fracture is present.  Alignment is normal.   Multilevel degenerative canal stenosis is  present.  Findings are most pronounced L3-4 and L4-5 with  moderate canal stenosis.  Multilevel neural foraminal narrowing  is seen most pronounced at L4-5 and L5-S1.      Impression:       1.  Negative CT evaluation of the lumbar spine for acute bony  injury.  2.  Advanced degenerative changes greatest L3-4 and  L4-5.  MR may be considered given the extent of degenerative  changes.  This study was performed using automated techniques to  achieve radiation exposure as low as reasonably achievable    Authenticated by FERNANDO Gu MD on 02/27/2020 06:16:37 PM    XR Chest 1 View [[id number]] Collected:  02/26/20 0836     Updated:  02/26/20 0838    Narrative:       PROCEDURE: XR CHEST 1 VW-     HISTORY: DYSPNEA CHRONIC, NO XRAY     COMPARISON: 02/25/2020.     FINDINGS: The heart is normal in size. The mediastinum is unremarkable.  There is diffuse bilateral airspace disease and effusions which are  unchanged. There is no pneumothorax.  There are no acute osseous  abnormalities.       Impression:       No change in the diffuse bilateral airspace disease  and  effusions.     Continued followup is recommended.     This report was finalized on 2/26/2020 8:36 AM by Cass Moses M.D..    CT Chest Pulmonary Embolism [[id number]] Collected:  02/25/20 1247     Updated:  02/25/20 1251    Narrative:       PROCEDURE: CT CHEST PULMONARY EMBOLISM-     HISTORY: Shortness of breath     COMPARISON: 02/23/2020.     TECHNIQUE: Multiple axial CT images were obtained from the thoracic  inlet through the upper abdomen following the administration of Isovue  300 per the CT PE protocol. Coronal and oblique 3D MIP images were  reconstructed from the original axial data set.      FINDINGS: There are no filling defects within the pulmonary arteries to  suggest an embolus. The thoracic aorta is normal in caliber with no  evidence of dissection. The heart is normal in size. There are bilateral  pleural effusions. There is no pericardial effusion. There is no  adenopathy. Lung windows reveal airspace disease in the right greater  than left upper lobes consistent with a pneumonia. There is atelectasis  in both lung bases. The visualized upper abdomen is unremarkable. Bone  windows reveal no acute osseous abnormalities.       Impression:       1. No evidence of pulmonary embolus or dissection.  2. Small bilateral pleural effusions and bilateral airspace disease  consistent with a pneumonia.           452.82 mGy.cm        This study was performed with techniques to keep radiation doses as low  as reasonably achievable (ALARA). Individualized dose reduction  techniques using automated exposure control or adjustment of mA and/or  kV according to the patient size were employed.      This report was finalized on 2/25/2020 12:49 PM by Cass Moses M.D..    XR Chest PA & Lateral [[id number]] Collected:  02/25/20 1131     Updated:  02/25/20 1133    Narrative:       PROCEDURE: XR CHEST PA AND LATERAL-        HISTORY: hypoxic respiratory failure; A41.9-Sepsis, unspecified  organism;  N39.0-Urinary tract infection, site not specified;  N20.1-Calculus of ureter; D70.9-Neutropenia, unspecified; Z74.09-Other  reduced mobility     COMPARISON: 02/21/2020.     FINDINGS: The heart is normal in size. The mediastinum is unremarkable.  There is right greater than left airspace disease consistent with a  pneumonia with small effusions. There is no pneumothorax. There are no  acute osseous abnormalities.       Impression:       Right greater than left airspace disease and small effusions  consistent with a pneumonia.           This report was finalized on 2/25/2020 11:31 AM by Cass Moses M.D..    XR Spine Lumbar AP & Lateral [[id number]] Collected:  02/25/20 1130     Updated:  02/25/20 1133    Narrative:       PROCEDURE: XR SPINE LUMBAR AP AND LATERAL-     HISTORY: back pain after fall at home; A41.9-Sepsis, unspecified  organism; N39.0-Urinary tract infection, site not specified;  N20.1-Calculus of ureter; D70.9-Neutropenia, unspecified; Z74.09-Other  reduced mobility     FINDINGS: 3 views of the lumbar spine were obtained. The pedicles are  intact. There are diffuse degenerative changes throughout the lumbar  spine with loss of disc space height and facet arthropathy. There is no  acute fracture or acute malalignment. There is no significant  subluxation .       Impression:       Diffuse degenerative change with no acute osseous  abnormalities.     This report was finalized on 2/25/2020 11:30 AM by Cass Moses M.D..    XR Sacrum & Coccyx [[id number]] Collected:  02/25/20 1129     Updated:  02/25/20 1132    Narrative:       PROCEDURE: XR SACRUM AND COCCYX-     HISTORY: back pain; A41.9-Sepsis, unspecified organism; N39.0-Urinary  tract infection, site not specified; N20.1-Calculus of ureter;  D70.9-Neutropenia, unspecified; Z74.09-Other reduced mobility     COMPARISON: None.     FINDINGS: There is no fracture or malalignment. Degenerative changes are  present within both hips and the  sacroiliac joints. The pubic symphysis  and SI joints are intact. A left ureteral stent is in place.       Impression:       No fracture or malalignment.     This report was finalized on 2/25/2020 11:30 AM by Cass Moses M.D..    CT Head Without Contrast Stroke Protocol [[id number]] Collected:  02/24/20 1600     Updated:  02/24/20 1602    Narrative:       PROCEDURE: CT HEAD WO CONTRAST STROKE PROTOCOL-     HISTORY: Confusion/delirium, altered LOC, unexplained; A41.9-Sepsis,  unspecified organism; N39.0-Urinary tract infection, site not specified;  N20.1-Calculus of ureter; D70.9-Neutropenia, unspecified; Z74.09-Other  reduced mobility     COMPARISON: 02/23/2020.     TECHNIQUE: Multiple axial CT images were performed from the foramen  magnum to the vertex without enhancement.      FINDINGS: There is significant motion artifact. No gross hemorrhage is  evident. There is no midline shift.  There is no hydrocephalus. The  paranasal sinuses are clear. Bone windows reveal no acute osseous  abnormalities.       Impression:       Limited exam secondary to extensive motion artifact with no  gross abnormality identified.           1741.63 mGy.cm        This study was performed with techniques to keep radiation doses as low  as reasonably achievable (ALARA). Individualized dose reduction  techniques using automated exposure control or adjustment of mA and/or  kV according to the patient size were employed.      This report was finalized on 2/24/2020 4:00 PM by Cass Moses M.D..    CT Abdomen Pelvis Without Contrast [[id number]] Collected:  02/23/20 1330     Updated:  02/23/20 1335    Narrative:       CT ABDOMEN AND PELVIS WITHOUT CONTRAST-     HISTORY: Abdominal pain, unspecified; A41.9-Sepsis, unspecified  organism; N39.0-Urinary tract infection, site not specified;  N20.1-Calculus of ureter; D70.9-Neutropenia, unspecified; Z74.09-Other  reduced mobility.     PROCEDURE: Axial images were obtained from the lung  bases to the pubic  symphysis by computed tomography.     Comparison with 02/19/2020. Since previous, there has been placement of  a left ureteral stent with proximal pigtail in the mid kidney and distal  pigtail near the level of the ureterovesicular junction, probably in the  bladder, which is largely decompressed by a Caraballo catheter. Previously  seen hydronephrosis has resolved. Allowing for some artifact from  patient motion, multiple nonobstructing stones are present within the  left kidney. The largest of these measures approximately 5 or 6 mm.  Questionable proximal ureteral stone versus artifact from patient  motion. No other calcified stone along the course of the ureter. There  is some mild left periureteral and perinephric fat stranding. Solid  organ evaluation is limited without the administration of intravenous  contrast. Allowing for this, the other abdominal solid organs  demonstrate no other acute abnormality. Gallbladder has definite  evidence of acute inflammatory change. Mild anasarca. Evaluation of  bowel is limited without oral contrast. No bowel obstruction identified.  Some soft tissue thickening and fat stranding in the anterior pelvic  wall is similar to previous and could be related to scarring. Some  localized ill-defined infectious or inflammatory process not entirely  excluded. No focal fluid collection or abscess. Uterus is absent. Please  see separate dictated report for CT chest. Moderate degenerative changes  throughout the spine and the pelvis.       Impression:       1. Interval placement of left ureteral stent with resolution of  previously seen hydronephrosis.  2. Multiple nonobstructing left renal stones.  3. Anasarca.  4. Other chronic-appearing findings. Please see separate report for CT  chest.        This study was performed with techniques to keep radiation doses as low  as reasonably achievable (ALARA). Individualized dose reduction  techniques using automated exposure  control or adjustment of mA and/or  kV according to the patient size were employed.      This report was finalized on 2/23/2020 1:33 PM by Rod Quigley MD.    CT Chest Without Contrast [[id number]] Collected:  02/23/20 1307     Updated:  02/23/20 1316    Narrative:       CT CHEST     INDICATION: Shortness of breath.     FINDINGS: Axial imaging through the chest without intravenous contrast,  compared with 02/19/2020. This study was performed with techniques to  keep radiation doses as low as reasonably achievable, (ALARA).     Exam is somewhat limited by patient motion artifact and lack of  intravenous contrast. Heart size is normal. No pericardial effusion.  Scattered vascular calcifications. Numerous calcifications are scattered  throughout each breast. Interval development of tiny bilateral pleural  effusions. There are also patchy dependent densities in both lower lobes  and the upper lobes which may represent areas of atelectasis. Developing  infiltrates from pneumonia thought somewhat less likely but not  excluded. Scattered degenerative changes within the spine. Probable  minimal anasarca.       Impression:       1. Interval development of bilateral dependent densities which may  represent areas of atelectasis. Developing infiltrates from pneumonia  less likely but not excluded. Follow-up recommended.  2. Tiny pleural effusions.     This report was finalized on 2/23/2020 1:14 PM by Rod Quigley MD.    CT Head Without Contrast [[id number]] Collected:  02/23/20 1259     Updated:  02/23/20 1303    Narrative:       PROCEDURE: CT HEAD WO CONTRAST-     HISTORY: Confusion/delirium, altered LOC, unexplained; A41.9-Sepsis,  unspecified organism; N39.0-Urinary tract infection, site not specified;  N20.1-Calculus of ureter; D70.9-Neutropenia, unspecified; Z74.09-Other  reduced mobility     COMPARISON: 02/19/2020     FINDINGS: Axial imaging through the head without contrast.     Some of the images are slightly degraded by  patient motion. No  hemorrhage, mass effect or midline shift. Basal cisterns are patent.  Ventricles are not enlarged. Visualized portions of the paranasal  sinuses and mastoid air cells are aerated and clear. No acute skull  fracture identified.       Impression:       Allowing for patient motion, no acute intracranial findings.  No significant change compared with 4 days prior. Consider MRI if  symptoms persist.           This study was performed with techniques to keep radiation doses as low  as reasonably achievable (ALARA). Individualized dose reduction  techniques using automated exposure control or adjustment of mA and/or  kV according to the patient size were employed.      This report was finalized on 2/23/2020 1:00 PM by Rod Quigley MD.    XR Chest 1 View [[id number]] Collected:  02/21/20 1005     Updated:  02/21/20 1007    Narrative:       PROCEDURE: XR CHEST 1 VW-     HISTORY: shortness of breathe; A41.9-Sepsis, unspecified organism;  N39.0-Urinary tract infection, site not specified; N20.1-Calculus of  ureter; D70.9-Neutropenia, unspecified     COMPARISON: 02/19/2020.     FINDINGS: The heart is normal in size. The mediastinum is unremarkable.  The lungs are clear. There is no pneumothorax.  There are no acute  osseous abnormalities.       Impression:       No acute cardiopulmonary process.     Continued followup is recommended.     This report was finalized on 2/21/2020 10:05 AM by Cass Moses M.D..    CT Chest Without Contrast [[id number]] Collected:  02/20/20 1022     Updated:  02/20/20 1122    Narrative:       PROCEDURE: CT CHEST WO CONTRAST-     HISTORY: hypoxia     COMPARISON:  None .     PROCEDURE:  Multiple axial CT images were obtained from the thoracic  inlet through the upper abdomen without the use of contrast.      FINDINGS:   Soft tissue windows reveal no axillary, hilar or mediastinal adenopathy.  There are coarse calcifications in the breasts bilaterally. Heart size  is normal. The  aorta is normal in caliber. There are no pleural or  pericardial effusions. Lung windows demonstrate no suspicious infiltrate  or nodules . The visualized upper abdomen is unremarkable. Bone windows  reveal no acute osseous abnormalities.       Impression:       No acute process.     1512.50 mGy.cm  17.11 mGy     This study was performed with techniques to keep radiation doses as low  as reasonably achievable (ALARA). Individualized dose reduction  techniques using automated exposure control or adjustment of mA and/or  kV according to the patient size were employed.               Images were reviewed, interpreted, and dictated by Dr. Cass Moses M.D.  Transcribed by Estee Mcbride PA-C.     This report was finalized on 2/20/2020 11:20 AM by Cass Moses M.D..    CT Abdomen Pelvis Without Contrast [[id number]] Collected:  02/20/20 0909     Updated:  02/20/20 1122    Narrative:       PROCEDURE: CT ABDOMEN PELVIS WO CONTRAST-     HISTORY: lower abd pain     COMPARISON: None .     PROCEDURE: Axial images were obtained from the lung bases through the  pubic symphysis without intravenous contrast.     FINDINGS:      ABDOMEN: The limited noncontrast images of the liver are fatty  infiltrated. The spleen is normal. No adrenal masses are seen.  The  pancreas has an unremarkable unenhanced appearance.. The aorta is normal  in caliber. There is no significant free fluid or adenopathy. There are  nonobstructing left renal stones. There is mild left hydronephrosis  secondary to a 6 mm stone in the proximal left ureter. There is no  hydronephrosis. Limited noncontrast images of the bowel are  unremarkable.     PELVIS: The appendix is not identified. The urinary bladder contains a  Caraballo catheter and is collapsed. There is no significant fluid or  adenopathy.       Impression:       Mild left hydronephrosis secondary to a 6 mm stone in the  proximal left ureter.     Fatty infiltration of the liver.           This  study was performed with techniques to keep radiation doses as low  as reasonably achievable (ALARA). Individualized dose reduction  techniques using automated exposure control or adjustment of mA and/or  kV according to the patient size were employed.         Images were reviewed, interpreted, and dictated by Dr. Moses.  Transcribed by Estee Mcbride PA-C.     This report was finalized on 2/20/2020 11:20 AM by Cass Moses M.D..    XR Chest 1 View [[id number]] Collected:  02/20/20 0743     Updated:  02/20/20 0746    Narrative:       PROCEDURE: XR CHEST 1 VW-     HISTORY: ams     COMPARISON: None.     FINDINGS: The heart is normal in size. The mediastinum is unremarkable.  There are low lung volumes, however there are no focal opacities were  effusions. There is no pneumothorax.  There are no acute osseous  abnormalities.       Impression:       No acute cardiopulmonary process.     Continued followup is recommended.     This report was finalized on 2/20/2020 7:44 AM by Cass Moses M.D..    CT Head Without Contrast [[id number]] Collected:  02/19/20 2332     Updated:  02/19/20 2333    Narrative:       FINAL REPORT    TECHNIQUE:  Routine axial images through the head were obtained without  contrast.    CLINICAL HISTORY:  ams, stroke protocol    FINDINGS:  The ventricles are normal.  There is no mass or other abnormal  hypodensity.  There is no shift of midline structures.  There is  no intracranial hemorrhage.  No acute sinus or osseous  abnormality is seen.      Impression:       Unremarkable.    Authenticated by Jean-Pierre Ruvalcaba M.D. on 02/19/2020 11:32:45 PM          Condition on Discharge:      Stable.    Code status during the hospital stay:    Code Status and Medical Interventions:   Ordered at: 02/20/20 0353     Limited Support to NOT Include:    Intubation     Level Of Support Discussed With:    Patient    Next of Kin (If No Surrogate)     Code Status:    No CPR     Medical Interventions (Level of  Support Prior to Arrest):    Limited       Discharge Disposition:    Rehab Facility or Unit (DC - External)    Discharge Medications:       Discharge Medications      New Medications      Instructions Start Date   apixaban 5 MG tablet tablet  Commonly known as:  ELIQUIS   5 mg, Oral, Every 12 Hours Scheduled      aspirin 81 MG chewable tablet   81 mg, Oral, Daily   Start Date:  March 3, 2020     enalapril 20 MG tablet  Commonly known as:  VASOTEC   20 mg, Oral, Every 12 Hours Scheduled      furosemide 20 MG tablet  Commonly known as:  LASIX   20 mg, Oral, Daily   Start Date:  March 3, 2020     hydrALAZINE 25 MG tablet  Commonly known as:  APRESOLINE   25 mg, Oral, Every 6 Hours PRN      sotalol 80 MG tablet  Commonly known as:  BETAPACE   80 mg, Oral, Every 12 Hours Scheduled      spironolactone 25 MG tablet  Commonly known as:  ALDACTONE   25 mg, Oral, Daily   Start Date:  March 3, 2020        Continue These Medications      Instructions Start Date   amitriptyline 50 MG tablet  Commonly known as:  ELAVIL   75 mg, Oral, Nightly      amLODIPine 10 MG tablet  Commonly known as:  NORVASC   5 mg, Oral, Daily      traMADol 50 MG tablet  Commonly known as:  ULTRAM   50 mg, Oral, Every 6 Hours PRN         Stop These Medications    insulin regular 100 UNIT/ML injection  Commonly known as:  humuLIN R,novoLIN R     levocetirizine 5 MG tablet  Commonly known as:  XYZAL     lidocaine 5 %  Commonly known as:  LIDODERM     promethazine 25 MG tablet  Commonly known as:  PHENERGAN     propranolol 20 MG tablet  Commonly known as:  INDERAL     tiZANidine 4 MG tablet  Commonly known as:  ZANAFLEX            Discharge Diet:         Activity at Discharge:         Follow-up Appointments:    Additional Instructions for the Follow-ups that You Need to Schedule     Discharge Follow-up with PCP   As directed       Currently Documented PCP:    Marichuy Cordova MD    PCP Phone Number:    [phone]     Follow Up Details:  1 week          Discharge Follow-up with Specified Provider: dr fiore; 2 Weeks   As directed      To:  dr fiore    Follow Up:  2 Weeks           Follow-up Information     Marichuy Cordova MD .    Specialty:  Internal Medicine  Why:  1 week  Contact information:  [address] [place] [address]  [phone]                   No future appointments.    Additional Instructions for the Follow-ups that You Need to Schedule     Discharge Follow-up with PCP   As directed       Currently Documented PCP:    Marichuy Cordova MD    PCP Phone Number:    [phone]     Follow Up Details:  1 week         Discharge Follow-up with Specified Provider: dr fiore; 2 Weeks   As directed      To:  dr fiore    Follow Up:  2 Weeks               Test Results Pending at Discharge:           Reddy Mitchell DO  03/02/20  1:44 PM    Time spent: Greater than 30 minutes    Dictated utilizing Dragon dictation.

## 2020-03-02 NOTE — PLAN OF CARE
Problem: Patient Care Overview  Goal: Plan of Care Review  3/2/2020 1310 by Elizabeth López OT  Outcome: Ongoing (interventions implemented as appropriate)  Flowsheets (Taken 3/2/2020 0317)  Outcome Summary: OT  tx completed. Patient completed toileting task with mod A, encouraged pt to continue using commode or BSC; improved performance with functional mobility, walking 59' + 40' + 23' using RW with CGA, 2 assist for chair follow and management of equipment. Continue OT POC

## 2020-03-02 NOTE — THERAPY TREATMENT NOTE
Acute Care - Physical Therapy Treatment Note  [facility]     Patient Name: Shannon Servin  : 1950  MRN: [id number]  Today's Date: 3/2/2020  Onset of Illness/Injury or Date of Surgery: 20     Referring Physician: Dr. Mitchell    Admit Date: 2020    Visit Dx:    ICD-10-CM ICD-9-CM   1. Sepsis due to urinary tract infection (CMS/HCC) A41.9 038.9    N39.0 995.91     599.0   2. Left ureteral stone N20.1 592.1   3. Neutropenia, unspecified type (CMS/HCC) D70.9 288.00   4. Impaired mobility and ADLs Z74.09 799.89     Patient Active Problem List   Diagnosis   • Sepsis due to urinary tract infection (CMS/HCC)   • Left ureteral stone   • Metabolic encephalopathy   • Acute low back pain       Therapy Treatment    Rehabilitation Treatment Summary     Row Name 20 0927             Treatment Time/Intention    Discipline  physical therapy assistant  -RM      Document Type  therapy note (daily note)  -RM      Subjective Information  complains of;weakness  -RM      Mode of Treatment  physical therapy  -RM      Care Plan Review  care plan/treatment goals reviewed  -RM      Patient Effort  adequate  -RM      Existing Precautions/Restrictions  fall  -RM      Recorded by [RM] Arthur Faith, PTA 20 1044      Row Name 20 0927             Vital Signs    Pre Systolic BP Rehab  146  -RM      Pre Treatment Diastolic BP  63  -RM      Post Systolic BP Rehab  159  -RM      Post Treatment Diastolic BP  90  -RM      Intratreatment Heart Rate (beats/min)  86  -RM      Posttreatment Heart Rate (beats/min)  72  -RM      Pre SpO2 (%)  92  -RM      O2 Delivery Pre Treatment  room air  -RM      Intra SpO2 (%)  93  -RM      O2 Delivery Intra Treatment  room air  -RM      Post SpO2 (%)  94  -RM      O2 Delivery Post Treatment  room air  -RM      Pre Patient Position  Supine  -RM      Intra Patient Position  Standing  -RM      Post Patient Position  Sitting  -RM      Recorded by [RM] Arthur Faith, PTA 20  1044      Row Name 03/02/20 0927             Safety Issues, Functional Mobility    Safety Issues Affecting Function (Mobility)  insight into deficits/self awareness;positioning of assistive device;safety precaution awareness;safety precautions follow-through/compliance;sequencing abilities  -RM      Impairments Affecting Function (Mobility)  balance;endurance/activity tolerance;pain;shortness of breath;strength  -RM      Recorded by [] Arthur Faith, PTA 03/02/20 1044      Row Name 03/02/20 0927             Bed Mobility Assessment/Treatment    Supine-Sit [place] (Bed Mobility)  minimum assist (75% patient effort);verbal cues  -RM      Bed Mobility, Safety Issues  decreased use of arms for pushing/pulling;decreased use of legs for bridging/pushing  -RM      Assistive Device (Bed Mobility)  bed rails;head of bed elevated  -RM      Recorded by [] Arthur Faith, PTA 03/02/20 1044      Row Name 03/02/20 0927             Sit-Stand Transfer    Sit-Stand [place] (Transfers)  contact guard;verbal cues  -RM      Assistive Device (Sit-Stand Transfers)  walker, front-wheeled  -RM      Recorded by [] Arthur Faith, PTA 03/02/20 1044      Row Name 03/02/20 0927             Stand-Sit Transfer    Stand-Sit [place] (Transfers)  contact guard;verbal cues  -RM      Assistive Device (Stand-Sit Transfers)  walker, front-wheeled  -RM      Recorded by [] Arthur Faith, PTA 03/02/20 1044      Row Name 03/02/20 0927             Gait/Stairs Assessment/Training    [place] Level (Gait)  contact guard;verbal cues  -RM      Assistive Device (Gait)  walker, front-wheeled  -RM      Distance in Feet (Gait)  59+40+23  -RM      Pattern (Gait)  step-to;step-through  -RM      Deviations/Abnormal Patterns (Gait)  brunilda decreased;stride length decreased;base of support, wide  -RM      Bilateral Gait Deviations  forward flexed posture;heel strike decreased  -RM      Recorded by [RM] Arthur Faith, PTA  03/02/20 1044      Row Name 03/02/20 0927             Static Sitting Balance    Level of [place] (Unsupported Sitting, Static Balance)  conditional independence  -RM      Sitting Position (Unsupported Sitting, Static Balance)  sitting on edge of bed  -RM      Time Able to Maintain Position (Unsupported Sitting, Static Balance)  more than 5 minutes  -RM      Recorded by [RM] Arthur Faith, PTA 03/02/20 1044      Row Name 03/02/20 0927             Static Standing Balance    Level of [place] (Supported Standing, Static Balance)  contact guard assist  -RM      Time Able to Maintain Position (Supported Standing, Static Balance)  1 to 2 minutes  -RM      Assistive Device Utilized (Supported Standing, Static Balance)  walker, rolling  -RM      Recorded by [RM] Arthur Faith, PTA 03/02/20 1044      Row Name 03/02/20 0927             Dynamic Standing Balance    Level of [place], Reaches Outside Midline (Standing, Dynamic Balance)  contact guard assist  -RM      Time Able to Maintain Position, Reaches Outside Midline (Standing, Dynamic Balance)  more than 5 minutes  -RM      Assistive Device Utilized (Supported Standing, Dynamic Balance)  walker, rolling  -RM      Recorded by [RM] Arthur Faith, PTA 03/02/20 1044      Row Name 03/02/20 0927             Positioning and Restraints    Pre-Treatment Position  in bed  -RM      Post Treatment Position  chair  -RM      In Chair  reclined;call light within reach;encouraged to call for assist;with nsg  -RM      Recorded by [RM] Arthur Faith, PTA 03/02/20 1044      Row Name 03/02/20 0927             Pain Scale: Numbers Pre/Post-Treatment    Pain Scale: Numbers, Pretreatment  6/10  -RM      Pain Scale: Numbers, Post-Treatment  5/10  -RM      Pain Location - Orientation  lower  -RM      Pain Location  back  -RM      Pain Intervention(s)  Repositioned;Ambulation/increased activity  -RM      Recorded by [RM] Arthur Faith, PTA 03/02/20 1044      Row Name                 Wound 02/27/20 1538 Other (See comments) sacral spine    Wound - Properties Group Date first assessed: 02/27/20 [CA] Time first assessed: 1538 [CA] Side: Other (See comments) [CA] Location: sacral spine [CA] Recorded by:  [CA] Yamilex Holt RN 02/27/20 1539    Row Name 03/02/20 0927             Plan of Care Review    Plan of Care Reviewed With  patient  -RM      Progress  improving  -RM      Outcome Summary  Pt tolerating increased activity with ambulating 59', 40'  and 23' cga with rw. Pt c/o dizziness and therefore requires 2 seated rests  that were of 2-3 min or more in time. Pt was encouraged to increase activity daily.  See flowsheet for details.    -RM      Recorded by [] Arthur Faith, PTA 03/02/20 1044      Row Name 03/02/20 0927             Outcome Summary/Treatment Plan (PT)    Daily Summary of Progress (PT)  progress towards functional goals is fair  -      Plan for Continued Treatment (PT)  Cont PT per POC.   -RM      Recorded by [] Arthur Faith, PTA 03/02/20 1044        User Key  (r) = Recorded By, (t) = Taken By, (c) = Cosigned By    Initials Name Effective Dates Discipline    CA Yamilex Holt RN 10/26/16 -  Nurse     Arthur Faith, PTA 03/07/18 -  PT          Wound 02/27/20 1538 Other (See comments) sacral spine (Active)   Dressing Appearance dry;intact 3/2/2020  8:00 AM   Periwound dry 3/2/2020  8:00 AM   [place] Temperature warm 3/2/2020  8:00 AM   [place]iwound Skin Turgor soft 3/2/2020  8:00 AM               PT Recommendation and Plan     Outcome Summary/Treatment Plan (PT)  Daily Summary of Progress (PT): progress towards functional goals is fair  Plan for Continued Treatment (PT): Cont PT per POC.   Plan of Care Reviewed With: patient  Progress: improving  Outcome Summary: Pt tolerating increased activity with ambulating 59', 40'  and 23' cga with rw. Pt c/o dizziness and therefore requires 2 seated rests  that were of 2-3 min or more in time. Pt  was encouraged to increase activity daily.  See flowsheet for details.    Outcome Measures     Row Name 03/02/20 0927             How much help from another person do you currently need...    Turning from your back to your side while in flat bed without using bedrails?  2  -RM      Moving from lying on back to sitting on the side of a flat bed without bedrails?  2  -RM      Moving to and from a bed to a chair (including a wheelchair)?  3  -RM      Standing up from a chair using your arms (e.g., wheelchair, bedside chair)?  3  -RM      Climbing 3-5 steps with a railing?  1  -RM      To walk in hospital room?  3  -RM      AM-PAC 6 Clicks Score (PT)  14  -RM         Functional Assessment    Outcome Measure Options  AM-PAC 6 Clicks Basic Mobility (PT)  -RM        User Key  (r) = Recorded By, (t) = Taken By, (c) = Cosigned By    Initials Name Provider Type    Arthur Rivas PTA Physical Therapy Assistant         Time Calculation:   PT Charges     Row Name 03/02/20 1045             Time Calculation    Start Time  0927  -RM      Stop Time  1030  -RM      Time Calculation (min)  63 min  -RM      PT Received On  03/02/20  -RM      PT Goal Re-Cert Due Date  03/03/20  -RM         Time Calculation- PT    Total Timed Code Minutes- PT  30 minute(s)  -RM         Timed Charges    70861 - Gait Training Minutes   30  -RM        User Key  (r) = Recorded By, (t) = Taken By, (c) = Cosigned By    Initials Name Provider Type    Arthur Rivas PTA Physical Therapy Assistant        Therapy Charges for Today     Code Description Service Date Service Provider Modifiers Qty    [id number] HC GAIT TRAINING EA 15 MIN 3/2/2020 Arthur Faith PTA GP 2          PT G-Codes  Outcome Measure Options: AM-PAC 6 Clicks Basic Mobility (PT)  AM-PAC 6 Clicks Score (PT): 14  AM-PAC 6 Clicks Score (OT): 15    Arthur Faith PTA  3/2/2020

## 2020-03-02 NOTE — THERAPY TREATMENT NOTE
Acute Care - Occupational Therapy Treatment Note  [facility]     Patient Name: Shannon Servin  : 1950  MRN: [id number]  Today's Date: 3/2/2020  Onset of Illness/Injury or Date of Surgery: 20  Date of Referral to OT: 20  Referring Physician: Dr. Mitchell    Admit Date: 2020       ICD-10-CM ICD-9-CM   1. Sepsis due to urinary tract infection (CMS/HCC) A41.9 038.9    N39.0 995.91     599.0   2. Left ureteral stone N20.1 592.1   3. Neutropenia, unspecified type (CMS/HCC) D70.9 288.00   4. Impaired mobility and ADLs Z74.09 799.89     Patient Active Problem List   Diagnosis   • Sepsis due to urinary tract infection (CMS/HCC)   • Left ureteral stone   • Metabolic encephalopathy   • Acute low back pain     Past Medical History:   Diagnosis Date   • Arthritis    • Diabetes mellitus (CMS/HCC)    • Hypertension    • Impaired functional mobility, balance, gait, and endurance      Past Surgical History:   Procedure Laterality Date   • HYSTERECTOMY     • TOTAL ABDOMINAL HYSTERECTOMY WITH SALPINGO OOPHORECTOMY     • URETEROSCOPY LASER LITHOTRIPSY WITH STENT INSERTION Left 2020    Procedure: URETEROSCOPY, LEFT RETROGRADE PYLEOGERAM WITH STENT INSERTION;  Surgeon: Mulugeta Saldana MD;  Location: [facility];  Service: Urology;  Laterality: Left;       Therapy Treatment    Rehabilitation Treatment Summary     Row Name 20 0927 20 0926          Treatment Time/Intention    Discipline  physical therapy assistant  -RM  occupational therapist  -SD     Document Type  therapy note (daily note)  -RM  therapy note (daily note)  -SD     Subjective Information  complains of;weakness  -RM  complains of;weakness  -SD     Mode of Treatment  physical therapy  -RM  occupational therapy  -SD     Patient/Family Observations  --  Nursing student and instructor present  -SD     Care Plan Review  care plan/treatment goals reviewed  -RM  care plan/treatment goals reviewed  -SD     Care Plan Review, Other  Participant(s)  --  spouse  -SD     Patient Effort  adequate  -RM  good  -SD     Existing Precautions/Restrictions  fall  -RM  fall  -SD     Recorded by [RM] Arthur Faith, PTA 03/02/20 1044 [SD] Elizabeth López, OT 03/02/20 1235     Row Name 03/02/20 0927 03/02/20 0926          Vital Signs    Pre Systolic BP Rehab  146  -RM  --     Pre Treatment Diastolic BP  63  -RM  --     Post Systolic BP Rehab  159  -RM  155  -SD     Post Treatment Diastolic BP  90  -RM  90 notified RN  -SD     Intratreatment Heart Rate (beats/min)  86  -RM  --     Posttreatment Heart Rate (beats/min)  72  -RM  --     Pre SpO2 (%)  92  -RM  94  -SD     O2 Delivery Pre Treatment  room air  -RM  room air  -SD     Intra SpO2 (%)  93  -RM  93  -SD     O2 Delivery Intra Treatment  room air  -RM  room air  -SD     Post SpO2 (%)  94  -RM  93  -SD     O2 Delivery Post Treatment  room air  -RM  room air  -SD     Pre Patient Position  Supine  -RM  --     Intra Patient Position  Standing  -RM  --     Post Patient Position  Sitting  -RM  --     Recorded by [RM] Arthur Faith, PTA 03/02/20 1044 [SD] Elizabeth López, OT 03/02/20 1307     Row Name 03/02/20 0927             Safety Issues, Functional Mobility    Safety Issues Affecting Function (Mobility)  insight into deficits/self awareness;positioning of assistive device;safety precaution awareness;safety precautions follow-through/compliance;sequencing abilities  -RM      Impairments Affecting Function (Mobility)  balance;endurance/activity tolerance;pain;shortness of breath;strength  -RM      Recorded by [RM] Arthur Faith, PTA 03/02/20 1044      Row Name 03/02/20 0927 03/02/20 0926          Bed Mobility Assessment/Treatment    Supine-Sit [place] (Bed Mobility)  minimum assist (75% patient effort);verbal cues  -RM  minimum assist (75% patient effort)  -SD     Bed Mobility, Safety Issues  decreased use of arms for pushing/pulling;decreased use of legs for bridging/pushing  -RM  --      Assistive Device (Bed Mobility)  bed rails;head of bed elevated  -RM  bed rails;head of bed elevated  -SD     Recorded by [RM] Arthur Faith, PTA 03/02/20 1044 [SD] Elizabeth López, OT 03/02/20 1307     Row Name 03/02/20 0926             Functional Mobility    Functional Mobility- Ind. Level  contact guard assist  -SD      Functional Mobility- Device  rolling walker  -SD      Functional Mobility-Distance (Feet)  59 + 40, +23  -SD      Recorded by [SD] Elizabeth López, OT 03/02/20 1307      Row Name 03/02/20 0926             Transfer Assessment/Treatment    Transfer Assessment/Treatment  sit-stand transfer;stand-sit transfer;toilet transfer  -SD      Recorded by [SD] Elizabeth López OT 03/02/20 1307      Row Name 03/02/20 0927 03/02/20 0926          Sit-Stand Transfer    Sit-Stand [place] (Transfers)  contact guard;verbal cues  -RM  contact guard  -SD     Assistive Device (Sit-Stand Transfers)  walker, front-wheeled  -RM  walker, front-wheeled  -SD     Recorded by [RM] Arthur Faith, PTA 03/02/20 1044 [SD] Elizabeth López, OT 03/02/20 1307     Row Name 03/02/20 0927 03/02/20 0926          Stand-Sit Transfer    Stand-Sit [place] (Transfers)  contact guard;verbal cues  -RM  contact guard  -SD     Assistive Device (Stand-Sit Transfers)  walker, front-wheeled  -RM  walker, front-wheeled  -SD     Recorded by [RM] Arthur Faith, PTA 03/02/20 1044 [SD] Elizabeth López, OT 03/02/20 1307     Row Name 03/02/20 0926             Toilet Transfer    Type (Toilet Transfer)  stand pivot/stand step  -SD      [place] Level (Toilet Transfer)  contact guard  -SD      Assistive Device (Toilet Transfer)  commode;grab bars/safety frame  -SD      Recorded by [SD] Elizabeth López, OT 03/02/20 1307      Row Name 03/02/20 0927             Gait/Stairs Assessment/Training    [place] Level (Gait)  contact guard;verbal cues  -RM      Assistive Device (Gait)  walker, front-wheeled  -RM      Distance in Feet  (Gait)  59+40+23  -RM      Pattern (Gait)  step-to;step-through  -RM      Deviations/Abnormal Patterns (Gait)  brunilda decreased;stride length decreased;base of support, wide  -RM      Bilateral Gait Deviations  forward flexed posture;heel strike decreased  -RM      Recorded by [RM] Arthur Faith, PTA 03/02/20 1044      Row Name 03/02/20 0926             Lower Body Dressing Assessment/Training    Lower Body Dressing [place] Level  don;shoes/slippers;supervision  -SD      Recorded by [SD] Elizabeth López OT 03/02/20 1307      Row Name 03/02/20 0926             Grooming Assessment/Training    [place] Level (Grooming)  hair care, combing/brushing;wash face, hands;set up  -SD      Recorded by [SD] Elizabeth López OT 03/02/20 1307      Row Name 03/02/20 0926             Toileting Assessment/Training    [place] Level (Toileting)  toileting skills;moderate assist (50% patient effort)  -SD      Recorded by [SD] Elizabeth López OT 03/02/20 1307      Row Name 03/02/20 0927             Static Sitting Balance    Level of [place] (Unsupported Sitting, Static Balance)  conditional independence  -RM      Sitting Position (Unsupported Sitting, Static Balance)  sitting on edge of bed  -RM      Time Able to Maintain Position (Unsupported Sitting, Static Balance)  more than 5 minutes  -RM      Recorded by [RM] Arthur Faith, PTA 03/02/20 1044      Row Name 03/02/20 0927             Static Standing Balance    Level of [place] (Supported Standing, Static Balance)  contact guard assist  -RM      Time Able to Maintain Position (Supported Standing, Static Balance)  1 to 2 minutes  -RM      Assistive Device Utilized (Supported Standing, Static Balance)  walker, rolling  -RM      Recorded by [RM] Arthur Faith, PTA 03/02/20 1044      Row Name 03/02/20 0927             Dynamic Standing Balance    Level of [place], Reaches Outside Midline (Standing, Dynamic Balance)  contact guard assist  -RM       Time Able to Maintain Position, Reaches Outside Midline (Standing, Dynamic Balance)  more than 5 minutes  -RM      Assistive Device Utilized (Supported Standing, Dynamic Balance)  walker, rolling  -RM      Recorded by [RM] Arthur Faith, PTA 03/02/20 1044      Row Name 03/02/20 0927 03/02/20 0926          Positioning and Restraints    Pre-Treatment Position  in bed  -RM  in bed  -SD     Post Treatment Position  chair  -RM  chair  -SD     In Chair  reclined;call light within reach;encouraged to call for assist;with nsg  -RM  reclined;call light within reach;encouraged to call for assist;with nsg  -SD     Recorded by [RM] Arthur Faith, PTA 03/02/20 1044 [SD] Elizabeth López OT 03/02/20 1307     Row Name 03/02/20 0927 03/02/20 0926          Pain Scale: Numbers Pre/Post-Treatment    Pain Scale: Numbers, Pretreatment  6/10  -RM  6/10  -SD     Pain Scale: Numbers, Post-Treatment  5/10  -RM  5/10  -SD     Pain Location - Orientation  lower  -RM  lower  -SD     Pain Location  back  -RM  back  -SD     Pain Intervention(s)  Repositioned;Ambulation/increased activity  -RM  Repositioned;Ambulation/increased activity  -SD     Recorded by [RM] Arthur Faith, PTA 03/02/20 1044 [SD] Elizabeth López OT 03/02/20 1307     Row Name                Wound 02/27/20 1538 Other (See comments) sacral spine    Wound - Properties Group Date first assessed: 02/27/20 [CA] Time first assessed: 1538 [CA] Side: Other (See comments) [CA] Location: sacral spine [CA] Recorded by:  [CA] Yamilex Holt RN 02/27/20 1539    Row Name 03/02/20 0926             Coping    Observed Emotional State  calm;cooperative  -SD      Verbalized Emotional State  acceptance  -SD      Recorded by [SD] Elizabeth López OT 03/02/20 1309      Row Name 03/02/20 0927 03/02/20 0926          Plan of Care Review    Plan of Care Reviewed With  patient  -RM  patient  -SD     Progress  improving  -RM  improving  -SD     Outcome Summary  Pt tolerating  increased activity with ambulating 59', 40'  and 23' cga with rw. Pt c/o dizziness and therefore requires 2 seated rests  that were of 2-3 min or more in time. Pt was encouraged to increase activity daily.  See flowsheet for details.    -RM  --     Recorded by [RM] Arthur Faith, PTA 03/02/20 1044 [SD] Elizabeth López, OT 03/02/20 1309     Row Name 03/02/20 0973             Outcome Summary/Treatment Plan (OT)    Daily Summary of Progress (OT)  progress toward functional goals as expected  -SD      Anticipated Discharge Disposition (OT)  inpatient rehabilitation facility  -SD      Recorded by [SD] Elizabeth López OT 03/02/20 1309      Row Name 03/02/20 0975             Outcome Summary/Treatment Plan (PT)    Daily Summary of Progress (PT)  progress towards functional goals is fair  -RM      Plan for Continued Treatment (PT)  Cont PT per POC.   -RM      Recorded by [RM] Arthur Faith, PTA 03/02/20 1044        User Key  (r) = Recorded By, (t) = Taken By, (c) = Cosigned By    Initials Name Effective Dates Discipline    CA Yamilex Holt RN 10/26/16 -  Nurse    Arthur Rivas, PTA 03/07/18 -  PT    SD Elizabeth López OT 03/07/18 -  OT        Wound 02/27/20 1538 Other (See comments) sacral spine (Active)   Dressing Appearance dry;intact 3/2/2020  8:00 AM   Periwound dry 3/2/2020  8:00 AM   [place] Temperature warm 3/2/2020  8:00 AM   [place]iwound Skin Turgor soft 3/2/2020  8:00 AM           OT Recommendation and Plan  Outcome Summary/Treatment Plan (OT)  Daily Summary of Progress (OT): progress toward functional goals as expected  Anticipated Discharge Disposition (OT): inpatient rehabilitation facility  Planned Therapy Interventions (OT Eval): activity tolerance training, adaptive equipment training, BADL retraining, passive ROM/stretching, strengthening exercise, transfer/mobility retraining, patient/caregiver education/training  Therapy Frequency (OT Eval): 3 times/wk(5 times if  indicated)  Daily Summary of Progress (OT): progress toward functional goals as expected  Plan of Care Review  Plan of Care Reviewed With: patient  Plan of Care Reviewed With: patient  Outcome Summary: OT  tx completed. Patient completed toileting task with mod A, encouraged pt to continue using commode or BSC; improved performance with functional mobility, walking 59' + 40' + 23' using RW with CGA, 2 assist for chair follow and management of equipment. Continue OT POC  Outcome Measures     Row Name 03/02/20 0927 03/02/20 0926          How much help from another person do you currently need...    Turning from your back to your side while in flat bed without using bedrails?  2  -RM  --     Moving from lying on back to sitting on the side of a flat bed without bedrails?  2  -RM  --     Moving to and from a bed to a chair (including a wheelchair)?  3  -RM  --     Standing up from a chair using your arms (e.g., wheelchair, bedside chair)?  3  -RM  --     Climbing 3-5 steps with a railing?  1  -RM  --     To walk in hospital room?  3  -RM  --     AM-PAC 6 Clicks Score (PT)  14  -RM  --        How much help from another is currently needed...    Putting on and taking off regular lower body clothing?  --  2  -SD     Bathing (including washing, rinsing, and drying)  --  2  -SD     Toileting (which includes using toilet bed pan or urinal)  --  2  -SD     Putting on and taking off regular upper body clothing  --  3  -SD     Taking care of personal grooming (such as brushing teeth)  --  3  -SD     Eating meals  --  3  -SD     AM-PAC 6 Clicks Score (OT)  --  15  -SD        Functional Assessment    Outcome Measure Options  AM-PAC 6 Clicks Basic Mobility (PT)  -RM  AM-PAC 6 Clicks Daily Activity (OT)  -SD       User Key  (r) = Recorded By, (t) = Taken By, (c) = Cosigned By    Initials Name Provider Type    Arthur Rivas, PTA Physical Therapy Assistant    Elizabeth Trejo, OT Occupational Therapist           Time  Calculation:   Time Calculation- OT     Row Name 03/02/20 1311 03/02/20 1045          Time Calculation- OT    OT Start Time  0926  -SD  --     Total Timed Code Minutes- OT  55 minute(s)  -SD  --     OT Received On  03/02/20  -SD  --     OT Goal Re-Cert Due Date  03/03/20  -SD  --        Timed Charges    08157 - Gait Training Minutes   --  30  -RM     34881 - OT Therapeutic Activity Minutes  15  -SD  --     83862 - OT Self Care/Mgmt Minutes  15  -SD  --       User Key  (r) = Recorded By, (t) = Taken By, (c) = Cosigned By    Initials Name Provider Type    Arthur Rivas, PTA Physical Therapy Assistant    SD Elizabeth López, OT Occupational Therapist        Therapy Charges for Today     Code Description Service Date Service Provider Modifiers Qty    06009300692  OT THERAPEUTIC ACT EA 15 MIN 3/2/2020 Elizabeth López OT GO 1    [id number] HC OT SELF CARE/MGMT/TRAIN EA 15 MIN 3/2/2020 Elizabeth López OT GO 1               Elizabeth López OT  3/2/2020

## 2020-03-02 NOTE — PROGRESS NOTES
Continued Stay Note   [facility]     Patient Name: Shannon Servin  MRN: [id number]  Today's Date: 3/2/2020    Admit Date: 2/19/2020    Discharge Plan     Row Name 03/02/20 0751       Plan    Plan  Spoke to Teagan Jha stating pt had decided wanted to go home with HH.  Spoke to pt in room and did state she wanted to go home.  States  will be here shortly and can discuss DCP with him.  Imm explained and signed.  CM no left on board for  to call when arrives.  Updated PT notes sent to [facility].     Spoke to  in room and again outside of room, acknowledges pt does want to go home but feels she has to do more for herself before going.  Spoke with PT and will evaluate today and discuss rehab vs home with HH.  Spoke with Dr Mitchell and will also speak with pt.  Received call from Nia at [facility] and has submitted update to Brandee.  Nia will call when gets approval.     Per Dr Mitchell pt has agreed to go to rehab.  Received call back from Nia at [facility] and has approval from Brandee and can come today.  Spoke with  Kevin and informed.   will transport.   given a card with no. For contacting [facility] who is working on Trilogy which will be needed at discharge from [facility].  Nia at [facility] also informed [facility] status of precert for Tr[facility] as [facility] will provide Trilogy while there.  Message left with Guero to inform pt discharging and to contact [facility] if needs anything else and for discharge date.  Teagan JHA informed Call report to [phone] and fax dcs to [phone]          Discharge Codes    No documentation.       Expected Discharge Date and Time     Expected Discharge Date Expected Discharge Time    Mar 2, 2020             Lori Wesley RN

## 2020-03-03 NOTE — PROGRESS NOTES
Case Management Discharge Note           Provided Post Acute Provider List?: Yes  Post Acute Provider List: DME Supplier  Delivered To: Support Person  Support Person: Kevin Servin  Method of Delivery: In person    Destination - Selection Complete      Service Provider Request Status Selected Services Address Phone Number Fax Number    [facility] [address] [place] [address] [phone]              Transportation Services  Private: Car    Final Discharge Disposition Code: 61 - hospital-based [facility]

## 2020-03-05 NOTE — PAYOR COMM NOTE
"Reference number:[id number]  [facility]  [phone], fax [phone]  Shannon Servin (69 y.o. Female)     Date of Birth Social Security Number Address Home Phone MRN    1950  [address] [place] [address] [phone] [id number]    [employer] Status          None        Admission Date Admission Type Admitting Provider Attending Provider Department, Room/Bed    2/19/20 Emergency Jose Francisco Whatley MD  [facility] MED SURG  3, 308/1    Discharge Date Discharge Disposition Discharge Destination        3/2/2020 Rehab Facility or Unit (DC - External)              Attending Provider:  (none)   Allergies:  No Known Allergies    Isolation:  None   Infection:  None   Code Status:  Prior    Ht:  170.2 cm (67\")   Wt:  117 kg (257 lb)    Admission Cmt:  None   Principal Problem:  Sepsis due to urinary tract infection (CMS/[facility]) [A41.9,N39.0]                 Active Insurance as of 2/19/2020     Primary Coverage     Payor Plan Insurance Group Employer/Plan Group    ANTHEM MEDICARE REPLACEMENT ANTHEM MEDICARE ADVANTAGE [id number]     Payor Plan Address Payor Plan Phone Number Payor Plan Fax Number Effective Dates    [address] [phone]  1/1/2020 - None Entered    [facility] [id number]       Subscriber Name Subscriber Birth Date Member ID       SHANNON SERVIN 1950 [id number]                 Emergency Contacts      (Rel.) Home Phone Work Phone Mobile Phone    TISHA SERVIN (Spouse) [phone] -- --               Discharge Summary      Mulugeta Saldana MD at 02/26/20 1010        Urology discharge summary    This patient was admitted with multiple medical problems including apparent  Sepsis from urinary tract infection.  Initial urine culture grew E. coli and she subsequently had positive blood cultures for Proteus.  There is a question of pneumonia on 1 CT scan of her chest but otherwise no other obvious source was found.    The patient did have a 6 mm stone " obstructing her left proximal ureter causing hydronephrosis and therefore she was taken to the OR where under sedation the stone was pushed back up into the kidney and a ureteral stent inserted.  She also has other stones in the kidney that are nonobstructing including one that is too large to pass.  The original plan was to return.  The patient to the operating room for ESWL to fragment all the stones into smaller pieces.  Unfortunately she is had significant cognitive dysfunction along with other medical problems including recent development of atrial fib and now the need for anticoagulation therapy.  The patient will soon be discharged and possibly to a palliative care only situation.  Since she has an indwelling ureteral stent this will need to be changed in 3 months or it will become so encrusted it can't be removed.  If the stent is removed without fragmenting the calculi she most likely will redevelop obstruction and possibly sepsis.    Electronically signed by Mulugeta Saldana MD at 20 1016     Reddy Mitchell DO at 20 1343              [facility]   DISCHARGE SUMMARY      Name:  Shannon Servin   Age:  69 y.o.  Sex:  female  :  1950  MRN:  [id number]   Visit Number:  [id number]    Admission Date:  2020  Date of Discharge:  3/2/2020  Primary Care Physician:  Marichuy Cordova MD    Discharge Diagnoses:       Sepsis due to urinary tract infection (CMS/[facility])    Left ureteral stone    Metabolic encephalopathy    Acute low back pain      Presenting Problem:    Sepsis due to urinary tract infection (CMS/[facility]) [A41.9, N39.0]  A-fib (CMS/[facility]) [I48.91]     Consults:     Consults     Date and Time Order Name Status Description    2020 0732 Inpatient Cardiology Consult Completed     2020 0867 Inpatient Urology Consult          Consulting Physician(s)     Provider Relationship Specialty    Mulugeta Saldana MD Consulting Physician Urology     Kevin Shirley MD Consulting Physician Cardiology    Reddy Mitchell DO Consulting Physician Internal Medicine          Procedures Performed:    Procedure(s):  URETEROSCOPY, LEFT RETROGRADE PYLEOGERAM WITH STENT INSERTION       History of presenting illness:    Patient is a 69 year old female with medical history of insulin dependent type 2 DM, obesity, diffuse osteoarthritis, sciatica, chronic debility and hypertension, who was brought in to the ER by EMS for evaluation of altered mentation. Patient is very sleepy, only wakes up for a few seconds at a time. She is currently oriented but difficult to obtain history due to encephalopathy. Her  is at the bedside and history obtained from speaking to him and the ER endorsement/documentation. Patient was evaluated here in the ER on 2/18/20 for pain in her right hip which occurred while she was getting off the toilet at home. She heard a pop associated with sudden onset pain. She had xrays done which did not reveal any fracture so she was discharged home on pain medications. She took a couple of doses of hydrocodone but felt poorly with burning sensation in her abdomen, so went to see her PCP. She had hydrocodone changed to tramadol as her PCP thought it may be too strong for her. They returned home and her  went to  her medications, when he returned home, he found her to be on the floor wedged between the bed and the wall. He was not able to get her up, so called EMS again. Patient apparently has been complaining of some dysuria over the last week or so for which she has been taking herbal supplements. She does have a history of frequent UTIs. She reports subjective fever and chills but did not check temperature at home. She has been slightly short of breath but this is a chronic issue for her. She denies any chest pain, palpitations, headache or dizziness. Her  has not noted any diarrhea, but she does have some constipation  intermittently.      Patient noted to be febrile with temperature max at 104.7 rectally, tachycardic, /91 and hypoxic on RA to 89%. Labs notable for WBC 1.6, platelets 91, normal H &H, 20% bands. BUN/Cr 18/1.58, no known baseline. Procalcitonin 6.39. Negative troponin, normal BNP. ABG shows pH of 7.3, normal CO2 and pO2 67 on 2L. Rapid flu screen negative. Urinalysis shows moderate leukocytes, positive nitrite, 6-12 RBCs and 21-30 WBCs and 3+ bacteria. Lactic acid elevated at 2.1. CT head was negative. CT chest does not show any acute cardiopulmonary process. CT abdomen/pelvis shows a 6 mm proximal left ureteral stone with mild left hydronephrosis with multiple left renal stones. She was given a total of 2L NS boluses and Ceftriaxone.  Blood cultures were drawn. She is being admitted to the hospitalist service.   Per H&P    Hospital Course:    Patient is a 69 year old female with medical history of insulin dependent type 2 DM, obesity, diffuse osteoarthritis, sciatica, chronic debility and hypertension, who was brought in to the ER by EMS for evaluation of altered mentation.  Patient was found to have a left sided renal stone with evidence of a UTI.  Patient underwent cystoscopy 2/20/2020 per Dr. Saldana.  Cystoscopy with stent placement was successful.  Patient transferred to the ICU post procedure for observation overnight as she was febrile and slightly hypotensive though did respond to fluid challenge.  Patient was transferred to the medical floor within 24 hours.  She remained hemodynamically stable however still with lethargy.  Patient did initially require BiPAP and was set up for home trilogy unit at discharge.  Patient will need trilogy unit as an outpatient.  Discussed with patient that she likely has obstructive sleep apnea and will need further studies done as an outpatient and needs follow-up with pulmonology.  Patient was also found to have pneumonia as well as Proteus bacteremia.  She was treated  with antibiotics and completed a full course while inpatient for both pneumonia and bacteremia.  Her some more oxygen was able to be titrated down to 2 L and even room air at time of discharge.  Patient also developed A. fib with RVR and associated hypertension secondary to underlying infectious process.  She was seen by cardiology and started on Eliquis with sotalol and continued on Lasix and spironolactone for edema.  Patient has continued to improve and would greatly benefit from further physical and Occupational Therapy.  She will need to use the trilogy machine during the nighttime and daytime with settings: Max pressure 32, PS min 6, PS max 26, EPAP 5 min, EPAP max 12, tidal volume 500, respiratory rate auto, with 2 L/min to be bled into the noninvasive ventilator machine to use both in the daytime and at night.    Vital Signs:    Temp:  [97.9 °F (36.6 °C)-98.6 °F (37 °C)] 98.6 °F (37 °C)  Heart Rate:  [69-76] 76  Resp:  [16-20] 18  BP: (145-178)/(58-73) 145/58    Physical Exam:    General Appearance:  Alert and cooperative, not in any acute distress.   Head:  Atraumatic and normocephalic, without obvious abnormality.   Eyes:          conjunctivae and sclerae normal, no Icterus. No pallor. Extraocular movements are within normal limits.   Ears:  Ears appear intact with no abnormalities noted.   Throat: No oral lesions, no thrush,    Neck: Supple, trachea midline,        Lungs:   Chest shape is normal. Breath sounds heard bilaterally equally.  No crackles or wheezing.    Heart:  Normal S1 and S2, no murmur   Abdomen:   Normal bowel sounds, Soft, non-tender, non-distended, no guarding, no rebound tenderness.   Extremities: Moves all extremities well, no edema, no cyanosis, no clubbing.   Pulses: Pulses palpable and equal bilaterally.   Skin: No bleeding, bruising or rash.   Lymph nodes: No palpable adenopathy.   Neurologic: Alert and oriented x 3. Moves all four limbs equally. No tremors.      Pertinent Lab  Results:     Results from last 7 days   Lab Units 03/01/20  0531 02/28/20  0545 02/27/20  0551 02/26/20  0405 02/25/20  0556   SODIUM mmol/L 140 142 143 144 151*   POTASSIUM mmol/L 3.9 3.5 3.6 3.1* 3.7   CHLORIDE mmol/L 100 97* 97* 98 108*   CO2 mmol/L 31.8* 35.0* 37.3* 36.3* 35.5*   BUN mg/dL 10 8 9 12 16   CREATININE mg/dL 0.69 0.64 0.59 0.63 0.66   CALCIUM mg/dL 8.9 8.9 8.7 8.8 8.4*   BILIRUBIN mg/dL  --  0.5  --  0.8 1.1   ALK PHOS U/L  --  64  --  72 76   ALT (SGPT) U/L  --  31  --  51* 32   AST (SGOT) U/L  --  22  --  60* 50*   GLUCOSE mg/dL 178* 157* 172* 177* 138*     Results from last 7 days   Lab Units 03/01/20  0531 02/28/20  0545 02/27/20  0551   WBC 10*3/mm3 10.29 11.77* 13.23*   HEMOGLOBIN g/dL 12.5 13.0 13.6   HEMATOCRIT % 39.0 39.8 42.1   PLATELETS 10*3/mm3 189 175 155         Results from last 7 days   Lab Units 02/26/20  1425 02/26/20  0757 02/26/20  0405   TROPONIN T ng/mL <0.010 <0.010 <0.010     Results from last 7 days   Lab Units 02/26/20  0405   PROBNP pg/mL 2,428.0*             Results from last 7 days   Lab Units 02/24/20  1501   PH, ARTERIAL pH units 7.337   PO2 ART mm Hg 72.6*   PCO2, ARTERIAL mm Hg 68.3*   HCO3 ART mmol/L 36.5*           Invalid input(s): USDES,  BLOODU, NITRITITE, BACT, EP  Pain Management Panel     There is no flowsheet data to display.        Results from last 7 days   Lab Units 02/24/20  1520 02/24/20  1515   BLOODCX  No growth at 5 days No growth at 5 days       Pertinent Radiology Results:    Imaging Results (All)     Procedure Component Value Units Date/Time    CT Lumbar Spine Without Contrast [[id number]] Collected:  02/27/20 1816     Updated:  02/27/20 1817    Narrative:       FINAL REPORT    CLINICAL HISTORY:  Low back pain, prior surgery, new or progressive sx    FINDINGS:  CT LUMBAR SPINE  TECHNIQUE: Thin section axial CT with sagittal  and coronal reconstructions  FINDINGS: No fracture is present.  Alignment is normal.   Multilevel degenerative canal stenosis  is  present.  Findings are most pronounced L3-4 and L4-5 with  moderate canal stenosis.  Multilevel neural foraminal narrowing  is seen most pronounced at L4-5 and L5-S1.      Impression:       1.  Negative CT evaluation of the lumbar spine for acute bony  injury.  2.  Advanced degenerative changes greatest L3-4 and  L4-5.  MR may be considered given the extent of degenerative  changes.  This study was performed using automated techniques to  achieve radiation exposure as low as reasonably achievable    Authenticated by FERNANDO Gu MD on 02/27/2020 06:16:37 PM    XR Chest 1 View [[id number]] Collected:  02/26/20 0836     Updated:  02/26/20 0838    Narrative:       PROCEDURE: XR CHEST 1 VW-     HISTORY: DYSPNEA CHRONIC, NO XRAY     COMPARISON: 02/25/2020.     FINDINGS: The heart is normal in size. The mediastinum is unremarkable.  There is diffuse bilateral airspace disease and effusions which are  unchanged. There is no pneumothorax.  There are no acute osseous  abnormalities.       Impression:       No change in the diffuse bilateral airspace disease and  effusions.     Continued followup is recommended.     This report was finalized on 2/26/2020 8:36 AM by Cass Moses M.D..    CT Chest Pulmonary Embolism [[id number]] Collected:  02/25/20 1247     Updated:  02/25/20 1251    Narrative:       PROCEDURE: CT CHEST PULMONARY EMBOLISM-     HISTORY: Shortness of breath     COMPARISON: 02/23/2020.     TECHNIQUE: Multiple axial CT images were obtained from the thoracic  inlet through the upper abdomen following the administration of Isovue  300 per the CT PE protocol. Coronal and oblique 3D MIP images were  reconstructed from the original axial data set.      FINDINGS: There are no filling defects within the pulmonary arteries to  suggest an embolus. The thoracic aorta is normal in caliber with no  evidence of dissection. The heart is normal in size. There are bilateral  pleural effusions. There is no pericardial  effusion. There is no  adenopathy. Lung windows reveal airspace disease in the right greater  than left upper lobes consistent with a pneumonia. There is atelectasis  in both lung bases. The visualized upper abdomen is unremarkable. Bone  windows reveal no acute osseous abnormalities.       Impression:       1. No evidence of pulmonary embolus or dissection.  2. Small bilateral pleural effusions and bilateral airspace disease  consistent with a pneumonia.           452.82 mGy.cm        This study was performed with techniques to keep radiation doses as low  as reasonably achievable (ALARA). Individualized dose reduction  techniques using automated exposure control or adjustment of mA and/or  kV according to the patient size were employed.      This report was finalized on 2/25/2020 12:49 PM by Cass Moses M.D..    XR Chest PA & Lateral [[id number]] Collected:  02/25/20 1131     Updated:  02/25/20 1133    Narrative:       PROCEDURE: XR CHEST PA AND LATERAL-        HISTORY: hypoxic respiratory failure; A41.9-Sepsis, unspecified  organism; N39.0-Urinary tract infection, site not specified;  N20.1-Calculus of ureter; D70.9-Neutropenia, unspecified; Z74.09-Other  reduced mobility     COMPARISON: 02/21/2020.     FINDINGS: The heart is normal in size. The mediastinum is unremarkable.  There is right greater than left airspace disease consistent with a  pneumonia with small effusions. There is no pneumothorax. There are no  acute osseous abnormalities.       Impression:       Right greater than left airspace disease and small effusions  consistent with a pneumonia.           This report was finalized on 2/25/2020 11:31 AM by Cass Moses M.D..    XR Spine Lumbar AP & Lateral [[id number]] Collected:  02/25/20 1130     Updated:  02/25/20 1133    Narrative:       PROCEDURE: XR SPINE LUMBAR AP AND LATERAL-     HISTORY: back pain after fall at home; A41.9-Sepsis, unspecified  organism; N39.0-Urinary tract infection,  site not specified;  N20.1-Calculus of ureter; D70.9-Neutropenia, unspecified; Z74.09-Other  reduced mobility     FINDINGS: 3 views of the lumbar spine were obtained. The pedicles are  intact. There are diffuse degenerative changes throughout the lumbar  spine with loss of disc space height and facet arthropathy. There is no  acute fracture or acute malalignment. There is no significant  subluxation .       Impression:       Diffuse degenerative change with no acute osseous  abnormalities.     This report was finalized on 2/25/2020 11:30 AM by Cass Moses M.D..    XR Sacrum & Coccyx [[id number]] Collected:  02/25/20 1129     Updated:  02/25/20 1132    Narrative:       PROCEDURE: XR SACRUM AND COCCYX-     HISTORY: back pain; A41.9-Sepsis, unspecified organism; N39.0-Urinary  tract infection, site not specified; N20.1-Calculus of ureter;  D70.9-Neutropenia, unspecified; Z74.09-Other reduced mobility     COMPARISON: None.     FINDINGS: There is no fracture or malalignment. Degenerative changes are  present within both hips and the sacroiliac joints. The pubic symphysis  and SI joints are intact. A left ureteral stent is in place.       Impression:       No fracture or malalignment.     This report was finalized on 2/25/2020 11:30 AM by Cass Moses M.D..    CT Head Without Contrast Stroke Protocol [[id number]] Collected:  02/24/20 1600     Updated:  02/24/20 1602    Narrative:       PROCEDURE: CT HEAD WO CONTRAST STROKE PROTOCOL-     HISTORY: Confusion/delirium, altered LOC, unexplained; A41.9-Sepsis,  unspecified organism; N39.0-Urinary tract infection, site not specified;  N20.1-Calculus of ureter; D70.9-Neutropenia, unspecified; Z74.09-Other  reduced mobility     COMPARISON: 02/23/2020.     TECHNIQUE: Multiple axial CT images were performed from the foramen  magnum to the vertex without enhancement.      FINDINGS: There is significant motion artifact. No gross hemorrhage is  evident. There is no midline  shift.  There is no hydrocephalus. The  paranasal sinuses are clear. Bone windows reveal no acute osseous  abnormalities.       Impression:       Limited exam secondary to extensive motion artifact with no  gross abnormality identified.           1741.63 mGy.cm        This study was performed with techniques to keep radiation doses as low  as reasonably achievable (ALARA). Individualized dose reduction  techniques using automated exposure control or adjustment of mA and/or  kV according to the patient size were employed.      This report was finalized on 2/24/2020 4:00 PM by Cass Moses M.D..    CT Abdomen Pelvis Without Contrast [[id number]] Collected:  02/23/20 1330     Updated:  02/23/20 1335    Narrative:       CT ABDOMEN AND PELVIS WITHOUT CONTRAST-     HISTORY: Abdominal pain, unspecified; A41.9-Sepsis, unspecified  organism; N39.0-Urinary tract infection, site not specified;  N20.1-Calculus of ureter; D70.9-Neutropenia, unspecified; Z74.09-Other  reduced mobility.     PROCEDURE: Axial images were obtained from the lung bases to the pubic  symphysis by computed tomography.     Comparison with 02/19/2020. Since previous, there has been placement of  a left ureteral stent with proximal pigtail in the mid kidney and distal  pigtail near the level of the ureterovesicular junction, probably in the  bladder, which is largely decompressed by a Caraballo catheter. Previously  seen hydronephrosis has resolved. Allowing for some artifact from  patient motion, multiple nonobstructing stones are present within the  left kidney. The largest of these measures approximately 5 or 6 mm.  Questionable proximal ureteral stone versus artifact from patient  motion. No other calcified stone along the course of the ureter. There  is some mild left periureteral and perinephric fat stranding. Solid  organ evaluation is limited without the administration of intravenous  contrast. Allowing for this, the other abdominal solid  organs  demonstrate no other acute abnormality. Gallbladder has definite  evidence of acute inflammatory change. Mild anasarca. Evaluation of  bowel is limited without oral contrast. No bowel obstruction identified.  Some soft tissue thickening and fat stranding in the anterior pelvic  wall is similar to previous and could be related to scarring. Some  localized ill-defined infectious or inflammatory process not entirely  excluded. No focal fluid collection or abscess. Uterus is absent. Please  see separate dictated report for CT chest. Moderate degenerative changes  throughout the spine and the pelvis.       Impression:       1. Interval placement of left ureteral stent with resolution of  previously seen hydronephrosis.  2. Multiple nonobstructing left renal stones.  3. Anasarca.  4. Other chronic-appearing findings. Please see separate report for CT  chest.        This study was performed with techniques to keep radiation doses as low  as reasonably achievable (ALARA). Individualized dose reduction  techniques using automated exposure control or adjustment of mA and/or  kV according to the patient size were employed.      This report was finalized on 2/23/2020 1:33 PM by Rod Quigley MD.    CT Chest Without Contrast [[id number]] Collected:  02/23/20 1307     Updated:  02/23/20 1316    Narrative:       CT CHEST     INDICATION: Shortness of breath.     FINDINGS: Axial imaging through the chest without intravenous contrast,  compared with 02/19/2020. This study was performed with techniques to  keep radiation doses as low as reasonably achievable, (ALARA).     Exam is somewhat limited by patient motion artifact and lack of  intravenous contrast. Heart size is normal. No pericardial effusion.  Scattered vascular calcifications. Numerous calcifications are scattered  throughout each breast. Interval development of tiny bilateral pleural  effusions. There are also patchy dependent densities in both lower lobes  and the  upper lobes which may represent areas of atelectasis. Developing  infiltrates from pneumonia thought somewhat less likely but not  excluded. Scattered degenerative changes within the spine. Probable  minimal anasarca.       Impression:       1. Interval development of bilateral dependent densities which may  represent areas of atelectasis. Developing infiltrates from pneumonia  less likely but not excluded. Follow-up recommended.  2. Tiny pleural effusions.     This report was finalized on 2/23/2020 1:14 PM by Rod Quigley MD.    CT Head Without Contrast [[id number]] Collected:  02/23/20 1259     Updated:  02/23/20 1303    Narrative:       PROCEDURE: CT HEAD WO CONTRAST-     HISTORY: Confusion/delirium, altered LOC, unexplained; A41.9-Sepsis,  unspecified organism; N39.0-Urinary tract infection, site not specified;  N20.1-Calculus of ureter; D70.9-Neutropenia, unspecified; Z74.09-Other  reduced mobility     COMPARISON: 02/19/2020     FINDINGS: Axial imaging through the head without contrast.     Some of the images are slightly degraded by patient motion. No  hemorrhage, mass effect or midline shift. Basal cisterns are patent.  Ventricles are not enlarged. Visualized portions of the paranasal  sinuses and mastoid air cells are aerated and clear. No acute skull  fracture identified.       Impression:       Allowing for patient motion, no acute intracranial findings.  No significant change compared with 4 days prior. Consider MRI if  symptoms persist.           This study was performed with techniques to keep radiation doses as low  as reasonably achievable (ALARA). Individualized dose reduction  techniques using automated exposure control or adjustment of mA and/or  kV according to the patient size were employed.      This report was finalized on 2/23/2020 1:00 PM by Rod Quigley MD.    XR Chest 1 View [[id number]] Collected:  02/21/20 1005     Updated:  02/21/20 1007    Narrative:       PROCEDURE: XR CHEST 1 VW-      HISTORY: shortness of breathe; A41.9-Sepsis, unspecified organism;  N39.0-Urinary tract infection, site not specified; N20.1-Calculus of  ureter; D70.9-Neutropenia, unspecified     COMPARISON: 02/19/2020.     FINDINGS: The heart is normal in size. The mediastinum is unremarkable.  The lungs are clear. There is no pneumothorax.  There are no acute  osseous abnormalities.       Impression:       No acute cardiopulmonary process.     Continued followup is recommended.     This report was finalized on 2/21/2020 10:05 AM by Cass Moses M.D..    CT Chest Without Contrast [[id number]] Collected:  02/20/20 1022     Updated:  02/20/20 1122    Narrative:       PROCEDURE: CT CHEST WO CONTRAST-     HISTORY: hypoxia     COMPARISON:  None .     PROCEDURE:  Multiple axial CT images were obtained from the thoracic  inlet through the upper abdomen without the use of contrast.      FINDINGS:   Soft tissue windows reveal no axillary, hilar or mediastinal adenopathy.  There are coarse calcifications in the breasts bilaterally. Heart size  is normal. The aorta is normal in caliber. There are no pleural or  pericardial effusions. Lung windows demonstrate no suspicious infiltrate  or nodules . The visualized upper abdomen is unremarkable. Bone windows  reveal no acute osseous abnormalities.       Impression:       No acute process.     1512.50 mGy.cm  17.11 mGy     This study was performed with techniques to keep radiation doses as low  as reasonably achievable (ALARA). Individualized dose reduction  techniques using automated exposure control or adjustment of mA and/or  kV according to the patient size were employed.               Images were reviewed, interpreted, and dictated by Dr. Cass Moses M.D.  Transcribed by Esete Mcbride PA-C.     This report was finalized on 2/20/2020 11:20 AM by Cass Moses M.D..    CT Abdomen Pelvis Without Contrast [[id number]] Collected:  02/20/20 0909     Updated:  02/20/20 1122     Narrative:       PROCEDURE: CT ABDOMEN PELVIS WO CONTRAST-     HISTORY: lower abd pain     COMPARISON: None .     PROCEDURE: Axial images were obtained from the lung bases through the  pubic symphysis without intravenous contrast.     FINDINGS:      ABDOMEN: The limited noncontrast images of the liver are fatty  infiltrated. The spleen is normal. No adrenal masses are seen.  The  pancreas has an unremarkable unenhanced appearance.. The aorta is normal  in caliber. There is no significant free fluid or adenopathy. There are  nonobstructing left renal stones. There is mild left hydronephrosis  secondary to a 6 mm stone in the proximal left ureter. There is no  hydronephrosis. Limited noncontrast images of the bowel are  unremarkable.     PELVIS: The appendix is not identified. The urinary bladder contains a  Caraballo catheter and is collapsed. There is no significant fluid or  adenopathy.       Impression:       Mild left hydronephrosis secondary to a 6 mm stone in the  proximal left ureter.     Fatty infiltration of the liver.           This study was performed with techniques to keep radiation doses as low  as reasonably achievable (ALARA). Individualized dose reduction  techniques using automated exposure control or adjustment of mA and/or  kV according to the patient size were employed.         Images were reviewed, interpreted, and dictated by Dr. Moses.  Transcribed by Estee Mcbride PA-C.     This report was finalized on 2/20/2020 11:20 AM by Cass Moses M.D..    XR Chest 1 View [[id number]] Collected:  02/20/20 0743     Updated:  02/20/20 0746    Narrative:       PROCEDURE: XR CHEST 1 VW-     HISTORY: ams     COMPARISON: None.     FINDINGS: The heart is normal in size. The mediastinum is unremarkable.  There are low lung volumes, however there are no focal opacities were  effusions. There is no pneumothorax.  There are no acute osseous  abnormalities.       Impression:       No acute  cardiopulmonary process.     Continued followup is recommended.     This report was finalized on 2/20/2020 7:44 AM by Cass Moses M.D..    CT Head Without Contrast [[id number]] Collected:  02/19/20 2332     Updated:  02/19/20 2333    Narrative:       FINAL REPORT    TECHNIQUE:  Routine axial images through the head were obtained without  contrast.    CLINICAL HISTORY:  ams, stroke protocol    FINDINGS:  The ventricles are normal.  There is no mass or other abnormal  hypodensity.  There is no shift of midline structures.  There is  no intracranial hemorrhage.  No acute sinus or osseous  abnormality is seen.      Impression:       Unremarkable.    Authenticated by Jean-Pierre Ruvalcaba M.D. on 02/19/2020 11:32:45 PM          Condition on Discharge:      Stable.    Code status during the hospital stay:    Code Status and Medical Interventions:   Ordered at: 02/20/20 0353     Limited Support to NOT Include:    Intubation     Level Of Support Discussed With:    Patient    Next of Kin (If No Surrogate)     Code Status:    No CPR     Medical Interventions (Level of Support Prior to Arrest):    Limited       Discharge Disposition:    Rehab Facility or Unit (DC - External)    Discharge Medications:       Discharge Medications      New Medications      Instructions Start Date   apixaban 5 MG tablet tablet  Commonly known as:  ELIQUIS   5 mg, Oral, Every 12 Hours Scheduled      aspirin 81 MG chewable tablet   81 mg, Oral, Daily   Start Date:  March 3, 2020     enalapril 20 MG tablet  Commonly known as:  VASOTEC   20 mg, Oral, Every 12 Hours Scheduled      furosemide 20 MG tablet  Commonly known as:  LASIX   20 mg, Oral, Daily   Start Date:  March 3, 2020     hydrALAZINE 25 MG tablet  Commonly known as:  APRESOLINE   25 mg, Oral, Every 6 Hours PRN      sotalol 80 MG tablet  Commonly known as:  BETAPACE   80 mg, Oral, Every 12 Hours Scheduled      spironolactone 25 MG tablet  Commonly known as:  ALDACTONE   25 mg, Oral, Daily   Start  Date:  March 3, 2020        Continue These Medications      Instructions Start Date   amitriptyline 50 MG tablet  Commonly known as:  ELAVIL   75 mg, Oral, Nightly      amLODIPine 10 MG tablet  Commonly known as:  NORVASC   5 mg, Oral, Daily      traMADol 50 MG tablet  Commonly known as:  ULTRAM   50 mg, Oral, Every 6 Hours PRN         Stop These Medications    insulin regular 100 UNIT/ML injection  Commonly known as:  humuLIN R,novoLIN R     levocetirizine 5 MG tablet  Commonly known as:  XYZAL     lidocaine 5 %  Commonly known as:  LIDODERM     promethazine 25 MG tablet  Commonly known as:  PHENERGAN     propranolol 20 MG tablet  Commonly known as:  INDERAL     tiZANidine 4 MG tablet  Commonly known as:  ZANAFLEX            Discharge Diet:         Activity at Discharge:         Follow-up Appointments:    Additional Instructions for the Follow-ups that You Need to Schedule     Discharge Follow-up with PCP   As directed       Currently Documented PCP:    Marichuy Cordova MD    PCP Phone Number:    [phone]     Follow Up Details:  1 week         Discharge Follow-up with Specified Provider: dr fiore; 2 Weeks   As directed      To:  dr fiore    Follow Up:  2 Weeks           Follow-up Information     Marichuy Cordova MD .    Specialty:  Internal Medicine  Why:  1 week  Contact information:  [address] [place] [address] [phone]                   No future appointments.    Additional Instructions for the Follow-ups that You Need to Schedule     Discharge Follow-up with PCP   As directed       Currently Documented PCP:    Marichuy Cordova MD    PCP Phone Number:    [phone]     Follow Up Details:  1 week         Discharge Follow-up with Specified Provider: dr fiore; 2 Weeks   As directed      To:  dr fiore    Follow Up:  2 Weeks               Test Results Pending at Discharge:           Reddy Mitchell DO  03/02/20  1:44 PM    Time spent: Greater than 30 minutes    Dictated  utilizing Davidon dictation.      Electronically signed by Reddy Mitchell DO at 03/02/20 1500

## 2020-03-30 ENCOUNTER — OFFICE VISIT (OUTPATIENT)
Dept: CARDIOLOGY | Facility: CLINIC | Age: 70
End: 2020-03-30

## 2020-03-30 VITALS
OXYGEN SATURATION: 96 % | HEIGHT: 67 IN | SYSTOLIC BLOOD PRESSURE: 104 MMHG | DIASTOLIC BLOOD PRESSURE: 72 MMHG | BODY MASS INDEX: 36.1 KG/M2 | WEIGHT: 230 LBS | HEART RATE: 80 BPM

## 2020-03-30 DIAGNOSIS — I10 ESSENTIAL HYPERTENSION: ICD-10-CM

## 2020-03-30 DIAGNOSIS — I48.0 PAROXYSMAL ATRIAL FIBRILLATION (HCC): Primary | ICD-10-CM

## 2020-03-30 PROCEDURE — 93000 ELECTROCARDIOGRAM COMPLETE: CPT | Performed by: INTERNAL MEDICINE

## 2020-03-30 PROCEDURE — 99213 OFFICE O/P EST LOW 20 MIN: CPT | Performed by: INTERNAL MEDICINE

## 2020-03-30 RX ORDER — RIZATRIPTAN BENZOATE 10 MG/1
TABLET ORAL
COMMUNITY
Start: 2020-02-24 | End: 2022-04-19 | Stop reason: ALTCHOICE

## 2020-03-30 RX ORDER — PROMETHAZINE HYDROCHLORIDE 25 MG/1
25 TABLET ORAL EVERY 6 HOURS PRN
COMMUNITY
Start: 2020-03-13

## 2020-03-30 RX ORDER — AMLODIPINE BESYLATE 5 MG/1
5 TABLET ORAL DAILY
COMMUNITY
Start: 2020-02-19 | End: 2022-06-03 | Stop reason: SDUPTHER

## 2020-03-30 RX ORDER — CLOTRIMAZOLE 1 %
CREAM (GRAM) TOPICAL
COMMUNITY
Start: 2020-03-24 | End: 2022-03-11

## 2020-03-30 RX ORDER — ECHINACEA 400 MG
2 CAPSULE ORAL EVERY EVENING
COMMUNITY
End: 2021-09-07

## 2020-03-30 RX ORDER — TIZANIDINE 4 MG/1
6 TABLET ORAL NIGHTLY PRN
COMMUNITY
End: 2022-08-08 | Stop reason: SDUPTHER

## 2020-03-30 RX ORDER — ONDANSETRON 8 MG/1
TABLET, ORALLY DISINTEGRATING ORAL
COMMUNITY
Start: 2020-03-13 | End: 2021-04-15

## 2020-03-30 RX ORDER — AMITRIPTYLINE HYDROCHLORIDE 100 MG/1
100 TABLET, FILM COATED ORAL NIGHTLY
Status: ON HOLD | COMMUNITY
Start: 2020-03-10 | End: 2021-06-01

## 2020-03-30 NOTE — PROGRESS NOTES
Subjective:     Encounter Date:03/30/2020      Patient ID: Shannon Servin is a 69 y.o. female.    Chief Complaint: Atrial fibrillation  HPI  This is a 69-year-old female patient with paroxysmal atrial fibrillation who presents to cardiology clinic for routine follow-up.  The patient indicates that she has done well from a cardiovascular standpoint.  She has had no signs or symptoms to suggest recurrent arrhythmia.  She reports tolerating her medications well without perceived side effect.  The patient has no chest discomfort at rest or with activity.  There is no exertional chest arm neck jaw shoulder or back discomfort.  There is no orthopnea PND or lower extremity edema.  There is no dizziness palpitations or syncope.  The following portions of the patient's history were reviewed and updated as appropriate: allergies, current medications, past family history, past medical history, past social history, past surgical history and problem  Review of Systems   Constitution: Negative for chills, diaphoresis, fever, malaise/fatigue, weight gain and weight loss.   HENT: Negative for ear discharge, hearing loss, hoarse voice and nosebleeds.    Eyes: Negative for discharge, double vision, pain and photophobia.   Cardiovascular: Negative for chest pain, claudication, cyanosis, dyspnea on exertion, irregular heartbeat, leg swelling, near-syncope, orthopnea, palpitations, paroxysmal nocturnal dyspnea and syncope.   Respiratory: Negative for cough, hemoptysis, shortness of breath, sputum production and wheezing.    Endocrine: Negative for cold intolerance, heat intolerance, polydipsia, polyphagia and polyuria.   Hematologic/Lymphatic: Negative for adenopathy and bleeding problem. Does not bruise/bleed easily.   Skin: Negative for color change, flushing, itching and rash.   Musculoskeletal: Negative for muscle cramps, muscle weakness, myalgias and stiffness.   Gastrointestinal: Negative for abdominal pain, diarrhea,  hematemesis, hematochezia, nausea and vomiting.   Genitourinary: Negative for dysuria, frequency and nocturia.   Neurological: Negative for focal weakness, loss of balance, numbness, paresthesias and seizures.   Psychiatric/Behavioral: Negative for altered mental status, hallucinations and suicidal ideas.   Allergic/Immunologic: Negative for HIV exposure, hives and persistent infections.           Current Outpatient Medications:   •  amitriptyline (ELAVIL) 100 MG tablet, , Disp: , Rfl:   •  amLODIPine (NORVASC) 5 MG tablet, Take 5 mg by mouth Daily., Disp: , Rfl:   •  apixaban (ELIQUIS) 5 MG tablet tablet, Take 1 tablet by mouth Every 12 (Twelve) Hours., Disp: 60 tablet, Rfl: 0  •  aspirin 81 MG chewable tablet, Chew 1 tablet by mouth Daily., Disp: 30 tablet, Rfl: 0  •  Cascara Sagrada 450 MG capsule, Take  by mouth., Disp: , Rfl:   •  clotrimazole (LOTRIMIN) 1 % cream, APPLY CREAM TOPICALLY TWICE DAILY TO THE AFFECTED AND SURROUNDING AREAS OF SKIN IN THE MORNING AND EVENING, Disp: , Rfl:   •  Docusate Calcium (STOOL SOFTENER PO), Take  by mouth., Disp: , Rfl:   •  enalapril (VASOTEC) 20 MG tablet, Take 1 tablet by mouth Every 12 (Twelve) Hours., Disp: 30 tablet, Rfl: 0  •  furosemide (LASIX) 20 MG tablet, Take 1 tablet by mouth Daily., Disp: 30 tablet, Rfl: 0  •  ondansetron ODT (ZOFRAN-ODT) 8 MG disintegrating tablet, DISSOLVE 1 TABLET IN MOUTH EVERY 6 HOURS AS NEEDED FOR NAUSEA AND VOMITING, Disp: , Rfl:   •  promethazine (PHENERGAN) 25 MG tablet, Take 25 mg by mouth Every 6 (Six) Hours As Needed., Disp: , Rfl:   •  rizatriptan (MAXALT) 10 MG tablet, TAKE ONE TABLET BY MOUTH AT ONSET OF MIGRAINE. IF SYMPTOMS PERSIST A SECOND DOSE MAY BE TAKEN IN 2 HOURS. DO NOT EXCEED 3 DOSES IN A 24 HOUR, Disp: , Rfl:   •  sotalol (BETAPACE) 80 MG tablet, Take 1 tablet by mouth Every 12 (Twelve) Hours., Disp: 60 tablet, Rfl: 0  •  spironolactone (ALDACTONE) 25 MG tablet, Take 1 tablet by mouth Daily., Disp: 30 tablet, Rfl: 0  •   "tiZANidine (ZANAFLEX) 4 MG tablet, Take 4 mg by mouth At Night As Needed for Muscle Spasms., Disp: , Rfl:   •  traMADol (ULTRAM) 50 MG tablet, Take 50 mg by mouth Every 6 (Six) Hours As Needed for Moderate Pain ., Disp: , Rfl:     Objective:   Physical Exam   Constitutional: She is oriented to person, place, and time. She appears well-developed and well-nourished. No distress.   HENT:   Head: Normocephalic and atraumatic.   Mouth/Throat: Oropharynx is clear and moist.   Eyes: Pupils are equal, round, and reactive to light. Conjunctivae and EOM are normal. No scleral icterus.   Neck: Normal range of motion. Neck supple. No JVD present. No tracheal deviation present. No thyromegaly present.   Cardiovascular: Normal rate, regular rhythm, S1 normal, S2 normal, normal heart sounds, intact distal pulses and normal pulses. PMI is not displaced. Exam reveals no gallop and no friction rub.   No murmur heard.  Pulmonary/Chest: Effort normal and breath sounds normal. No stridor. No respiratory distress. She has no wheezes. She has no rales.   Abdominal: Soft. Bowel sounds are normal. She exhibits no distension and no mass. There is no tenderness. There is no rebound and no guarding.   Musculoskeletal: Normal range of motion. She exhibits no edema or deformity.   Neurological: She is alert and oriented to person, place, and time. She displays normal reflexes. No cranial nerve deficit. Coordination normal.   Skin: Skin is warm and dry. No rash noted. She is not diaphoretic. No erythema.   Psychiatric: She has a normal mood and affect. Her behavior is normal. Thought content normal.     Height 170.2 cm (67\"), weight 104 kg (230 lb).   Lab Review:     Assessment:       1. Paroxysmal atrial fibrillation (CMS/HCC)  Maintaining normal sinus rhythm on low-dose antiarrhythmic drug therapy.  No evidence of proarrhythmia.  No QT prolongation.  No signs or symptoms to suggest stroke, TIA or other cardioembolic event.  Tolerating direct " oral anticoagulation therapy without bleeding side effects.    2. Essential hypertension  Acceptable blood pressure control.      ECG 12 Lead  Date/Time: 3/30/2020 3:20 PM  Performed by: Kevin Shirley MD  Authorized by: Kevin Shirley MD   Previous ECG: no previous ECG available  Rhythm: sinus rhythm  Rate: normal  Conduction: 1st degree AV block  QRS axis: normal  Other findings: poor R wave progression    Clinical impression: abnormal EKG            Plan:     Advance Care Planning   ACP discussion was held with the patient during this visit. Patient has an advance directive (not in EMR), copy requested.  No changes to her medication therapy are indicated at this time.  No additional cardiovascular testing is warranted.

## 2021-04-06 ENCOUNTER — OFFICE VISIT (OUTPATIENT)
Dept: CARDIOLOGY | Facility: CLINIC | Age: 71
End: 2021-04-06

## 2021-04-06 VITALS
OXYGEN SATURATION: 95 % | WEIGHT: 232 LBS | HEART RATE: 78 BPM | SYSTOLIC BLOOD PRESSURE: 112 MMHG | RESPIRATION RATE: 18 BRPM | DIASTOLIC BLOOD PRESSURE: 62 MMHG | HEIGHT: 68 IN | BODY MASS INDEX: 35.16 KG/M2

## 2021-04-06 DIAGNOSIS — I10 ESSENTIAL HYPERTENSION: ICD-10-CM

## 2021-04-06 DIAGNOSIS — I48.0 PAROXYSMAL ATRIAL FIBRILLATION (HCC): Primary | ICD-10-CM

## 2021-04-06 PROCEDURE — 99213 OFFICE O/P EST LOW 20 MIN: CPT | Performed by: NURSE PRACTITIONER

## 2021-04-06 RX ORDER — DOXYCYCLINE HYCLATE 100 MG/1
CAPSULE ORAL
COMMUNITY
Start: 2021-03-23 | End: 2021-09-07

## 2021-04-06 RX ORDER — LEVOCETIRIZINE DIHYDROCHLORIDE 5 MG/1
5 TABLET, FILM COATED ORAL EVERY EVENING
COMMUNITY
End: 2021-09-07

## 2021-04-06 RX ORDER — ATORVASTATIN CALCIUM 80 MG/1
80 TABLET, FILM COATED ORAL DAILY
COMMUNITY
Start: 2021-01-27 | End: 2022-06-27 | Stop reason: SDUPTHER

## 2021-04-06 RX ORDER — NITROFURANTOIN 25; 75 MG/1; MG/1
CAPSULE ORAL
COMMUNITY
Start: 2021-03-23 | End: 2022-03-11

## 2021-04-06 RX ORDER — DIPHENHYDRAMINE HCL 50 MG
50 CAPSULE ORAL EVERY 12 HOURS
COMMUNITY
End: 2022-03-11 | Stop reason: ALTCHOICE

## 2021-04-06 NOTE — PROGRESS NOTES
"             [facility] Cardiology Office Follow Up Note    Shannon Servin  [id number]  2021    Primary Care Provider: Marichuy Cordova MD   Referring Provider: No ref. provider found    Chief Complaint: Routine follow-up    History of Present Illness:   Mrs. Shannon Servin is a 70 y.o. female who presents to the Cardiology Clinic for routine follow-up.  Patient has a past medical history of paroxysmal atrial fibrillation and hypertension.  She presents today for routine follow-up.  Patient reports feeling well since her last visit.  She typically denies chest pain, exertional chest pain, shortness of breath, orthopnea, dizziness, palpitations.  She reports some swelling of her lower extremities but tells me that has \"been there for a long time\".    Past Cardiac Testin. Last Coronary Angio: None   2. Prior Stress Testing: None  3. Last Echo: 2020 Adult Transthoracic Echo Complete W/ Cont if Necessary Per Protocol (2020 11:03)    1. Estimated EF = 55%.   2. Left ventricular systolic function is normal.  4. Prior Holter Monitor: None    Review of Systems:   Review of Systems   Constitutional: Negative for activity change, chills, diaphoresis, fatigue, fever and unexpected weight gain.   Eyes: Negative for blurred vision and visual disturbance.   Respiratory: Negative for apnea, cough, chest tightness, shortness of breath and wheezing.    Cardiovascular: Positive for leg swelling. Negative for chest pain and palpitations.   Gastrointestinal: Negative for abdominal distention, blood in stool, GERD and indigestion.   Endocrine: Negative for cold intolerance and heat intolerance.   Genitourinary: Negative for hematuria.   Musculoskeletal: Negative for gait problem, joint swelling and myalgias.   Skin: Negative for color change, pallor and bruise.   Neurological: Negative for dizziness, seizures, syncope, weakness, light-headedness, numbness, headache and confusion. "   Hematological: Does not bruise/bleed easily.   Psychiatric/Behavioral: Negative for behavioral problems, sleep disturbance, suicidal ideas and depressed mood.     I have reviewed and confirmed the accuracy of the ROS as documented by the MA/LPN/RN MAICO Chen    I have reviewed and/or updated the patient's past medical, past surgical, family, social history, problem list and allergies as appropriate.     Medications:     Current Outpatient Medications:   •  amitriptyline (ELAVIL) 100 MG tablet, Take 100 mg by mouth Every Night., Disp: , Rfl:   •  amLODIPine (NORVASC) 5 MG tablet, Take 5 mg by mouth Daily., Disp: , Rfl:   •  apixaban (ELIQUIS) 5 MG tablet tablet, Take 1 tablet by mouth Every 12 (Twelve) Hours., Disp: 60 tablet, Rfl: 0  •  atorvastatin (LIPITOR) 40 MG tablet, Daily., Disp: , Rfl:   •  Cascara Sagrada 450 MG capsule, Take 2 tablets by mouth Every Evening., Disp: , Rfl:   •  clotrimazole (LOTRIMIN) 1 % cream, APPLY CREAM TOPICALLY TWICE DAILY TO THE AFFECTED AND SURROUNDING AREAS OF SKIN IN THE MORNING AND EVENING, Disp: , Rfl:   •  diphenhydrAMINE (BENADRYL) 50 MG capsule, Take 50 mg by mouth Every 6 (Six) Hours As Needed for Itching., Disp: , Rfl:   •  Docusate Calcium (STOOL SOFTENER PO), Take 240 mg by mouth Daily., Disp: , Rfl:   •  doxycycline (VIBRAMYCIN) 100 MG capsule, TAKE 1 CAPSULE BY MOUTH ONCE DAILY FOR 1 MONTH ALTERNATING WITH MACROBID, Disp: , Rfl:   •  enalapril (VASOTEC) 20 MG tablet, Take 1 tablet by mouth Every 12 (Twelve) Hours., Disp: 30 tablet, Rfl: 0  •  furosemide (LASIX) 20 MG tablet, Take 1 tablet by mouth Daily., Disp: 30 tablet, Rfl: 0  •  levocetirizine (XYZAL) 5 MG tablet, Take 5 mg by mouth Every Evening., Disp: , Rfl:   •  nitrofurantoin, macrocrystal-monohydrate, (MACROBID) 100 MG capsule, TAKE 1 CAPSULE BY MOUTH ONCE DAILY FOR 1 MONTH ALTERNATING WITH DOXYCYCLINE, Disp: , Rfl:   •  ondansetron ODT (ZOFRAN-ODT) 8 MG disintegrating tablet, DISSOLVE 1  "TABLET IN MOUTH EVERY 6 HOURS AS NEEDED FOR NAUSEA AND VOMITING, Disp: , Rfl:   •  promethazine (PHENERGAN) 25 MG tablet, Take 25 mg by mouth Every 6 (Six) Hours As Needed., Disp: , Rfl:   •  rizatriptan (MAXALT) 10 MG tablet, TAKE ONE TABLET BY MOUTH AT ONSET OF MIGRAINE. IF SYMPTOMS PERSIST A SECOND DOSE MAY BE TAKEN IN 2 HOURS. DO NOT EXCEED 3 DOSES IN A 24 HOUR, Disp: , Rfl:   •  sotalol (BETAPACE) 80 MG tablet, Take 1 tablet by mouth Every 12 (Twelve) Hours., Disp: 60 tablet, Rfl: 0  •  spironolactone (ALDACTONE) 25 MG tablet, Take 1 tablet by mouth Daily., Disp: 30 tablet, Rfl: 0  •  tiZANidine (ZANAFLEX) 4 MG tablet, Take 6 mg by mouth At Night As Needed for Muscle Spasms., Disp: , Rfl:   •  traMADol (ULTRAM) 50 MG tablet, Take 50 mg by mouth Every 6 (Six) Hours As Needed for Moderate Pain ., Disp: , Rfl:   •  aspirin 81 MG chewable tablet, Chew 1 tablet by mouth Daily., Disp: 30 tablet, Rfl: 0    Physical Exam:  Vital Signs:   Vitals:    04/06/21 1516   BP: 112/62   BP Location: Left arm   Patient Position: Sitting   Cuff Size: Large Adult   Pulse: 78   Resp: 18   SpO2: 95%   Weight: 105 kg (232 lb)   Height: 172.7 cm (68\")     Body mass index is 35.28 kg/m².    Physical Exam  Vitals and nursing note reviewed.   Constitutional:       General: She is not in acute distress.     Appearance: Normal appearance. She is well-developed. She is obese.      Comments: Is in a wheelchair during her visit today, truncal obesity   HENT:      Head: Normocephalic and atraumatic.      Mouth/Throat:      Mouth: Mucous membranes are moist.   Eyes:      General: No scleral icterus.     Extraocular Movements: Extraocular movements intact.   Neck:      Trachea: Trachea normal.   Cardiovascular:      Rate and Rhythm: Normal rate and regular rhythm.      Pulses: Normal pulses.      Heart sounds: Normal heart sounds. No murmur heard.   No friction rub. No gallop.    Pulmonary:      Effort: Pulmonary effort is normal.      Breath " sounds: Normal breath sounds. No stridor. No wheezing, rhonchi or rales.      Comments: Breath sounds decreased in bilateral bases  Abdominal:      Palpations: Abdomen is soft.      Tenderness: There is no abdominal tenderness.   Musculoskeletal:         General: Normal range of motion.      Cervical back: Neck supple.      Right lower leg: Edema present.      Left lower leg: Edema present.      Comments: Nonpitting edema noted to bilateral lower extremities   Skin:     General: Skin is warm and dry.      Findings: No bruising, lesion or rash.   Neurological:      Mental Status: She is alert and oriented to person, place, and time.      Motor: No weakness.      Gait: Gait normal.   Psychiatric:         Mood and Affect: Mood normal.         Behavior: Behavior normal. Behavior is cooperative.         Thought Content: Thought content does not include suicidal ideation.         Results Review:   I reviewed the patient's new clinical results.      ECG 12 Lead    Date/Time: 4/7/2021 12:31 PM  Performed by: Mily Rutledge APRN  Authorized by: Mily Rutledge APRN   Comparison: compared with previous ECG from 3/30/2020  Rhythm: sinus rhythm  Rate: normal  Conduction: 1st degree AV block  QRS axis: normal  Other findings: non-specific ST-T wave changes    Clinical impression: abnormal EKG            Assessment / Plan:   Diagnoses and all orders for this visit:    1. Paroxysmal atrial fibrillation (CMS/HCC) (Primary)  -No evidence of recurrence  -[id number]  -Continue Eliquis for embolic prophylaxis  -Plan for follow-up with Dr. Shirley in 1 year or sooner if needed    2. Essential hypertension  -Excellent blood pressure control  -Continue antihypertensives      Preventative Cardiology:   Tobacco Cessation: N/A   Obstructive Sleep Apnea Screening: Deffered  AAA Screening: Completed  Peripheral Arterial Disease Screening: Completed  Advance Care Planning: ACP discussion was held with the patient during this  visit. Patient has an advance directive in EMR which is still valid.      Follow Up:   Return in about 1 year (around 4/6/2022) for Follow-up with Dr. Shirley.      Thank you for allowing me to participate in the care of your patient. Please to not hesitate to contact me with additional questions or concerns.     MAICO Styles

## 2021-04-07 PROCEDURE — 93000 ELECTROCARDIOGRAM COMPLETE: CPT | Performed by: NURSE PRACTITIONER

## 2021-04-13 ENCOUNTER — TELEPHONE (OUTPATIENT)
Dept: CARDIOLOGY | Facility: CLINIC | Age: 71
End: 2021-04-13

## 2021-04-13 NOTE — TELEPHONE ENCOUNTER
Patient was prescribed yeast medication and the pharmacy told the patient it could interfere with her Sotalol.      Please review and advise.

## 2021-04-15 ENCOUNTER — OFFICE VISIT (OUTPATIENT)
Dept: GASTROENTEROLOGY | Facility: CLINIC | Age: 71
End: 2021-04-15

## 2021-04-15 VITALS
BODY MASS INDEX: 35.31 KG/M2 | TEMPERATURE: 97.7 F | DIASTOLIC BLOOD PRESSURE: 70 MMHG | SYSTOLIC BLOOD PRESSURE: 118 MMHG | WEIGHT: 233 LBS | HEIGHT: 68 IN

## 2021-04-15 DIAGNOSIS — Z01.818 PREOP TESTING: Primary | ICD-10-CM

## 2021-04-15 DIAGNOSIS — Z12.11 ENCOUNTER FOR SCREENING FOR MALIGNANT NEOPLASM OF COLON: ICD-10-CM

## 2021-04-15 DIAGNOSIS — K59.09 CHRONIC CONSTIPATION: ICD-10-CM

## 2021-04-15 DIAGNOSIS — R10.30 LOWER ABDOMINAL PAIN: Primary | ICD-10-CM

## 2021-04-15 DIAGNOSIS — K58.1 IRRITABLE BOWEL SYNDROME WITH CONSTIPATION: ICD-10-CM

## 2021-04-15 DIAGNOSIS — F11.90 OPIOID USE: ICD-10-CM

## 2021-04-15 PROCEDURE — 99204 OFFICE O/P NEW MOD 45 MIN: CPT | Performed by: INTERNAL MEDICINE

## 2021-04-15 RX ORDER — DICYCLOMINE HYDROCHLORIDE 10 MG/5ML
20 SOLUTION ORAL 3 TIMES DAILY PRN
Qty: 473 ML | Refills: 1 | Status: SHIPPED | OUTPATIENT
Start: 2021-04-15 | End: 2021-09-07

## 2021-04-15 RX ORDER — BISACODYL 5 MG/1
20 TABLET, DELAYED RELEASE ORAL ONCE
Qty: 4 TABLET | Refills: 0 | Status: SHIPPED | OUTPATIENT
Start: 2021-04-15 | End: 2021-04-15

## 2021-04-15 RX ORDER — OXYCODONE HYDROCHLORIDE AND ACETAMINOPHEN 5; 325 MG/1; MG/1
1 TABLET ORAL EVERY 6 HOURS PRN
COMMUNITY
End: 2023-03-08

## 2021-04-15 RX ORDER — SODIUM CHLORIDE 9 MG/ML
70 INJECTION, SOLUTION INTRAVENOUS CONTINUOUS PRN
Status: CANCELLED | OUTPATIENT
Start: 2021-04-15

## 2021-04-15 NOTE — PROGRESS NOTES
New Patient Consult      Date: 04/15/2021   Patient Name: Shannon Servin  MRN: [id number]  : 1950     Referring Physician: Marichuy Cordova*    Chief Complaint   Patient presents with   • Abdominal Pain       History of Present Illness: Shannon Servin is a 70 y.o. female who is here today to establish care with Gastroenterology for evaluation of ongoing abdominal pain.    History of Chronic abdominal pain and constipation on and off for decades, but lower abdominal pain got worse recently.  She is on docusate and cascara sagrada daily with almost daily soft bowel movement.   The pain is intermittent, mild to moderate in intesnsity in severity and aching in nature.  Frequency being almost daily and stays all the time. On occasion morning she will be pain free.  There is no radiation of abdominal pain.  Eating worsens the abdominal discomfort.  No definite relieving factors of abdominal pain.   She is also on Percocet and tramadol for back pain, but takes as needed. She has associated nausea but no vomiting. She feels bloated al the time.     No history of acid reflux, odynophagia or dysphagia. No history of change in bowel habit. Deny any hematochezia or melena. There is no prior history of liver or pancreatic disease. There is no history of anemia. No prior history of EGD or colonoscopy. No family history of colon cancer or any GI malignancy.  Denies alcohol abuse or cigarette smoking.     She also had a CT scan abdomen pelvis done in  at that time she had a multiple renal stones and had hydronephrosis.  She had a aortic stent placed and removed later on. As per pt her pain from renal stone is different form her ongoing lower abdominal pain.   She is also on eliquis for A fib    Subjective      Past Medical History:   Past Medical History:   Diagnosis Date   • Arthritis    • Diabetes mellitus (CMS/HCC)    • Hypertension    • Impaired functional mobility, balance, gait, and endurance        Past  Surgical History:   Past Surgical History:   Procedure Laterality Date   • HYSTERECTOMY     • TOTAL ABDOMINAL HYSTERECTOMY WITH SALPINGO OOPHORECTOMY     • URETEROSCOPY LASER LITHOTRIPSY WITH STENT INSERTION Left 2/20/2020    Procedure: URETEROSCOPY, LEFT RETROGRADE PYLEOGERAM WITH STENT INSERTION;  Surgeon: Mulugeta Saldana MD;  Location: [facility];  Service: Urology;  Laterality: Left;       Family History:   Family History   Problem Relation Age of Onset   • Cancer Other    • Diabetes Other    • Migraines Other        Social History:   Social History     Socioeconomic History   • Marital status:      Spouse name: Not on file   • Number of children: Not on file   • Years of education: Not on file   • Highest education level: Not on file   Tobacco Use   • Smoking status: Never Smoker   • Smokeless tobacco: Never Used   Substance and Sexual Activity   • Alcohol use: Yes     Comment: socially   • Drug use: Never   • Sexual activity: Defer         Current Outpatient Medications:   •  amitriptyline (ELAVIL) 100 MG tablet, Take 100 mg by mouth Every Night. Taking 1/4 pill every other day. Weaning off, Disp: , Rfl:   •  amLODIPine (NORVASC) 5 MG tablet, Take 5 mg by mouth Daily., Disp: , Rfl:   •  apixaban (ELIQUIS) 5 MG tablet tablet, Take 1 tablet by mouth Every 12 (Twelve) Hours., Disp: 60 tablet, Rfl: 0  •  atorvastatin (LIPITOR) 40 MG tablet, Daily., Disp: , Rfl:   •  Cascara Sagrada 450 MG capsule, Take 2 tablets by mouth Every Evening., Disp: , Rfl:   •  clotrimazole (LOTRIMIN) 1 % cream, APPLY CREAM TOPICALLY TWICE DAILY TO THE AFFECTED AND SURROUNDING AREAS OF SKIN IN THE MORNING AND EVENING, Disp: , Rfl:   •  diphenhydrAMINE (BENADRYL) 50 MG capsule, Take 50 mg by mouth Every 6 (Six) Hours As Needed for Itching., Disp: , Rfl:   •  Docusate Calcium (STOOL SOFTENER PO), Take 240 mg by mouth Daily., Disp: , Rfl:   •  doxycycline (VIBRAMYCIN) 100 MG capsule, TAKE 1 CAPSULE BY MOUTH ONCE DAILY FOR 1 MONTH  ALTERNATING WITH MACROBID, Disp: , Rfl:   •  enalapril (VASOTEC) 20 MG tablet, Take 1 tablet by mouth Every 12 (Twelve) Hours., Disp: 30 tablet, Rfl: 0  •  furosemide (LASIX) 20 MG tablet, Take 1 tablet by mouth Daily., Disp: 30 tablet, Rfl: 0  •  levocetirizine (XYZAL) 5 MG tablet, Take 5 mg by mouth Every Evening., Disp: , Rfl:   •  oxyCODONE-acetaminophen (PERCOCET) 5-325 MG per tablet, Take 1 tablet by mouth Every 6 (Six) Hours As Needed., Disp: , Rfl:   •  promethazine (PHENERGAN) 25 MG tablet, Take 25 mg by mouth Every 6 (Six) Hours As Needed., Disp: , Rfl:   •  rizatriptan (MAXALT) 10 MG tablet, TAKE ONE TABLET BY MOUTH AT ONSET OF MIGRAINE. IF SYMPTOMS PERSIST A SECOND DOSE MAY BE TAKEN IN 2 HOURS. DO NOT EXCEED 3 DOSES IN A 24 HOUR, Disp: , Rfl:   •  sotalol (BETAPACE) 80 MG tablet, Take 1 tablet by mouth Every 12 (Twelve) Hours., Disp: 60 tablet, Rfl: 0  •  spironolactone (ALDACTONE) 25 MG tablet, Take 1 tablet by mouth Daily., Disp: 30 tablet, Rfl: 0  •  tiZANidine (ZANAFLEX) 4 MG tablet, Take 6 mg by mouth At Night As Needed for Muscle Spasms., Disp: , Rfl:   •  traMADol (ULTRAM) 50 MG tablet, Take 50 mg by mouth Every 6 (Six) Hours As Needed for Moderate Pain ., Disp: , Rfl:   •  bisacodyl (DULCOLAX) 5 MG EC tablet, Take 4 tablets by mouth 1 (One) Time for 1 dose. Please see prep instructions from office., Disp: 4 tablet, Rfl: 0  •  dicyclomine (BENTYL) 10 MG/5ML syrup, Take 10 mL by mouth 3 (Three) Times a Day As Needed (abdominal pain)., Disp: 473 mL, Rfl: 1  •  nitrofurantoin, macrocrystal-monohydrate, (MACROBID) 100 MG capsule, TAKE 1 CAPSULE BY MOUTH ONCE DAILY FOR 1 MONTH ALTERNATING WITH DOXYCYCLINE, Disp: , Rfl:   •  polyethylene glycol (GoLYTELY) 236 g solution, Take 4,000 mL by mouth 1 (One) Time for 1 dose. Please see prep instructions from office., Disp: 4000 mL, Rfl: 0    Allergies   Allergen Reactions   • Metformin Unknown - Low Severity       Review of Systems:   Review of Systems  "  Constitutional: Negative for appetite change, fatigue, fever and unexpected weight loss.   HENT: Negative for trouble swallowing.    Respiratory: Negative for cough, shortness of breath and wheezing.    Cardiovascular: Negative for chest pain, palpitations and leg swelling.   Gastrointestinal: Positive for abdominal pain, constipation and nausea. Negative for abdominal distention, anal bleeding, blood in stool, diarrhea, rectal pain, vomiting, GERD and indigestion.   Genitourinary: Negative for dysuria, frequency and hematuria.   Musculoskeletal: Negative for back pain and joint swelling.   Skin: Negative for color change, rash and skin lesions.   Neurological: Negative for dizziness, syncope, speech difficulty, weakness, headache and memory problem.   Hematological: Negative for adenopathy. Does not bruise/bleed easily.   Psychiatric/Behavioral: Negative for agitation, behavioral problems, suicidal ideas and depressed mood.       The following portions of the patient's history were reviewed and updated as appropriate: allergies, current medications, past family history, past medical history, past social history, past surgical history and problem list.    Objective     Physical Exam:  Vital Signs:   Vitals:    04/15/21 1538   BP: 118/70   Temp: 97.7 °F (36.5 °C)   TempSrc: Temporal   Weight: 106 kg (233 lb)   Height: 172.7 cm (68\")       Physical Exam  Vitals and nursing note reviewed.   Constitutional:       Appearance: She is well-developed. She is obese.   HENT:      Head: Normocephalic and atraumatic.      Right Ear: External ear normal.      Left Ear: External ear normal.   Eyes:      Conjunctiva/sclera: Conjunctivae normal.   Neck:      Thyroid: No thyromegaly.      Trachea: No tracheal deviation.   Cardiovascular:      Rate and Rhythm: Normal rate and regular rhythm.      Heart sounds: No murmur heard.     Pulmonary:      Effort: Pulmonary effort is normal. No respiratory distress.      Breath sounds: " Normal breath sounds.   Abdominal:      General: Bowel sounds are normal. There is no distension.      Palpations: Abdomen is soft. There is no mass.      Tenderness: There is no abdominal tenderness (Minimal discomfort in the both lower abdomen on deep palpation). There is no guarding or rebound.      Hernia: No hernia is present.      Comments: Prior surgical scar noted   Musculoskeletal:         General: Normal range of motion.      Cervical back: Normal range of motion and neck supple.   Skin:     General: Skin is warm and dry.   Neurological:      Mental Status: She is alert and oriented to person, place, and time.      Cranial Nerves: No cranial nerve deficit.      Sensory: No sensory deficit.   Psychiatric:         Mood and Affect: Mood normal.         Behavior: Behavior normal.         Thought Content: Thought content normal.         Judgment: Judgment normal.         Results Review:   I have reviewed the patient's new clinical and imaging results and agree with the interpretation.     No visits with results within 90 Day(s) from this visit.   Latest known visit with results is:   No results displayed because visit has over 200 results.         No radiology results for the last 90 days.    Assessment / Plan      Assessment & Plan:  1. Lower abdominal pain  2. Chronic constipation  3. Opioid use  4. Irritable bowel syndrome with constipation  Patient's history is more suggestive of irritable bowel syndrome with constipation.  She had a chronic history of constipation with lower abdominal pain for decades now.  She have developed the symptoms after she had a hysterectomy and abdominal plasty.  However for the past couple of years her symptoms was getting worse.  She had a significant issues with the bowel movement however recently after she was started on cascara sagrada and the docusate he has been having almost daily bowel movement.  Her  long-term opioid use also contributing to her symptoms especially for  constipation.  She had a CAT scan of the abdomen pelvis done with contrast in 2020 which did not reveal any significant normalities except a renal stones with that has been taken care of by renal.  Her lab studies done in 2020 were largely unremarkable.     We had a discussion on a low gas-forming diet  I have advised her to continue her cascara and docusate for now.  We will consider newer medicines if indicated in the future.  At this time she is having regular bowel movement with the current regimen  We will start her on dicyclomine liquid 20 mg p.o. 3 times daily as needed    She never had a colonoscopy we will schedule her for colonoscopy for further evaluation  She has to hold her Eliquis 3 days prior to the test    The indications, technique, alternatives and potential risk and complications were discussed with the patient including but not limited to bleeding, bowel perforations, missing lesions and anesthetic complications. The patient understands and wishes to proceed with the procedure and has given their verbal consent. Written patient education information was given to the patient.   The patient will call if they have further questions before procedure.     5. Encounter for screening for malignant neoplasm of colon  She never had a colonoscopy.  No family history of any colon cancer.  We will schedule colonoscopy now  - Case Request; Standing  - Implement Anesthesia Orders Day of Procedure; Standing  - Obtain Informed Consent; Standing  - Verify Bowel Prep Was Successful; Standing  - Oxygen Therapy- Nasal Cannula; 2 LPM; Titrate for SPO2: equal to or greater than, 90%; Standing  - sodium chloride 0.9 % infusion  - Case Request      Follow Up:   Return for Follow Up after procedure.    Cam Parra MD  [facility] [place]  4/15/2021  16:31 EDT    Please note that portions of this note may have been completed with a voice recognition program.

## 2021-04-15 NOTE — PATIENT INSTRUCTIONS
Low-FODMAP Eating Plan    FODMAPs (fermentable oligosaccharides, disaccharides, monosaccharides, and polyols) are sugars that are hard for some people to digest. A low-FODMAP eating plan may help some people who have bowel (intestinal) diseases to manage their symptoms.  This meal plan can be complicated to follow. Work with a diet and nutrition specialist (dietitian) to make a low-FODMAP eating plan that is right for you. A dietitian can make sure that you get enough nutrition from this diet.  What are tips for following this plan?  Reading food labels  · Check labels for hidden FODMAPs such as:  ? High-fructose syrup.  ? Honey.  ? Agave.  ? Natural fruit flavors.  ? Onion or garlic powder.  · Choose low-FODMAP foods that contain 3-4 grams of fiber per serving.  · Check food labels for serving sizes. Eat only one serving at a time to make sure FODMAP levels stay low.  Meal planning  · Follow a low-FODMAP eating plan for up to 6 weeks, or as told by your health care provider or dietitian.  · To follow the eating plan:  1. Eliminate high-FODMAP foods from your diet completely.  2. Gradually reintroduce high-FODMAP foods into your diet one at a time. Most people should wait a few days after introducing one high-FODMAP food before they introduce the next high-FODMAP food. Your dietitian can recommend how quickly you may reintroduce foods.  3. Keep a daily record of what you eat and drink, and make note of any symptoms that you have after eating.  4. Review your daily record with a dietitian regularly. Your dietitian can help you identify which foods you can eat and which foods you should avoid.  General tips  · Drink enough fluid each day to keep your urine pale yellow.  · Avoid processed foods. These often have added sugar and may be high in FODMAPs.  · Avoid most dairy products, whole grains, and sweeteners.  · Work with a dietitian to make sure you get enough fiber in your diet.  Recommended  "foods  Grains  · Gluten-free grains, such as rice, oats, buckwheat, quinoa, corn, polenta, and millet. Gluten-free pasta, bread, or cereal. Rice noodles. Corn tortillas.  Vegetables  · Eggplant, zucchini, cucumber, peppers, green beans, [place] sprouts, bean sprouts, lettuce, arugula, kale, Swiss chard, spinach, terrance greens, bok laura, summer squash, potato, and tomato. Limited amounts of corn, carrot, and sweet potato. Green parts of scallions.  Fruits  · Bananas, oranges, deann, limes, blueberries, raspberries, strawberries, grapes, cantaloupe, honeydew melon, kiwi, papaya, passion fruit, and pineapple. Limited amounts of dried cranberries, banana chips, and shredded coconut.  Dairy  · Lactose-free milk, yogurt, and kefir. Lactose-free cottage cheese and ice cream. Non-dairy milks, such as almond, coconut, hemp, and rice milk. Yogurts made of non-dairy milks. Limited amounts of goat cheese, brie, mozzarella, parmesan, swiss, and other hard cheeses.  Meats and other protein foods  · Unseasoned beef, pork, poultry, or fish. Eggs. Hogan. Tofu (firm) and tempeh. Limited amounts of nuts and seeds, such as almonds, walnuts, brazil nuts, pecans, peanuts, pumpkin seeds, merle seeds, and sunflower seeds.  Fats and oils  · Butter-free spreads. Vegetable oils, such as olive, canola, and sunflower oil.  Seasoning and other foods  · Artificial sweeteners with names that do not end in \"ol\" such as aspartame, saccharine, and stevia. Maple syrup, white table sugar, raw sugar, brown sugar, and molasses. Fresh basil, coriander, parsley, rosemary, and thyme.  Beverages  · Water and mineral water. Sugar-sweetened soft drinks. Small amounts of orange juice or cranberry juice. Black and green tea. Most dry artem. Coffee.  This may not be a complete list of low-FODMAP foods. Talk with your dietitian for more information.  Foods to avoid  Grains  · Wheat, including kamut, durum, and semolina. Barley and bulgur. Couscous. Wheat-based " cereals. Wheat noodles, bread, crackers, and pastries.  Vegetables  · Chicory root, artichoke, asparagus, cabbage, snow peas, sugar snap peas, mushrooms, and cauliflower. Onions, garlic, leeks, and the white part of scallions.  Fruits  · Fresh, dried, and juiced forms of apple, pear, watermelon, peach, plum, cherries, apricots, blackberries, boysenberries, figs, nectarines, and ellie. Avocado.  Dairy  · Milk, yogurt, ice cream, and soft cheese. Cream and sour cream. Milk-based sauces. Custard.  Meats and other protein foods  · Fried or fatty meat. Sausage. Cashews and pistachios. Soybeans, baked beans, black beans, chickpeas, kidney beans, john beans, navy beans, lentils, and split peas.  Seasoning and other foods  · Any sugar-free gum or candy. Foods that contain artificial sweeteners such as sorbitol, mannitol, isomalt, or xylitol. Foods that contain honey, high-fructose corn syrup, or agave. Bouillon, vegetable stock, beef stock, and chicken stock. Garlic and onion powder. Condiments made with onion, such as hummus, chutney, pickles, relish, salad dressing, and salsa. Tomato paste.  Beverages  · Chicory-based drinks. Coffee substitutes. Chamomile tea. Fennel tea. Sweet or fortified artem such as port or cassandra. Diet soft drinks made with isomalt, mannitol, maltitol, sorbitol, or xylitol. Apple, pear, and ellie juice. Juices with high-fructose corn syrup.  This may not be a complete list of high-FODMAP foods. Talk with your dietitian to discuss what dietary choices are best for you.   Summary  · A low-FODMAP eating plan is a short-term diet that eliminates FODMAPs from your diet to help ease symptoms of certain bowel diseases.  · The eating plan usually lasts up to 6 weeks. After that, high-FODMAP foods are restarted gradually, one at a time, so you can find out which may be causing symptoms.  · A low-FODMAP eating plan can be complicated. It is best to work with a dietitian who has experience with this type of  plan.  This information is not intended to replace advice given to you by your health care provider. Make sure you discuss any questions you have with your health care provider.  Document Revised: 11/30/2018 Document Reviewed: 08/14/2018  Elsevier Patient Education © 2021 [employer]

## 2021-05-27 PROBLEM — K59.09 CHRONIC CONSTIPATION: Status: ACTIVE | Noted: 2021-05-27

## 2021-05-27 PROBLEM — R10.30 LOWER ABDOMINAL PAIN: Status: ACTIVE | Noted: 2021-05-27

## 2021-05-27 PROBLEM — Z12.11 ENCOUNTER FOR SCREENING FOR MALIGNANT NEOPLASM OF COLON: Status: ACTIVE | Noted: 2021-05-27

## 2021-06-01 ENCOUNTER — HOSPITAL ENCOUNTER (OUTPATIENT)
Facility: HOSPITAL | Age: 71
Setting detail: HOSPITAL OUTPATIENT SURGERY
Discharge: HOME | End: 2021-06-01
Attending: INTERNAL MEDICINE | Admitting: INTERNAL MEDICINE

## 2021-06-01 ENCOUNTER — ANESTHESIA EVENT (OUTPATIENT)
Dept: GASTROENTEROLOGY | Facility: HOSPITAL | Age: 71
End: 2021-06-01

## 2021-06-01 ENCOUNTER — ANESTHESIA (OUTPATIENT)
Dept: GASTROENTEROLOGY | Facility: HOSPITAL | Age: 71
End: 2021-06-01

## 2021-06-01 VITALS
HEIGHT: 69 IN | BODY MASS INDEX: 33.77 KG/M2 | HEART RATE: 68 BPM | RESPIRATION RATE: 22 BRPM | DIASTOLIC BLOOD PRESSURE: 61 MMHG | SYSTOLIC BLOOD PRESSURE: 140 MMHG | TEMPERATURE: 97.2 F | WEIGHT: 228 LBS | OXYGEN SATURATION: 100 %

## 2021-06-01 DIAGNOSIS — R10.30 LOWER ABDOMINAL PAIN: ICD-10-CM

## 2021-06-01 DIAGNOSIS — Z12.11 ENCOUNTER FOR SCREENING FOR MALIGNANT NEOPLASM OF COLON: ICD-10-CM

## 2021-06-01 DIAGNOSIS — K59.09 CHRONIC CONSTIPATION: ICD-10-CM

## 2021-06-01 LAB — GLUCOSE BLDC GLUCOMTR-MCNC: 121 MG/DL (ref 70–130)

## 2021-06-01 PROCEDURE — 82962 GLUCOSE BLOOD TEST: CPT

## 2021-06-01 PROCEDURE — 25010000002 PROPOFOL 200 MG/20ML EMULSION: Performed by: NURSE ANESTHETIST, CERTIFIED REGISTERED

## 2021-06-01 PROCEDURE — 45385 COLONOSCOPY W/LESION REMOVAL: CPT | Performed by: INTERNAL MEDICINE

## 2021-06-01 PROCEDURE — 88305 TISSUE EXAM BY PATHOLOGIST: CPT | Performed by: INTERNAL MEDICINE

## 2021-06-01 PROCEDURE — 45380 COLONOSCOPY AND BIOPSY: CPT | Performed by: INTERNAL MEDICINE

## 2021-06-01 RX ORDER — MAGNESIUM HYDROXIDE 1200 MG/15ML
LIQUID ORAL AS NEEDED
Status: DISCONTINUED | OUTPATIENT
Start: 2021-06-01 | End: 2021-06-01 | Stop reason: HOSPADM

## 2021-06-01 RX ORDER — PROPOFOL 10 MG/ML
INJECTION, EMULSION INTRAVENOUS AS NEEDED
Status: DISCONTINUED | OUTPATIENT
Start: 2021-06-01 | End: 2021-06-01 | Stop reason: SURG

## 2021-06-01 RX ORDER — LIDOCAINE HYDROCHLORIDE 20 MG/ML
INJECTION, SOLUTION INTRAVENOUS AS NEEDED
Status: DISCONTINUED | OUTPATIENT
Start: 2021-06-01 | End: 2021-06-01 | Stop reason: SURG

## 2021-06-01 RX ORDER — KETAMINE HYDROCHLORIDE 50 MG/ML
INJECTION, SOLUTION, CONCENTRATE INTRAMUSCULAR; INTRAVENOUS AS NEEDED
Status: DISCONTINUED | OUTPATIENT
Start: 2021-06-01 | End: 2021-06-01 | Stop reason: SURG

## 2021-06-01 RX ORDER — SODIUM CHLORIDE 9 MG/ML
70 INJECTION, SOLUTION INTRAVENOUS CONTINUOUS PRN
Status: DISCONTINUED | OUTPATIENT
Start: 2021-06-01 | End: 2021-06-01 | Stop reason: HOSPADM

## 2021-06-01 RX ADMIN — PROPOFOL 100 MG: 10 INJECTION, EMULSION INTRAVENOUS at 11:45

## 2021-06-01 RX ADMIN — PROPOFOL 100 MG: 10 INJECTION, EMULSION INTRAVENOUS at 11:24

## 2021-06-01 RX ADMIN — PROPOFOL 100 MG: 10 INJECTION, EMULSION INTRAVENOUS at 11:32

## 2021-06-01 RX ADMIN — KETAMINE HYDROCHLORIDE 25 MG: 50 INJECTION, SOLUTION INTRAMUSCULAR; INTRAVENOUS at 11:24

## 2021-06-01 RX ADMIN — LIDOCAINE HYDROCHLORIDE 100 MG: 20 INJECTION, SOLUTION INTRAVENOUS at 11:24

## 2021-06-01 RX ADMIN — SODIUM CHLORIDE 70 ML/HR: 9 INJECTION, SOLUTION INTRAVENOUS at 11:08

## 2021-06-01 NOTE — H&P
[facility]  HISTORY AND PHYSICAL    Patient Name: Shannon Servin  : 1950  MRN: [id number]    Chief Complaint:   For screening colonoscopy    History Of Presenting Illness:    Average risk screening  Change in bowel habit  Lower abdominal pain      Past Medical History:   Diagnosis Date   • Afib (CMS/HCC)    • Arthritis    • Diabetes mellitus (CMS/HCC)    • Elevated cholesterol    • History of recurrent UTIs    • Hypertension    • Impaired functional mobility, balance, gait, and endurance    • Kidney calculi    • Migraines    • Sleep apnea        Past Surgical History:   Procedure Laterality Date   • HYSTERECTOMY     • OVARIAN CYST REMOVAL     • TOTAL ABDOMINAL HYSTERECTOMY WITH SALPINGO OOPHORECTOMY     • URETEROSCOPY LASER LITHOTRIPSY WITH STENT INSERTION Left 2020    Procedure: URETEROSCOPY, LEFT RETROGRADE PYLEOGERAM WITH STENT INSERTION;  Surgeon: Mulugeta Saldana MD;  Location: [facility];  Service: Urology;  Laterality: Left;       Social History     Socioeconomic History   • Marital status:      Spouse name: Not on file   • Number of children: Not on file   • Years of education: Not on file   • Highest education level: Not on file   Tobacco Use   • Smoking status: Never Smoker   • Smokeless tobacco: Never Used   Substance and Sexual Activity   • Alcohol use: Yes     Alcohol/week: 4.0 standard drinks     Types: 2 Glasses of wine, 2 Shots of liquor per week     Comment: socially   • Drug use: Never   • Sexual activity: Defer       Family History   Problem Relation Age of Onset   • Cancer Other    • Diabetes Other    • Migraines Other        Prior to Admission Medications:  Medications Prior to Admission   Medication Sig Dispense Refill Last Dose   • amLODIPine (NORVASC) 5 MG tablet Take 5 mg by mouth Daily.   2021 at 1130   • apixaban (ELIQUIS) 5 MG tablet tablet Take 1 tablet by mouth Every 12 (Twelve) Hours. 60 tablet 0 2021 at 2130   • atorvastatin (LIPITOR) 40  MG tablet Daily.   5/31/2021 at 1130   • Cascara Sagrada 450 MG capsule Take 2 tablets by mouth Every Evening.   Past Week at Unknown time   • clotrimazole (LOTRIMIN) 1 % cream APPLY CREAM TOPICALLY TWICE DAILY TO THE AFFECTED AND SURROUNDING AREAS OF SKIN IN THE MORNING AND EVENING   Past Month at Unknown time   • dicyclomine (BENTYL) 10 MG/5ML syrup Take 10 mL by mouth 3 (Three) Times a Day As Needed (abdominal pain). 473 mL 1 Past Month at Unknown time   • diphenhydrAMINE (BENADRYL) 50 MG capsule Take 50 mg by mouth Every 6 (Six) Hours As Needed for Itching.   6/1/2021 at 0900   • Docusate Calcium (STOOL SOFTENER PO) Take 240 mg by mouth Daily.   Past Week at Unknown time   • doxycycline (VIBRAMYCIN) 100 MG capsule TAKE 1 CAPSULE BY MOUTH ONCE DAILY FOR 1 MONTH ALTERNATING WITH MACROBID   5/31/2021 at 2130   • enalapril (VASOTEC) 20 MG tablet Take 1 tablet by mouth Every 12 (Twelve) Hours. 30 tablet 0 5/31/2021 at 2130   • furosemide (LASIX) 20 MG tablet Take 1 tablet by mouth Daily. 30 tablet 0 Patient Taking Differently at Unknown time   • levocetirizine (XYZAL) 5 MG tablet Take 5 mg by mouth Every Evening.   5/31/2021 at 2130   • nitrofurantoin, macrocrystal-monohydrate, (MACROBID) 100 MG capsule TAKE 1 CAPSULE BY MOUTH ONCE DAILY FOR 1 MONTH ALTERNATING WITH DOXYCYCLINE   Past Week at Unknown time   • oxyCODONE-acetaminophen (PERCOCET) 5-325 MG per tablet Take 1 tablet by mouth Every 6 (Six) Hours As Needed.   5/31/2021 at 1400   • promethazine (PHENERGAN) 25 MG tablet Take 25 mg by mouth Every 6 (Six) Hours As Needed.   Past Week at Unknown time   • sotalol (BETAPACE) 80 MG tablet Take 1 tablet by mouth Every 12 (Twelve) Hours. 60 tablet 0 5/31/2021 at 2130   • spironolactone (ALDACTONE) 25 MG tablet Take 1 tablet by mouth Daily. 30 tablet 0 5/31/2021 at 0930   • tiZANidine (ZANAFLEX) 4 MG tablet Take 6 mg by mouth At Night As Needed for Muscle Spasms.   5/31/2021 at 2130   • traMADol (ULTRAM) 50 MG tablet  Take 50 mg by mouth Every 6 (Six) Hours As Needed for Moderate Pain .   Past Week at Unknown time   • rizatriptan (MAXALT) 10 MG tablet TAKE ONE TABLET BY MOUTH AT ONSET OF MIGRAINE. IF SYMPTOMS PERSIST A SECOND DOSE MAY BE TAKEN IN 2 HOURS. DO NOT EXCEED 3 DOSES IN A 24 HOUR   More than a month at Unknown time       Allergies:  Allergies   Allergen Reactions   • Metformin Hallucinations        Vitals: Temp:  [97.7 °F (36.5 °C)] 97.7 °F (36.5 °C)  Heart Rate:  [66] 66  Resp:  [18] 18  BP: (134)/(61) 134/61    Review Of Systems:  Constitutional:  Negative for chills, fever, and unexpected weight change.  Respiratory:  Negative for cough, chest tightness, shortness of breath, and wheezing.  Cardiovascular:  Negative for chest pain, palpitations, and leg swelling.  Gastrointestinal:  Negative for abdominal distention, abdominal pain, Nausea, vomiting.  Neurological:  Negative for Weakness, numbness, and headaches.     Physical Exam:    General Appearance:  Alert, cooperative, in no acute distress.   Lungs:   Clear to auscultation, respirations regular, even and                 unlabored.   Heart:  Regular rhythm and normal rate.   Abdomen:   Normal bowel sounds, no masses, no organomegaly. Soft, non-tender, non-distended   Neurologic: Alert and oriented x 3. Moves all four limbs equally       Plan: COLONOSCOPY (N/A)     Cam Parra MD  6/1/2021

## 2021-06-01 NOTE — DISCHARGE INSTRUCTIONS
Rest today  No pushing,pulling,tugging,heavy lifting, or strenuous activity   No major decision making,driving,or drinking alcoholic beverages for 24 hours due to the sedation you received  Always use good hand hygiene/washing technique  No driving on pain medication.    To assist you in voiding:  Drink plenty of fluids  Listen to running water while attempting to void.    If you are unable to urinate and you have an uncomfortable urge to void or it has been   6 hours since you were discharged, return to the Emergency Room.      - Discharge patient to home (ambulatory).   - High fiber diet.   - Continue present medications.   - Hold Eliquis for 3 days   - Await pathology results.   - Will change abdirashida, cascara to different laxatives as op  - Repeat colonoscopy in 3 years for surveillance.   - Return to GI office in 6 weeks.

## 2021-06-01 NOTE — ANESTHESIA POSTPROCEDURE EVALUATION
Patient: Shannon Servin    Procedure Summary     Date: 06/01/21 Room / Location: [facility] ENDOSCOPY 2 / [facility] ENDOSCOPY    Anesthesia Start: 1119 Anesthesia Stop: 1155    Procedure: COLONOSCOPY WITH BIOPSY, COLD SNARE POLYPECTOMY, HOT SNARE POLYPECTOMY (N/A Anus) Diagnosis:       Lower abdominal pain      Chronic constipation      Encounter for screening for malignant neoplasm of colon      (Lower abdominal pain [R10.30])      (Chronic constipation [K59.09])      (Encounter for screening for malignant neoplasm of colon [Z12.11])    Surgeons: Cam Parra MD Provider: Inocente Mensah CRNA    Anesthesia Type: general ASA Status: 3 - Emergent          Anesthesia Type: general    Vitals  No vitals data found for the desired time range.          Post Anesthesia Care and Evaluation    Patient location during evaluation: bedside  Patient participation: complete - patient participated  Level of consciousness: sleepy but conscious  Pain score: 0  Pain management: adequate  Airway patency: patent  Anesthetic complications: No anesthetic complications  PONV Status: none  Cardiovascular status: acceptable  Respiratory status: acceptable  Hydration status: acceptable

## 2021-06-02 NOTE — ANESTHESIA POSTPROCEDURE EVALUATION
Patient: Shannon Servin    Procedure Summary     Date: 06/01/21 Room / Location: [facility] ENDOSCOPY 2 / [facility] ENDOSCOPY    Anesthesia Start: 1119 Anesthesia Stop: 1155    Procedure: COLONOSCOPY WITH BIOPSY, COLD SNARE POLYPECTOMY, HOT SNARE POLYPECTOMY (N/A Anus) Diagnosis:       Lower abdominal pain      Chronic constipation      Encounter for screening for malignant neoplasm of colon      (Lower abdominal pain [R10.30])      (Chronic constipation [K59.09])      (Encounter for screening for malignant neoplasm of colon [Z12.11])    Surgeons: Cam Parra MD Provider: Inocente Mensah CRNA    Anesthesia Type: general ASA Status: 3 - Emergent          Anesthesia Type: general    Vitals  Vitals Value Taken Time   /61 06/01/21 1241   Temp 97.2 °F (36.2 °C) 06/01/21 1201   Pulse 68 06/01/21 1241   Resp 22 06/01/21 1241   SpO2 100 % 06/01/21 1241           Post Anesthesia Care and Evaluation    Patient location during evaluation: PACU  Patient participation: complete - patient participated  Level of consciousness: awake and alert  Pain management: satisfactory to patient  Airway patency: patent  Anesthetic complications: No anesthetic complications    Cardiovascular status: acceptable and hemodynamically stable  Respiratory status: acceptable  Hydration status: acceptable

## 2021-06-03 LAB
LAB AP CASE REPORT: NORMAL
PATH REPORT.FINAL DX SPEC: NORMAL

## 2021-08-06 ENCOUNTER — HOSPITAL ENCOUNTER (EMERGENCY)
Facility: HOSPITAL | Age: 71
Discharge: HOME | End: 2021-08-06
Attending: FAMILY MEDICINE | Admitting: FAMILY MEDICINE

## 2021-08-06 ENCOUNTER — APPOINTMENT (OUTPATIENT)
Dept: GENERAL RADIOLOGY | Facility: HOSPITAL | Age: 71
End: 2021-08-06

## 2021-08-06 VITALS
BODY MASS INDEX: 34.56 KG/M2 | RESPIRATION RATE: 16 BRPM | DIASTOLIC BLOOD PRESSURE: 68 MMHG | TEMPERATURE: 98.5 F | HEART RATE: 65 BPM | SYSTOLIC BLOOD PRESSURE: 135 MMHG | OXYGEN SATURATION: 96 % | HEIGHT: 68 IN | WEIGHT: 228 LBS

## 2021-08-06 DIAGNOSIS — R05.9 COUGH: Primary | ICD-10-CM

## 2021-08-06 LAB
ALBUMIN SERPL-MCNC: 4 G/DL (ref 3.5–5.2)
ALBUMIN/GLOB SERPL: 1.4 G/DL
ALP SERPL-CCNC: 67 U/L (ref 39–117)
ALT SERPL W P-5'-P-CCNC: 38 U/L (ref 1–33)
ANION GAP SERPL CALCULATED.3IONS-SCNC: 11.1 MMOL/L (ref 5–15)
AST SERPL-CCNC: 22 U/L (ref 1–32)
B PARAPERT DNA SPEC QL NAA+PROBE: NOT DETECTED
B PERT DNA SPEC QL NAA+PROBE: NOT DETECTED
BASOPHILS # BLD AUTO: 0.03 10*3/MM3 (ref 0–0.2)
BASOPHILS NFR BLD AUTO: 0.3 % (ref 0–1.5)
BILIRUB SERPL-MCNC: 0.6 MG/DL (ref 0–1.2)
BUN SERPL-MCNC: 17 MG/DL (ref 8–23)
BUN/CREAT SERPL: 25.4 (ref 7–25)
C PNEUM DNA NPH QL NAA+NON-PROBE: NOT DETECTED
CALCIUM SPEC-SCNC: 9.4 MG/DL (ref 8.6–10.5)
CHLORIDE SERPL-SCNC: 103 MMOL/L (ref 98–107)
CO2 SERPL-SCNC: 24.9 MMOL/L (ref 22–29)
CREAT SERPL-MCNC: 0.67 MG/DL (ref 0.57–1)
CRP SERPL-MCNC: 2.07 MG/DL (ref 0–0.5)
DEPRECATED RDW RBC AUTO: 42.8 FL (ref 37–54)
EOSINOPHIL # BLD AUTO: 0.16 10*3/MM3 (ref 0–0.4)
EOSINOPHIL NFR BLD AUTO: 1.8 % (ref 0.3–6.2)
ERYTHROCYTE [DISTWIDTH] IN BLOOD BY AUTOMATED COUNT: 12.4 % (ref 12.3–15.4)
FLUAV SUBTYP SPEC NAA+PROBE: NOT DETECTED
FLUBV RNA ISLT QL NAA+PROBE: NOT DETECTED
GFR SERPL CREATININE-BSD FRML MDRD: 87 ML/MIN/1.73
GLOBULIN UR ELPH-MCNC: 2.8 GM/DL
GLUCOSE SERPL-MCNC: 147 MG/DL (ref 65–99)
HADV DNA SPEC NAA+PROBE: NOT DETECTED
HCOV 229E RNA SPEC QL NAA+PROBE: NOT DETECTED
HCOV HKU1 RNA SPEC QL NAA+PROBE: NOT DETECTED
HCOV NL63 RNA SPEC QL NAA+PROBE: NOT DETECTED
HCOV OC43 RNA SPEC QL NAA+PROBE: NOT DETECTED
HCT VFR BLD AUTO: 38.6 % (ref 34–46.6)
HGB BLD-MCNC: 13 G/DL (ref 12–15.9)
HMPV RNA NPH QL NAA+NON-PROBE: NOT DETECTED
HPIV1 RNA SPEC QL NAA+PROBE: NOT DETECTED
HPIV2 RNA SPEC QL NAA+PROBE: NOT DETECTED
HPIV3 RNA NPH QL NAA+PROBE: NOT DETECTED
HPIV4 P GENE NPH QL NAA+PROBE: NOT DETECTED
IMM GRANULOCYTES # BLD AUTO: 0.02 10*3/MM3 (ref 0–0.05)
IMM GRANULOCYTES NFR BLD AUTO: 0.2 % (ref 0–0.5)
LYMPHOCYTES # BLD AUTO: 2.91 10*3/MM3 (ref 0.7–3.1)
LYMPHOCYTES NFR BLD AUTO: 33 % (ref 19.6–45.3)
M PNEUMO IGG SER IA-ACNC: NOT DETECTED
MCH RBC QN AUTO: 31.3 PG (ref 26.6–33)
MCHC RBC AUTO-ENTMCNC: 33.7 G/DL (ref 31.5–35.7)
MCV RBC AUTO: 93 FL (ref 79–97)
MONOCYTES # BLD AUTO: 0.76 10*3/MM3 (ref 0.1–0.9)
MONOCYTES NFR BLD AUTO: 8.6 % (ref 5–12)
NEUTROPHILS NFR BLD AUTO: 4.94 10*3/MM3 (ref 1.7–7)
NEUTROPHILS NFR BLD AUTO: 56.1 % (ref 42.7–76)
NRBC BLD AUTO-RTO: 0 /100 WBC (ref 0–0.2)
PLATELET # BLD AUTO: 153 10*3/MM3 (ref 140–450)
PMV BLD AUTO: 11 FL (ref 6–12)
POTASSIUM SERPL-SCNC: 3.8 MMOL/L (ref 3.5–5.2)
PROT SERPL-MCNC: 6.8 G/DL (ref 6–8.5)
RBC # BLD AUTO: 4.15 10*6/MM3 (ref 3.77–5.28)
RHINOVIRUS RNA SPEC NAA+PROBE: NOT DETECTED
RSV RNA NPH QL NAA+NON-PROBE: NOT DETECTED
SARS-COV-2 RNA NPH QL NAA+NON-PROBE: NOT DETECTED
SODIUM SERPL-SCNC: 139 MMOL/L (ref 136–145)
TROPONIN T SERPL-MCNC: <0.01 NG/ML (ref 0–0.03)
WBC # BLD AUTO: 8.82 10*3/MM3 (ref 3.4–10.8)

## 2021-08-06 PROCEDURE — 87040 BLOOD CULTURE FOR BACTERIA: CPT | Performed by: FAMILY MEDICINE

## 2021-08-06 PROCEDURE — 71045 X-RAY EXAM CHEST 1 VIEW: CPT

## 2021-08-06 PROCEDURE — 85025 COMPLETE CBC W/AUTO DIFF WBC: CPT | Performed by: FAMILY MEDICINE

## 2021-08-06 PROCEDURE — 0202U NFCT DS 22 TRGT SARS-COV-2: CPT | Performed by: FAMILY MEDICINE

## 2021-08-06 PROCEDURE — 84484 ASSAY OF TROPONIN QUANT: CPT | Performed by: FAMILY MEDICINE

## 2021-08-06 PROCEDURE — 99283 EMERGENCY DEPT VISIT LOW MDM: CPT

## 2021-08-06 PROCEDURE — 93005 ELECTROCARDIOGRAM TRACING: CPT | Performed by: FAMILY MEDICINE

## 2021-08-06 PROCEDURE — 80053 COMPREHEN METABOLIC PANEL: CPT | Performed by: FAMILY MEDICINE

## 2021-08-06 PROCEDURE — 86140 C-REACTIVE PROTEIN: CPT | Performed by: FAMILY MEDICINE

## 2021-08-06 RX ORDER — DOXYCYCLINE 100 MG/1
100 CAPSULE ORAL 2 TIMES DAILY
Qty: 14 CAPSULE | Refills: 0 | Status: SHIPPED | OUTPATIENT
Start: 2021-08-06 | End: 2022-03-11

## 2021-08-06 RX ORDER — SODIUM CHLORIDE 0.9 % (FLUSH) 0.9 %
10 SYRINGE (ML) INJECTION AS NEEDED
Status: DISCONTINUED | OUTPATIENT
Start: 2021-08-06 | End: 2021-08-06 | Stop reason: HOSPADM

## 2021-08-06 NOTE — ED PROVIDER NOTES
"Subjective   71-year-old female presents emergency department has a past medical history of hyperlipidemia, diabetes, paroxysmal atrial fibrillation with a report of a 3-day history of shortness of breath and cough.  Patient reports today when she woke up she felt a \"rattling in her chest\" and then she also said that she coughed up green sputum and knew that she could have a walking pneumonia so she came in to be evaluated.  Patient denies any sick contacts, patient denies any fever or chills.  Patient had her COVID 19 Vaccine ( [place] -3/31/21)      History provided by:  Patient  Shortness of Breath  Severity:  Moderate  Onset quality:  Gradual  Duration:  3 days  Timing:  Intermittent  Progression:  Waxing and waning  Chronicity:  New  Context: URI    Relieved by:  Nothing  Worsened by:  Nothing  Ineffective treatments:  None tried  Associated symptoms: cough and sputum production    Associated symptoms: no abdominal pain, no chest pain and no fever    Risk factors: obesity        Review of Systems   Constitutional: Negative.  Negative for fever.   HENT: Negative.    Respiratory: Positive for cough, sputum production and shortness of breath.    Cardiovascular: Negative.  Negative for chest pain.   Gastrointestinal: Negative.  Negative for abdominal pain.   Endocrine: Negative.    Genitourinary: Negative.  Negative for dysuria.   Skin: Negative.    Neurological: Negative.    Psychiatric/Behavioral: Negative.    All other systems reviewed and are negative.      Past Medical History:   Diagnosis Date   • Afib (CMS/HCC)    • Arthritis    • Diabetes mellitus (CMS/HCC)    • Elevated cholesterol    • History of recurrent UTIs    • Hypertension    • Impaired functional mobility, balance, gait, and endurance    • Kidney calculi    • Migraines    • Sleep apnea        Allergies   Allergen Reactions   • Metformin Hallucinations       Past Surgical History:   Procedure Laterality Date   • COLONOSCOPY N/A 6/1/2021    " Procedure: COLONOSCOPY WITH BIOPSY, COLD SNARE POLYPECTOMY, HOT SNARE POLYPECTOMY;  Surgeon: Cam Parra MD;  Location: [facility] ENDOSCOPY;  Service: Gastroenterology;  Laterality: N/A;   • HYSTERECTOMY     • OVARIAN CYST REMOVAL     • TOTAL ABDOMINAL HYSTERECTOMY WITH SALPINGO OOPHORECTOMY     • URETEROSCOPY LASER LITHOTRIPSY WITH STENT INSERTION Left 2/20/2020    Procedure: URETEROSCOPY, LEFT RETROGRADE PYLEOGERAM WITH STENT INSERTION;  Surgeon: Mulugeta Saldana MD;  Location: [facility] OR;  Service: Urology;  Laterality: Left;       Family History   Problem Relation Age of Onset   • Cancer Other    • Diabetes Other    • Migraines Other        Social History     Socioeconomic History   • Marital status:      Spouse name: Not on file   • Number of children: Not on file   • Years of education: Not on file   • Highest education level: Not on file   Tobacco Use   • Smoking status: Never Smoker   • Smokeless tobacco: Never Used   Substance and Sexual Activity   • Alcohol use: Yes     Alcohol/week: 4.0 standard drinks     Types: 2 Glasses of wine, 2 Shots of liquor per week     Comment: socially   • Drug use: Never   • Sexual activity: Defer           Objective   Physical Exam  Vitals and nursing note reviewed.   Constitutional:       Appearance: Normal appearance.   HENT:      Head: Normocephalic and atraumatic.      Right Ear: Tympanic membrane normal.      Left Ear: Tympanic membrane normal.      Nose: Nose normal.      Mouth/Throat:      Mouth: Mucous membranes are moist.   Eyes:      Extraocular Movements: Extraocular movements intact.      Pupils: Pupils are equal, round, and reactive to light.   Cardiovascular:      Rate and Rhythm: Normal rate and regular rhythm.   Pulmonary:      Effort: Pulmonary effort is normal.      Breath sounds: Decreased air movement present. Decreased breath sounds present.   Abdominal:      General: Abdomen is flat.   Musculoskeletal:         General: Normal range of  motion.      Cervical back: Normal range of motion.   Skin:     General: Skin is warm.      Capillary Refill: Capillary refill takes less than 2 seconds.   Neurological:      General: No focal deficit present.      Mental Status: She is alert and oriented to person, place, and time.   Psychiatric:         Mood and Affect: Mood normal.         Behavior: Behavior normal.         Thought Content: Thought content normal.         Judgment: Judgment normal.         Procedures           ED Course  ED Course as of Aug 07 0219   Fri Aug 06, 2021   0742 EKG interpreted by me.  Sinus rhythm.  Rate of 69.  First-degree AV block.  No ST segment or T wave changes.  Artifact present abnormal EKG    [CG]      ED Course User Index  [CG] Robin Pulliam, DO                                           MDM  Number of Diagnoses or Management Options  Cough: new and requires workup     Amount and/or Complexity of Data Reviewed  Clinical lab tests: ordered and reviewed  Tests in the radiology section of CPT®: ordered and reviewed  Tests in the medicine section of CPT®: ordered and reviewed        Final diagnoses:   Cough       ED Disposition  ED Disposition     ED Disposition Condition Comment    Discharge Stable           Marichuy Cordova MD  [facility] [address] [place] [address] [phone]               Medication List      New Prescriptions    doxycycline 100 MG capsule  Commonly known as: MONODOX  Take 1 capsule by mouth 2 (Two) Times a Day.           Where to Get Your Medications      These medications were sent to [facility] [address] [place] [address] - [address] [place] [address] - [phone]  - [phone]  [address] [facility] [address]    Phone: [phone]   · doxycycline 100 MG capsule          Yesenia Montero DO  08/07/21 0217

## 2021-08-11 LAB
BACTERIA SPEC AEROBE CULT: NORMAL
BACTERIA SPEC AEROBE CULT: NORMAL

## 2021-09-07 ENCOUNTER — OFFICE VISIT (OUTPATIENT)
Dept: CARDIOLOGY | Facility: CLINIC | Age: 71
End: 2021-09-07

## 2021-09-07 VITALS
BODY MASS INDEX: 35.77 KG/M2 | SYSTOLIC BLOOD PRESSURE: 102 MMHG | WEIGHT: 236 LBS | DIASTOLIC BLOOD PRESSURE: 62 MMHG | HEART RATE: 74 BPM | OXYGEN SATURATION: 90 % | HEIGHT: 68 IN

## 2021-09-07 DIAGNOSIS — R60.0 BILATERAL LOWER EXTREMITY EDEMA: Primary | ICD-10-CM

## 2021-09-07 DIAGNOSIS — I10 ESSENTIAL HYPERTENSION: ICD-10-CM

## 2021-09-07 DIAGNOSIS — I48.0 PAROXYSMAL ATRIAL FIBRILLATION (HCC): ICD-10-CM

## 2021-09-07 PROCEDURE — 99213 OFFICE O/P EST LOW 20 MIN: CPT | Performed by: NURSE PRACTITIONER

## 2021-09-07 PROCEDURE — 93000 ELECTROCARDIOGRAM COMPLETE: CPT | Performed by: NURSE PRACTITIONER

## 2021-09-07 RX ORDER — SENNA PLUS 8.6 MG/1
1 TABLET ORAL DAILY
COMMUNITY

## 2021-09-07 RX ORDER — TRAZODONE HYDROCHLORIDE 50 MG/1
50 TABLET ORAL NIGHTLY
COMMUNITY

## 2021-09-07 RX ORDER — OXYBUTYNIN CHLORIDE 10 MG/1
10 TABLET, EXTENDED RELEASE ORAL DAILY
COMMUNITY
End: 2022-06-03

## 2021-09-07 RX ORDER — GABAPENTIN 300 MG/1
100 CAPSULE ORAL DAILY
COMMUNITY
End: 2022-05-24 | Stop reason: SDUPTHER

## 2021-09-07 RX ORDER — FLUTICASONE PROPIONATE 50 MCG
2 SPRAY, SUSPENSION (ML) NASAL DAILY
COMMUNITY
End: 2022-10-11 | Stop reason: ALTCHOICE

## 2021-09-07 NOTE — PROGRESS NOTES
"             [facility] Cardiology Office Follow Up Note    Shannon Servin  [id number]  2021    Primary Care Provider: Marichuy Cordova MD   Referring Provider: No ref. provider found    Chief Complaint: Lower extremity edema    History of Present Illness:   Mrs. Shannon Servin is a 71 y.o. female who presents to the Cardiology Clinic for concerns regarding lower extremity edema.  She has a past medical history of paroxysmal atrial fibrillation and hypertension.  She was last seen in  with no acute findings or changes to her medication profile.  She presents today for a 2-month history of lower extremity edema.  She reports that her legs feel hot and that even the skin of her lower extremities \"feels different\".  The patient is adamant that she has employed every conservative measure (including a low-sodium diet and elevation of feet/legs) with no improvement.  Her  states he is unable to stretch her compression stockings wide enough to get them on her legs.  The patient notes that she walks as best as she can, but adds that she is \"partially disabled\" and that this greatly affects her mobility.  She reports \"I've done everything I can do\".  It has become difficult/uncomfortable for her to wear shoes.  She specifically denies chest discomfort, dyspnea.  She denies palpitations, dizziness, syncope.  She denies orthopnea, PND. She continues to take furosemide 20 mg as part of her daily medication profile without improvement.  She offers no other complaints or concerns at this time.    Past Cardiac Testin. Last Coronary Angio: None  2. Prior Stress Testing: None  3. Last Echo: 2020   1. Estimated EF = 55%.   2. Left ventricular systolic function is normal  4. Prior Holter Monitor: None    Review of Systems:   Review of Systems   Constitutional: Negative for activity change, chills, diaphoresis, fatigue, fever and unexpected weight gain.   Eyes: Negative for blurred vision " and visual disturbance.   Respiratory: Negative for apnea, cough, chest tightness, shortness of breath and wheezing.    Cardiovascular: Positive for leg swelling. Negative for chest pain and palpitations.   Gastrointestinal: Negative for abdominal distention, blood in stool, GERD and indigestion.   Endocrine: Negative for cold intolerance and heat intolerance.   Genitourinary: Negative for hematuria.   Musculoskeletal: Negative for gait problem, joint swelling and myalgias.   Skin: Negative for color change, pallor and bruise.   Neurological: Negative for dizziness, seizures, syncope, weakness, light-headedness, numbness, headache and confusion.   Hematological: Does not bruise/bleed easily.   Psychiatric/Behavioral: Negative for behavioral problems, sleep disturbance, suicidal ideas and depressed mood.     I have reviewed and confirmed the accuracy of the ROS as documented by the MA/LPN/RN MAICO Chen    I have reviewed and/or updated the patient's past medical, past surgical, family, social history, problem list and allergies as appropriate.     Medications:     Current Outpatient Medications:   •  amLODIPine (NORVASC) 5 MG tablet, Take 5 mg by mouth Daily., Disp: , Rfl:   •  apixaban (ELIQUIS) 5 MG tablet tablet, Take 1 tablet by mouth Every 12 (Twelve) Hours., Disp: 60 tablet, Rfl: 0  •  atorvastatin (LIPITOR) 40 MG tablet, Daily., Disp: , Rfl:   •  clotrimazole (LOTRIMIN) 1 % cream, APPLY CREAM TOPICALLY TWICE DAILY TO THE AFFECTED AND SURROUNDING AREAS OF SKIN IN THE MORNING AND EVENING, Disp: , Rfl:   •  diphenhydrAMINE (BENADRYL) 50 MG capsule, Take 50 mg by mouth Every 6 (Six) Hours As Needed for Itching., Disp: , Rfl:   •  Docusate Calcium (STOOL SOFTENER PO), Take 240 mg by mouth Daily., Disp: , Rfl:   •  doxycycline (MONODOX) 100 MG capsule, Take 1 capsule by mouth 2 (Two) Times a Day., Disp: 14 capsule, Rfl: 0  •  enalapril (VASOTEC) 20 MG tablet, Take 1 tablet by mouth Every 12 (Twelve)  Hours., Disp: 30 tablet, Rfl: 0  •  Estradiol 10 % cream, , Disp: , Rfl:   •  FLUCONAZOLE PO, Take 156 mg by mouth., Disp: , Rfl:   •  fluticasone (FLONASE) 50 MCG/ACT nasal spray, 2 sprays into the nostril(s) as directed by provider Daily., Disp: , Rfl:   •  furosemide (LASIX) 20 MG tablet, Take 1 tablet by mouth Daily., Disp: 30 tablet, Rfl: 0  •  gabapentin (NEURONTIN) 300 MG capsule, Take 300 mg by mouth 3 (Three) Times a Day., Disp: , Rfl:   •  Insulin Zinc Human (NOVOLIN L SC), Inject  under the skin into the appropriate area as directed., Disp: , Rfl:   •  nitrofurantoin, macrocrystal-monohydrate, (MACROBID) 100 MG capsule, TAKE 1 CAPSULE BY MOUTH ONCE DAILY FOR 1 MONTH ALTERNATING WITH DOXYCYCLINE, Disp: , Rfl:   •  Ondansetron HCl (ZOFRAN PO), Take  by mouth., Disp: , Rfl:   •  oxybutynin XL (DITROPAN-XL) 10 MG 24 hr tablet, Take 10 mg by mouth Daily., Disp: , Rfl:   •  oxyCODONE-acetaminophen (PERCOCET) 5-325 MG per tablet, Take 1 tablet by mouth Every 6 (Six) Hours As Needed., Disp: , Rfl:   •  promethazine (PHENERGAN) 25 MG tablet, Take 25 mg by mouth Every 6 (Six) Hours As Needed., Disp: , Rfl:   •  rizatriptan (MAXALT) 10 MG tablet, TAKE ONE TABLET BY MOUTH AT ONSET OF MIGRAINE. IF SYMPTOMS PERSIST A SECOND DOSE MAY BE TAKEN IN 2 HOURS. DO NOT EXCEED 3 DOSES IN A 24 HOUR, Disp: , Rfl:   •  senna (senna) 8.6 MG tablet, Take 1 tablet by mouth Daily., Disp: , Rfl:   •  sotalol (BETAPACE) 80 MG tablet, Take 1 tablet by mouth Every 12 (Twelve) Hours., Disp: 60 tablet, Rfl: 0  •  spironolactone (ALDACTONE) 25 MG tablet, Take 1 tablet by mouth Daily., Disp: 30 tablet, Rfl: 0  •  tiZANidine (ZANAFLEX) 4 MG tablet, Take 6 mg by mouth At Night As Needed for Muscle Spasms., Disp: , Rfl:   •  traMADol (ULTRAM) 50 MG tablet, Take 50 mg by mouth Every 6 (Six) Hours As Needed for Moderate Pain ., Disp: , Rfl:   •  traZODone (DESYREL) 50 MG tablet, Take 50 mg by mouth Every Night., Disp: , Rfl:     Physical  "Exam:  Vital Signs:   Vitals:    09/07/21 1453   BP: 102/62   BP Location: Right arm   Patient Position: Sitting   Cuff Size: Adult   Pulse: 74   SpO2: 90%   Weight: 107 kg (236 lb)   Height: 172.7 cm (68\")     Body mass index is 35.88 kg/m².    Physical Exam  Vitals and nursing note reviewed.   Constitutional:       General: She is not in acute distress.     Appearance: She is well-developed.      Comments: BMI 35.8 kg/m²   HENT:      Head: Normocephalic and atraumatic.   Eyes:      General: No scleral icterus.     Extraocular Movements: Extraocular movements intact.   Neck:      Trachea: Trachea normal.   Cardiovascular:      Rate and Rhythm: Normal rate and regular rhythm.      Pulses: Normal pulses.      Heart sounds: Normal heart sounds. No murmur heard.   No friction rub. No gallop.    Pulmonary:      Effort: Pulmonary effort is normal.      Comments: Decreased breath sounds throughout  Abdominal:      Palpations: Abdomen is soft.      Tenderness: There is no abdominal tenderness.   Musculoskeletal:         General: Normal range of motion.      Cervical back: Neck supple.      Right lower leg: Edema present.      Left lower leg: Edema present.      Comments: 3+ edema to BLE   Skin:     General: Skin is warm and dry.      Findings: No bruising, lesion or rash.      Comments: Skin of BLE is clean dry and intact with no erythema   Neurological:      Mental Status: She is alert and oriented to person, place, and time.      Motor: No weakness.      Gait: Gait normal.   Psychiatric:         Mood and Affect: Mood normal.         Behavior: Behavior is cooperative.         Thought Content: Thought content does not include suicidal ideation.         ECG 12 Lead    Date/Time: 9/7/2021 5:34 PM  Performed by: Mily Rutledge APRN  Authorized by: Mily Rutledge APRN   Comparison: compared with previous ECG from 8/6/2021  Rhythm: sinus rhythm  Rate: normal  BPM: 63  QRS axis: normal    Clinical impression: " normal ECG  Comments: QTc= 446 ms            Results Review:   I reviewed the patient's new clinical results.    Assessment / Plan:     1. Bilateral lower extremity edema (Primary)  --2020 LVEF =55% per echocardiogram  --No associated dyspnea  --Reinforced conservative measures  --Recent creatinine 0.67 in 8/21  --Short course of Lasix to be given 40 mg in the morning and 40 mg in the early afternoon x3 days beginning tomorrow.  She will return to 20 mg daily on day 4.  --Patient's  declined to have this medication called in; he will double up her medication at home and call if he needs refills  --Follow-up in 1 week for a volume status check and need for ongoing diuresis    2. Paroxysmal atrial fibrillation (CMS/HCC)  --Asymptomatic  --ECG shows NSR today  --Continue antiarrhythmic/anticoagulation strategies    3. Essential hypertension  --Acceptable blood pressure    Preventative Cardiology:   Tobacco Cessation: N/A   Advance Care Planning: ACP discussion was declined by the patient. Patient has an advance directive in EMR which is still valid.      Follow Up:   Return in about 1 week (around 9/14/2021) for Follow-up with Dr. Shirley.      Thank you for allowing me to participate in the care of your patient. Please to not hesitate to contact me with additional questions or concerns.     MAICO Styles

## 2021-09-14 ENCOUNTER — OFFICE VISIT (OUTPATIENT)
Dept: CARDIOLOGY | Facility: CLINIC | Age: 71
End: 2021-09-14

## 2021-09-14 VITALS
WEIGHT: 232 LBS | DIASTOLIC BLOOD PRESSURE: 82 MMHG | OXYGEN SATURATION: 93 % | HEART RATE: 66 BPM | SYSTOLIC BLOOD PRESSURE: 122 MMHG | HEIGHT: 68 IN | RESPIRATION RATE: 20 BRPM | BODY MASS INDEX: 35.16 KG/M2

## 2021-09-14 DIAGNOSIS — R60.0 BILATERAL LOWER EXTREMITY EDEMA: Primary | ICD-10-CM

## 2021-09-14 PROCEDURE — 99213 OFFICE O/P EST LOW 20 MIN: CPT | Performed by: NURSE PRACTITIONER

## 2021-09-14 RX ORDER — FUROSEMIDE 20 MG/1
20 TABLET ORAL 2 TIMES DAILY
Qty: 60 TABLET | Refills: 6 | Status: SHIPPED | OUTPATIENT
Start: 2021-09-14 | End: 2021-09-14

## 2021-09-14 RX ORDER — FUROSEMIDE 20 MG/1
20 TABLET ORAL 2 TIMES DAILY
Qty: 60 TABLET | Refills: 6 | Status: SHIPPED | OUTPATIENT
Start: 2021-09-14 | End: 2022-08-08 | Stop reason: SDUPTHER

## 2021-09-14 NOTE — PROGRESS NOTES
"             [facility] Cardiology Office Follow Up Note    Shannon Servin  [id number]  2021    Primary Care Provider: Marichuy Cordova MD   Referring Provider: No ref. provider found    Chief Complaint: Follow-up for lower extremity edema    History of Present Illness:   Mrs. Shannon Servin is a 71 y.o. female who presents to the Cardiology Clinic for follow up of lower extremity edema.  She has a past medical history of paroxysmal atrial fibrillation and hypertension.  She presented last week for evaluation of a 2-month history of lower extremity edema.  At that time, she reported that her legs felt hot and that even the skin of her lower extremities felt \"different\".  The patient was adamant that she has employed every conservative measure (including a low-sodium diet and elevation of feet/legs) with no improvement.  Her  was unable to stretch her compression stockings wide enough to get them on her legs.  The patient noted that she walks as best as she can, but adds that she is \"partially disabled\" and that this greatly affects her mobility.  There was no associated chest discomfort, dyspnea, and dizziness.  She was prescribed a short course of increased-dose Lasix.  She presents today for follow-up.  Patient reports improved lower extremity edema with twice daily Lasix and reduced-dose amlodipine.  She wishes to stay on this long-term.  She denies dyspnea, chest discomfort, palpitations.  She offers no other complaints or concerns at this time.        Past Cardiac Testin. Last Coronary Angio: None  2. Prior Stress Testing: None  3. Last Echo: 2020              1. Estimated EF = 55%.              2. Left ventricular systolic function is normal  4. Prior Holter Monitor: None    Review of Systems:   Review of Systems   Constitutional: Negative for activity change, chills, diaphoresis, fatigue, fever and unexpected weight gain.   Eyes: Negative for blurred vision and " visual disturbance.   Respiratory: Negative for apnea, cough, chest tightness, shortness of breath and wheezing.    Cardiovascular: Positive for leg swelling. Negative for chest pain and palpitations.   Gastrointestinal: Negative for abdominal distention, blood in stool, GERD and indigestion.   Endocrine: Negative for cold intolerance and heat intolerance.   Genitourinary: Negative for hematuria.   Musculoskeletal: Negative for gait problem, joint swelling and myalgias.   Skin: Negative for color change, pallor and bruise.   Neurological: Negative for dizziness, seizures, syncope, weakness, light-headedness, numbness, headache and confusion.   Hematological: Does not bruise/bleed easily.   Psychiatric/Behavioral: Negative for behavioral problems, sleep disturbance, suicidal ideas and depressed mood.     I have reviewed and confirmed the accuracy of the ROS as documented by the MA/LPN/RN MAICO Chen    I have reviewed and/or updated the patient's past medical, past surgical, family, social history, problem list and allergies as appropriate.     Medications:     Current Outpatient Medications:   •  amLODIPine (NORVASC) 5 MG tablet, Take 5 mg by mouth Daily., Disp: , Rfl:   •  apixaban (ELIQUIS) 5 MG tablet tablet, Take 1 tablet by mouth Every 12 (Twelve) Hours., Disp: 60 tablet, Rfl: 0  •  atorvastatin (LIPITOR) 80 MG tablet, 80 mg Daily., Disp: , Rfl:   •  clotrimazole (LOTRIMIN) 1 % cream, APPLY CREAM TOPICALLY TWICE DAILY TO THE AFFECTED AND SURROUNDING AREAS OF SKIN IN THE MORNING AND EVENING, Disp: , Rfl:   •  diphenhydrAMINE (BENADRYL) 50 MG capsule, Take 50 mg by mouth Every 6 (Six) Hours As Needed for Itching., Disp: , Rfl:   •  Docusate Calcium (STOOL SOFTENER PO), Take 240 mg by mouth Daily., Disp: , Rfl:   •  doxycycline (MONODOX) 100 MG capsule, Take 1 capsule by mouth 2 (Two) Times a Day., Disp: 14 capsule, Rfl: 0  •  enalapril (VASOTEC) 20 MG tablet, Take 1 tablet by mouth Every 12 (Twelve)  Hours., Disp: 30 tablet, Rfl: 0  •  Estradiol 10 % cream, , Disp: , Rfl:   •  FLUCONAZOLE PO, Take 156 mg by mouth., Disp: , Rfl:   •  fluticasone (FLONASE) 50 MCG/ACT nasal spray, 2 sprays into the nostril(s) as directed by provider Daily., Disp: , Rfl:   •  gabapentin (NEURONTIN) 100 MG capsule, Take 100 mg by mouth every night at bedtime., Disp: , Rfl:   •  nitrofurantoin, macrocrystal-monohydrate, (MACROBID) 100 MG capsule, TAKE 1 CAPSULE BY MOUTH ONCE DAILY FOR 1 MONTH ALTERNATING WITH DOXYCYCLINE, Disp: , Rfl:   •  Ondansetron HCl (ZOFRAN PO), Take  by mouth., Disp: , Rfl:   •  oxybutynin XL (DITROPAN-XL) 10 MG 24 hr tablet, Take 10 mg by mouth Daily., Disp: , Rfl:   •  oxyCODONE-acetaminophen (PERCOCET) 5-325 MG per tablet, Take 1 tablet by mouth Every 6 (Six) Hours As Needed., Disp: , Rfl:   •  promethazine (PHENERGAN) 25 MG tablet, Take 25 mg by mouth Every 6 (Six) Hours As Needed., Disp: , Rfl:   •  rizatriptan (MAXALT) 10 MG tablet, TAKE ONE TABLET BY MOUTH AT ONSET OF MIGRAINE. IF SYMPTOMS PERSIST A SECOND DOSE MAY BE TAKEN IN 2 HOURS. DO NOT EXCEED 3 DOSES IN A 24 HOUR, Disp: , Rfl:   •  senna (senna) 8.6 MG tablet, Take 1 tablet by mouth Daily., Disp: , Rfl:   •  sotalol (BETAPACE) 80 MG tablet, Take 1 tablet by mouth Every 12 (Twelve) Hours., Disp: 60 tablet, Rfl: 0  •  spironolactone (ALDACTONE) 25 MG tablet, Take 1 tablet by mouth Daily., Disp: 30 tablet, Rfl: 0  •  tiZANidine (ZANAFLEX) 4 MG tablet, Take 6 mg by mouth At Night As Needed for Muscle Spasms., Disp: , Rfl:   •  traMADol (ULTRAM) 50 MG tablet, Take 50 mg by mouth Every 6 (Six) Hours As Needed for Moderate Pain ., Disp: , Rfl:   •  traZODone (DESYREL) 50 MG tablet, Take 50 mg by mouth Every Night., Disp: , Rfl:   •  furosemide (LASIX) 20 MG tablet, Take 1 tablet by mouth 2 (Two) Times a Day., Disp: 60 tablet, Rfl: 6  •  Insulin Zinc Human (NOVOLIN L SC), Inject  under the skin into the appropriate area as directed., Disp: , Rfl:  "    Physical Exam:  Vital Signs:   Vitals:    09/14/21 1413   BP: 122/82   BP Location: Right arm   Patient Position: Sitting   Cuff Size: Adult   Pulse: 66   Resp: 20   SpO2: 93%   Weight: 105 kg (232 lb)   Height: 172.7 cm (67.99\")     Body mass index is 35.28 kg/m².    Physical Exam  Vitals and nursing note reviewed.   Constitutional:       General: She is not in acute distress.     Appearance: She is well-developed. She is obese.      Comments: 35.3 kg/m²   HENT:      Head: Normocephalic and atraumatic.   Eyes:      General: No scleral icterus.     Extraocular Movements: Extraocular movements intact.   Neck:      Trachea: Trachea normal.   Cardiovascular:      Rate and Rhythm: Normal rate and regular rhythm.      Pulses: Normal pulses.      Heart sounds: Normal heart sounds. No murmur heard.   No friction rub. No gallop.    Pulmonary:      Effort: Pulmonary effort is normal.      Breath sounds: Normal breath sounds.   Abdominal:      Palpations: Abdomen is soft.      Tenderness: There is no abdominal tenderness.   Musculoskeletal:         General: Normal range of motion.      Cervical back: Neck supple.      Right lower leg: Edema present.      Left lower leg: Edema present.      Comments: 1+ edema to BLE, nontender to palpation   Skin:     General: Skin is warm and dry.      Findings: No bruising, lesion or rash.   Neurological:      Mental Status: She is alert and oriented to person, place, and time.      Motor: No weakness.      Gait: Gait normal.   Psychiatric:         Mood and Affect: Mood normal.         Behavior: Behavior normal. Behavior is cooperative.         Thought Content: Thought content does not include suicidal ideation.         Results Review:   I reviewed the patient's new clinical results.      Assessment / Plan:     1. Bilateral lower extremity edema (Primary)  --2020 LVEF =55% per echocardiogram  --No associated dyspnea  --Reinforced conservative measures  --Recent creatinine 0.67 in " 8/21  --Start Lasix 20 mg twice daily  --Continue spironolactone  --Consider BMP and BNP on follow-up  --Follow-up in 6 months with Dr. Shirley or sooner if needed     2. Paroxysmal atrial fibrillation (CMS/HCC)  --Asymptomatic  --ECG shows NSR today  --Continue antiarrhythmic/anticoagulation strategies     3. Essential hypertension  --Acceptable blood pressure with reduced-dose amlodipine      Preventative Cardiology:   Tobacco Cessation: N/A   Advance Care Planning: ACP discussion was declined by the patient. Patient has an advance directive in EMR which is still valid.      Follow Up:   Follow-up with Dr. Shirley as scheduled in April 2022      Thank you for allowing me to participate in the care of your patient. Please to not hesitate to contact me with additional questions or concerns.     Mily Rutledge APRN

## 2021-09-16 ENCOUNTER — OFFICE VISIT (OUTPATIENT)
Dept: GASTROENTEROLOGY | Facility: CLINIC | Age: 71
End: 2021-09-16

## 2021-09-16 VITALS
HEIGHT: 68 IN | BODY MASS INDEX: 35.28 KG/M2 | DIASTOLIC BLOOD PRESSURE: 67 MMHG | TEMPERATURE: 97.3 F | SYSTOLIC BLOOD PRESSURE: 136 MMHG | HEART RATE: 75 BPM

## 2021-09-16 DIAGNOSIS — D12.6 ADENOMATOUS POLYP OF COLON, UNSPECIFIED PART OF COLON: ICD-10-CM

## 2021-09-16 DIAGNOSIS — K58.1 IRRITABLE BOWEL SYNDROME WITH CONSTIPATION: Primary | ICD-10-CM

## 2021-09-16 DIAGNOSIS — R79.89 ELEVATED LFTS: ICD-10-CM

## 2021-09-16 PROCEDURE — 99213 OFFICE O/P EST LOW 20 MIN: CPT | Performed by: INTERNAL MEDICINE

## 2021-09-16 NOTE — PROGRESS NOTES
Follow Up Note     Date: 2021   Patient Name: Shannon Servin  MRN: [id number]  : 1950     Referring Physician: No ref. provider found    Chief Complaint:    Chief Complaint   Patient presents with   • Follow-up   • Abdominal Pain   • Constipation       Interval History:   2021  Shannon Servin is a 71 y.o. female who is here today for follow up for follow-up after recent colonoscopy.  She states that she has been taking now senna along with the Colace.  She stopped taking the cascara sagrada.  She is having the bowel movement once daily without any constipation or diarrhea now.  Has occasional abdominal discomfort on dicyclomine.    4/15/2021  Shannon Servin is a 70 y.o. female who is here today to establish care with Gastroenterology for evaluation of ongoing abdominal pain.=  History of Chronic abdominal pain and constipation on and off for decades, but lower abdominal pain got worse recently.  She is on docusate and cascara sagrada daily with almost daily soft bowel movement.   The pain is intermittent, mild to moderate in intesnsity in severity and aching in nature.  Frequency being almost daily and stays all the time. On occasion morning she will be pain free.  There is no radiation of abdominal pain.  Eating worsens the abdominal discomfort.  No definite relieving factors of abdominal pain.   She is also on Percocet and tramadol for back pain, but takes as needed. She has associated nausea but no vomiting. She feels bloated al the time.   No history of acid reflux, odynophagia or dysphagia. No history of change in bowel habit. Deny any hematochezia or melena. There is no prior history of liver or pancreatic disease. There is no history of anemia. No prior history of EGD or colonoscopy. No family history of colon cancer or any GI malignancy.  Denies alcohol abuse or cigarette smoking.      She also had a CT scan abdomen pelvis done in  at that time she had a multiple renal stones and had  hydronephrosis.  She had a aortic stent placed and removed later on. As per pt her pain from renal stone is different form her ongoing lower abdominal pain.   She is also on eliquis for A fib    Subjective      Past Medical History:   Past Medical History:   Diagnosis Date   • Afib (CMS/HCC)    • Arthritis    • Diabetes mellitus (CMS/HCC)    • Edema    • Elevated cholesterol    • History of recurrent UTIs    • Hypertension    • Impaired functional mobility, balance, gait, and endurance    • Kidney calculi    • Migraines    • Sleep apnea      Past Surgical History:   Past Surgical History:   Procedure Laterality Date   • COLONOSCOPY N/A 6/1/2021    Procedure: COLONOSCOPY WITH BIOPSY, COLD SNARE POLYPECTOMY, HOT SNARE POLYPECTOMY;  Surgeon: Cam Parra MD;  Location: [facility] ENDOSCOPY;  Service: Gastroenterology;  Laterality: N/A;   • HYSTERECTOMY     • OVARIAN CYST REMOVAL     • TOTAL ABDOMINAL HYSTERECTOMY WITH SALPINGO OOPHORECTOMY     • URETEROSCOPY LASER LITHOTRIPSY WITH STENT INSERTION Left 2/20/2020    Procedure: URETEROSCOPY, LEFT RETROGRADE PYLEOGERAM WITH STENT INSERTION;  Surgeon: Mulugeta Saldana MD;  Location: [facility] OR;  Service: Urology;  Laterality: Left;       Family History:   Family History   Problem Relation Age of Onset   • Cancer Other    • Diabetes Other    • Migraines Other        Social History:   Social History     Socioeconomic History   • Marital status:      Spouse name: Not on file   • Number of children: Not on file   • Years of education: Not on file   • Highest education level: Not on file   Tobacco Use   • Smoking status: Never Smoker   • Smokeless tobacco: Never Used   Vaping Use   • Vaping Use: Never used   Substance and Sexual Activity   • Alcohol use: Yes     Comment: socially   • Drug use: Never   • Sexual activity: Defer       Medications:     Current Outpatient Medications:   •  amLODIPine (NORVASC) 5 MG tablet, Take 5 mg by mouth Daily., Disp: , Rfl:   •   apixaban (ELIQUIS) 5 MG tablet tablet, Take 1 tablet by mouth Every 12 (Twelve) Hours., Disp: 60 tablet, Rfl: 0  •  atorvastatin (LIPITOR) 80 MG tablet, 80 mg Daily., Disp: , Rfl:   •  clotrimazole (LOTRIMIN) 1 % cream, APPLY CREAM TOPICALLY TWICE DAILY TO THE AFFECTED AND SURROUNDING AREAS OF SKIN IN THE MORNING AND EVENING, Disp: , Rfl:   •  diphenhydrAMINE (BENADRYL) 50 MG capsule, Take 50 mg by mouth Every 12 (Twelve) Hours., Disp: , Rfl:   •  Docusate Calcium (STOOL SOFTENER PO), Take 240 mg by mouth Daily., Disp: , Rfl:   •  doxycycline (MONODOX) 100 MG capsule, Take 1 capsule by mouth 2 (Two) Times a Day., Disp: 14 capsule, Rfl: 0  •  enalapril (VASOTEC) 20 MG tablet, Take 1 tablet by mouth Every 12 (Twelve) Hours., Disp: 30 tablet, Rfl: 0  •  Estradiol 10 % cream, , Disp: , Rfl:   •  Exenatide (BYDUREON SC), Inject  under the skin into the appropriate area as directed., Disp: , Rfl:   •  FLUCONAZOLE PO, Take 156 mg by mouth., Disp: , Rfl:   •  fluticasone (FLONASE) 50 MCG/ACT nasal spray, 2 sprays into the nostril(s) as directed by provider Daily., Disp: , Rfl:   •  furosemide (LASIX) 20 MG tablet, Take 1 tablet by mouth 2 (Two) Times a Day., Disp: 60 tablet, Rfl: 6  •  gabapentin (NEURONTIN) 100 MG capsule, Take 100 mg by mouth every night at bedtime., Disp: , Rfl:   •  Insulin Glargine (BASAGLAR KWIKPEN SC), Inject  under the skin into the appropriate area as directed., Disp: , Rfl:   •  nitrofurantoin, macrocrystal-monohydrate, (MACROBID) 100 MG capsule, TAKE 1 CAPSULE BY MOUTH ONCE DAILY FOR 1 MONTH ALTERNATING WITH DOXYCYCLINE, Disp: , Rfl:   •  Ondansetron HCl (ZOFRAN PO), Take  by mouth., Disp: , Rfl:   •  oxybutynin XL (DITROPAN-XL) 10 MG 24 hr tablet, Take 10 mg by mouth Daily., Disp: , Rfl:   •  oxyCODONE-acetaminophen (PERCOCET) 5-325 MG per tablet, Take 1 tablet by mouth Every 6 (Six) Hours As Needed., Disp: , Rfl:   •  promethazine (PHENERGAN) 25 MG tablet, Take 25 mg by mouth Every 6 (Six)  "Hours As Needed., Disp: , Rfl:   •  rizatriptan (MAXALT) 10 MG tablet, TAKE ONE TABLET BY MOUTH AT ONSET OF MIGRAINE. IF SYMPTOMS PERSIST A SECOND DOSE MAY BE TAKEN IN 2 HOURS. DO NOT EXCEED 3 DOSES IN A 24 HOUR, Disp: , Rfl:   •  senna (senna) 8.6 MG tablet, Take 1 tablet by mouth Daily., Disp: , Rfl:   •  sotalol (BETAPACE) 80 MG tablet, Take 1 tablet by mouth Every 12 (Twelve) Hours., Disp: 60 tablet, Rfl: 0  •  spironolactone (ALDACTONE) 25 MG tablet, Take 1 tablet by mouth Daily., Disp: 30 tablet, Rfl: 0  •  tiZANidine (ZANAFLEX) 4 MG tablet, Take 6 mg by mouth At Night As Needed for Muscle Spasms., Disp: , Rfl:   •  traMADol (ULTRAM) 50 MG tablet, Take 50 mg by mouth Every 6 (Six) Hours As Needed for Moderate Pain ., Disp: , Rfl:   •  traZODone (DESYREL) 50 MG tablet, Take 50 mg by mouth Every Night., Disp: , Rfl:     Allergies:   Allergies   Allergen Reactions   • Metformin Hallucinations       Review of Systems:   Review of Systems   Constitutional: Negative for appetite change, fatigue, fever and unexpected weight loss.   HENT: Negative for trouble swallowing.    Gastrointestinal: Positive for abdominal distention, abdominal pain, constipation, nausea, GERD and indigestion. Negative for anal bleeding, blood in stool, diarrhea, rectal pain and vomiting.       The following portions of the patient's history were reviewed and updated as appropriate: allergies, current medications, past family history, past medical history, past social history, past surgical history and problem list.    Objective     Physical Exam:  Vital Signs:   Vitals:    09/16/21 1600   BP: 136/67   Pulse: 75   Temp: 97.3 °F (36.3 °C)   TempSrc: Infrared   Weight: Comment: \"232 lb\"  per patient   Height: 172.7 cm (68\")       Physical Exam    Results Review:   I reviewed the patient's new clinical results.    Admission on 08/06/2021, Discharged on 08/06/2021   Component Date Value Ref Range Status   • Glucose 08/06/2021 147* 65 - 99 mg/dL " Final   • BUN 08/06/2021 17  8 - 23 mg/dL Final   • Creatinine 08/06/2021 0.67  0.57 - 1.00 mg/dL Final   • Sodium 08/06/2021 139  136 - 145 mmol/L Final   • Potassium 08/06/2021 3.8  3.5 - 5.2 mmol/L Final   • Chloride 08/06/2021 103  98 - 107 mmol/L Final   • CO2 08/06/2021 24.9  22.0 - 29.0 mmol/L Final   • Calcium 08/06/2021 9.4  8.6 - 10.5 mg/dL Final   • Total Protein 08/06/2021 6.8  6.0 - 8.5 g/dL Final   • Albumin 08/06/2021 4.00  3.50 - 5.20 g/dL Final   • ALT (SGPT) 08/06/2021 38* 1 - 33 U/L Final   • AST (SGOT) 08/06/2021 22  1 - 32 U/L Final   • Alkaline Phosphatase 08/06/2021 67  39 - 117 U/L Final   • Total Bilirubin 08/06/2021 0.6  0.0 - 1.2 mg/dL Final   • eGFR Non  Amer 08/06/2021 87  >60 mL/min/1.73 Final   • Globulin 08/06/2021 2.8  gm/dL Final   • A/G Ratio 08/06/2021 1.4  g/dL Final   • BUN/Creatinine Ratio 08/06/2021 25.4* 7.0 - 25.0 Final   • Anion Gap 08/06/2021 11.1  5.0 - 15.0 mmol/L Final   • Troponin T 08/06/2021 <0.010  0.000 - 0.030 ng/mL Final   • C-Reactive Protein 08/06/2021 2.07* 0.00 - 0.50 mg/dL Final   • ADENOVIRUS, PCR 08/06/2021 Not Detected  Not Detected Final   • Coronavirus 229E 08/06/2021 Not Detected  Not Detected Final   • Coronavirus HKU1 08/06/2021 Not Detected  Not Detected Final   • Coronavirus NL63 08/06/2021 Not Detected  Not Detected Final   • Coronavirus OC43 08/06/2021 Not Detected  Not Detected Final   • COVID19 08/06/2021 Not Detected  Not Detected - Ref. Range Final   • Human Metapneumovirus 08/06/2021 Not Detected  Not Detected Final   • Human Rhinovirus/Enterovirus 08/06/2021 Not Detected  Not Detected Final   • Influenza A PCR 08/06/2021 Not Detected  Not Detected Final   • Influenza B PCR 08/06/2021 Not Detected  Not Detected Final   • Parainfluenza Virus 1 08/06/2021 Not Detected  Not Detected Final   • Parainfluenza Virus 2 08/06/2021 Not Detected  Not Detected Final   • Parainfluenza Virus 3 08/06/2021 Not Detected  Not Detected Final   •  Parainfluenza Virus 4 08/06/2021 Not Detected  Not Detected Final   • RSV, PCR 08/06/2021 Not Detected  Not Detected Final   • Bordetella pertussis pcr 08/06/2021 Not Detected  Not Detected Final   • Bordetella parapertussis PCR 08/06/2021 Not Detected  Not Detected Final   • Chlamydophila pneumoniae PCR 08/06/2021 Not Detected  Not Detected Final   • Mycoplasma pneumo by PCR 08/06/2021 Not Detected  Not Detected Final   • WBC 08/06/2021 8.82  3.40 - 10.80 10*3/mm3 Final   • RBC 08/06/2021 4.15  3.77 - 5.28 10*6/mm3 Final   • Hemoglobin 08/06/2021 13.0  12.0 - 15.9 g/dL Final   • Hematocrit 08/06/2021 38.6  34.0 - 46.6 % Final   • MCV 08/06/2021 93.0  79.0 - 97.0 fL Final   • MCH 08/06/2021 31.3  26.6 - 33.0 pg Final   • MCHC 08/06/2021 33.7  31.5 - 35.7 g/dL Final   • RDW 08/06/2021 12.4  12.3 - 15.4 % Final   • RDW-SD 08/06/2021 42.8  37.0 - 54.0 fl Final   • MPV 08/06/2021 11.0  6.0 - 12.0 fL Final   • Platelets 08/06/2021 153  140 - 450 10*3/mm3 Final   • Neutrophil % 08/06/2021 56.1  42.7 - 76.0 % Final   • Lymphocyte % 08/06/2021 33.0  19.6 - 45.3 % Final   • Monocyte % 08/06/2021 8.6  5.0 - 12.0 % Final   • Eosinophil % 08/06/2021 1.8  0.3 - 6.2 % Final   • Basophil % 08/06/2021 0.3  0.0 - 1.5 % Final   • Immature Grans % 08/06/2021 0.2  0.0 - 0.5 % Final   • Neutrophils, Absolute 08/06/2021 4.94  1.70 - 7.00 10*3/mm3 Final   • Lymphocytes, Absolute 08/06/2021 2.91  0.70 - 3.10 10*3/mm3 Final   • Monocytes, Absolute 08/06/2021 0.76  0.10 - 0.90 10*3/mm3 Final   • Eosinophils, Absolute 08/06/2021 0.16  0.00 - 0.40 10*3/mm3 Final   • Basophils, Absolute 08/06/2021 0.03  0.00 - 0.20 10*3/mm3 Final   • Immature Grans, Absolute 08/06/2021 0.02  0.00 - 0.05 10*3/mm3 Final   • nRBC 08/06/2021 0.0  0.0 - 0.2 /100 WBC Final   • Blood Culture 08/06/2021 No growth at 5 days   Final   • Blood Culture 08/06/2021 No growth at 5 days   Final      XR Chest 1 View    Result Date: 8/6/2021  No acute cardiopulmonary process.   Continued followup is recommended.  This report was finalized on 8/6/2021 7:22 AM by aCss Moses M.D..    6/1/2021  - One 5 mm polyp in the cecum, removed with a hot snare. Resected and retrieved.  - One 4 mm polyp in the cecum, removed with a hot snare. Resected and retrieved.  - One 4 mm polyp in the ascending colon, removed with a hot snare. Resected and retrieved.  - One 5 mm polyp in the transverse colon, removed with a hot snare. Resected and retrieved.  - Three 3 to 5 mm polyps in the transverse colon, removed with a hot snare. Resected and retrieved.  - One 4 mm polyp in the proximal descending colon, removed with a hot snare. Resected and retrieved.  - Non-bleeding internal hemorrhoids.  - Melanosis in the colon. Biopsied.  - The examined portion of the ileum was normal. Biopsied.  - Biopsies were taken with a cold forceps from the right colon, left colon and transverse colon for evaluation of  microscopic colitis.    Assessment / Plan        1. Irritable bowel syndrome with constipation  2.  Long-term opioid use  Change in bowel habit multifactorial in etiology including the some element of IBS, diabetes mellitus, opioid associated.   9/16/2021  Patient currently taking senna daily along with Colace twice daily with a good daily bowel movement without any significant abdominal pain  Her recent colonoscopy done on 6/1/2021 with the colonic biopsies and TI biopsies did not reveal any signs of colitis.   We will continue senna, Colace, dicyclomine  Follow-up in 1 year time    4/15/2021  Patient's history is more suggestive of irritable bowel syndrome with constipation.  She had a chronic history of constipation with lower abdominal pain for decades now.  She have developed the symptoms after she had a hysterectomy and abdominal plasty.  However for the past couple of years her symptoms was getting worse.  She had a significant issues with the bowel movement however recently after she was started on  cascara sagrada and the docusate he has been having almost daily bowel movement.  Her  long-term opioid use also contributing to her symptoms especially for constipation.  She had a CAT scan of the abdomen pelvis done with contrast in 2020 which did not reveal any significant normalities except a renal stones with that has been taken care of by renal.  Her lab studies done in 2020 were largely unremarkable.      We had a discussion on a low gas-forming diet  I have advised her to continue her cascara and docusate for now.  We will consider newer medicines if indicated in the future.  At this time she is having regular bowel movement with the current regimen  We will start her on dicyclomine liquid 20 mg p.o. 3 times daily as needed     She never had a colonoscopy we will schedule her for colonoscopy for further evaluation  She has to hold her Eliquis 3 days prior to the test    3.  Adenomatous colon polyps   9/16/2021  Colonoscopy done on 6/1/2021 revealed multiple colon polyps.  At least 8 subcentimeter polyps were removed and 4 of them were tubular adenoma without any dysplasia.  Terminal ileum biopsy was normal and random colon BX showed normal also.  Repeat surveillance colonoscopy in 2024.     4. Obesity with BMI 35  5.  Elevated liver enzymes   Recent liver enzymes revealed a borderline elevated AST.  No signs of any advanced liver disease.  Advised regular exercise half hour every day at least 3 days in a week.   Reduced calorie intake  and lifestyle and dietary changes  have been discussed  Given her significant medical comorbidities patient may not be able to participate in any exercise program.   Advised to avoid alcohol and smoking cigarette      Follow Up:   No follow-ups on file.    Cam Parra MD  [facility] [place]  9/16/2021  16:03 EDT     Please note that portions of this note may have been completed with a voice recognition program.

## 2022-03-11 ENCOUNTER — OFFICE VISIT (OUTPATIENT)
Dept: FAMILY MEDICINE CLINIC | Facility: CLINIC | Age: 72
End: 2022-03-11

## 2022-03-11 VITALS
OXYGEN SATURATION: 94 % | DIASTOLIC BLOOD PRESSURE: 82 MMHG | BODY MASS INDEX: 35.28 KG/M2 | HEART RATE: 75 BPM | HEIGHT: 68 IN | SYSTOLIC BLOOD PRESSURE: 130 MMHG

## 2022-03-11 DIAGNOSIS — I10 ESSENTIAL HYPERTENSION: ICD-10-CM

## 2022-03-11 DIAGNOSIS — F43.20 ADULT SITUATIONAL STRESS DISORDER: ICD-10-CM
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
## 2022-03-11 DIAGNOSIS — R53.83 MALAISE AND FATIGUE: ICD-10-CM

## 2022-03-11 DIAGNOSIS — R53.81 MALAISE AND FATIGUE: ICD-10-CM

## 2022-03-11 DIAGNOSIS — Z79.01 CHRONIC ANTICOAGULATION: Chronic | ICD-10-CM

## 2022-03-11 DIAGNOSIS — J30.9 CHRONIC ALLERGIC RHINITIS: Chronic | ICD-10-CM

## 2022-03-11 DIAGNOSIS — R40.0 HAS DAYTIME DROWSINESS: ICD-10-CM

## 2022-03-11 DIAGNOSIS — Z99.89 OBSTRUCTIVE SLEEP APNEA ON CPAP: Chronic | ICD-10-CM

## 2022-03-11 DIAGNOSIS — Q82.8 KERATOSIS OF PLANTAR ASPECT OF FOOT: ICD-10-CM

## 2022-03-11 DIAGNOSIS — E11.9 COMPREHENSIVE DIABETIC FOOT EXAMINATION, TYPE 2 DM, ENCOUNTER FOR: ICD-10-CM

## 2022-03-11 DIAGNOSIS — I48.0 PAROXYSMAL ATRIAL FIBRILLATION: ICD-10-CM

## 2022-03-11 DIAGNOSIS — E78.5 DYSLIPIDEMIA: Chronic | ICD-10-CM

## 2022-03-11 DIAGNOSIS — K59.09 CHRONIC CONSTIPATION: ICD-10-CM

## 2022-03-11 DIAGNOSIS — Z00.00 INITIAL MEDICARE ANNUAL WELLNESS VISIT: Primary | ICD-10-CM

## 2022-03-11 DIAGNOSIS — M54.9 ACUTE RIGHT-SIDED BACK PAIN, UNSPECIFIED BACK LOCATION: ICD-10-CM

## 2022-03-11 DIAGNOSIS — E11.9 TYPE 2 DIABETES MELLITUS TREATED WITHOUT INSULIN: ICD-10-CM

## 2022-03-11 DIAGNOSIS — G47.33 OBSTRUCTIVE SLEEP APNEA ON CPAP: Chronic | ICD-10-CM

## 2022-03-11 DIAGNOSIS — F51.01 PRIMARY INSOMNIA: ICD-10-CM

## 2022-03-11 DIAGNOSIS — Z12.31 ENCOUNTER FOR SCREENING MAMMOGRAM FOR MALIGNANT NEOPLASM OF BREAST: ICD-10-CM

## 2022-03-11 PROBLEM — G93.41 METABOLIC ENCEPHALOPATHY: Status: RESOLVED | Noted: 2020-02-25 | Resolved: 2022-03-11

## 2022-03-11 PROBLEM — N39.0 SEPSIS DUE TO URINARY TRACT INFECTION: Status: RESOLVED | Noted: 2020-02-20 | Resolved: 2022-03-11

## 2022-03-11 PROBLEM — A41.9 SEPSIS DUE TO URINARY TRACT INFECTION: Status: RESOLVED | Noted: 2020-02-20 | Resolved: 2022-03-11

## 2022-03-11 LAB
EXPIRATION DATE: ABNORMAL
HBA1C MFR BLD: 7.5 %
Lab: ABNORMAL

## 2022-03-11 PROCEDURE — 1170F FXNL STATUS ASSESSED: CPT | Performed by: FAMILY MEDICINE

## 2022-03-11 PROCEDURE — 83036 HEMOGLOBIN GLYCOSYLATED A1C: CPT | Performed by: FAMILY MEDICINE

## 2022-03-11 PROCEDURE — 99214 OFFICE O/P EST MOD 30 MIN: CPT | Performed by: FAMILY MEDICINE

## 2022-03-11 PROCEDURE — G0439 PPPS, SUBSEQ VISIT: HCPCS | Performed by: FAMILY MEDICINE

## 2022-03-11 PROCEDURE — 1160F RVW MEDS BY RX/DR IN RCRD: CPT | Performed by: FAMILY MEDICINE

## 2022-03-11 PROCEDURE — 3051F HG A1C>EQUAL 7.0%<8.0%: CPT | Performed by: FAMILY MEDICINE

## 2022-03-11 RX ORDER — ASPIRIN 81 MG/1
81 TABLET ORAL DAILY
Start: 2022-03-11 | End: 2023-02-15

## 2022-03-11 RX ORDER — BUSPIRONE HYDROCHLORIDE 5 MG/1
5 TABLET ORAL 2 TIMES DAILY PRN
Qty: 40 TABLET | Refills: 0 | Status: SHIPPED | OUTPATIENT
Start: 2022-03-11 | End: 2023-02-15

## 2022-03-11 RX ORDER — MONTELUKAST SODIUM 10 MG/1
10 TABLET ORAL NIGHTLY
Qty: 90 TABLET | Refills: 2 | Status: SHIPPED | OUTPATIENT
Start: 2022-03-11 | End: 2022-06-01

## 2022-03-11 NOTE — PROGRESS NOTES
The ABCs of the Annual Wellness Visit  Initial Medicare Wellness Visit    Chief Complaint   Patient presents with   • Establish Care     New pt, establish care with Dr. Wells. Patient would like to discuss diabetes and weight.   • Diabetes     Subjective   History of Present Illness:  Shannon Servin is a 71 y.o. female who presents for an Initial Medicare Wellness Visit.    Patient is also here to establish with a new primary care provider for chronic management of numerous comorbid conditions.    She has a known history of paroxysmal atrial fibrillation, currently utilizing chronic anticoagulation.  Followed by cardiology additionally.  She denies any chest pain, syncope, palpitations or vertigo.    Known history of diabetes mellitus, denies any cyclical/persistent hypoglycemic episodes.      History of constipation, denies any BRB/BTS.    Recently had a fall in which she has dealt with persistence of right lower rib cage discomfort, just lateral to the distal thoracic spine.  Denies any bowel/bladder incontinence.  No saddle anesthesia.    Has a known history of obstructive sleep apnea, with CPAP use.  Continues to deal with daytime drowsiness and malaise/fatigue.    Seasonal worsening of chronic allergic rhinitis, usually worsens this time of year.  Denies any epistaxis.    Patient reports significant situational stress, leads to episodes daily of substantial increased anxiety, described as an impending feeling of doom.  Still getting acclimated to changes associated with COVID-19.    The following portions of the patient's history were reviewed and   updated as appropriate: allergies, current medications, past family history, past medical history, past social history, past surgical history and problem list.     Compared to one year ago, the patient feels her physical   health is the same.    Compared to one year ago, the patient feels her mental   health is the same.    Recent Hospitalizations:  She was not  admitted to the hospital during the last year.       Current Medical Providers:  Patient Care Team:  Humberto Wells DO as PCP - General (Family Medicine)    Outpatient Medications Prior to Visit   Medication Sig Dispense Refill   • amLODIPine (NORVASC) 5 MG tablet Take 5 mg by mouth Daily.     • apixaban (ELIQUIS) 5 MG tablet tablet Take 1 tablet by mouth Every 12 (Twelve) Hours. 60 tablet 0   • atorvastatin (LIPITOR) 80 MG tablet 80 mg Daily.     • Docusate Calcium (STOOL SOFTENER PO) Take 240 mg by mouth Daily.     • enalapril (VASOTEC) 20 MG tablet Take 1 tablet by mouth Every 12 (Twelve) Hours. 30 tablet 0   • Estradiol 10 % cream      • FLUCONAZOLE PO Take 156 mg by mouth.     • fluticasone (FLONASE) 50 MCG/ACT nasal spray 2 sprays into the nostril(s) as directed by provider Daily.     • furosemide (LASIX) 20 MG tablet Take 1 tablet by mouth 2 (Two) Times a Day. 60 tablet 6   • gabapentin (NEURONTIN) 300 MG capsule Take 100 mg by mouth 3 (Three) Times a Day.     • Ondansetron HCl (ZOFRAN PO) Take  by mouth.     • oxybutynin XL (DITROPAN-XL) 10 MG 24 hr tablet Take 10 mg by mouth Daily.     • oxyCODONE-acetaminophen (PERCOCET) 5-325 MG per tablet Take 1 tablet by mouth Every 6 (Six) Hours As Needed.     • promethazine (PHENERGAN) 25 MG tablet Take 25 mg by mouth Every 6 (Six) Hours As Needed.     • rizatriptan (MAXALT) 10 MG tablet TAKE ONE TABLET BY MOUTH AT ONSET OF MIGRAINE. IF SYMPTOMS PERSIST A SECOND DOSE MAY BE TAKEN IN 2 HOURS. DO NOT EXCEED 3 DOSES IN A 24 HOUR     • senna (SENOKOT) 8.6 MG tablet Take 1 tablet by mouth Daily.     • sotalol (BETAPACE) 80 MG tablet Take 1 tablet by mouth Every 12 (Twelve) Hours. 60 tablet 0   • spironolactone (ALDACTONE) 25 MG tablet Take 1 tablet by mouth Daily. 30 tablet 0   • tiZANidine (ZANAFLEX) 4 MG tablet Take 6 mg by mouth At Night As Needed for Muscle Spasms.     • traMADol (ULTRAM) 50 MG tablet Take 50 mg by mouth Every 6 (Six) Hours As Needed for Moderate  Pain .     • traZODone (DESYREL) 50 MG tablet Take 50 mg by mouth Every Night.     • diphenhydrAMINE (BENADRYL) 50 MG capsule Take 50 mg by mouth Every 12 (Twelve) Hours.     • clotrimazole (LOTRIMIN) 1 % cream APPLY CREAM TOPICALLY TWICE DAILY TO THE AFFECTED AND SURROUNDING AREAS OF SKIN IN THE MORNING AND EVENING     • doxycycline (MONODOX) 100 MG capsule Take 1 capsule by mouth 2 (Two) Times a Day. 14 capsule 0   • Exenatide (BYETTA 5 MCG PEN SC) Inject  under the skin into the appropriate area as directed.     • nitrofurantoin, macrocrystal-monohydrate, (MACROBID) 100 MG capsule TAKE 1 CAPSULE BY MOUTH ONCE DAILY FOR 1 MONTH ALTERNATING WITH DOXYCYCLINE       No facility-administered medications prior to visit.       Opioid medication/s are on active medication list.  and I have evaluated her active treatment plan and pain score trends (see table).  There were no vitals filed for this visit.  I have reviewed the chart for potential of high risk medication and harmful drug interactions in the elderly.            Aspirin is not on active medication list.  Aspirin use is indicated based on review of current medical condition/s. Pros and cons of this therapy have been discussed with this patient. Benefits of this medication outweigh potential harm.  Patient has been instructed to start taking this medication..    Patient Active Problem List   Diagnosis   • Left ureteral stone   • Acute low back pain   • Paroxysmal atrial fibrillation (HCC)   • Essential hypertension   • Lower abdominal pain   • Chronic constipation   • Encounter for screening for malignant neoplasm of colon   • Bilateral lower extremity edema   • Chronic anticoagulation   • Dyslipidemia   • Type 2 diabetes mellitus treated without insulin (HCC)   • Chronic allergic rhinitis   • Primary insomnia   • Obstructive sleep apnea on CPAP   • Keratosis of plantar aspect of foot   • Adult situational stress disorder     Advance Care Planning  Advance  Directive is on file.  ACP discussion was held with the patient during this visit. Patient has an advance directive in EMR which is still valid.     Review of Systems   Constitutional: Negative for activity change, appetite change, chills, diaphoresis, fatigue and fever.   HENT: Positive for congestion (Chronic seasonal allergic rhinitis), postnasal drip (Chronic) and rhinorrhea (Chronic). Negative for dental problem, drooling, ear discharge, ear pain, facial swelling, hearing loss, mouth sores, nosebleeds, sinus pressure, sneezing, sore throat, tinnitus, trouble swallowing and voice change.    Eyes: Negative for photophobia, pain, discharge, redness, itching and visual disturbance.   Respiratory: Negative for apnea, cough, choking, chest tightness, shortness of breath, wheezing and stridor.    Cardiovascular: Negative for chest pain, palpitations and leg swelling.   Gastrointestinal: Positive for constipation (Chronic). Negative for abdominal distention, abdominal pain, anal bleeding, blood in stool, diarrhea, nausea, rectal pain and vomiting.   Endocrine: Negative for cold intolerance, heat intolerance, polydipsia, polyphagia and polyuria.   Genitourinary: Negative for decreased urine volume, difficulty urinating, dysuria, flank pain, frequency, hematuria and urgency.   Musculoskeletal: Positive for arthralgias (Chronic) and back pain (As per above). Negative for gait problem, joint swelling, myalgias, neck pain and neck stiffness.   Skin: Negative for rash and wound.        Noted callus formation to bilateral feet.   Allergic/Immunologic: Negative for immunocompromised state.   Neurological: Negative for dizziness, tremors, seizures, syncope, facial asymmetry, speech difficulty, weakness, light-headedness, numbness and confusion.   Hematological: Negative for adenopathy. Does not bruise/bleed easily.   Psychiatric/Behavioral: Negative for agitation, behavioral problems, decreased concentration, dysphoric mood,  "hallucinations, self-injury, sleep disturbance and suicidal ideas. The patient is not nervous/anxious and is not hyperactive.         Objective       Vitals:    03/11/22 1132   BP: 130/82   Pulse: 75   SpO2: 94%   Height: 172.7 cm (67.99\")     BMI Readings from Last 1 Encounters:   03/11/22 35.28 kg/m²   BMI is above normal parameters. Recommendations include: educational material, exercise counseling and nutrition counseling    Does the patient have evidence of cognitive impairment? No    Physical Exam  Vitals and nursing note reviewed.   Constitutional:       General: She is not in acute distress.     Appearance: Normal appearance. She is well-developed. She is not diaphoretic.   HENT:      Head: Normocephalic and atraumatic.      Right Ear: Tympanic membrane, ear canal and external ear normal.      Left Ear: Tympanic membrane, ear canal and external ear normal.      Nose: Nose normal.      Mouth/Throat:      Mouth: Mucous membranes are moist.      Pharynx: Oropharynx is clear. No oropharyngeal exudate.   Eyes:      General: No scleral icterus.        Right eye: No discharge.         Left eye: No discharge.      Extraocular Movements: Extraocular movements intact.      Conjunctiva/sclera: Conjunctivae normal.      Pupils: Pupils are equal, round, and reactive to light.   Neck:      Thyroid: No thyromegaly.      Vascular: No JVD.      Trachea: No tracheal deviation.   Cardiovascular:      Rate and Rhythm: Normal rate.      Heart sounds: Normal heart sounds. No murmur heard.    No friction rub. No gallop.   Pulmonary:      Effort: Pulmonary effort is normal. No respiratory distress.      Breath sounds: Normal breath sounds. No stridor. No wheezing or rales.   Chest:      Chest wall: No tenderness.   Abdominal:      General: Bowel sounds are normal. There is no distension.      Palpations: Abdomen is soft. There is no mass.      Tenderness: There is no abdominal tenderness. There is no guarding or rebound.      " Hernia: No hernia is present.   Genitourinary:     Comments: Pt defers  Musculoskeletal:         General: No tenderness or deformity. Normal range of motion.      Cervical back: Normal range of motion and neck supple.      Comments: Right lower thoracic discomfort, radiates towards the rib cage on palpation.  No obvious bony abnormalities to the ribs.   Lymphadenopathy:      Cervical: No cervical adenopathy.   Skin:     General: Skin is warm.      Capillary Refill: Capillary refill takes less than 2 seconds.      Coloration: Skin is not pale.      Findings: No erythema or rash.   Neurological:      General: No focal deficit present.      Mental Status: She is alert and oriented to person, place, and time.      Cranial Nerves: No cranial nerve deficit.      Motor: No abnormal muscle tone.      Coordination: Coordination normal.      Deep Tendon Reflexes: Reflexes are normal and symmetric. Reflexes normal.   Psychiatric:         Mood and Affect: Mood normal.         Behavior: Behavior normal.         Thought Content: Thought content normal.         Judgment: Judgment normal.       Lab Results   Component Value Date    HGBA1C 7.5 2022          HEALTH RISK ASSESSMENT    Smoking Status:  Social History     Tobacco Use   Smoking Status Former Smoker   • Quit date:    • Years since quittin.2   Smokeless Tobacco Never Used     Alcohol Consumption:  Social History     Substance and Sexual Activity   Alcohol Use Yes    Comment: socially     Fall Risk Screen:    STEADI Fall Risk Assessment has not been completed.    Depression Screen:   PHQ-2/PHQ-9 Depression Screening 3/11/2022   Little Interest or Pleasure in Doing Things 0-->not at all   Feeling Down, Depressed or Hopeless 1-->several days   PHQ-9: Brief Depression Severity Measure Score 1       Health Habits and Functional and Cognitive Screening:  Functional & Cognitive Status 3/11/2022   Do you have difficulty preparing food and eating? No   Do you have  difficulty bathing yourself, getting dressed or grooming yourself? Yes   Do you have difficulty using the toilet? No   Do you have difficulty moving around from place to place? Yes   Do you have trouble with steps or getting out of a bed or a chair? No   Current Diet Unhealthy Diet   Dental Exam Not up to date   Eye Exam Up to date   Exercise (times per week) 0 times per week   Current Exercises Include No Regular Exercise   Do you need help using the phone?  No   Are you deaf or do you have serious difficulty hearing?  No   Do you need help with transportation? Yes   Do you need help shopping? Yes   Do you need help preparing meals?  No   Do you need help with housework?  Yes   Do you need help with laundry? Yes   Do you need help taking your medications? No   Do you need help managing money? No   Do you ever drive or ride in a car without wearing a seat belt? No   Have you felt unusual stress, anger or loneliness in the last month? Yes   Who do you live with? Spouse   If you need help, do you have trouble finding someone available to you? No   Have you been bothered in the last four weeks by sexual problems? No   Do you have difficulty concentrating, remembering or making decisions? No       Age-appropriate Screening Schedule:  Refer to the list below for future screening recommendations based on patient's age, sex and/or medical conditions. Orders for these recommended tests are listed in the plan section. The patient has been provided with a written plan.    Health Maintenance   Topic Date Due   • MAMMOGRAM  Never done   • URINE MICROALBUMIN  Never done   • DXA SCAN  Never done   • TDAP/TD VACCINES (1 - Tdap) Never done   • ZOSTER VACCINE (1 of 2) Never done   • DIABETIC EYE EXAM  Never done   • LIPID PANEL  Never done   • HEMOGLOBIN A1C  09/11/2022   • DIABETIC FOOT EXAM  03/11/2023   • INFLUENZA VACCINE  Completed            Assessment/Plan   CMS Preventative Services Quick Reference  Risk Factors Identified  During Encounter  Cardiovascular Disease  Fall Risk-High or Moderate  Inactivity/Sedentary  The above risks/problems have been discussed with the patient.  Follow up actions/plans if indicated are seen below in the Assessment/Plan Section.  Pertinent information has been shared with the patient in the After Visit Summary.    Diagnoses and all orders for this visit:    1. Initial Medicare annual wellness visit (Primary)    2. Essential hypertension  -     aspirin (aspirin) 81 MG EC tablet; Take 1 tablet by mouth Daily.    3. Paroxysmal atrial fibrillation (HCC)    4. Chronic anticoagulation    5. Type 2 diabetes mellitus treated without insulin (HCC)  -     Discontinue: glyburide micronized (GLYNASE) 1.5 MG tablet; Take 1 tablet by mouth 2 (Two) Times a Day Before Meals.  Dispense: 180 tablet; Refill: 1  -     POC Glycosylated Hemoglobin (Hb A1C)  -     aspirin (aspirin) 81 MG EC tablet; Take 1 tablet by mouth Daily.    6. Chronic constipation    7. Dyslipidemia  -     aspirin (aspirin) 81 MG EC tablet; Take 1 tablet by mouth Daily.    8. Chronic allergic rhinitis  -     montelukast (Singulair) 10 MG tablet; Take 1 tablet by mouth Every Night.  Dispense: 90 tablet; Refill: 2    9. Primary insomnia  -     Cancel: Overnight Sleep Oximetry Study; Future  -     Overnight Sleep Oximetry Study; Future    10. Malaise and fatigue  -     Cancel: Overnight Sleep Oximetry Study; Future  -     Overnight Sleep Oximetry Study; Future    11. Has daytime drowsiness  -     Cancel: Overnight Sleep Oximetry Study; Future  -     Overnight Sleep Oximetry Study; Future    12. Obstructive sleep apnea on CPAP    13. Acute right-sided back pain, unspecified back location  -     XR Spine Thoracic 2 View; Future  -     XR Ribs Right With PA Chest; Future    14. Injury due to fall, initial encounter  -     XR Spine Thoracic 2 View; Future  -     XR Ribs Right With PA Chest; Future    15. Keratosis of plantar aspect of foot  -     Ambulatory  Referral to Podiatry    16. Comprehensive diabetic foot examination, type 2 DM, encounter for (HCC)  -     Ambulatory Referral to Podiatry    17. Adult situational stress disorder  -     busPIRone (BUSPAR) 5 MG tablet; Take 1 tablet by mouth 2 (Two) Times a Day As Needed (anxiety).  Dispense: 40 tablet; Refill: 0    18. Encounter for screening mammogram for malignant neoplasm of breast  -     Mammo Screening Digital Tomosynthesis Bilateral With CAD; Future        Follow Up:  Return in about 6 weeks (around 4/22/2022) for Recheck, Med Change/New Meds.     An After Visit Summary and PPPS were made available to the patient.    Referral has been placed to see podiatry concerning need for diabetic foot exam, and stents of callus formation of the feet.    Vital signs demonstrate hemodynamic stability, blood pressure is at goal and heart rate well controlled.  Continue daily aspirin therapy, as well as anticoagulation.    Continue avoidance of prolonged fasting periods, as well as healthy dietary choices.  Maintain appropriate hydration status has been stable.  Continue home blood glucose monitoring.    Given patient's continued daytime drowsiness and malaise/fatigue, I recommended an overnight sleep oximetry study.    Buspirone 5 mg dosing prescribed, to be taken twice daily as needed for anxiety.  Continues to deal with significant situational stress.  Discussed need for stress/anxiety reducing techniques such as prayer/meditation/breathing and counting exercises and avoidance of stress producing environments/situations; will follow clinically.    I recommended x-rays of her thoracic spine and rib cage.  I have discussed fall precautions in detail.    Adding montelukast 1 daily and treatment of her chronic allergic rhinitis.  Plan to follow clinically.  Should she develop any ill effects to the new medication, I have asked that she notify the office immediately for potential change in treatment regimen.    I have  discussed age/gender specific preventative healthcare issues in detail with patient today.  I have answered all of the questions.      I spent 70 minutes caring for Shannon on this date of service; this includes 25 minutes spent in management of her Medicare wellness issues/concerns, as well as an additional 45 minutes spent in management of her hypertension, atrial fibrillation, chronic anticoagulation, diabetes mellitus, chronic constipation, dyslipidemia, chronic allergic rhinitis, insomnia, obstructive sleep apnea injury with fall resulting in acute right-sided low back pain, hyperkeratosis of plantar foot and adult situational stress. This time includes time spent by me in the following activities:preparing for the visit, performing a medically appropriate examination and/or evaluation , counseling and educating the patient/family/caregiver, ordering medications, tests, or procedures, referring and communicating with other health care professionals , documenting information in the medical record, independently interpreting results and communicating that information with the patient/family/caregiver and care coordination

## 2022-03-11 NOTE — PATIENT INSTRUCTIONS
Advance Care Planning and Advance Directives     You make decisions on a daily basis - decisions about where you want to live, your career, your home, your life. Perhaps one of the most important decisions you face is your choice for future medical care. Take time to talk with your family and your healthcare team and start planning today.  Advance Care Planning is a process that can help you:  · Understand possible future healthcare decisions in light of your own experiences  · Reflect on those decision in light of your goals and values  · Discuss your decisions with those closest to you and the healthcare professionals that care for you  · Make a plan by creating a document that reflects your wishes    Surrogate Decision Maker  In the event of a medical emergency, which has left you unable to communicate or to make your own decisions, you would need someone to make decisions for you.  It is important to discuss your preferences for medical treatment with this person while you are in good health.     Qualities of a surrogate decision maker:  • Willing to take on this role and responsibility  • Knows what you want for future medical care  • Willing to follow your wishes even if they don't agree with them  • Able to make difficult medical decisions under stressful circumstances    Advance Directives  These are legal documents you can create that will guide your healthcare team and decision maker(s) when needed. These documents can be stored in the electronic medical record.    · Living Will - a legal document to guide your care if you have a terminal condition or a serious illness and are unable to communicate. States vary by statute in document names/types, but most forms may include one or more of the following:        -  Directions regarding life-prolonging treatments        -  Directions regarding artificially provided nutrition/hydration        -  Choosing a healthcare decision maker        -  Direction  regarding organ/tissue donation    · Durable Power of  for Healthcare - this document names an -in-fact to make medical decisions for you, but it may also allow this person to make personal and financial decisions for you. Please seek the advice of an  if you need this type of document.    **Advance Directives are not required and no one may discriminate against you if you do not sign one.    Medical Orders  Many states allow specific forms/orders signed by your physician to record your wishes for medical treatment in your current state of health. This form, signed in personal communication with your physician, addresses resuscitation and other medical interventions that you may or may not want.      For more information or to schedule a time with a [facility] Advance Care Planning Facilitator contact: [facility] or call [phone] and someone will contact you directly.    Medicare Wellness  Personal Prevention Plan of Service     Date of Office Visit:    Encounter Provider:  Humberto Wells DO  Place of Service:  [facility]  Patient Name: Shannon Servin  :  1950    As part of the Medicare Wellness portion of your visit today, we are providing you with this personalized preventive plan of services (PPPS). This plan is based upon recommendations of the [place] Preventive Services Task Force (USPSTF) and the Advisory Committee on Immunization Practices (ACIP).    This lists the preventive care services that should be considered, and provides dates of when you are due. Items listed as completed are up-to-date and do not require any further intervention.    Health Maintenance   Topic Date Due   • MAMMOGRAM  Never done   • DXA SCAN  Never done   • COVID-19 Vaccine (1) Never done   • TDAP/TD VACCINES (1 - Tdap) Never done   • ZOSTER VACCINE (1 of 2) Never done   • Pneumococcal Vaccine 65+ (1 of 1 - PPSV23) Never done   • HEPATITIS C  SCREENING  Never done   • ANNUAL WELLNESS VISIT  Never done   • LIPID PANEL  Never done   • COLORECTAL CANCER SCREENING  06/01/2031   • INFLUENZA VACCINE  Completed       Orders Placed This Encounter   Procedures   • Overnight Sleep Oximetry Study     Standing Status:   Future     Standing Expiration Date:   3/11/2023   • POC Glycosylated Hemoglobin (Hb A1C)     Order Specific Question:   Release to patient     Answer:   Immediate       No follow-ups on file.

## 2022-03-11 NOTE — TELEPHONE ENCOUNTER
Caller: Shannon Servin    Relationship: Self    Best call back number: [phone]     Requested Prescriptions:   Requested Prescriptions     Pending Prescriptions Disp Refills   • glyburide micronized (GLYNASE) 1.5 MG tablet 180 tablet 1     Sig: Take 1 tablet by mouth 2 (Two) Times a Day Before Meals.        Pharmacy where request should be sent: [facility] - [place], [place] - [facility] [address] - [phone]  - [phone] FX     Additional details provided by patient: MED WAS SENT TO [facility]    Does the patient have less than a 3 day supply:  [x] Yes  [] No    Toni Sen Rep   03/11/22 14:35 EST

## 2022-03-14 NOTE — TELEPHONE ENCOUNTER
Caller: Shannon Servin    Relationship: Self    Best call back number: [phone]    Requested Prescriptions:   Requested Prescriptions     Pending Prescriptions Disp Refills   • glyburide micronized (GLYNASE) 1.5 MG tablet 180 tablet 1     Sig: Take 1 tablet by mouth 2 (Two) Times a Day Before Meals.        Pharmacy where request should be sent: [facility] - [place], [place] - [address] - [phone] PH - [phone] FX     Additional details provided by patient:   PATIENT STATED THAT THIS MEDICATION WAS CALLED INTO [facility] IN [facility] AND THIS MEDICATION NEEDS TO BE SENT O [facility] IN [facility]     Does the patient have less than a 3 day supply:  [x] Yes  [] No    Toni Montes Rep   03/14/22 10:18 EDT

## 2022-03-18 ENCOUNTER — TELEPHONE (OUTPATIENT)
Dept: FAMILY MEDICINE CLINIC | Facility: CLINIC | Age: 72
End: 2022-03-18

## 2022-03-18 NOTE — TELEPHONE ENCOUNTER
Patient has been notified that she is fine to take her insulin.    She also states that kulwinder gave her the wrong script of glyburide.    She states that she was given a 1.25 mg, and that is not the dose that Dr. Wells sent.    Patient is supposed to be taking the 1.5 mg of glyburide.

## 2022-03-18 NOTE — TELEPHONE ENCOUNTER
PATIENT HAS CALLED REQUESTING A CALL BACK ASAP TO ADVISE ON WHETHER OR NOT SHE SHOULD TAKE HER INSULIN. PATIENT'S BLOOD SUGARS HAVE BEEN VERY HIGH FOR HER SINCE RECENT MEDICATION CHANGE.    CALL BACK NUMBER [phone]

## 2022-04-06 DIAGNOSIS — R53.81 MALAISE AND FATIGUE: ICD-10-CM

## 2022-04-06 DIAGNOSIS — G47.34 NOCTURNAL HYPOXIA: Chronic | ICD-10-CM

## 2022-04-06 DIAGNOSIS — G47.33 OBSTRUCTIVE SLEEP APNEA OF ADULT: Primary | ICD-10-CM

## 2022-04-06 DIAGNOSIS — R53.83 MALAISE AND FATIGUE: ICD-10-CM

## 2022-04-06 PROBLEM — R40.0 HAS DAYTIME DROWSINESS: Status: ACTIVE | Noted: 2022-04-06

## 2022-04-19 ENCOUNTER — OFFICE VISIT (OUTPATIENT)
Dept: FAMILY MEDICINE CLINIC | Facility: CLINIC | Age: 72
End: 2022-04-19

## 2022-04-19 VITALS
BODY MASS INDEX: 35.16 KG/M2 | HEART RATE: 69 BPM | TEMPERATURE: 97.3 F | RESPIRATION RATE: 15 BRPM | SYSTOLIC BLOOD PRESSURE: 126 MMHG | WEIGHT: 232 LBS | OXYGEN SATURATION: 94 % | HEIGHT: 68 IN | DIASTOLIC BLOOD PRESSURE: 72 MMHG

## 2022-04-19 DIAGNOSIS — B35.3 TINEA PEDIS OF BOTH FEET: Primary | ICD-10-CM

## 2022-04-19 DIAGNOSIS — G47.33 OBSTRUCTIVE SLEEP APNEA OF ADULT: ICD-10-CM

## 2022-04-19 DIAGNOSIS — I10 ESSENTIAL HYPERTENSION: ICD-10-CM

## 2022-04-19 DIAGNOSIS — G47.34 NOCTURNAL HYPOXIA: Chronic | ICD-10-CM

## 2022-04-19 DIAGNOSIS — G43.009 MIGRAINE WITHOUT AURA AND WITHOUT STATUS MIGRAINOSUS, NOT INTRACTABLE: ICD-10-CM

## 2022-04-19 DIAGNOSIS — I48.0 PAROXYSMAL ATRIAL FIBRILLATION: ICD-10-CM

## 2022-04-19 DIAGNOSIS — J44.9 CHRONIC OBSTRUCTIVE BRONCHITIS: ICD-10-CM

## 2022-04-19 DIAGNOSIS — E11.9 TYPE 2 DIABETES MELLITUS TREATED WITHOUT INSULIN: ICD-10-CM

## 2022-04-19 DIAGNOSIS — E53.8 VITAMIN B12 DEFICIENCY: ICD-10-CM

## 2022-04-19 DIAGNOSIS — B35.1 ONYCHOMYCOSIS OF TOENAIL: ICD-10-CM

## 2022-04-19 DIAGNOSIS — Z79.01 CHRONIC ANTICOAGULATION: Chronic | ICD-10-CM

## 2022-04-19 PROBLEM — J44.89 CHRONIC OBSTRUCTIVE BRONCHITIS: Status: ACTIVE | Noted: 2022-04-19

## 2022-04-19 PROCEDURE — 99215 OFFICE O/P EST HI 40 MIN: CPT | Performed by: FAMILY MEDICINE

## 2022-04-19 PROCEDURE — 96372 THER/PROPH/DIAG INJ SC/IM: CPT | Performed by: FAMILY MEDICINE

## 2022-04-19 RX ORDER — CYANOCOBALAMIN 1000 UG/ML
1000 INJECTION, SOLUTION INTRAMUSCULAR; SUBCUTANEOUS
Status: SHIPPED | OUTPATIENT
Start: 2022-04-19

## 2022-04-19 RX ORDER — BUDESONIDE, GLYCOPYRROLATE, AND FORMOTEROL FUMARATE 160; 9; 4.8 UG/1; UG/1; UG/1
2 AEROSOL, METERED RESPIRATORY (INHALATION) 2 TIMES DAILY
Qty: 10.7 G | Refills: 0 | COMMUNITY
Start: 2022-04-19 | End: 2022-05-23

## 2022-04-19 RX ORDER — METHYLPREDNISOLONE ACETATE 40 MG/ML
40 INJECTION, SUSPENSION INTRA-ARTICULAR; INTRALESIONAL; INTRAMUSCULAR; SOFT TISSUE ONCE
Status: COMPLETED | OUTPATIENT
Start: 2022-04-19 | End: 2022-04-19

## 2022-04-19 RX ORDER — CEFDINIR 300 MG/1
300 CAPSULE ORAL 2 TIMES DAILY
Qty: 14 CAPSULE | Refills: 0 | Status: SHIPPED | OUTPATIENT
Start: 2022-04-19 | End: 2022-06-03

## 2022-04-19 RX ORDER — IPRATROPIUM BROMIDE AND ALBUTEROL SULFATE 2.5; .5 MG/3ML; MG/3ML
3 SOLUTION RESPIRATORY (INHALATION) EVERY 6 HOURS PRN
Qty: 360 ML | Refills: 0 | Status: SHIPPED | OUTPATIENT
Start: 2022-04-19 | End: 2023-02-15

## 2022-04-19 RX ORDER — RIMEGEPANT SULFATE 75 MG/75MG
1 TABLET, ORALLY DISINTEGRATING ORAL AS NEEDED
Qty: 4 TABLET | Refills: 0 | COMMUNITY
Start: 2022-04-19 | End: 2023-01-20

## 2022-04-19 RX ORDER — DEXAMETHASONE 0.5 MG/1
TABLET ORAL
Qty: 21 TABLET | Refills: 0 | Status: SHIPPED | OUTPATIENT
Start: 2022-04-19 | End: 2022-06-03

## 2022-04-19 RX ORDER — KETOCONAZOLE 20 MG/G
1 CREAM TOPICAL DAILY
Qty: 60 G | Refills: 2 | Status: SHIPPED | OUTPATIENT
Start: 2022-04-19 | End: 2022-10-12

## 2022-04-19 RX ADMIN — METHYLPREDNISOLONE ACETATE 40 MG: 40 INJECTION, SUSPENSION INTRA-ARTICULAR; INTRALESIONAL; INTRAMUSCULAR; SOFT TISSUE at 16:15

## 2022-04-19 RX ADMIN — CYANOCOBALAMIN 1000 MCG: 1000 INJECTION, SOLUTION INTRAMUSCULAR; SUBCUTANEOUS at 16:55

## 2022-04-19 NOTE — PROGRESS NOTES
Established Patient        Chief Complaint:   Chief Complaint   Patient presents with   • Follow-up        Shannon Servin is a 71 y.o. female    History of Present Illness:   Here today in evaluation of her paroxysmal atrial fibrillation, chronic anticoagulation, hypertension, diabetes mellitus, chronic obstructive bronchitis, obstructive sleep apnea, vitamin B12 deficiency and migraine headache disorder.    HA's, approx 3-5 over last month; hx of migraines in past.  She is unsure of any triggers for the most recent worsening of frequency of her headaches.  They have been responsive to over-the-counter medications thus far.  She denies any seizure-like activity.  Denies any fever, chills or night sweats.    Does report a problematic pruritic dermatitis noted to bilateral feet/ankles, treated in the past with antifungal cream with resolution.  This has been several years ago.  She denies any contact irritants.    She denies any hemoptysis or epistaxis.  No reports of hematuria or BRB/BTS.  She does have chronic respiratory symptoms, including clear/yellow phlegm production with occasional cough.  Denies aspiration or dysphagia.    Patient has a history of vitamin B12 deficiency, she is interested in B12 supplementation if able today.    Patient reports thickened/yellow discoloration of bilateral great toenails.    She denies any cyclical/persistent hypoglycemic episodes.  Subjective     The following portions of the patient's history were reviewed and updated as appropriate: allergies, current medications, past family history, past medical history, past social history, past surgical history and problem list.    Allergies   Allergen Reactions   • Metformin Hallucinations   • Codeine Itching       Review of Systems  1. Constitutional: Negative for fever. Negative for chills, diaphoresis; malaise/fatigue of chronic nature, and without unexpected weight change.   2. HENT: No dysphagia; no changes to  "vision/hearing/smell/taste; no epistaxis  3. Eyes: Negative for redness and visual disturbance.   4. Respiratory: Chronic cough, no hemoptysis reported.  Productive of clear/yellow phlegm at times.  She does report wheezing and coughing with laughing or prolonged talking, as well as with cold air exposure.  5. Cardiovascular: Negative for chest pain and palpitations.   6. Gastrointestinal: Negative for abdominal distention, abdominal pain and blood in stool.   7. Endocrine: Negative for cold intolerance and heat intolerance.   8. Genitourinary: Negative for difficulty urinating, dysuria and frequency.   9. Musculoskeletal: Negative for arthralgias, back pain and myalgias.   10. Skin: Erythematous pruritic rash to the lower extremities, diagnosed in the past with tinea.  She reports thickened/yellow discoloration to the numerous toes of bilateral feet, worse to the bilateral great toes.  11. Neurological: Negative for syncope, weakness.  Headaches as per above.  12. Hematological: Negative for adenopathy. Does not bruise/bleed easily.   13. Psychiatric/Behavioral: Negative for confusion. The patient is not nervous/anxious.    Objective     Physical Exam   Vital Signs: /72   Pulse 69   Temp 97.3 °F (36.3 °C)   Resp 15   Ht 172.7 cm (67.99\")   Wt 105 kg (232 lb)   SpO2 94%   BMI 35.29 kg/m²     General Appearance: alert, oriented x 3, no acute distress.  Pleasant and interactive during questioning/examination.  Well-nourished and developed female.  Skin: warm and dry.  Changes to bilateral feet and ankles consistent with tinea, signs of excoriation noted, some hyperkeratotic changes additionally.  Stasis dermatitis noted additionally.  Thickened/yellowish discoloration to bilateral toenails, worse to the great toenails.  HEENT: Atraumatic.  pupils round and reactive to light and accommodation, oral mucosa pink and moist.  Nares patent without epistaxis.  External auditory canals are patent tympanic " membranes intact.  Neck: supple, no JVD, trachea midline.  No thyromegaly  Lungs: CTA, unlabored breathing effort.  Heart: RRR, normal S1 and S2, no S3, no rub.  Abdomen: soft, non-tender, no palpable bladder, present bowel sounds to auscultation ×4.  No guarding or rigidity.  Extremities: no clubbing, cyanosis.  Good range of motion actively and passively.  Symmetric muscle strength and development.  Nonpitting edematous changes noted to the mid tibias bilaterally, brawny changes associated with venous stasis, chronic in nature.  Ambulates independently.  Neuro: normal speech and mental status.  Cranial nerves II through XII intact.  No anosmia. DTR 2+; proprioception intact.  No focal motor/sensory deficits.    Assessment and Plan      Assessment/Plan:   Diagnoses and all orders for this visit:    1. Tinea pedis of both feet (Primary)  -     ketoconazole (NIZORAL) 2 % cream; Apply 1 application topically to the appropriate area as directed Daily.  Dispense: 60 g; Refill: 2    2. Paroxysmal atrial fibrillation ([facility])    3. Essential hypertension    4. Chronic anticoagulation    5. Type 2 diabetes mellitus treated without insulin ([facility])    6. Chronic obstructive bronchitis (HCC)  -     Budeson-Glycopyrrol-Formoterol (Breztri Aerosphere) 160-9-4.8 MCG/ACT aerosol inhaler; Inhale 2 puffs 2 (Two) Times a Day.  Dispense: 10.7 g; Refill: 0  -     dexamethasone (DECADRON) 0.5 MG tablet; Take 6 tablets by mouth on day 1, then decrease by 1 tablet each day after.   6-5-4-3-2-1.  Dispense: 21 tablet; Refill: 0  -     cefdinir (OMNICEF) 300 MG capsule; Take 1 capsule by mouth 2 (Two) Times a Day.  Dispense: 14 capsule; Refill: 0  -     methylPREDNISolone acetate (DEPO-medrol) injection 40 mg  -     ipratropium-albuterol (DUO-NEB) 0.5-2.5 mg/3 ml nebulizer; Inhale 1 vial (3 mL) by nebulization Every 6 (Six) Hours As Needed for Wheezing or Shortness of Air.  Dispense: 360 mL; Refill: 0    7. Onychomycosis of toenail  -      ketoconazole (NIZORAL) 2 % cream; Apply 1 application topically to the appropriate area as directed Daily.  Dispense: 60 g; Refill: 2    8. Obstructive sleep apnea of adult    9. Nocturnal hypoxia    10. Vitamin B12 deficiency  -     cyanocobalamin injection 1,000 mcg    11. Migraine without aura and without status migrainosus, not intractable  -     Rimegepant Sulfate (Nurtec) 75 MG tablet dispersible tablet; Take 1 tablet by mouth As Needed (migraine). May repeat 1 hour after first dose if needed, not to exceed 1 additional dose.  Dispense: 4 tablet; Refill: 0      Continue use of NIPPV support (CPAP) for tx of obstructive sleep apnea with nocturnal hypoxia.    Ketoconazole cream to lalit feet/toes.    Duoneb solution sent to pharmacy; needs new nebulizer; will use q6h for next few days.    Dexamethasone taper, 7 day course of cefdinir.    Samples of Breztri provided today.    Start B12 IM supplementation today.    Nurtec samples provided today; will follow clinically.  May be d/t B12 def, hopeful for improvement after starting supplementation.      Discussion Summary:    I spent 40 minutes caring for Shannon on this date of service. This time includes time spent by me in the following activities:preparing for the visit, reviewing tests, performing a medically appropriate examination and/or evaluation , counseling and educating the patient/family/caregiver, ordering medications, tests, or procedures, documenting information in the medical record, independently interpreting results and communicating that information with the patient/family/caregiver and care coordination    Discussed plan of care in detail with pt today; pt verb understanding and agrees.  Follow up:  Return in about 4 weeks (around 5/17/2022) for Recheck, Med Change/New Meds.     There are no Patient Instructions on file for this visit.    Humberto Wells,   04/19/22  16:35 EDT          Please note that portions of this note may have been completed  with a voice recognition program. Efforts were made to edit the dictations, but occasionally words are mistranscribed.

## 2022-04-26 ENCOUNTER — OFFICE VISIT (OUTPATIENT)
Dept: NEUROLOGY | Facility: CLINIC | Age: 72
End: 2022-04-26

## 2022-04-26 VITALS
OXYGEN SATURATION: 97 % | BODY MASS INDEX: 35.29 KG/M2 | HEART RATE: 62 BPM | SYSTOLIC BLOOD PRESSURE: 130 MMHG | TEMPERATURE: 97.1 F | DIASTOLIC BLOOD PRESSURE: 80 MMHG | HEIGHT: 68 IN

## 2022-04-26 DIAGNOSIS — G47.31 COMPLEX SLEEP APNEA SYNDROME: Primary | ICD-10-CM

## 2022-04-26 DIAGNOSIS — Z87.09 HISTORY OF ACUTE RESPIRATORY FAILURE: ICD-10-CM

## 2022-04-26 DIAGNOSIS — Z99.11 DEPENDENCE ON HOME VENTILATOR: ICD-10-CM

## 2022-04-26 PROCEDURE — 99213 OFFICE O/P EST LOW 20 MIN: CPT | Performed by: NURSE PRACTITIONER

## 2022-04-26 NOTE — PROGRESS NOTES
"     New Sleep Patient Office Visit      Patient Name: Shannon Servin  : 1950   MRN: [id number]     Referring Physician: Humberto Wells DO    Chief Complaint:    Chief Complaint   Patient presents with   • Consult     NP, in office to establish care for mila. Patient c/o snoring and shortness of breath. Patient states she feels like she has an elephant on her chest.          History of Present Illness: Shannon Servin is a 71 y.o. female who is here today to establish care with Sleep Medicine for difficulty breathing that started about 5 months ago.  She is accompanied by her  today.  She is currently on a trilogy machine and was initially placed on that in  after an acute illness.  She has complaints of difficulty breathing, when not using her trilogy machine.  She puts on her trilogy machine and the difficulty breathing improves.  She has not seen a pulmonologist since she was discharged from her inpatient stay in .  She denies any fever.  She does have chest pressure and describes this as \"having an elephant on my chest\".  Additional risk factors- dyslipidemia, atrial fibrillation, migraine disorder, HTN, history of chronic respiratory failure, diabetes type 2 not on insulin, insomnia, chronic pain.   *She is using [facility] (Olivier, respiratory therapist, [phone]) in [place], [place].  Current compliance report is not available for review today.   *She has active follow up with Dr. Kevin Shirley, cardiologist, with her next appointment on 2022 at 15:45.      [place] Score: 15    Subjective      Review of Systems:   Review of Systems   Constitutional: Positive for fatigue. Negative for fever, unexpected weight gain and unexpected weight loss.   HENT: Negative for hearing loss, sore throat, swollen glands, tinnitus and trouble swallowing.    Eyes: Negative for blurred vision, double vision, photophobia and visual disturbance.   Respiratory: Positive for shortness of breath and " wheezing. Negative for cough and chest tightness.    Cardiovascular: Negative for chest pain, palpitations and leg swelling.   Gastrointestinal: Negative for constipation, diarrhea and nausea.   Endocrine: Negative for cold intolerance and heat intolerance.   Musculoskeletal: Negative for gait problem, neck pain and neck stiffness.   Skin: Negative for color change and rash.   Allergic/Immunologic: Negative for environmental allergies and food allergies.   Neurological: Positive for headache. Negative for dizziness, syncope, facial asymmetry, speech difficulty, weakness, memory problem and confusion.   Psychiatric/Behavioral: Negative for agitation, behavioral problems and depressed mood. The patient is not nervous/anxious.        Past Medical History:   Past Medical History:   Diagnosis Date   • Afib (HCC)    • Arthritis    • Diabetes mellitus (HCC)    • Edema    • Elevated cholesterol    • History of recurrent UTIs    • Hypertension    • Impaired functional mobility, balance, gait, and endurance    • Kidney calculi    • Migraines    • Sleep apnea        Past Surgical History:   Past Surgical History:   Procedure Laterality Date   • COLONOSCOPY N/A 6/1/2021    Procedure: COLONOSCOPY WITH BIOPSY, COLD SNARE POLYPECTOMY, HOT SNARE POLYPECTOMY;  Surgeon: Cam Parra MD;  Location: [facility] ENDOSCOPY;  Service: Gastroenterology;  Laterality: N/A;   • HYSTERECTOMY     • OVARIAN CYST REMOVAL     • TOTAL ABDOMINAL HYSTERECTOMY WITH SALPINGO OOPHORECTOMY     • URETEROSCOPY LASER LITHOTRIPSY WITH STENT INSERTION Left 2/20/2020    Procedure: URETEROSCOPY, LEFT RETROGRADE PYLEOGERAM WITH STENT INSERTION;  Surgeon: Mulugeta Saldana MD;  Location: [facility] OR;  Service: Urology;  Laterality: Left;       Family History:   Family History   Problem Relation Age of Onset   • Cancer Other    • Diabetes Other    • Migraines Other        Social History:   Social History     Socioeconomic History   • Marital status:     Tobacco Use   • Smoking status: Former Smoker     Quit date:      Years since quittin.3   • Smokeless tobacco: Never Used   Vaping Use   • Vaping Use: Never used   Substance and Sexual Activity   • Alcohol use: Yes     Comment: socially   • Drug use: Never   • Sexual activity: Defer       Medications:     Current Outpatient Medications:   •  amLODIPine (NORVASC) 5 MG tablet, Take 5 mg by mouth Daily., Disp: , Rfl:   •  apixaban (ELIQUIS) 5 MG tablet tablet, Take 1 tablet by mouth Every 12 (Twelve) Hours., Disp: 60 tablet, Rfl: 0  •  aspirin (aspirin) 81 MG EC tablet, Take 1 tablet by mouth Daily., Disp: , Rfl:   •  atorvastatin (LIPITOR) 80 MG tablet, 80 mg Daily., Disp: , Rfl:   •  Budeson-Glycopyrrol-Formoterol (Breztri Aerosphere) 160-9-4.8 MCG/ACT aerosol inhaler, Inhale 2 puffs 2 (Two) Times a Day., Disp: 10.7 g, Rfl: 0  •  busPIRone (BUSPAR) 5 MG tablet, Take 1 tablet by mouth 2 (Two) Times a Day As Needed (anxiety)., Disp: 40 tablet, Rfl: 0  •  cefdinir (OMNICEF) 300 MG capsule, Take 1 capsule by mouth 2 (Two) Times a Day., Disp: 14 capsule, Rfl: 0  •  dexamethasone (DECADRON) 0.5 MG tablet, Take 6 tablets by mouth on day 1, then decrease by 1 tablet each day after.  6-5-4-3-2-1. (Patient taking differently: Take 6 tablets by mouth on day 1, then decrease by 1 tablet each day after.  6-5-4-3-2-1.), Disp: 21 tablet, Rfl: 0  •  Docusate Calcium (STOOL SOFTENER PO), Take 240 mg by mouth Daily., Disp: , Rfl:   •  enalapril (VASOTEC) 20 MG tablet, Take 1 tablet by mouth Every 12 (Twelve) Hours., Disp: 30 tablet, Rfl: 0  •  Estradiol 10 % cream, , Disp: , Rfl:   •  fluticasone (FLONASE) 50 MCG/ACT nasal spray, 2 sprays into the nostril(s) as directed by provider Daily., Disp: , Rfl:   •  furosemide (LASIX) 20 MG tablet, Take 1 tablet by mouth 2 (Two) Times a Day., Disp: 60 tablet, Rfl: 6  •  gabapentin (NEURONTIN) 300 MG capsule, Take 100 mg by mouth Daily., Disp: , Rfl:   •  glyburide micronized  (GLYNASE) 1.5 MG tablet, Take 1 tablet by mouth 2 (Two) Times a Day Before Meals., Disp: 180 tablet, Rfl: 1  •  ipratropium-albuterol (DUO-NEB) 0.5-2.5 mg/3 ml nebulizer, Inhale 1 vial (3 mL) by nebulization Every 6 (Six) Hours As Needed for Wheezing or Shortness of Air., Disp: 360 mL, Rfl: 0  •  ketoconazole (NIZORAL) 2 % cream, Apply 1 application topically to the appropriate area as directed Daily., Disp: 60 g, Rfl: 2  •  montelukast (Singulair) 10 MG tablet, Take 1 tablet by mouth Every Night., Disp: 90 tablet, Rfl: 2  •  Ondansetron HCl (ZOFRAN PO), Take  by mouth., Disp: , Rfl:   •  oxybutynin XL (DITROPAN-XL) 10 MG 24 hr tablet, Take 10 mg by mouth Daily., Disp: , Rfl:   •  oxyCODONE-acetaminophen (PERCOCET) 5-325 MG per tablet, Take 1 tablet by mouth Every 6 (Six) Hours As Needed., Disp: , Rfl:   •  promethazine (PHENERGAN) 25 MG tablet, Take 25 mg by mouth Every 6 (Six) Hours As Needed., Disp: , Rfl:   •  Rimegepant Sulfate (Nurtec) 75 MG tablet dispersible tablet, Take 1 tablet by mouth As Needed (migraine). May repeat 1 hour after first dose if needed, not to exceed 1 additional dose., Disp: 4 tablet, Rfl: 0  •  senna (SENOKOT) 8.6 MG tablet, Take 1 tablet by mouth Daily., Disp: , Rfl:   •  sotalol (BETAPACE) 80 MG tablet, Take 1 tablet by mouth Every 12 (Twelve) Hours., Disp: 60 tablet, Rfl: 0  •  spironolactone (ALDACTONE) 25 MG tablet, Take 1 tablet by mouth Daily., Disp: 30 tablet, Rfl: 0  •  tiZANidine (ZANAFLEX) 4 MG tablet, Take 6 mg by mouth At Night As Needed for Muscle Spasms., Disp: , Rfl:   •  traMADol (ULTRAM) 50 MG tablet, Take 50 mg by mouth Every 6 (Six) Hours As Needed for Moderate Pain ., Disp: , Rfl:   •  traZODone (DESYREL) 50 MG tablet, Take 50 mg by mouth Every Night., Disp: , Rfl:     Current Facility-Administered Medications:   •  cyanocobalamin injection 1,000 mcg, 1,000 mcg, Intramuscular, Q28 Days, Humberto Wells DO, 1,000 mcg at 04/19/22 5640    Allergies:   Allergies  "  Allergen Reactions   • Metformin Hallucinations   • Codeine Itching       Objective     Physical Exam:  Vital Signs:   Vitals:    04/26/22 1459   BP: 130/80   BP Location: Right arm   Patient Position: Sitting   Cuff Size: Adult   Pulse: 62   Temp: 97.1 °F (36.2 °C)   SpO2: 97%   Weight: Comment: unable to obtain   Height: 172.7 cm (67.99\")   PainSc:   5   PainLoc: Back  Comment: lower back     BMI: Body mass index is 35.29 kg/m².  Neck Circumference: 15 5/8    Physical Exam  Vitals and nursing note reviewed.   Constitutional:       General: She is not in acute distress.     Appearance: She is well-developed. She is obese. She is not diaphoretic.      Comments: Ambulatory via wheelchair.    HENT:      Head: Normocephalic and atraumatic.      Comments: Mallampati 4 with thick tongue.   Eyes:      Extraocular Movements: Extraocular movements intact.      Conjunctiva/sclera: Conjunctivae normal.      Pupils: Pupils are equal, round, and reactive to light.   Cardiovascular:      Rate and Rhythm: Normal rate and regular rhythm.      Heart sounds: Normal heart sounds. No murmur heard.    No friction rub. No gallop.   Pulmonary:      Effort: Pulmonary effort is normal. No respiratory distress.      Breath sounds: Normal breath sounds. No wheezing or rales.   Skin:     General: Skin is warm and dry.      Findings: No rash.   Neurological:      Mental Status: She is alert and oriented to person, place, and time.   Psychiatric:         Mood and Affect: Mood normal.         Behavior: Behavior normal.         Thought Content: Thought content normal.         Judgment: Judgment normal.         Assessment / Plan      Assessment/Plan:   Diagnoses and all orders for this visit:    1. Complex sleep apnea syndrome (Primary)  -     Ambulatory Referral to Pulmonology    2. History of acute respiratory failure  -     Ambulatory Referral to Pulmonology    3. Dependence on home ventilator (HCC)  -     Ambulatory Referral to " Pulmonology    *Urgent referral to Pulmonology. I have advised patient to go to the ED or call 911 for any chest pain or severe shortness of breath.   *She is not currently driving.     Follow Up:   Return if symptoms worsen or fail to improve.    I have advised the patient the need to continue the use of CPAP.  Gold standard for treatment of sleep apnea includes weight loss, use of cpap and avoidance of alcohol.  Untreated FLORI may increase the risk for development of hypertension, stroke, myocardial infarction, diabetes, cardiovascular disease, work-related issues and driving accidents. I have counseled and advised the patient to avoid driving or operating heavy/dangerous equipment if feeling drowsy.     MAICO Wheeler, FNP-C  [facility]       Please note that portions of this note may have been completed with a voice recognition program. Efforts were made to edit the dictations, but occasionally words are mistranscribed.

## 2022-05-02 RX ORDER — ENALAPRIL MALEATE 20 MG/1
20 TABLET ORAL EVERY 12 HOURS SCHEDULED
Qty: 30 TABLET | Refills: 0 | Status: SHIPPED | OUTPATIENT
Start: 2022-05-02 | End: 2022-05-05 | Stop reason: SDUPTHER

## 2022-05-04 ENCOUNTER — TELEPHONE (OUTPATIENT)
Dept: FAMILY MEDICINE CLINIC | Facility: CLINIC | Age: 72
End: 2022-05-04

## 2022-05-04 ENCOUNTER — PATIENT ROUNDING (BHMG ONLY) (OUTPATIENT)
Dept: NEUROLOGY | Facility: CLINIC | Age: 72
End: 2022-05-04

## 2022-05-05 ENCOUNTER — TELEPHONE (OUTPATIENT)
Dept: FAMILY MEDICINE CLINIC | Facility: CLINIC | Age: 72
End: 2022-05-05

## 2022-05-05 RX ORDER — ENALAPRIL MALEATE 20 MG/1
20 TABLET ORAL EVERY 12 HOURS SCHEDULED
Qty: 180 TABLET | Refills: 1 | Status: SHIPPED | OUTPATIENT
Start: 2022-05-05 | End: 2022-11-29

## 2022-05-05 NOTE — TELEPHONE ENCOUNTER
Princess with [facility] has called needing an clarification on recent oxygen order.    Please contact Princess at [facility].

## 2022-05-05 NOTE — TELEPHONE ENCOUNTER
[facility] NEED FOR CLARIFICATION.    PER [facility]: THE PATIENT STATES THAT SHE IS SUPPOSED TO BE GETTING ENALAPRIL, BUT THEY DID NOT RECEIVE A SCRIPT FOR THE MEDICATION.      THE MEDICATION THAT WAS SENT WAS GLYNASE 1.5 MG TABLET.    PLEASE ADVISE ASAP

## 2022-05-10 NOTE — TELEPHONE ENCOUNTER
[facility] sts pt needs a titration study because the pulse ox study will not qualify.  I informed them she did not wear her C-Pap the night of the study so she would need new study any way.

## 2022-05-20 NOTE — TELEPHONE ENCOUNTER
Caller: Shannon Servin    Relationship: Self    Best call back number: [phone]    Requested Prescriptions:   Requested Prescriptions     Pending Prescriptions Disp Refills   • gabapentin (NEURONTIN) 300 MG capsule       Sig: Take 1 capsule by mouth Daily.        Pharmacy where request should be sent: [facility] - [place], [place] - On license of [facility] [address] - [phone]  - [phone] FX     Additional details provided by patient: SHANNON HAS  NO DAYS LEFT    Does the patient have less than a 3 day supply:  [x] Yes  [] No    Toni Deng Rep   05/20/22 16:02 EDT

## 2022-05-20 NOTE — TELEPHONE ENCOUNTER
Rx Refill Note  Requested Prescriptions     Pending Prescriptions Disp Refills   • gabapentin (NEURONTIN) 300 MG capsule       Sig: Take 1 capsule by mouth Daily.      Last office visit with prescribing clinician: 4/19/2022      Next office visit with prescribing clinician: 6/3/2022            Holli Bishop MA  05/20/22, 17:07 EDT

## 2022-05-23 ENCOUNTER — OFFICE VISIT (OUTPATIENT)
Dept: PULMONOLOGY | Facility: CLINIC | Age: 72
End: 2022-05-23

## 2022-05-23 VITALS
HEART RATE: 60 BPM | WEIGHT: 225 LBS | OXYGEN SATURATION: 95 % | BODY MASS INDEX: 34.1 KG/M2 | TEMPERATURE: 96.8 F | RESPIRATION RATE: 16 BRPM | HEIGHT: 68 IN | DIASTOLIC BLOOD PRESSURE: 78 MMHG | SYSTOLIC BLOOD PRESSURE: 132 MMHG

## 2022-05-23 DIAGNOSIS — G47.19 EXCESSIVE DAYTIME SLEEPINESS: ICD-10-CM

## 2022-05-23 DIAGNOSIS — J44.9 CHRONIC OBSTRUCTIVE PULMONARY DISEASE, UNSPECIFIED COPD TYPE: ICD-10-CM

## 2022-05-23 DIAGNOSIS — J43.9 PULMONARY EMPHYSEMA, UNSPECIFIED EMPHYSEMA TYPE: ICD-10-CM

## 2022-05-23 DIAGNOSIS — G47.33 OBSTRUCTIVE SLEEP APNEA: Primary | ICD-10-CM

## 2022-05-23 DIAGNOSIS — R06.83 SNORING: ICD-10-CM

## 2022-05-23 PROCEDURE — 99204 OFFICE O/P NEW MOD 45 MIN: CPT | Performed by: INTERNAL MEDICINE

## 2022-05-23 NOTE — TELEPHONE ENCOUNTER
Rx Refill Note  Requested Prescriptions     Pending Prescriptions Disp Refills   • gabapentin (NEURONTIN) 300 MG capsule       Sig: Take 1 capsule by mouth Daily.      Last office visit with prescribing clinician: 4/19/2022      Next office visit with prescribing clinician: 6/3/2022            Zenaida Bucio MA  05/23/22, 16:50 EDT

## 2022-05-23 NOTE — TELEPHONE ENCOUNTER
Caller: Shannon Servin    Relationship to patient: Self    Best call back number: [phone]    Patient is needing: PATIENT STATED THAT SHE IS COMPLETELY OUT OF MEDICATION     PLEASE ADVISE ASAP

## 2022-05-23 NOTE — PROGRESS NOTES
"  Chief Complaint   Patient presents with   • Breathing Problem       Subjective   Shannon Servin is a 72 y.o. female.     History of Present Illness   Patient comes in today for consultation because of history of shortness of breath and being on noninvasive ventilation device.    The patient said that she was admitted to this hospital in 2020.  She was found to have UTI but also atrial fibrillation at that time.  During her stay the patient was told that she would benefit from noninvasive ventilation device and was discharged on the same.      Upon questioning the patient used to smoke 1-2 PPD for 30 years, but quit smoking in 1998.    The patient does report shortness of breath on exertion and although her exercise capacity is limited due to back pain and other issues, she does mention shortness of breath with exertion.    The patient does report occasional issues with daytime sleepiness and tiredness.  She is not however, entirely certain, if NIV helps her symptoms to a great degree.    The patient was started on Breztri but she has not been taking it on a regular basis or as prescribed.      The following portions of the patient's history were reviewed and updated as appropriate: allergies, current medications, past family history, past medical history, past social history and past surgical history.    Review of Systems   Constitutional: Negative for chills.   HENT: Positive for sneezing.    Respiratory: Positive for cough, shortness of breath and wheezing.    Psychiatric/Behavioral: Positive for sleep disturbance.   All other systems reviewed and are negative.      Objective   Visit Vitals  /78 (BP Location: Right arm, Patient Position: Sitting, Cuff Size: Adult)   Pulse 60   Temp 96.8 °F (36 °C)   Resp 16   Ht 172.7 cm (67.99\")   Wt 102 kg (225 lb)   SpO2 95%   BMI 34.22 kg/m²       Physical Exam  Vitals reviewed.   Constitutional:       Appearance: She is well-developed.   HENT:      Head: Atraumatic.    "   Mouth/Throat:      Comments: Oropharynx was crowded.  Eyes:      Pupils: Pupils are equal, round, and reactive to light.   Neck:      Thyroid: No thyromegaly.      Vascular: No JVD.      Trachea: No tracheal deviation.      Comments: Increased neck circumference noted.  Cardiovascular:      Rate and Rhythm: Normal rate and regular rhythm.   Pulmonary:      Effort: Pulmonary effort is normal. No respiratory distress.      Breath sounds: Rales (Bibasilar) present.   Musculoskeletal:      Right lower leg: No edema.      Left lower leg: No edema.      Comments: Used a wheelchair    Skin:     General: Skin is warm and dry.   Neurological:      Mental Status: She is alert and oriented to person, place, and time.   Psychiatric:         Mood and Affect: Mood normal.         Behavior: Behavior normal.         Assessment & Plan   Diagnoses and all orders for this visit:    1. Obstructive sleep apnea (Primary)    2. Snoring    3. Excessive daytime sleepiness    4. Chronic obstructive pulmonary disease, unspecified COPD type (HCC)    5. Pulmonary emphysema, unspecified emphysema type (HCC)    Other orders  -     tiotropium bromide-olodaterol (STIOLTO RESPIMAT) 2.5-2.5 MCG/ACT aerosol solution inhaler; Inhale 2 puffs Daily.  Dispense: 1 each; Refill: 5           Return in about 5 months (around 10/23/2022) for Recheck, PFT F/U, For [place] ([place]).    DISCUSSION (if any):  I reviewed the patient's discharge summary that mentioned sepsis due to UTI, atrial fibrillation and metabolic encephalopathy.  It also mentioned discharge on Eliquis as well as sotalol.  It also listed that she was discharged on trilogy machine.    Last chest x-ray was reviewed personally and the results were shared with the patient.  Images reviewed personally.   Results for orders placed during the hospital encounter of 08/06/21    XR Chest 1 View    Narrative  PROCEDURE: XR CHEST 1 VW-    HISTORY: sob cough    COMPARISON: 2/26/2020.    FINDINGS: The  heart is normal in size. The mediastinum is unremarkable.  The lungs are clear. There is no pneumothorax.  There are no acute  osseous abnormalities.    Impression  No acute cardiopulmonary process.    Continued followup is recommended.    This report was finalized on 8/6/2021 7:22 AM by Cass Moses M.D..    Last CT scan was reviewed in great detail with the patient. Images reviewed personally.   Results for orders placed during the hospital encounter of 02/19/20    CT Chest Pulmonary Embolism    Narrative  PROCEDURE: CT CHEST PULMONARY EMBOLISM-    HISTORY: Shortness of breath    COMPARISON: 02/23/2020.    TECHNIQUE: Multiple axial CT images were obtained from the thoracic  inlet through the upper abdomen following the administration of Isovue  300 per the CT PE protocol. Coronal and oblique 3D MIP images were  reconstructed from the original axial data set.    FINDINGS: There are no filling defects within the pulmonary arteries to  suggest an embolus. The thoracic aorta is normal in caliber with no  evidence of dissection. The heart is normal in size. There are bilateral  pleural effusions. There is no pericardial effusion. There is no  adenopathy. Lung windows reveal airspace disease in the right greater  than left upper lobes consistent with a pneumonia. There is atelectasis  in both lung bases. The visualized upper abdomen is unremarkable. Bone  windows reveal no acute osseous abnormalities.    Impression  1. No evidence of pulmonary embolus or dissection.  2. Small bilateral pleural effusions and bilateral airspace disease  consistent with a pneumonia.        452.82 mGy.cm      This study was performed with techniques to keep radiation doses as low  as reasonably achievable (ALARA). Individualized dose reduction  techniques using automated exposure control or adjustment of mA and/or  kV according to the patient size were employed.    This report was finalized on 2/25/2020 12:49 PM by Cass Moses  "M.D..    I also reviewed her last echocardiogram and shared the results with her.   Results for orders placed during the hospital encounter of 02/19/20    Adult Transthoracic Echo Complete W/ Cont if Necessary Per Protocol    Interpretation Summary  · Estimated EF = 55%.  · Left ventricular systolic function is normal.      I have also reviewed laboratory data.  Lab Results   Component Value Date    EOSABS 0.16 08/06/2021    EOSABS 0.22 03/01/2020    EOSABS 0.20 02/28/2020    &   Lab Results   Component Value Date    CO2 24.9 08/06/2021     Lab Results   Component Value Date    PHART 7.337 02/24/2020    AJF2NHX 68.3 (C) 02/24/2020    PO2ART 72.6 (L) 02/24/2020    HGBBG 13.9 02/24/2020    G8OWYVCN 94.5 02/24/2020    CARBOXYHGB 1.6 02/24/2020     ===========================  ===========================    Orders as above    Based on history and physical exam, it seems that her shortness of breath is possibly from obstructive lung disease.     There seems to be an element of overlap syndrome, given her symptoms of FLORI and likely COPD.    We will avoid ICS due to her apprehension about \"yeast infection\".    Patient was educated on compliance with, and the correct method of using the pulmonary medicines.     Side effects, of prescribed medicines, discussed.    I have told her that we will made further recommendations, based on clinical response.     Patient was given reading material, as appropriate.     I have encouraged the patient to call or schedule a visit earlier, if there are any concerns.    Continue treatment with noninvasive ventilation device at the current setting.    she is currently using full facemask.    Patient seems to be compliant with NIV device, based on the available data and her account of improved symptoms.     Repeat ABG will be considered, if clinically necessary.        Dictated utilizing Dragon dictation.    This document was electronically signed by Bob Sahu MD on 05/23/22 at 15:40 " EDT

## 2022-05-24 RX ORDER — GABAPENTIN 300 MG/1
300 CAPSULE ORAL DAILY
Qty: 30 CAPSULE | Refills: 5 | Status: SHIPPED | OUTPATIENT
Start: 2022-05-24 | End: 2022-06-03 | Stop reason: ALTCHOICE

## 2022-05-24 RX ORDER — GABAPENTIN 300 MG/1
100 CAPSULE ORAL DAILY
Status: CANCELLED | OUTPATIENT
Start: 2022-05-24

## 2022-06-01 ENCOUNTER — PATIENT ROUNDING (BHMG ONLY) (OUTPATIENT)
Dept: PULMONOLOGY | Facility: CLINIC | Age: 72
End: 2022-06-01

## 2022-06-01 DIAGNOSIS — J30.9 CHRONIC ALLERGIC RHINITIS: Chronic | ICD-10-CM

## 2022-06-01 RX ORDER — MONTELUKAST SODIUM 10 MG/1
10 TABLET ORAL NIGHTLY
Qty: 90 TABLET | Refills: 1 | Status: SHIPPED | OUTPATIENT
Start: 2022-06-01 | End: 2022-12-19

## 2022-06-03 ENCOUNTER — OFFICE VISIT (OUTPATIENT)
Dept: FAMILY MEDICINE CLINIC | Facility: CLINIC | Age: 72
End: 2022-06-03

## 2022-06-03 VITALS
SYSTOLIC BLOOD PRESSURE: 118 MMHG | DIASTOLIC BLOOD PRESSURE: 62 MMHG | TEMPERATURE: 97.5 F | WEIGHT: 222 LBS | HEIGHT: 68 IN | BODY MASS INDEX: 33.65 KG/M2 | RESPIRATION RATE: 14 BRPM

## 2022-06-03 DIAGNOSIS — B35.1 ONYCHOMYCOSIS OF TOENAIL: ICD-10-CM

## 2022-06-03 DIAGNOSIS — G25.81 RESTLESS LEGS SYNDROME: ICD-10-CM

## 2022-06-03 DIAGNOSIS — J44.9 CHRONIC OBSTRUCTIVE BRONCHITIS: ICD-10-CM

## 2022-06-03 DIAGNOSIS — E11.9 TYPE 2 DIABETES MELLITUS TREATED WITHOUT INSULIN: Primary | ICD-10-CM

## 2022-06-03 DIAGNOSIS — E11.44 DIABETIC AMYOTROPHY ASSOCIATED WITH TYPE 2 DIABETES MELLITUS: ICD-10-CM

## 2022-06-03 LAB
EXPIRATION DATE: NORMAL
HBA1C MFR BLD: 6.5 %
Lab: NORMAL

## 2022-06-03 PROCEDURE — 96372 THER/PROPH/DIAG INJ SC/IM: CPT | Performed by: FAMILY MEDICINE

## 2022-06-03 PROCEDURE — 83036 HEMOGLOBIN GLYCOSYLATED A1C: CPT | Performed by: FAMILY MEDICINE

## 2022-06-03 PROCEDURE — 3044F HG A1C LEVEL LT 7.0%: CPT | Performed by: FAMILY MEDICINE

## 2022-06-03 PROCEDURE — 99214 OFFICE O/P EST MOD 30 MIN: CPT | Performed by: FAMILY MEDICINE

## 2022-06-03 RX ORDER — ROPINIROLE 0.25 MG/1
TABLET, FILM COATED ORAL
Qty: 180 TABLET | Refills: 1 | Status: SHIPPED | OUTPATIENT
Start: 2022-06-03 | End: 2022-12-05

## 2022-06-03 RX ADMIN — CYANOCOBALAMIN 1000 MCG: 1000 INJECTION, SOLUTION INTRAMUSCULAR; SUBCUTANEOUS at 17:06

## 2022-06-03 NOTE — PROGRESS NOTES
Established Patient        Chief Complaint:   Chief Complaint   Patient presents with   • Follow-up        Shannon Servin is a 72 y.o. female    History of Present Illness:   Answers for HPI/ROS submitted by the patient on 5/29/2022  What is the primary reason for your visit?: Abdominal Pain  Progression since onset: gradually worsening  Pain location: suprapubic region  Pain - numeric: 8/10  Pain quality: aching  Radiates to: suprapubic region  anorexia: No  belching: Yes  constipation: Yes  diarrhea: Yes  nausea: Yes  Aggravated by: eating  Relieved by: nothing    Here today in scheduled follow-up visit of her diabetes mellitus, diabetic amyotrophy, chronic obstructive bronchitis and onychomycosis of the toenails.    Painful paresthesias to lalit LE, worse at night with uncontrollable jerking/moving urge of lalit LE.  No sig improvement with use of gabapentin, even with inc dosing.    No cyclical/persistent hypoglycemia.  Denies chest pain, syncope, palpitations or vertigo.  Denies fever, chills or night sweats.  She reports balanced dietary intake, good hydration habits.      Subjective     The following portions of the patient's history were reviewed and updated as appropriate: allergies, current medications, past family history, past medical history, past social history, past surgical history and problem list.    Allergies   Allergen Reactions   • Metformin Hallucinations   • Codeine Itching     Review of Systems:    1. Constitutional: Negative for fever. Negative for chills, diaphoresis; malaise/fatigue of chronic nature, and without unexpected weight change.   2. HENT: No dysphagia; no changes to vision/hearing/smell/taste; no epistaxis  3. Eyes: Negative for redness and visual disturbance.   4. Respiratory: Chronic cough, no hemoptysis reported.  Productive of clear/yellow phlegm at times.  She does report wheezing and coughing with laughing or prolonged talking, as well as with cold air  "exposure.  5. Cardiovascular: Negative for chest pain and palpitations.   6. Gastrointestinal: Negative for abdominal distention, abdominal pain and blood in stool.   7. Endocrine: Negative for cold intolerance and heat intolerance.   8. Genitourinary: Negative for difficulty urinating, dysuria and frequency.   9. Musculoskeletal: Negative for arthralgias, back pain and myalgias.   10. Skin: Erythematous pruritic rash to the lower extremities, diagnosed in the past with tinea.  She reports thickened/yellow discoloration to the numerous toes of bilateral feet, worse to the bilateral great toes.  11. Neurological: Negative for syncope, weakness.  As per above otherwise.  12. Hematological: Negative for adenopathy. Does not bruise/bleed easily.   13. Psychiatric/Behavioral: Negative for confusion. The patient is not nervous/anxious.    Objective     Physical Exam   Vital Signs: /62   Temp 97.5 °F (36.4 °C)   Resp 14   Ht 172.7 cm (68\")   Wt 101 kg (222 lb)   BMI 33.75 kg/m²     General Appearance: alert, oriented x 3, no acute distress.  Pleasant and interactive during questioning/examination.  Well-nourished and developed female.  Skin: warm and dry.  Changes to bilateral feet and ankles consistent with tinea, signs of excoriation noted, some hyperkeratotic changes additionally.  Stasis dermatitis noted additionally.  Thickened/yellowish discoloration to bilateral toenails, worse to the great toenails.  HEENT: Atraumatic.  pupils round and reactive to light and accommodation, oral mucosa pink and moist.  Nares patent without epistaxis.  External auditory canals are patent tympanic membranes intact.  Neck: supple, no JVD, trachea midline.  No thyromegaly  Lungs: CTA, unlabored breathing effort.  Heart: RRR, normal S1 and S2, no S3, no rub.  Abdomen: soft, non-tender, no palpable bladder, present bowel sounds to auscultation ×4.  No guarding or rigidity.  Extremities: no clubbing, cyanosis.  Good range of " motion actively and passively.  Symmetric muscle strength and development.  Nonpitting edematous changes noted to the mid tibias bilaterally, brawny changes associated with venous stasis, chronic in nature.  Ambulates independently.  Neuro: normal speech and mental status.  Cranial nerves II through XII intact.  No anosmia. DTR 2+; proprioception intact.  No focal motor/sensory deficits.    Assessment and Plan      Assessment/Plan:   Diagnoses and all orders for this visit:    1. Type 2 diabetes mellitus treated without insulin (HCC) (Primary)  -     POC Glycosylated Hemoglobin (Hb A1C)    2. Restless legs syndrome  -     rOPINIRole (REQUIP) 0.25 MG tablet; 0.5 tab po qhs x 2 nights; then 1 tabs po x 5 nights; then 2 tabs po qhs each night thereafter.  Dispense: 180 tablet; Refill: 1    3. 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  Dispense: 180 tablet; Refill: 1    4. Chronic obstructive bronchitis (HCC)    5. Onychomycosis of toenail        HbA1c sig improved, now down to 6.5; continue current dietary mods, including avoidance of prolonged fasting; continue healthy dietary choices; maintain good hydration status.    B12 IM today.    Begin trial of ropinirole; will follow clinically.    Stop gabapentin.  Plan to follow clinically.    Trial of GatorLyte.  Stressed importance of good hydration as per above.      Discussion Summary:    Discussed plan of care in detail with pt today; pt verb understanding and agrees.    I spent 30 minutes caring for Shannon on this date of service. This time includes time spent by me in the following activities:preparing for the visit, performing a medically appropriate examination and/or evaluation , counseling and educating the patient/family/caregiver, ordering medications, tests, or procedures, documenting information in  the medical record, independently interpreting results and communicating that information with the patient/family/caregiver and care coordination    I have reviewed and updated all copied forward information, as appropriate.  I attest to the accuracy and relevance of any unchanged information.    Follow up:  Return in about 3 months (around 9/3/2022) for Recheck, Med Change/New Meds.     There are no Patient Instructions on file for this visit.    Humberto Wells DO  06/03/22  17:06 EDT          Please note that portions of this note may have been completed with a voice recognition program. Efforts were made to edit the dictations, but occasionally words are mistranscribed.

## 2022-06-06 RX ORDER — OXYBUTYNIN CHLORIDE 15 MG/1
15 TABLET, EXTENDED RELEASE ORAL DAILY
Qty: 1080 TABLET | Refills: 0 | Status: SHIPPED | OUTPATIENT
Start: 2022-06-06 | End: 2022-09-25 | Stop reason: SDUPTHER

## 2022-06-06 RX ORDER — FLUCONAZOLE 150 MG/1
150 TABLET ORAL DAILY
Qty: 40 TABLET | Refills: 0 | Status: SHIPPED | OUTPATIENT
Start: 2022-06-06

## 2022-06-20 ENCOUNTER — OFFICE VISIT (OUTPATIENT)
Dept: FAMILY MEDICINE CLINIC | Facility: CLINIC | Age: 72
End: 2022-06-20

## 2022-06-20 VITALS
DIASTOLIC BLOOD PRESSURE: 68 MMHG | SYSTOLIC BLOOD PRESSURE: 100 MMHG | RESPIRATION RATE: 18 BRPM | OXYGEN SATURATION: 97 % | HEIGHT: 68 IN | BODY MASS INDEX: 33.75 KG/M2 | TEMPERATURE: 96.8 F | HEART RATE: 92 BPM

## 2022-06-20 DIAGNOSIS — I10 ESSENTIAL HYPERTENSION: ICD-10-CM

## 2022-06-20 DIAGNOSIS — I48.0 PAROXYSMAL ATRIAL FIBRILLATION: ICD-10-CM

## 2022-06-20 DIAGNOSIS — R10.84 ABDOMINAL PAIN, GENERALIZED: ICD-10-CM

## 2022-06-20 DIAGNOSIS — E11.44 DIABETIC AMYOTROPHY ASSOCIATED WITH TYPE 2 DIABETES MELLITUS: Primary | ICD-10-CM

## 2022-06-20 DIAGNOSIS — E11.9 TYPE 2 DIABETES MELLITUS TREATED WITHOUT INSULIN: ICD-10-CM

## 2022-06-20 DIAGNOSIS — K21.9 GERD WITHOUT ESOPHAGITIS: ICD-10-CM

## 2022-06-20 DIAGNOSIS — R10.13 ABDOMINAL PAIN, EPIGASTRIC: ICD-10-CM

## 2022-06-20 DIAGNOSIS — Z79.01 CHRONIC ANTICOAGULATION: Chronic | ICD-10-CM

## 2022-06-20 PROCEDURE — 99214 OFFICE O/P EST MOD 30 MIN: CPT | Performed by: FAMILY MEDICINE

## 2022-06-20 RX ORDER — TRAMADOL HYDROCHLORIDE 50 MG/1
50 TABLET ORAL EVERY 6 HOURS PRN
Qty: 30 TABLET | Refills: 0 | Status: SHIPPED | OUTPATIENT
Start: 2022-06-20 | End: 2022-09-30 | Stop reason: SDUPTHER

## 2022-06-20 RX ORDER — PANTOPRAZOLE SODIUM 20 MG/1
20 TABLET, DELAYED RELEASE ORAL DAILY
Qty: 90 TABLET | Refills: 0 | Status: SHIPPED | OUTPATIENT
Start: 2022-06-20 | End: 2022-06-27 | Stop reason: SDUPTHER

## 2022-06-20 RX ORDER — SUCRALFATE 1 G/1
1 TABLET ORAL
Qty: 56 TABLET | Refills: 0 | Status: SHIPPED | OUTPATIENT
Start: 2022-06-20 | End: 2022-07-04

## 2022-06-20 NOTE — PROGRESS NOTES
Established Patient        Chief Complaint:   Chief Complaint   Patient presents with   • Abdominal Pain        Shannon Servin is a 72 y.o. female    History of Present Illness:   Answers for HPI/ROS submitted by the patient on 6/20/2022  What is the primary reason for your visit?: Abdominal Pain  Chronicity: chronic  Onset: more than 1 year ago  Onset quality: gradual  Frequency: constantly  Episode duration: 8 hours  Progression since onset: rapidly worsening  Pain location: suprapubic region  Pain - numeric: 9/10  Pain quality: aching  Radiates to: suprapubic region  anorexia: No  arthralgias: Yes  belching: Yes  constipation: Yes  diarrhea: No  dysuria: No  fever: No  flatus: Yes  frequency: No  headaches: No  hematochezia: No  hematuria: No  melena: No  myalgias: Yes  nausea: Yes  weight loss: No  vomiting: No  Aggravated by: eating  Relieved by: nothing      Worsened GI symptoms x 2-3 weeks; can only tolerate cottage cheese and pineapple; all other foods creates epigastric abd discomfort/nausea.    No BRB/BTS.  No F/C, no night sweats.  Patient is not taking ibuprofen or any other NSAIDs at this time.    No rashes; no dysuria/hematuria.        Subjective     The following portions of the patient's history were reviewed and updated as appropriate: allergies, current medications, past family history, past medical history, past social history, past surgical history and problem list.    Allergies   Allergen Reactions   • Metformin Hallucinations   • Codeine Itching     Review of Systems:    1. Constitutional: Negative for fever. Negative for chills, diaphoresis; malaise/fatigue of chronic nature, and without unexpected weight change.   2. HENT: No dysphagia; no changes to vision/hearing/smell/taste; no epistaxis  3. Eyes: Negative for redness and visual disturbance.   4. Respiratory: Chronic cough, no hemoptysis reported.  Productive of clear/yellow phlegm at times.  She does report wheezing  "and coughing with laughing or prolonged talking, as well as with cold air exposure.  5. Cardiovascular: Negative for chest pain and palpitations.   6. Gastrointestinal: As per above.  7. Endocrine: Negative for cold intolerance and heat intolerance.   8. Genitourinary: Negative for difficulty urinating, dysuria and frequency.   9. Musculoskeletal: Negative for arthralgias, back pain and myalgias.   10. Skin: Erythematous pruritic rash to the lower extremities, diagnosed in the past with tinea.  She reports thickened/yellow discoloration to the numerous toes of bilateral feet, worse to the bilateral great toes.  11. Neurological: Negative for syncope, weakness.  As per above otherwise.  12. Hematological: Negative for adenopathy. Does not bruise/bleed easily.   13. Psychiatric/Behavioral: Negative for confusion. The patient is not nervous/anxious.    Objective     Physical Exam   Vital Signs: /68 (BP Location: Left arm, Patient Position: Sitting, Cuff Size: Adult)   Pulse 92   Temp 96.8 °F (36 °C)   Resp 18   Ht 172.7 cm (68\")   SpO2 97%   BMI 33.75 kg/m²     General Appearance: alert, oriented x 3, no acute distress.  Pleasant and interactive during questioning/examination.  Well-nourished and developed female.  Skin: warm and dry.  Changes to bilateral feet and ankles consistent with tinea, signs of excoriation noted, some hyperkeratotic changes additionally.  Stasis dermatitis noted additionally.  Thickened/yellowish discoloration to bilateral toenails, worse to the great toenails.  HEENT: Atraumatic.  pupils round and reactive to light and accommodation, oral mucosa pink and moist.  Nares patent without epistaxis.  External auditory canals are patent tympanic membranes intact.  Neck: supple, no JVD, trachea midline.  No thyromegaly  Lungs: CTA, unlabored breathing effort.  Heart: RRR, normal S1 and S2, no S3, no rub.  Abdomen: soft, non-tender, no palpable bladder, present bowel sounds to auscultation " ×4.  No guarding or rigidity.  Extremities: no clubbing, cyanosis.  Good range of motion actively and passively.  Symmetric muscle strength and development.  Nonpitting edematous changes noted to the mid tibias bilaterally, brawny changes associated with venous stasis, chronic in nature.  Ambulates independently.  Neuro: normal speech and mental status.  Cranial nerves II through XII intact.  No anosmia. DTR 2+; proprioception intact.  No focal motor/sensory deficits.    Assessment and Plan      Assessment/Plan:   Diagnoses and all orders for this visit:    1. 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  Dispense: 30 tablet; Refill: 0    2. Type 2 diabetes mellitus treated without insulin ([facility])    3. Paroxysmal atrial fibrillation ([facility])    4. Chronic anticoagulation    5. Essential hypertension    6. Abdominal pain, generalized  -     pantoprazole (Protonix) 20 MG EC tablet; Take 1 tablet by mouth Daily.  Dispense: 90 tablet; Refill: 0  -     sucralfate (Carafate) 1 g tablet; Take 1 tablet by mouth 4 (Four) Times a Day Before Meals & at Bedtime for 14 days.  Dispense: 56 tablet; Refill: 0    7. Abdominal pain, epigastric  -     pantoprazole (Protonix) 20 MG EC tablet; Take 1 tablet by mouth Daily.  Dispense: 90 tablet; Refill: 0  -     sucralfate (Carafate) 1 g tablet; Take 1 tablet by mouth 4 (Four) Times a Day Before Meals & at Bedtime for 14 days.  Dispense: 56 tablet; Refill: 0    8. GERD without esophagitis  -     pantoprazole (Protonix) 20 MG EC tablet; Take 1 tablet by mouth Daily.  Dispense: 90 tablet; Refill: 0  -     sucralfate (Carafate) 1 g tablet; Take 1 tablet by mouth 4 (Four) Times a Day Before Meals & at Bedtime for 14 days.  Dispense: 56 tablet; Refill: 0    I recommended patient's start pantoprazole once daily.  Do suspect her symptoms are result of  gastroesophageal reflux disease.  I recommended a trial of Carafate, 4 times daily, before meals and at bedtime, for planned duration of 14 days.    Anti - reflux measures, trigger foods and drinks to avoid: Fatty foods, alcohol, chocolate, coffee, tea, caffeinated soft drinks (decaffeinated coffee still has some caffeine), peppermint and spearmint, spices and vinegar, citrus fruits and juices, tomatoes and tomato sauces, and smoking. Other antireflux measures include weight reduction if overweight, avoid tight clothing around the abdomen, elevate the head of her bed 6 inches (May use a bed wedge which is placed between the mattress in box H[place]) or blocks under the head of the bed, don't drink or eat for 2 hours before going to bed and avoid lying down immediately after meals.    I have provided as needed analgesic and treatment of her diabetes associated amyotrophy.  I have asked that she notify the office should she develop any ill effects of the medication.    Continue avoidance of prolonged fasting periods.  Maintain healthy dietary choices.  Continue to maintain appropriate hydration status.    Vital signs demonstrate hemodynamic stability.  Blood pressure is at goal.  Heart rate well controlled.    Continue anticoagulation.  She demonstrates no findings of BRB/BTS.  Continue aspirin for now, I have advised her not to utilize any over-the-counter nonsteroidal anti-inflammatory medications.      Discussion Summary:    Discussed plan of care in detail with pt today; pt verb understanding and agrees.    I spent 30 minutes caring for Shannon on this date of service. This time includes time spent by me in the following activities:preparing for the visit, performing a medically appropriate examination and/or evaluation , counseling and educating the patient/family/caregiver, ordering medications, tests, or procedures, documenting information in the medical record and care coordination    I have reviewed and updated  all copied forward information, as appropriate.  I attest to the accuracy and relevance of any unchanged information.    Follow up:  No follow-ups on file.     There are no Patient Instructions on file for this visit.    Humberto Wells DO  06/20/22  17:04 EDT        Please note that portions of this note may have been completed with a voice recognition program. Efforts were made to edit the dictations, but occasionally words are mistranscribed.

## 2022-06-21 ENCOUNTER — PATIENT MESSAGE (OUTPATIENT)
Dept: FAMILY MEDICINE CLINIC | Facility: CLINIC | Age: 72
End: 2022-06-21

## 2022-06-21 DIAGNOSIS — R10.13 ABDOMINAL PAIN, EPIGASTRIC: ICD-10-CM

## 2022-06-21 DIAGNOSIS — R10.84 ABDOMINAL PAIN, GENERALIZED: ICD-10-CM

## 2022-06-21 DIAGNOSIS — K21.9 GERD WITHOUT ESOPHAGITIS: ICD-10-CM

## 2022-06-23 NOTE — TELEPHONE ENCOUNTER
I have discontinued the Stiolto    Samples of Trelegy provided: 1 inhalation once daily.  She can pick those up.  The office.    D

## 2022-06-27 RX ORDER — ATORVASTATIN CALCIUM 80 MG/1
80 TABLET, FILM COATED ORAL DAILY
Qty: 90 TABLET | Refills: 1 | Status: SHIPPED | OUTPATIENT
Start: 2022-06-27 | End: 2023-02-15

## 2022-06-27 RX ORDER — PANTOPRAZOLE SODIUM 20 MG/1
20 TABLET, DELAYED RELEASE ORAL DAILY
Qty: 90 TABLET | Refills: 0 | Status: SHIPPED | OUTPATIENT
Start: 2022-06-27 | End: 2022-12-19

## 2022-06-28 ENCOUNTER — OFFICE VISIT (OUTPATIENT)
Dept: CARDIOLOGY | Facility: CLINIC | Age: 72
End: 2022-06-28

## 2022-06-28 VITALS
HEART RATE: 79 BPM | OXYGEN SATURATION: 96 % | DIASTOLIC BLOOD PRESSURE: 70 MMHG | WEIGHT: 222 LBS | BODY MASS INDEX: 33.65 KG/M2 | SYSTOLIC BLOOD PRESSURE: 132 MMHG | HEIGHT: 68 IN

## 2022-06-28 DIAGNOSIS — Z79.01 CHRONIC ANTICOAGULATION: Chronic | ICD-10-CM

## 2022-06-28 DIAGNOSIS — I10 ESSENTIAL HYPERTENSION: ICD-10-CM

## 2022-06-28 DIAGNOSIS — E78.5 DYSLIPIDEMIA: Primary | Chronic | ICD-10-CM

## 2022-06-28 DIAGNOSIS — I48.0 PAROXYSMAL ATRIAL FIBRILLATION: ICD-10-CM

## 2022-06-28 DIAGNOSIS — E11.9 TYPE 2 DIABETES MELLITUS TREATED WITHOUT INSULIN: ICD-10-CM

## 2022-06-28 PROCEDURE — 99213 OFFICE O/P EST LOW 20 MIN: CPT | Performed by: INTERNAL MEDICINE

## 2022-06-28 PROCEDURE — 93000 ELECTROCARDIOGRAM COMPLETE: CPT | Performed by: INTERNAL MEDICINE

## 2022-06-28 NOTE — PROGRESS NOTES
"    Subjective:     Encounter Date:06/28/2022      Patient ID: Shannon Servin is a 72 y.o. female.    Chief Complaint: Atrial fibrillation  HPI  This is a 72-year-old female patient who presents to cardiology clinic for routine follow-up.  Since her last visit she has had no symptomatic recurrences of atrial fibrillation.  She is maintaining normal sinus rhythm on low-dose sotalol therapy without evidence of proarrhythmia.  She has been anticoagulated with Eliquis 5 mg orally twice daily without bleeding complications.  She reports that her co-pay on Eliquis has become \"cost prohibitive\".  The following portions of the patient's history were reviewed and updated as appropriate: allergies, current medications, past family history, past medical history, past social history, past surgical history and problem  Review of Systems   Constitutional: Negative for chills, diaphoresis, fever, malaise/fatigue, weight gain and weight loss.   HENT: Negative for ear discharge, hearing loss, hoarse voice and nosebleeds.    Eyes: Negative for discharge, double vision, pain and photophobia.   Cardiovascular: Negative for chest pain, claudication, cyanosis, dyspnea on exertion, irregular heartbeat, leg swelling, near-syncope, orthopnea, palpitations, paroxysmal nocturnal dyspnea and syncope.   Respiratory: Negative for cough, hemoptysis, shortness of breath, sputum production and wheezing.    Endocrine: Negative for cold intolerance, heat intolerance, polydipsia, polyphagia and polyuria.   Hematologic/Lymphatic: Negative for adenopathy and bleeding problem. Does not bruise/bleed easily.   Skin: Negative for color change, flushing, itching and rash.   Musculoskeletal: Negative for muscle cramps, muscle weakness, myalgias and stiffness.   Gastrointestinal: Negative for abdominal pain, diarrhea, hematemesis, hematochezia, nausea and vomiting.   Genitourinary: Negative for dysuria, frequency and nocturia.   Neurological: Negative for focal " weakness, loss of balance, numbness, paresthesias and seizures.   Psychiatric/Behavioral: Negative for altered mental status, hallucinations and suicidal ideas.   Allergic/Immunologic: Negative for HIV exposure, hives and persistent infections.           Current Outpatient Medications:   •  amLODIPine (NORVASC) 5 MG tablet, Take 1 tablet by mouth Daily., Disp: 90 tablet, Rfl: 0  •  apixaban (ELIQUIS) 5 MG tablet tablet, Take 1 tablet by mouth Every 12 (Twelve) Hours., Disp: 60 tablet, Rfl: 0  •  aspirin (aspirin) 81 MG EC tablet, Take 1 tablet by mouth Daily., Disp: , Rfl:   •  atorvastatin (LIPITOR) 80 MG tablet, Take 1 tablet by mouth Daily., Disp: 90 tablet, Rfl: 1  •  busPIRone (BUSPAR) 5 MG tablet, Take 1 tablet by mouth 2 (Two) Times a Day As Needed (anxiety)., Disp: 40 tablet, Rfl: 0  •  dicyclomine (BENTYL) 10 MG/5ML syrup, Take 10 mL by mouth 3 (Three) Times a Day As Needed., Disp: 473 mL, Rfl: 1  •  Docusate Calcium (STOOL SOFTENER PO), Take 240 mg by mouth Daily., Disp: , Rfl:   •  enalapril (VASOTEC) 20 MG tablet, Take 1 tablet by mouth Every 12 (Twelve) Hours., Disp: 180 tablet, Rfl: 1  •  Estradiol 10 % cream, , Disp: , Rfl:   •  fluconazole (DIFLUCAN) 150 MG tablet, Take 1 tablet by mouth Daily for 3 days then take as needed, Disp: 40 tablet, Rfl: 0  •  fluticasone (FLONASE) 50 MCG/ACT nasal spray, 2 sprays into the nostril(s) as directed by provider Daily., Disp: , Rfl:   •  Fluticasone-Umeclidin-Vilant (Trelegy Ellipta) 100-62.5-25 MCG/INH inhaler, Inhale 1 puff Daily., Disp: 28 each, Rfl: 0  •  furosemide (LASIX) 20 MG tablet, Take 1 tablet by mouth 2 (Two) Times a Day., Disp: 60 tablet, Rfl: 6  •  glyburide (DIAbeta) 1.25 MG tablet, Take 1 tablet by mouth 2 (Two) Times a Day Before Meals., Disp: 180 tablet, Rfl: 1  •  ipratropium-albuterol (DUO-NEB) 0.5-2.5 mg/3 ml nebulizer, Inhale 1 vial (3 mL) by nebulization Every 6 (Six) Hours As Needed for Wheezing or Shortness of Air., Disp: 360 mL, Rfl:  0  •  ketoconazole (NIZORAL) 2 % cream, Apply 1 application topically to the appropriate area as directed Daily., Disp: 60 g, Rfl: 2  •  montelukast (SINGULAIR) 10 MG tablet, TAKE 1 TABLET BY MOUTH EVERY NIGHT., Disp: 90 tablet, Rfl: 1  •  Ondansetron HCl (ZOFRAN PO), Take  by mouth., Disp: , Rfl:   •  oxybutynin XL (DITROPAN XL) 15 MG 24 hr tablet, Take 1 tablet by mouth Daily., Disp: 1080 tablet, Rfl: 0  •  oxyCODONE-acetaminophen (PERCOCET) 5-325 MG per tablet, Take 1 tablet by mouth Every 6 (Six) Hours As Needed., Disp: , Rfl:   •  pantoprazole (Protonix) 20 MG EC tablet, Take 1 tablet by mouth Daily., Disp: 90 tablet, Rfl: 0  •  phenazopyridine (PYRIDIUM) 100 MG tablet, Take 1 tablet by mouth 4 (Four) Times a Day As Needed., Disp: 30 tablet, Rfl: 0  •  promethazine (PHENERGAN) 25 MG tablet, Take 25 mg by mouth Every 6 (Six) Hours As Needed., Disp: , Rfl:   •  Rimegepant Sulfate (Nurtec) 75 MG tablet dispersible tablet, Take 1 tablet by mouth As Needed (migraine). May repeat 1 hour after first dose if needed, not to exceed 1 additional dose., Disp: 4 tablet, Rfl: 0  •  rOPINIRole (REQUIP) 0.25 MG tablet, 0.5 tab po qhs x 2 nights; then 1 tabs po x 5 nights; then 2 tabs po qhs each night thereafter., Disp: 180 tablet, Rfl: 1  •  senna (SENOKOT) 8.6 MG tablet, Take 1 tablet by mouth Daily., Disp: , Rfl:   •  sotalol (BETAPACE) 80 MG tablet, Take 1 tablet by mouth Every 12 (Twelve) Hours., Disp: 60 tablet, Rfl: 0  •  spironolactone (ALDACTONE) 25 MG tablet, Take 1 tablet by mouth Daily., Disp: 30 tablet, Rfl: 0  •  sucralfate (Carafate) 1 g tablet, Take 1 tablet by mouth 4 (Four) Times a Day Before Meals & at Bedtime for 14 days., Disp: 56 tablet, Rfl: 0  •  tiZANidine (ZANAFLEX) 4 MG tablet, Take 6 mg by mouth At Night As Needed for Muscle Spasms., Disp: , Rfl:   •  traMADol (ULTRAM) 50 MG tablet, Take 1 tablet by mouth Every 6 (Six) Hours As Needed for Moderate Pain ., Disp: 30 tablet, Rfl: 0  •  traZODone  "(DESYREL) 50 MG tablet, Take 50 mg by mouth Every Night., Disp: , Rfl:     Current Facility-Administered Medications:   •  cyanocobalamin injection 1,000 mcg, 1,000 mcg, Intramuscular, Q28 Days, Humberto Wells DO, 1,000 mcg at 06/03/22 1706    Objective:   Vitals and nursing note reviewed.   Constitutional:       Appearance: Healthy appearance. Not in distress.   Neck:      Vascular: No JVR. JVD normal.   Pulmonary:      Effort: Pulmonary effort is normal.      Breath sounds: Normal breath sounds. No wheezing. No rhonchi. No rales.   Chest:      Chest wall: Not tender to palpatation.   Cardiovascular:      PMI at left midclavicular line. Normal rate. Regular rhythm. Normal S1. Normal S2.      Murmurs: There is no murmur.      No gallop. No click. No rub.   Pulses:     Intact distal pulses.   Edema:     Peripheral edema absent.   Abdominal:      General: Bowel sounds are normal.      Palpations: Abdomen is soft.      Tenderness: There is no abdominal tenderness.   Musculoskeletal: Normal range of motion.         General: No tenderness. Skin:     General: Skin is warm and dry.   Neurological:      General: No focal deficit present.      Mental Status: Alert and oriented to person, place and time.       Blood pressure 132/70, pulse 79, height 172.7 cm (68\"), weight 101 kg (222 lb), SpO2 96 %, not currently breastfeeding.   Lab Review:     Assessment:       1. Dyslipidemia  Per PCP.    2. Essential hypertension  Acceptable blood pressure control.    3. Paroxysmal atrial fibrillation (HCC)  Rhythm management strategy.  Asymptomatic.    4. Chronic anticoagulation  Cost for long-term anticoagulation with Eliquis has become prohibitive.    5. Type 2 diabetes mellitus treated without insulin (HCC)  Typical obesity related insulin resistance.      ECG 12 Lead    Date/Time: 6/28/2022 3:59 PM  Performed by: Kevin Shirley MD  Authorized by: Kevin Shirley MD   Comparison: compared with previous ECG   Similar to " previous ECG  Rhythm: sinus rhythm  Rate: normal  QRS axis: normal    Clinical impression: normal ECG            Plan:     Advance Care Planning   ACP discussion was held with the patient during this visit. Patient has an advance directive (not in EMR), copy requested.     For cost containment purposes.  I have recommended discontinuation of Eliquis and starting Xarelto 20 mg orally once per day after the largest meal.  She is instructed to wait 48 hours after her last dose of Eliquis before starting Xarelto.    No other change in medications have been made at today's visit.    No cardiovascular testing is warranted at this time.

## 2022-07-21 ENCOUNTER — TELEPHONE (OUTPATIENT)
Dept: CARDIOLOGY | Facility: CLINIC | Age: 72
End: 2022-07-21

## 2022-07-21 DIAGNOSIS — I48.91 ATRIAL FIBRILLATION, UNSPECIFIED TYPE: Primary | ICD-10-CM

## 2022-07-21 NOTE — TELEPHONE ENCOUNTER
Pt states that her Xarelto will be the same cost has Eliquis, the cost is $230 for a 90 day supply. PA was not needed for Xarelto as [employer] covers it. Please advise

## 2022-07-22 ENCOUNTER — TELEPHONE (OUTPATIENT)
Dept: PHARMACY | Facility: HOSPITAL | Age: 72
End: 2022-07-22

## 2022-07-22 DIAGNOSIS — I48.0 PAROXYSMAL ATRIAL FIBRILLATION: Primary | ICD-10-CM

## 2022-07-22 PROBLEM — I48.91 ATRIAL FIBRILLATION, UNSPECIFIED TYPE: Status: ACTIVE | Noted: 2022-07-22

## 2022-07-22 RX ORDER — WARFARIN SODIUM 5 MG/1
5 TABLET ORAL
Qty: 30 TABLET | Refills: 11 | Status: SHIPPED | OUTPATIENT
Start: 2022-07-22 | End: 2022-07-29 | Stop reason: SDUPTHER

## 2022-07-22 NOTE — TELEPHONE ENCOUNTER
Warfarin 5 mg orally once per day after 5 PM.  Refer to [facility] outpatient INR clinic.  INR goal: 2.0-3.0.  First PT/INR check 5 days after first dose of warfarin.

## 2022-07-22 NOTE — TELEPHONE ENCOUNTER
New Referral from Dr Shirley, indication a fib INR goal 2.0-3.0. Has been on Eliquis since 2/2020. She has 1 tablet remaining. Picked up RX for warfarin 5mg tablets today sent in by Dr Shirley. Will begin Warfarin 5mg qPM 7/23. Will check INR 7/26 at [facility], Does not have transportation to [place]. Order sent to  [phone]

## 2022-07-26 ENCOUNTER — TELEPHONE (OUTPATIENT)
Dept: PHARMACY | Facility: HOSPITAL | Age: 72
End: 2022-07-26

## 2022-07-26 ENCOUNTER — TELEPHONE (OUTPATIENT)
Dept: FAMILY MEDICINE CLINIC | Facility: CLINIC | Age: 72
End: 2022-07-26

## 2022-07-26 ENCOUNTER — ANTICOAGULATION VISIT (OUTPATIENT)
Dept: PHARMACY | Facility: HOSPITAL | Age: 72
End: 2022-07-26

## 2022-07-26 DIAGNOSIS — I48.0 PAROXYSMAL ATRIAL FIBRILLATION: Primary | ICD-10-CM

## 2022-07-26 DIAGNOSIS — I48.91 ATRIAL FIBRILLATION, UNSPECIFIED TYPE: ICD-10-CM

## 2022-07-26 DIAGNOSIS — I48.91 ATRIAL FIBRILLATION, UNSPECIFIED TYPE: Primary | ICD-10-CM

## 2022-07-26 LAB — INR PPP: 1.7

## 2022-07-26 NOTE — TELEPHONE ENCOUNTER
Reports her PCP will check INR via fingerstick at apt 7/29 and report results to clinic ASAP. Agreeable to do so prior to 4 PM.

## 2022-07-26 NOTE — PROGRESS NOTES
Anticoagulation Clinic - Remote Progress Note  [REMOTE LAB]  Frequency of Monitoring: q1-4 weeks    Indication: atrial fibrillation  Referring Provider: Dr Shirley  Initial Warfarin Start Date: 7/23/22    Goal INR:  2 to 3   Current Drug Interactions: Aspirin, ROPINIRole, TraMADol, TraZODone, Pantoprazole, PRN fluconazole, Spironolactone, PRN Percocet  [id number]: 4 (Hypertension, Diabetes, age 65-74, female)  Bleed Risk:    Other: [facility]oned from [facility] 7/22 as cost prohibitive    Diet: salads 3 - 4 times per week (mixed greens), broccoli, she is on Dr. Светлана Perez Broth diet  Alcohol: rarely  Tobacco: remote hx, quite in 98  OTC Pain Medication: she takes Percocet RX for pain only    INR History:  Date 7/26                 Total Weekly Dose 15 mg                 INR 1.7                 Notes 3 doses                   Phone Interview:  Tablet Strength: 5 mg  Patient Contact Info:[phone] (Home/ mobile Phone)    Estimated OOP cost: send if pt scheduled in clinic  Verbal Release Auth:  Will mail to her week of 7/26/22, watch for return  Lab Contact Info: [facility] (fax [phone])    Shannon Servin is a new start to warfarin therapy. She was contacted by phone for counseling. Discussed warfarin's indication, mechanism, and dosing. Also enforced the importance of taking warfarin as instructed and at the same time every day, preferably in the evening so that we can make dose adjustments more easily following subsequent clinic visits. She expressed understanding of this fact. We also discussed what she should do about a missed dose; pts can take missed doses within about 12 hours of their usual scheduled dose, but she was instructed on the importance of not doubling up on doses unless told to do so by the warfarin clinic. Explained possible side effects of warfarin therapy, including increased risk of bleeding, s/sx of bleeding and s/sx of any additional clots/PE/CVA. Also discussed monitoring of  warfarin, the INR, goal INR range 2 - 3, and the frequency of monitoring. Explained that she would be having her INR checked more frequently in these first few weeks of therapy as we try to adjust her dose and achieve a therapeutic INR x 2 consecutive readings. Once that is achieved, Ms Servin will have INR checked every 2 to 4 weeks, on average. She stated she has problems with transportation but understands the importance of timely checks. Reviewed drug/food/tobacco/EtOH interactions and provided written information covering these topics in more detail, explaining that green, leafy vegetables interact most heavily with warfarin. Instructed the pt not to take or discontinue any medications without informing her physician/pharmacist and reminded her to inform us of any dietary changes, as well. She expressed understanding of the information provided and has no additional questions at this time.    Patient Findings  Positives:  Change in diet/appetite, Other complaints   Negatives:  Signs/symptoms of thrombosis, Signs/symptoms of bleeding, Laboratory test error suspected, Change in health, Change in alcohol use, Change in activity, Upcoming invasive procedure, Emergency department visit, Upcoming dental procedure, Missed doses, Extra doses, Change in medications, Hospital admission, Bruising   Comments:  Note:  Her last dose of Eliquis was on 7/22 prior to initiation of warfarin on 7/23.  The direct factor Xa inhibitors (eg, rivaroxaban, apixaban, edoxaban) also prolong the PT/INR, which may make monitoring during the transition more challenging   She stated that she is a difficult stick.   She is on a bone broth diet and tries to eat consistently.  Reviewed vitamin K and informed her that we would mail a pamphlet to her.  She is concerned due to stomach ulcer issues which leads to a change in her diet.     She would like to try to find a way to go back on Eliquis if she can afford as she is unable to drive and her   works.    She has an appointment with Dr. Wells on Friday at 330 pm.  She will check on having blood drawn there. She will investigate if they do POC INR tests there;   If not, she is aware that she needs to go Thursday PM when her  is able to take her as she does not drive.      Plan:  1. INR is SUB therapeutic today at 1.7 after three doses of warfarin.  She had been taking Eliquis through 7/22.      Instructed Ms Servin to take warfarin 2.5 mg tonight and Wednesday, 5 mg on Thursday and recheck on Friday.   2. Repeat INR on Friday, 7/29 if able to have POC test at Dr. Wells office; otherwise, will go on 7/28 pm.  3. Pt declines warfarin refills.  4. Will need to review her medications at next encounter  5. Verbal information provided over the phone to Shannon Srevin. Shannon Servin expresses understanding by teach back, RBV dosing instructions, and has no further questions at this time.    Bev Rangel, PharmD  7/26/2022  14:33 EDT

## 2022-07-29 ENCOUNTER — OFFICE VISIT (OUTPATIENT)
Dept: FAMILY MEDICINE CLINIC | Facility: CLINIC | Age: 72
End: 2022-07-29

## 2022-07-29 ENCOUNTER — ANTICOAGULATION VISIT (OUTPATIENT)
Dept: PHARMACY | Facility: HOSPITAL | Age: 72
End: 2022-07-29

## 2022-07-29 VITALS
RESPIRATION RATE: 15 BRPM | BODY MASS INDEX: 33.65 KG/M2 | OXYGEN SATURATION: 93 % | WEIGHT: 222 LBS | HEART RATE: 73 BPM | TEMPERATURE: 97.5 F | DIASTOLIC BLOOD PRESSURE: 64 MMHG | HEIGHT: 68 IN | SYSTOLIC BLOOD PRESSURE: 110 MMHG

## 2022-07-29 DIAGNOSIS — E11.44 DIABETIC AMYOTROPHY ASSOCIATED WITH TYPE 2 DIABETES MELLITUS: ICD-10-CM

## 2022-07-29 DIAGNOSIS — I48.0 PAROXYSMAL ATRIAL FIBRILLATION: ICD-10-CM

## 2022-07-29 DIAGNOSIS — K21.9 GERD WITHOUT ESOPHAGITIS: ICD-10-CM

## 2022-07-29 DIAGNOSIS — Z79.01 CHRONIC ANTICOAGULATION: ICD-10-CM

## 2022-07-29 DIAGNOSIS — I48.91 ATRIAL FIBRILLATION, UNSPECIFIED TYPE: Primary | ICD-10-CM

## 2022-07-29 DIAGNOSIS — E11.9 TYPE 2 DIABETES MELLITUS TREATED WITHOUT INSULIN: Primary | ICD-10-CM

## 2022-07-29 PROBLEM — Z96.0 PRESENCE OF UROGENITAL IMPLANTS: Status: ACTIVE | Noted: 2020-03-01

## 2022-07-29 PROBLEM — Z79.82 LONG TERM (CURRENT) USE OF ASPIRIN: Status: ACTIVE | Noted: 2020-06-18

## 2022-07-29 PROBLEM — M54.30 SCIATICA, UNSPECIFIED SIDE: Status: ACTIVE | Noted: 2020-01-01

## 2022-07-29 PROBLEM — N20.0 CALCULUS OF KIDNEY: Status: ACTIVE | Noted: 2020-03-01

## 2022-07-29 PROBLEM — Z87.01 PERSONAL HISTORY OF PNEUMONIA (RECURRENT): Status: ACTIVE | Noted: 2020-01-01

## 2022-07-29 PROBLEM — M51.369 OTHER INTERVERTEBRAL DISC DEGENERATION, LUMBAR REGION: Status: ACTIVE | Noted: 2020-01-01

## 2022-07-29 PROBLEM — E66.2 MORBID (SEVERE) OBESITY WITH ALVEOLAR HYPOVENTILATION: Status: ACTIVE | Noted: 2020-01-01

## 2022-07-29 PROBLEM — M15.0 PRIMARY GENERALIZED (OSTEO)ARTHRITIS: Status: ACTIVE | Noted: 2020-06-18

## 2022-07-29 PROBLEM — M25.811 OTHER SPECIFIED JOINT DISORDERS, RIGHT SHOULDER: Status: ACTIVE | Noted: 2020-06-18

## 2022-07-29 PROBLEM — M25.611 STIFFNESS OF RIGHT SHOULDER, NOT ELSEWHERE CLASSIFIED: Status: ACTIVE | Noted: 2020-06-18

## 2022-07-29 PROBLEM — M51.36 OTHER INTERVERTEBRAL DISC DEGENERATION, LUMBAR REGION: Status: ACTIVE | Noted: 2020-01-01

## 2022-07-29 LAB
EXPIRATION DATE: NORMAL
HBA1C MFR BLD: 6.1 %
INR PPP: 3.5 (ref 0.9–1.1)
INR PPP: 3.5 (ref 0.9–1.1)
Lab: NORMAL

## 2022-07-29 PROCEDURE — 85610 PROTHROMBIN TIME: CPT | Performed by: FAMILY MEDICINE

## 2022-07-29 PROCEDURE — 36416 COLLJ CAPILLARY BLOOD SPEC: CPT | Performed by: FAMILY MEDICINE

## 2022-07-29 PROCEDURE — 83036 HEMOGLOBIN GLYCOSYLATED A1C: CPT | Performed by: FAMILY MEDICINE

## 2022-07-29 PROCEDURE — 3044F HG A1C LEVEL LT 7.0%: CPT | Performed by: FAMILY MEDICINE

## 2022-07-29 PROCEDURE — 99214 OFFICE O/P EST MOD 30 MIN: CPT | Performed by: FAMILY MEDICINE

## 2022-07-29 RX ORDER — SUCRALFATE 1 G/1
1 TABLET ORAL
Qty: 56 TABLET | Refills: 0 | Status: SHIPPED | OUTPATIENT
Start: 2022-07-29 | End: 2022-08-09

## 2022-07-29 RX ORDER — WARFARIN SODIUM 5 MG/1
2.5 TABLET ORAL
Qty: 30 TABLET | Refills: 2 | Status: SHIPPED | OUTPATIENT
Start: 2022-07-29 | End: 2022-08-02

## 2022-07-29 NOTE — PROGRESS NOTES
Established Patient        Chief Complaint:   Chief Complaint   Patient presents with   • Follow-up   • Diabetes        Shannon Servin is a 72 y.o. female    History of Present Illness:   Answers for HPI/ROS submitted by the patient on 7/22/2022  Please describe your symptoms.: follow up apt.  Have you had these symptoms before?: No  How long have you been having these symptoms?: Greater than 2 weeks  Please list any medications you are currently taking for this condition.: n/a  Please describe any probable cause for these symptoms. : n/a  What is the primary reason for your visit?: Other    Here today in scheduled follow-up visit of her diabetes mellitus with associated amyotrophy, GERD, paroxysmal atrial fibrillation and chronic anticoagulation.    The cost of her anticoagulation previously used, Eliquis, has doubled in price and is unaffordable.  She discussed this with her cardiologist, recommended to transition to Xarelto.  Unfortunately, Xarelto is also the same increased price and unable to be 40.  She was subsequently transitioned to warfarin approximately 5 days ago.  Initial dosing was 5 mg, however it was subsequently decreased to 2.5 mg daily 2 days later.  Her first INR was subtherapeutic, she denies any hemoptysis/epistaxis/hematuria/BRB/BTS.    She denies aspiration or dysphagia.  Maintains a balanced dietary intake, denies cyclical/persistent hypoglycemic episodes.        Subjective     The following portions of the patient's history were reviewed and updated as appropriate: allergies, current medications, past family history, past medical history, past social history, past surgical history and problem list.    Allergies   Allergen Reactions   • Metformin Hallucinations   • Codeine Itching     Review of Systems:    1. Constitutional: Negative for fever. Negative for chills, diaphoresis; malaise/fatigue of chronic nature, and without unexpected weight change.   2. HENT: No  "dysphagia; no changes to vision/hearing/smell/taste; no epistaxis  3. Eyes: Negative for redness and visual disturbance.   4. Respiratory: Chronic cough, no hemoptysis reported.  Productive of clear/yellow phlegm at times.  She does report wheezing and coughing with laughing or prolonged talking, as well as with cold air exposure.  5. Cardiovascular: Negative for chest pain and palpitations.   6. Gastrointestinal: As per above.  7. Endocrine: Negative for cold intolerance and heat intolerance.   8. Genitourinary: Negative for difficulty urinating, dysuria and frequency.   9. Musculoskeletal: Negative for arthralgias, back pain and myalgias.   10. Skin: She reports thickened/yellow discoloration to the numerous toes of bilateral feet, worse to the bilateral great toes.  11. Neurological: Negative for syncope, weakness.  As per above otherwise.  12. Hematological: Negative for adenopathy. Does not bruise/bleed easily.   13. Psychiatric/Behavioral: Negative for confusion. The patient is not nervous/anxious.    Objective     Physical Exam   Vital Signs: /64   Pulse 73   Temp 97.5 °F (36.4 °C)   Resp 15   Ht 172.7 cm (68\")   Wt 101 kg (222 lb)   SpO2 93%   BMI 33.75 kg/m²     General Appearance: alert, oriented x 3, no acute distress.  Pleasant and interactive during questioning/examination.  Well-nourished and developed female.  Skin: warm and dry.  Changes to bilateral feet and ankles consistent with tinea, signs of excoriation noted, some hyperkeratotic changes additionally.  Stasis dermatitis noted additionally.  Thickened/yellowish discoloration to bilateral toenails, worse to the great toenails.  HEENT: Atraumatic.  pupils round and reactive to light and accommodation, oral mucosa pink and moist.  Nares patent without epistaxis.  External auditory canals are patent tympanic membranes intact.  Neck: supple, no JVD, trachea midline.  No thyromegaly  Lungs: CTA, unlabored breathing effort.  Heart: RRR, " normal S1 and S2, no S3, no rub.  Abdomen: soft, non-tender, no palpable bladder, present bowel sounds to auscultation ×4.  No guarding or rigidity.  Extremities: no clubbing, cyanosis.  Good range of motion actively and passively.  Symmetric muscle strength and development.  Nonpitting edematous changes noted to the mid tibias bilaterally, brawny changes associated with venous stasis, chronic in nature.  Ambulates independently.  Neuro: normal speech and mental status.  Cranial nerves II through XII intact.  No anosmia. DTR 2+; proprioception intact.  No focal motor/sensory deficits.    Assessment and Plan      Assessment/Plan:   Diagnoses and all orders for this visit:    1. Type 2 diabetes mellitus treated without insulin (HCC) (Primary)  -     POC Glycosylated Hemoglobin (Hb A1C)    2. Chronic anticoagulation  -     POC Protime / INR  -     POCT INR  -     warfarin (COUMADIN) 5 MG tablet; Take 0.5 tablets by mouth Daily.  Dispense: 30 tablet; Refill: 2    3. Paroxysmal atrial fibrillation (HCC)  -     warfarin (COUMADIN) 5 MG tablet; Take 0.5 tablets by mouth Daily.  Dispense: 30 tablet; Refill: 2    4. Diabetic amyotrophy associated with type 2 diabetes mellitus (HCC)    5. GERD without esophagitis  -     sucralfate (Carafate) 1 g tablet; Take 1 tablet by mouth 3 (Three) Times a Day Before Meals.  Dispense: 56 tablet; Refill: 0      I have added a 14-day course of Carafate secondary to worsened reflux symptoms, has a history of gastritis/peptic ulcer disease.  Advised that she notify the office should she develop any ill effects from the medication.  She has utilized this in the past with good results.    Hemoglobin A1c demonstrates continued excellent blood glucose control.  I stressed the importance of adequate hydration, as well as the need to avoid prolonged fasting periods.  Continue healthy dietary choices.    INR today is supratherapeutic; dosing adjustment has been made to include 2.5 mg daily dosing.   She will return to the clinic in approximately 4 days for repeat INR.  This can be done through fingerstick analyzer.    Vital signs demonstrate hemodynamic stability.  Blood pressure and heart rate are at goal.    Discussion Summary:    Discussed plan of care in detail with pt today; pt verb understanding and agrees.    I spent 30 minutes caring for Shannon on this date of service. This time includes time spent by me in the following activities:preparing for the visit, performing a medically appropriate examination and/or evaluation , counseling and educating the patient/family/caregiver, ordering medications, tests, or procedures, documenting information in the medical record, independently interpreting results and communicating that information with the patient/family/caregiver and care coordination    I have reviewed and updated all copied forward information, as appropriate.  I attest to the accuracy and relevance of any unchanged information.    Follow up:  No follow-ups on file.     There are no Patient Instructions on file for this visit.    Humberto Wells DO  07/29/22  16:04 EDT        Please note that portions of this note may have been completed with a voice recognition program. Efforts were made to edit the dictations, but occasionally words are mistranscribed.

## 2022-08-01 ENCOUNTER — TELEPHONE (OUTPATIENT)
Dept: FAMILY MEDICINE CLINIC | Facility: CLINIC | Age: 72
End: 2022-08-01

## 2022-08-01 ENCOUNTER — TELEPHONE (OUTPATIENT)
Dept: PHARMACY | Facility: HOSPITAL | Age: 72
End: 2022-08-01

## 2022-08-01 NOTE — TELEPHONE ENCOUNTER
Patient called to report Dr Wells provided patient with warfarin dosing and she prefers he manage INR moving forward. LVM at [phone] to confirm with their office

## 2022-08-02 ENCOUNTER — ANTICOAGULATION VISIT (OUTPATIENT)
Dept: FAMILY MEDICINE CLINIC | Facility: CLINIC | Age: 72
End: 2022-08-02

## 2022-08-02 DIAGNOSIS — I48.0 PAROXYSMAL ATRIAL FIBRILLATION: Primary | ICD-10-CM

## 2022-08-02 LAB — INR PPP: 4.8 (ref 0.9–1.1)

## 2022-08-02 PROCEDURE — 36416 COLLJ CAPILLARY BLOOD SPEC: CPT | Performed by: FAMILY MEDICINE

## 2022-08-02 PROCEDURE — 85610 PROTHROMBIN TIME: CPT | Performed by: FAMILY MEDICINE

## 2022-08-02 RX ORDER — WARFARIN SODIUM 2.5 MG/1
TABLET ORAL
Qty: 30 TABLET | Refills: 2 | Status: SHIPPED | OUTPATIENT
Start: 2022-08-02 | End: 2022-10-03 | Stop reason: ALTCHOICE

## 2022-08-02 NOTE — TELEPHONE ENCOUNTER
Dr. Shirley     Shannon [place] notified us that her warfarin therapy is now being managed by Dr. Wells. We received verbal confirmation from Annabel at Dr. Wells's office, so at this time, we will defer management to them, per patient request.       If we can be of any further assistance, please let us know. We will otherwise discharge Shannon Servin from our services.    Thank you for allowing us to participate in this patient's care.     Sincerely,  [facility] Anticoagulation [facility] Team  [phone]

## 2022-08-02 NOTE — TELEPHONE ENCOUNTER
Confirmed per Annabel that Dr Wells will be managing INR/warfarin dosing. Will close episode at this time

## 2022-08-08 DIAGNOSIS — R60.0 BILATERAL LOWER EXTREMITY EDEMA: ICD-10-CM

## 2022-08-08 RX ORDER — SPIRONOLACTONE 25 MG/1
25 TABLET ORAL DAILY
Qty: 30 TABLET | Refills: 0 | Status: CANCELLED | OUTPATIENT
Start: 2022-08-08

## 2022-08-08 RX ORDER — TIZANIDINE 4 MG/1
4 TABLET ORAL NIGHTLY PRN
Qty: 90 TABLET | Refills: 1 | Status: SHIPPED | OUTPATIENT
Start: 2022-08-08 | End: 2023-02-10

## 2022-08-08 RX ORDER — FUROSEMIDE 20 MG/1
20 TABLET ORAL 2 TIMES DAILY
Qty: 60 TABLET | Refills: 11 | Status: SHIPPED | OUTPATIENT
Start: 2022-08-08 | End: 2023-02-15

## 2022-08-08 NOTE — TELEPHONE ENCOUNTER
Caller: Shannon Servin    Relationship: Self    Best call back number:[phone]    Requested Prescriptions:   Requested Prescriptions     Pending Prescriptions Disp Refills   • tiZANidine (ZANAFLEX) 4 MG tablet       Sig: Take 1.5 tablets by mouth At Night As Needed for Muscle Spasms.        Pharmacy where request should be sent: [facility] - [place] [address] - [phone]  - [phone] FX     Additional details provided by patient: patient has been out for 2 days    Does the patient have less than a 3 day supply:  [x] Yes  [] No    Toni Cooney Rep   08/08/22 09:44 EDT

## 2022-08-09 ENCOUNTER — TELEPHONE (OUTPATIENT)
Dept: FAMILY MEDICINE CLINIC | Facility: CLINIC | Age: 72
End: 2022-08-09

## 2022-08-09 ENCOUNTER — CLINICAL SUPPORT (OUTPATIENT)
Dept: FAMILY MEDICINE CLINIC | Facility: CLINIC | Age: 72
End: 2022-08-09

## 2022-08-09 DIAGNOSIS — K21.9 GERD WITHOUT ESOPHAGITIS: ICD-10-CM

## 2022-08-09 LAB — INR PPP: 3 (ref 0.9–1.1)

## 2022-08-09 RX ORDER — SPIRONOLACTONE 25 MG/1
25 TABLET ORAL DAILY
Qty: 30 TABLET | Refills: 11 | Status: SHIPPED | OUTPATIENT
Start: 2022-08-09

## 2022-08-09 RX ORDER — SUCRALFATE 1 G/1
1 TABLET ORAL
Qty: 56 TABLET | Refills: 0 | Status: SHIPPED | OUTPATIENT
Start: 2022-08-09 | End: 2022-10-12

## 2022-08-19 ENCOUNTER — TELEPHONE (OUTPATIENT)
Dept: CARDIOLOGY | Facility: CLINIC | Age: 72
End: 2022-08-19

## 2022-08-19 NOTE — TELEPHONE ENCOUNTER
Pt notified and asked if there was anything we can do for her insurance to not flag her having CHF. Pt was told to call her insurance and speak with them and if they needed anything through us to notify us

## 2022-08-19 NOTE — TELEPHONE ENCOUNTER
----- Message from Shannon Servin sent at 8/18/2022  7:17 PM EDT -----  Regarding: Congestive heart failure  Dr. Shirley, I have contacted a new  to address my medication problem, and he says the medications you currently have me on, flag me as having congestive heart failure. Is this true?  Do I have congestive heart failure? If not is there something you can do to clarify my diagnosis so they will stop flagging me as having it?  Any help would be greatly appreciated. Thanks, Shannon Servin

## 2022-09-07 RX ORDER — AMLODIPINE BESYLATE 5 MG/1
5 TABLET ORAL DAILY
Qty: 90 TABLET | Refills: 0 | Status: SHIPPED | OUTPATIENT
Start: 2022-09-07 | End: 2022-12-12

## 2022-09-07 NOTE — TELEPHONE ENCOUNTER
Caller: EthanMarvin bowsera    Relationship: Self    Best call back number: [phone]    Requested Prescriptions:   Requested Prescriptions     Pending Prescriptions Disp Refills   • amLODIPine (NORVASC) 5 MG tablet 90 tablet 0     Sig: Take 1 tablet by mouth Daily.        Pharmacy where request should be sent: [facility] - [place] [address] - [phone]  - [phone]     Additional details provided by patient:   PATIENT IS COMPLETELY OUT OF MEDICATION     Does the patient have less than a 3 day supply:  [x] Yes  [] No    Toni Montes Rep   09/07/22 12:36 EDT

## 2022-09-12 ENCOUNTER — OFFICE VISIT (OUTPATIENT)
Dept: FAMILY MEDICINE CLINIC | Facility: CLINIC | Age: 72
End: 2022-09-12

## 2022-09-12 VITALS
HEIGHT: 68 IN | OXYGEN SATURATION: 95 % | TEMPERATURE: 97.8 F | HEART RATE: 75 BPM | DIASTOLIC BLOOD PRESSURE: 76 MMHG | SYSTOLIC BLOOD PRESSURE: 124 MMHG | BODY MASS INDEX: 33.75 KG/M2 | RESPIRATION RATE: 15 BRPM

## 2022-09-12 DIAGNOSIS — G89.29 CHRONIC GENERALIZED ABDOMINAL PAIN: ICD-10-CM

## 2022-09-12 DIAGNOSIS — R10.84 CHRONIC GENERALIZED ABDOMINAL PAIN: ICD-10-CM

## 2022-09-12 DIAGNOSIS — E11.44 DIABETIC AMYOTROPHY ASSOCIATED WITH TYPE 2 DIABETES MELLITUS: ICD-10-CM

## 2022-09-12 DIAGNOSIS — Z79.01 CHRONIC ANTICOAGULATION: Chronic | ICD-10-CM

## 2022-09-12 DIAGNOSIS — I48.0 PAROXYSMAL ATRIAL FIBRILLATION: ICD-10-CM

## 2022-09-12 DIAGNOSIS — I10 ESSENTIAL HYPERTENSION: ICD-10-CM

## 2022-09-12 DIAGNOSIS — J44.9 CHRONIC OBSTRUCTIVE BRONCHITIS: ICD-10-CM

## 2022-09-12 DIAGNOSIS — I73.9 PERIPHERAL VASCULAR DISEASE: ICD-10-CM

## 2022-09-12 DIAGNOSIS — M15.0 PRIMARY GENERALIZED (OSTEO)ARTHRITIS: ICD-10-CM

## 2022-09-12 DIAGNOSIS — E11.9 TYPE 2 DIABETES MELLITUS TREATED WITHOUT INSULIN: Primary | ICD-10-CM

## 2022-09-12 PROBLEM — Z87.01 PERSONAL HISTORY OF PNEUMONIA (RECURRENT): Status: RESOLVED | Noted: 2020-01-01 | Resolved: 2022-09-12

## 2022-09-12 LAB — INR PPP: 2.5 (ref 0.9–1.1)

## 2022-09-12 PROCEDURE — 99214 OFFICE O/P EST MOD 30 MIN: CPT | Performed by: FAMILY MEDICINE

## 2022-09-12 PROCEDURE — 36416 COLLJ CAPILLARY BLOOD SPEC: CPT | Performed by: FAMILY MEDICINE

## 2022-09-12 PROCEDURE — 85610 PROTHROMBIN TIME: CPT | Performed by: FAMILY MEDICINE

## 2022-09-12 NOTE — PROGRESS NOTES
Established Patient        Chief Complaint:   Chief Complaint   Patient presents with   • Follow-up   • Diabetes        Shannon Servin is a 72 y.o. female    History of Present Illness:   Answers for HPI/ROS submitted by the patient on 9/5/2022  What is the primary reason for your visit?: Abdominal Pain  Chronicity: recurrent  Onset: more than 1 year ago  Onset quality: gradual  Frequency: daily  Episode duration: 6 hours  Progression since onset: gradually worsening  Pain location: periumbilical region, suprapubic region  Pain - numeric: 9/10  Pain quality: burning  Radiates to: periumbilical region, suprapubic region  anorexia: No  arthralgias: Yes  belching: Yes  constipation: Yes  diarrhea: Yes  dysuria: No  fever: No  flatus: No  frequency: Yes  headaches: Yes  hematochezia: No  hematuria: No  melena: No  myalgias: No  nausea: Yes  weight loss: No  Aggravated by: being still, certain positions, movement  Relieved by: nothing  Diagnostic workup: lower endoscopy    Here today in follow-up of her diabetes mellitus, hypertension, paroxysmal atrial fibrillation, chronic anticoagulation, generalized osteoarthritis, diabetic amyotrophy, chronic obstructive bronchitis.    Patient continues to have generalized abdominal pain, worsened with food intake.  It is diffuse in nature, severe and limiting of her dietary intake at times.  She denies any bowel changes, no reported BRB/BTS.  Denies any hematuria or dysuria.    She denies cyclical/persistent hypoglycemic episodes.    Denies epistaxis, hemoptysis or hematuria.        Subjective     The following portions of the patient's history were reviewed and updated as appropriate: allergies, current medications, past family history, past medical history, past social history, past surgical history and problem list.    Allergies   Allergen Reactions   • Metformin Hallucinations   • Codeine Itching     Review of Systems:    Constitutional: Negative for fever.  "Negative for chills, diaphoresis; malaise/fatigue of chronic nature, and without unexpected weight change.   HENT: No dysphagia; no changes to vision/hearing/smell/taste; no epistaxis  Eyes: Negative for redness and visual disturbance.   Respiratory: Chronic cough, no hemoptysis reported.  Productive of clear/yellow phlegm at times.  She does report wheezing and coughing with laughing or prolonged talking, as well as with cold air exposure.  Cardiovascular: Negative for chest pain and palpitations.   Gastrointestinal: As per above.  Endocrine: Negative for cold intolerance and heat intolerance.   Genitourinary: Negative for difficulty urinating, dysuria and frequency.   Musculoskeletal: Negative for arthralgias, back pain and myalgias.   Skin: She reports thickened/yellow discoloration to the numerous toes of bilateral feet, worse to the bilateral great toes.  Neurological: Negative for syncope, weakness.  As per above otherwise.  Hematological: Negative for adenopathy. Does not bruise/bleed easily.   Psychiatric/Behavioral: Negative for confusion. The patient is not nervous/anxious.    Objective     Physical Exam   Vital Signs: /76   Pulse 75   Temp 97.8 °F (36.6 °C)   Resp 15   Ht 172.7 cm (68\")   SpO2 95%   BMI 33.75 kg/m²     General Appearance: alert, oriented x 3, no acute distress.  Pleasant and interactive during questioning/examination.  Well-nourished and developed female.  Skin: warm and dry.  Changes to bilateral feet and ankles consistent with tinea, signs of excoriation noted, some hyperkeratotic changes additionally.  Stasis dermatitis noted additionally.  Thickened/yellowish discoloration to bilateral toenails, worse to the great toenails.  HEENT: Atraumatic.  pupils round and reactive to light and accommodation, oral mucosa pink and moist.  Nares patent without epistaxis.  External auditory canals are patent tympanic membranes intact.  Neck: supple, no JVD, trachea midline.  No " thyromegaly  Lungs: CTA, unlabored breathing effort.  Heart: RRR, normal S1 and S2, no S3, no rub.  Abdomen: soft, non-tender, no palpable bladder, present bowel sounds to auscultation ×4.  No guarding or rigidity.  No CVA tenderness.  Extremities: no clubbing, cyanosis.  Good range of motion actively and passively.  Symmetric muscle strength and development.  Nonpitting edematous changes noted to the mid tibias bilaterally, brawny changes associated with venous stasis, chronic in nature.  Ambulates independently.  Neuro: normal speech and mental status.  Cranial nerves II through XII intact.  No anosmia. DTR 2+; proprioception intact.  No focal motor/sensory deficits.    Assessment and Plan      Assessment/Plan:   Diagnoses and all orders for this visit:    1. Type 2 diabetes mellitus treated without insulin (HCC) (Primary)  -     CT Angio Abdominal Aorta Bilateral Iliofem Runoff; Future    2. Essential hypertension  -     CT Angio Abdominal Aorta Bilateral Iliofem Runoff; Future    3. Paroxysmal atrial fibrillation (HCC)    4. Chronic anticoagulation  -     POCT INR    5. Primary generalized (osteo)arthritis    6. 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 Chronic obstructive bronchitis (HCC)    8. Chronic generalized abdominal pain  -     CT Angio Abdominal Aorta Bilateral Iliofem Runoff; Future    9. Peripheral vascular disease (HCC)  -     CT Angio Abdominal Aorta Bilateral Iliofem Runoff; Future      I strongly suspect peripheral vascular disease is the contributing cause to her generalized abdominal pain given her numerous comorbid risk factors.  I have recommended a CT angiogram/aortogram with bilateral iliofemoral runoff.  Plan to follow clinically, referral to vascular surgery if indicated.    Her INR is therapeutic at 2.5.  We are hopeful that she can receive patient assistance which would allow for transition to Eliquis or Xarelto.    Vital signs demonstrate hemodynamic  stability, blood pressure is at goal.  Heart rate well controlled.    Continue avoidance of prolonged fasting periods.  Last hemoglobin A1c demonstrated excellent blood glucose control.  This will be repeated at follow-up visit.    Demonstrates no increased work of breathing today.    Discussion Summary:    Discussed plan of care in detail with pt today; pt verb understanding and agrees.    I spent 30 minutes caring for Shannon on this date of service. This time includes time spent by me in the following activities:preparing for the visit, performing a medically appropriate examination and/or evaluation , counseling and educating the patient/family/caregiver, ordering medications, tests, or procedures, documenting information in the medical record and care coordination    I have reviewed and updated all copied forward information, as appropriate.  I attest to the accuracy and relevance of any unchanged information.    Follow up:  No follow-ups on file.     There are no Patient Instructions on file for this visit.    Humberto Wells DO  09/12/22  17:31 EDT        Please note that portions of this note may have been completed with a voice recognition program. Efforts were made to edit the dictations, but occasionally words are mistranscribed.

## 2022-09-13 ENCOUNTER — HOSPITAL ENCOUNTER (OUTPATIENT)
Dept: CT IMAGING | Facility: HOSPITAL | Age: 72
Discharge: HOME | End: 2022-09-13
Admitting: FAMILY MEDICINE

## 2022-09-13 DIAGNOSIS — E11.9 TYPE 2 DIABETES MELLITUS TREATED WITHOUT INSULIN: ICD-10-CM

## 2022-09-13 DIAGNOSIS — I10 ESSENTIAL HYPERTENSION: ICD-10-CM

## 2022-09-13 DIAGNOSIS — G89.29 CHRONIC GENERALIZED ABDOMINAL PAIN: ICD-10-CM

## 2022-09-13 DIAGNOSIS — I73.9 PERIPHERAL VASCULAR DISEASE: ICD-10-CM

## 2022-09-13 DIAGNOSIS — R10.84 CHRONIC GENERALIZED ABDOMINAL PAIN: ICD-10-CM

## 2022-09-13 PROCEDURE — 75635 CT ANGIO ABDOMINAL ARTERIES: CPT

## 2022-09-13 PROCEDURE — 25010000002 IOPAMIDOL 61 % SOLUTION: Performed by: FAMILY MEDICINE

## 2022-09-13 RX ADMIN — IOPAMIDOL 100 ML: 612 INJECTION, SOLUTION INTRAVENOUS at 17:37

## 2022-09-26 RX ORDER — OXYBUTYNIN CHLORIDE 15 MG/1
15 TABLET, EXTENDED RELEASE ORAL DAILY
Qty: 90 TABLET | Refills: 0 | Status: SHIPPED | OUTPATIENT
Start: 2022-09-26 | End: 2022-12-29

## 2022-09-26 RX ORDER — OXYBUTYNIN CHLORIDE 15 MG/1
15 TABLET, EXTENDED RELEASE ORAL DAILY
Qty: 1080 TABLET | Refills: 0 | Status: SHIPPED | OUTPATIENT
Start: 2022-09-26 | End: 2022-09-26

## 2022-09-28 ENCOUNTER — HOSPITAL ENCOUNTER (EMERGENCY)
Facility: HOSPITAL | Age: 72
Discharge: HOME | End: 2022-09-28
Attending: EMERGENCY MEDICINE | Admitting: FAMILY MEDICINE

## 2022-09-28 ENCOUNTER — APPOINTMENT (OUTPATIENT)
Dept: CT IMAGING | Facility: HOSPITAL | Age: 72
End: 2022-09-28

## 2022-09-28 VITALS
DIASTOLIC BLOOD PRESSURE: 69 MMHG | BODY MASS INDEX: 34.1 KG/M2 | HEIGHT: 68 IN | HEART RATE: 73 BPM | OXYGEN SATURATION: 98 % | SYSTOLIC BLOOD PRESSURE: 130 MMHG | WEIGHT: 225 LBS | TEMPERATURE: 98 F | RESPIRATION RATE: 16 BRPM

## 2022-09-28 DIAGNOSIS — S09.90XA INJURY OF HEAD, INITIAL ENCOUNTER: Primary | ICD-10-CM

## 2022-09-28 DIAGNOSIS — S39.92XA INJURY OF BACK, INITIAL ENCOUNTER: ICD-10-CM

## 2022-09-28 DIAGNOSIS — G89.29 CHRONIC GENERALIZED ABDOMINAL PAIN: Primary | ICD-10-CM

## 2022-09-28 DIAGNOSIS — K55.1: Chronic | ICD-10-CM

## 2022-09-28 DIAGNOSIS — R10.84 CHRONIC GENERALIZED ABDOMINAL PAIN: Primary | ICD-10-CM

## 2022-09-28 DIAGNOSIS — S70.00XA CONTUSION OF HIP, UNSPECIFIED LATERALITY, INITIAL ENCOUNTER: ICD-10-CM

## 2022-09-28 LAB
INR PPP: 8.5 (ref 0.9–1.1)
PROTHROMBIN TIME: 73.8 SECONDS (ref 12.5–14.5)

## 2022-09-28 PROCEDURE — 72125 CT NECK SPINE W/O DYE: CPT

## 2022-09-28 PROCEDURE — 72192 CT PELVIS W/O DYE: CPT

## 2022-09-28 PROCEDURE — 25010000002 MORPHINE PER 10 MG: Performed by: EMERGENCY MEDICINE

## 2022-09-28 PROCEDURE — 36415 COLL VENOUS BLD VENIPUNCTURE: CPT

## 2022-09-28 PROCEDURE — 72131 CT LUMBAR SPINE W/O DYE: CPT

## 2022-09-28 PROCEDURE — 72128 CT CHEST SPINE W/O DYE: CPT

## 2022-09-28 PROCEDURE — 85610 PROTHROMBIN TIME: CPT | Performed by: EMERGENCY MEDICINE

## 2022-09-28 PROCEDURE — 99283 EMERGENCY DEPT VISIT LOW MDM: CPT

## 2022-09-28 PROCEDURE — 99284 EMERGENCY DEPT VISIT MOD MDM: CPT

## 2022-09-28 PROCEDURE — 70450 CT HEAD/BRAIN W/O DYE: CPT

## 2022-09-28 PROCEDURE — 96372 THER/PROPH/DIAG INJ SC/IM: CPT

## 2022-09-28 RX ORDER — MORPHINE SULFATE 4 MG/ML
4 INJECTION, SOLUTION INTRAMUSCULAR; INTRAVENOUS ONCE
Status: COMPLETED | OUTPATIENT
Start: 2022-09-28 | End: 2022-09-28

## 2022-09-28 RX ORDER — MORPHINE SULFATE 4 MG/ML
4 INJECTION, SOLUTION INTRAMUSCULAR; INTRAVENOUS ONCE
Status: DISCONTINUED | OUTPATIENT
Start: 2022-09-28 | End: 2022-09-28

## 2022-09-28 RX ADMIN — MORPHINE SULFATE 4 MG: 4 INJECTION, SOLUTION INTRAMUSCULAR; INTRAVENOUS at 19:00

## 2022-09-30 DIAGNOSIS — E11.44 DIABETIC AMYOTROPHY ASSOCIATED WITH TYPE 2 DIABETES MELLITUS: ICD-10-CM

## 2022-10-03 ENCOUNTER — ANTICOAGULATION VISIT (OUTPATIENT)
Dept: FAMILY MEDICINE CLINIC | Facility: CLINIC | Age: 72
End: 2022-10-03

## 2022-10-03 DIAGNOSIS — Z79.01 CHRONIC ANTICOAGULATION: Primary | ICD-10-CM

## 2022-10-03 LAB — INR PPP: 8 (ref 0.9–1.1)

## 2022-10-03 PROCEDURE — 85610 PROTHROMBIN TIME: CPT | Performed by: FAMILY MEDICINE

## 2022-10-03 PROCEDURE — 36416 COLLJ CAPILLARY BLOOD SPEC: CPT | Performed by: FAMILY MEDICINE

## 2022-10-03 RX ORDER — TRAMADOL HYDROCHLORIDE 50 MG/1
50 TABLET ORAL EVERY 6 HOURS PRN
Qty: 30 TABLET | Refills: 0 | Status: SHIPPED | OUTPATIENT
Start: 2022-10-03

## 2022-10-03 RX ORDER — PHYTONADIONE 5 MG/1
5 TABLET ORAL ONCE
Qty: 1 TABLET | Refills: 0 | Status: SHIPPED | OUTPATIENT
Start: 2022-10-03 | End: 2022-10-03

## 2022-10-06 ENCOUNTER — ANTICOAGULATION VISIT (OUTPATIENT)
Dept: FAMILY MEDICINE CLINIC | Facility: CLINIC | Age: 72
End: 2022-10-06

## 2022-10-06 DIAGNOSIS — E11.65 TYPE 2 DIABETES MELLITUS WITH HYPERGLYCEMIA, UNSPECIFIED WHETHER LONG TERM INSULIN USE: Primary | ICD-10-CM

## 2022-10-06 DIAGNOSIS — Z79.01 CHRONIC ANTICOAGULATION: Chronic | ICD-10-CM

## 2022-10-06 DIAGNOSIS — I48.0 PAROXYSMAL ATRIAL FIBRILLATION: Primary | ICD-10-CM

## 2022-10-06 LAB — INR PPP: 1.6 (ref 0.9–1.1)

## 2022-10-06 PROCEDURE — 36416 COLLJ CAPILLARY BLOOD SPEC: CPT | Performed by: FAMILY MEDICINE

## 2022-10-06 PROCEDURE — 85610 PROTHROMBIN TIME: CPT | Performed by: FAMILY MEDICINE

## 2022-10-11 ENCOUNTER — OFFICE VISIT (OUTPATIENT)
Dept: FAMILY MEDICINE CLINIC | Facility: CLINIC | Age: 72
End: 2022-10-11

## 2022-10-11 VITALS
DIASTOLIC BLOOD PRESSURE: 70 MMHG | SYSTOLIC BLOOD PRESSURE: 110 MMHG | OXYGEN SATURATION: 98 % | WEIGHT: 227 LBS | HEART RATE: 74 BPM | TEMPERATURE: 98.4 F | HEIGHT: 68 IN | BODY MASS INDEX: 34.4 KG/M2

## 2022-10-11 DIAGNOSIS — N39.0 RECURRENT UTI: ICD-10-CM

## 2022-10-11 DIAGNOSIS — I48.0 PAROXYSMAL ATRIAL FIBRILLATION: ICD-10-CM

## 2022-10-11 DIAGNOSIS — J44.9 CHRONIC OBSTRUCTIVE BRONCHITIS: ICD-10-CM

## 2022-10-11 DIAGNOSIS — J30.9 CHRONIC ALLERGIC RHINITIS: Chronic | ICD-10-CM

## 2022-10-11 DIAGNOSIS — N39.46 MIXED URGE AND STRESS INCONTINENCE: ICD-10-CM

## 2022-10-11 DIAGNOSIS — K55.1: Chronic | ICD-10-CM

## 2022-10-11 DIAGNOSIS — Z23 NEED FOR INFLUENZA VACCINATION: ICD-10-CM

## 2022-10-11 DIAGNOSIS — E11.9 TYPE 2 DIABETES MELLITUS TREATED WITHOUT INSULIN: Primary | ICD-10-CM

## 2022-10-11 DIAGNOSIS — Z79.01 CHRONIC ANTICOAGULATION: Chronic | ICD-10-CM

## 2022-10-11 DIAGNOSIS — G89.29 CHRONIC GENERALIZED ABDOMINAL PAIN: ICD-10-CM

## 2022-10-11 DIAGNOSIS — I10 ESSENTIAL HYPERTENSION: ICD-10-CM

## 2022-10-11 DIAGNOSIS — R10.84 CHRONIC GENERALIZED ABDOMINAL PAIN: ICD-10-CM

## 2022-10-11 LAB
EXPIRATION DATE: NORMAL
HBA1C MFR BLD: 6.5 %
Lab: NORMAL

## 2022-10-11 PROCEDURE — G0008 ADMIN INFLUENZA VIRUS VAC: HCPCS | Performed by: FAMILY MEDICINE

## 2022-10-11 PROCEDURE — 3044F HG A1C LEVEL LT 7.0%: CPT | Performed by: FAMILY MEDICINE

## 2022-10-11 PROCEDURE — 90662 IIV NO PRSV INCREASED AG IM: CPT | Performed by: FAMILY MEDICINE

## 2022-10-11 PROCEDURE — 99214 OFFICE O/P EST MOD 30 MIN: CPT | Performed by: FAMILY MEDICINE

## 2022-10-11 RX ORDER — ONDANSETRON 4 MG/1
4 TABLET, FILM COATED ORAL EVERY 8 HOURS PRN
Qty: 180 TABLET | Refills: 2 | Status: SHIPPED | OUTPATIENT
Start: 2022-10-11 | End: 2022-10-14 | Stop reason: SDUPTHER

## 2022-10-11 RX ORDER — AZELASTINE HYDROCHLORIDE, FLUTICASONE PROPIONATE 137; 50 UG/1; UG/1
2 SPRAY, METERED NASAL DAILY
Qty: 23 G | Refills: 3 | Status: SHIPPED | OUTPATIENT
Start: 2022-10-11 | End: 2022-10-25

## 2022-10-11 NOTE — PROGRESS NOTES
Established Patient        Chief Complaint:   Chief Complaint   Patient presents with   • Pain     S/p 9/28/2022   • Sinus Problem        Shannon Servin is a 72 y.o. female    History of Present Illness:       Here today in scheduled follow-up of her diabetes mellitus, paroxysmal atrial fibrillation, chronic anticoagulation, chronic struct of bronchitis, hypertension, chronic allergic rhinitis, recurrent UTI and mixed urge/stress incontinence.    Recently evaluated for chronic generalized abdominal pain, vascular evaluation of her abdomen demonstrated significant inferior mesenteric artery insufficiency.  She has been referred to vascular surgery, has an appointment scheduled for next week.  She is currently utilizing dietary modifications to aid in symptom management.    She denies any cyclical/persistent hypoglycemic episodes.  Blood glucose control continues to be excellent on home monitoring.    She denies chest pain, syncope, palpitations or vertigo.  She has had a worsening of her upper airway congestion, including nasal congestion during the changing of the seasons, typically occurs this time a year for her.  She denies any epistaxis or hemoptysis.  Denies any fever, chills or night sweats.  Denies any known sick contacts.        Subjective     The following portions of the patient's history were reviewed and updated as appropriate: allergies, current medications, past family history, past medical history, past social history, past surgical history and problem list.    Allergies   Allergen Reactions   • Metformin Hallucinations   • Codeine Itching     Review of Systems:    1. Constitutional: Negative for fever. Negative for chills, diaphoresis; malaise/fatigue of chronic nature, and without unexpected weight change.   2. HENT: No dysphagia; no changes to vision/hearing/smell/taste; no epistaxis  3. Eyes: Negative for redness and visual disturbance.   4. Respiratory: Chronic cough, no  "hemoptysis reported.  Productive of clear/yellow phlegm at times.  She does report wheezing and coughing with laughing or prolonged talking, as well as with cold air exposure.  5. Cardiovascular: Negative for chest pain and palpitations.   6. Gastrointestinal: As per above.  7. Endocrine: Negative for cold intolerance and heat intolerance.   8. Genitourinary: Negative for difficulty urinating, dysuria and frequency.   9. Musculoskeletal: Negative for arthralgias, back pain and myalgias.   10. Skin: She reports thickened/yellow discoloration to the numerous toes of bilateral feet, worse to the bilateral great toes.  11. Neurological: Negative for syncope, weakness.  As per above otherwise.  12. Hematological: Negative for adenopathy. Does not bruise/bleed easily.   13. Psychiatric/Behavioral: Negative for confusion. The patient is not nervous/anxious.    Objective     Physical Exam   Vital Signs: /70   Pulse 74   Temp 98.4 °F (36.9 °C)   Ht 172.7 cm (68\")   Wt 103 kg (227 lb)   SpO2 98%   BMI 34.52 kg/m²     General Appearance: alert, oriented x 3, no acute distress.  Pleasant and interactive during questioning/examination.  Well-nourished and developed female.  Skin: warm and dry.  Changes to bilateral feet and ankles consistent with tinea, signs of excoriation noted, some hyperkeratotic changes additionally.  Stasis dermatitis noted additionally.  Thickened/yellowish discoloration to bilateral toenails, worse to the great toenails.  HEENT: Atraumatic.  pupils round and reactive to light and accommodation, oral mucosa pink and moist.  Nares patent without epistaxis.  External auditory canals are patent tympanic membranes intact.  Neck: supple, no JVD, trachea midline.  No thyromegaly  Lungs: CTA, unlabored breathing effort.  Heart: RRR, normal S1 and S2, no S3, no rub.  Abdomen: soft, non-tender, no palpable bladder, present bowel sounds to auscultation ×4.  No guarding or rigidity.  No CVA " tenderness.  Extremities: no clubbing, cyanosis.  Good range of motion actively and passively.  Symmetric muscle strength and development.  Nonpitting edematous changes noted to the mid tibias bilaterally, brawny changes associated with venous stasis, chronic in nature.  Ambulates independently.  Neuro: normal speech and mental status.  Cranial nerves II through XII intact.  No anosmia. DTR 2+; proprioception intact.  No focal motor/sensory deficits.    Assessment and Plan      Assessment/Plan:   Diagnoses and all orders for this visit:    1. Type 2 diabetes mellitus treated without insulin (HCC) (Primary)  -     POC Glycosylated Hemoglobin (Hb A1C)    2. Paroxysmal atrial fibrillation ([facility])    3. Chronic anticoagulation    4. Chronic obstructive bronchitis ([facility])    5. Essential hypertension    6. Chronic generalized abdominal pain    7. Arteriosclerosis, mesenteric artery ([facility])    8. Need for influenza vaccination  -     Flu Vaccine High Dose PF 65YR+    9. Chronic allergic rhinitis  -     Azelastine-Fluticasone 137-50 MCG/ACT suspension; 2 sprays into the nostril(s) as directed by provider Daily.  Dispense: 23 g; Refill: 3    10. Recurrent UTI  -     Ambulatory Referral to Urology    11. Mixed urge and stress incontinence  -     Ambulatory Referral to Urology    Other orders  -     ondansetron (Zofran) 4 MG tablet; Take 1 tablet by mouth Every 8 (Eight) Hours As Needed for Nausea or Vomiting.  Dispense: 180 tablet; Refill: 2      Keep scheduled appt with Dr. Shah for eval of PVD of abd vasculature, mesenteric artery on 10/18/22.    Continue current insulin regimen.  Continue healthy dietary choices.  Hemoglobin A1c demonstrates continued excellent blood glucose control.  Continue home blood glucose monitoring.    VSS, appears HD asymptomatic; HR well controlled.    Referral to local urologist for establishment.    Flu vaccine administered today.    Adding azelastine to nasal steroid in an effort to improve her  chronic allergic rhinitis symptoms.    Continue Eliquis for anticoagulation.    Discussion Summary:    Discussed plan of care in detail with pt today; pt verb understanding and agrees.    I spent 35 minutes caring for Shannon on this date of service. This time includes time spent by me in the following activities:preparing for the visit, performing a medically appropriate examination and/or evaluation , counseling and educating the patient/family/caregiver, ordering medications, tests, or procedures, documenting information in the medical record and care coordination    I have reviewed and updated all copied forward information, as appropriate.  I attest to the accuracy and relevance of any unchanged information.    Follow up:  Return in about 3 months (around 1/11/2023) for Recheck, Med Change/New Meds.     There are no Patient Instructions on file for this visit.    Humberto Wells,   10/12/22  15:06 EDT        Please note that portions of this note may have been completed with a voice recognition program. Efforts were made to edit the dictations, but occasionally words are mistranscribed.

## 2022-10-14 RX ORDER — ONDANSETRON 4 MG/1
4 TABLET, FILM COATED ORAL EVERY 8 HOURS PRN
Qty: 180 TABLET | Refills: 2 | Status: SHIPPED | OUTPATIENT
Start: 2022-10-14

## 2022-10-25 ENCOUNTER — OFFICE VISIT (OUTPATIENT)
Dept: PULMONOLOGY | Facility: CLINIC | Age: 72
End: 2022-10-25

## 2022-10-25 VITALS
OXYGEN SATURATION: 95 % | WEIGHT: 225 LBS | TEMPERATURE: 97 F | HEART RATE: 75 BPM | HEIGHT: 68 IN | RESPIRATION RATE: 16 BRPM | SYSTOLIC BLOOD PRESSURE: 124 MMHG | BODY MASS INDEX: 34.1 KG/M2 | DIASTOLIC BLOOD PRESSURE: 78 MMHG

## 2022-10-25 DIAGNOSIS — J30.9 ALLERGIC RHINITIS, UNSPECIFIED SEASONALITY, UNSPECIFIED TRIGGER: ICD-10-CM

## 2022-10-25 DIAGNOSIS — G47.33 OBSTRUCTIVE SLEEP APNEA: Primary | ICD-10-CM

## 2022-10-25 DIAGNOSIS — R06.02 SHORTNESS OF BREATH: Primary | ICD-10-CM

## 2022-10-25 DIAGNOSIS — R06.02 SOB (SHORTNESS OF BREATH): ICD-10-CM

## 2022-10-25 DIAGNOSIS — E66.9 OBESITY (BMI 30-39.9): ICD-10-CM

## 2022-10-25 DIAGNOSIS — J44.9 CHRONIC OBSTRUCTIVE PULMONARY DISEASE, UNSPECIFIED COPD TYPE: ICD-10-CM

## 2022-10-25 PROBLEM — E66.2 MORBID (SEVERE) OBESITY WITH ALVEOLAR HYPOVENTILATION: Status: RESOLVED | Noted: 2020-01-01 | Resolved: 2022-10-25

## 2022-10-25 PROCEDURE — 99214 OFFICE O/P EST MOD 30 MIN: CPT | Performed by: NURSE PRACTITIONER

## 2022-10-25 PROCEDURE — 94060 EVALUATION OF WHEEZING: CPT | Performed by: INTERNAL MEDICINE

## 2022-10-25 PROCEDURE — 94726 PLETHYSMOGRAPHY LUNG VOLUMES: CPT | Performed by: INTERNAL MEDICINE

## 2022-10-25 PROCEDURE — 94729 DIFFUSING CAPACITY: CPT | Performed by: INTERNAL MEDICINE

## 2022-10-25 RX ORDER — FLUNISOLIDE 0.25 MG/ML
1 SOLUTION NASAL EVERY 12 HOURS
Qty: 75 ML | Refills: 1 | Status: SHIPPED | OUTPATIENT
Start: 2022-10-25 | End: 2023-02-15

## 2022-10-25 RX ORDER — LEVOCETIRIZINE DIHYDROCHLORIDE 5 MG/1
5 TABLET, FILM COATED ORAL EVERY EVENING
Qty: 90 TABLET | Refills: 1 | Status: SHIPPED | OUTPATIENT
Start: 2022-10-25 | End: 2023-02-15

## 2022-10-25 RX ORDER — AZELASTINE 1 MG/ML
1 SPRAY, METERED NASAL 2 TIMES DAILY PRN
Qty: 90 EACH | Refills: 1 | Status: SHIPPED | OUTPATIENT
Start: 2022-10-25 | End: 2023-02-15

## 2022-10-26 ENCOUNTER — PATIENT MESSAGE (OUTPATIENT)
Dept: FAMILY MEDICINE CLINIC | Facility: CLINIC | Age: 72
End: 2022-10-26

## 2022-10-26 RX ORDER — SOTALOL HYDROCHLORIDE 80 MG/1
80 TABLET ORAL EVERY 12 HOURS SCHEDULED
Qty: 60 TABLET | Refills: 0 | Status: CANCELLED | OUTPATIENT
Start: 2022-10-26

## 2022-10-26 RX ORDER — SOTALOL HYDROCHLORIDE 80 MG/1
80 TABLET ORAL EVERY 12 HOURS SCHEDULED
Qty: 180 TABLET | Refills: 1 | Status: SHIPPED | OUTPATIENT
Start: 2022-10-26

## 2022-10-26 NOTE — TELEPHONE ENCOUNTER
From: Shannon Servin  To: Humberto Wells DO  Sent: 10/26/2022 9:40 AM EDT  Subject: Refill of Sotalol    Can I please get a 90-day supply and get it filled at [facility] in [place], [place]? thanks

## 2022-11-07 ENCOUNTER — OFFICE VISIT (OUTPATIENT)
Dept: UROLOGY | Facility: CLINIC | Age: 72
End: 2022-11-07

## 2022-11-07 VITALS
HEIGHT: 68 IN | SYSTOLIC BLOOD PRESSURE: 130 MMHG | DIASTOLIC BLOOD PRESSURE: 68 MMHG | HEART RATE: 71 BPM | BODY MASS INDEX: 34.1 KG/M2 | RESPIRATION RATE: 12 BRPM | TEMPERATURE: 97.4 F | WEIGHT: 225 LBS | OXYGEN SATURATION: 99 %

## 2022-11-07 DIAGNOSIS — N39.490 OVERFLOW INCONTINENCE OF URINE: Primary | ICD-10-CM

## 2022-11-07 DIAGNOSIS — N39.0 RECURRENT UTI: ICD-10-CM

## 2022-11-07 LAB
BILIRUB BLD-MCNC: NEGATIVE MG/DL
CLARITY, POC: ABNORMAL
COLOR UR: YELLOW
EXPIRATION DATE: ABNORMAL
GLUCOSE UR STRIP-MCNC: NEGATIVE MG/DL
KETONES UR QL: NEGATIVE
LEUKOCYTE EST, POC: NEGATIVE
Lab: ABNORMAL
NITRITE UR-MCNC: NEGATIVE MG/ML
PH UR: 6 [PH] (ref 5–8)
PROT UR STRIP-MCNC: NEGATIVE MG/DL
RBC # UR STRIP: ABNORMAL /UL
SP GR UR: 1.02 (ref 1–1.03)
UROBILINOGEN UR QL: NORMAL

## 2022-11-07 PROCEDURE — 81003 URINALYSIS AUTO W/O SCOPE: CPT | Performed by: NURSE PRACTITIONER

## 2022-11-07 PROCEDURE — 51798 US URINE CAPACITY MEASURE: CPT | Performed by: NURSE PRACTITIONER

## 2022-11-07 PROCEDURE — 99213 OFFICE O/P EST LOW 20 MIN: CPT | Performed by: NURSE PRACTITIONER

## 2022-11-07 NOTE — PROGRESS NOTES
Office Visit Female LUTS     Patient Name: Shannon Servin  : 1950   MRN: [id number]     Chief Complaint:   Chief Complaint   Patient presents with   • Establish Care   • recurrent uti   • Urinary Incontinence     Mixed (urge & stress)        Referring Provider: Humberto Wells DO    History of Present Illness: Shannon Servin is a 72 y.o. female who presents today with complaint of urinary incontinence for *** years.     Pt has primarily *** urinary incontinence.     Severity: Pt uses *** pads a day and has tried *** in the past  Associated Sx: Pt has *** h/o daytime frequency, urgency and nocturia.     No h/o poor stream, straining, hesitancy or incomplete voiding.     No h/o recurrent UTI, hematuria, nephrolithiasis    No vaginal discharge or bleeding.  No h/o something coming out of vagina   *** Constipation  *** Vaginal deliveries    Subjective      Review of System:   Constitutional: No fevers or chills  Respiratory: Negative for shortness of breath or wheezing  Gastrointestinal: No constipation, nausea or vomiting  Genitourinary: *** Negative for new lower urinary tract symptoms, current gross hematuria or dysuria.    Past Medical History:   Past Medical History:   Diagnosis Date   • Afib ([facility])    • Allergic    • Arthritis    • Asthma 2022   • Colon polyp    • COPD (chronic obstructive pulmonary disease) ([facility]) 2022 ?????   • Diabetes mellitus ([facility])    • Edema    • Elevated cholesterol    • Fibromyalgia, primary    • History of recurrent UTIs    • Hypertension    • Impaired functional mobility, balance, gait, and endurance    • Kidney calculi    • Low back pain    • Migraines    • Pneumonia    • Sleep apnea        Past Surgical History:   Past Surgical History:   Procedure Laterality Date   • COLONOSCOPY N/A 2021    Procedure: COLONOSCOPY WITH BIOPSY, COLD SNARE POLYPECTOMY, HOT SNARE POLYPECTOMY;  Surgeon: Cam Parra MD;  Location: [facility] ENDOSCOPY;   Service: Gastroenterology;  Laterality: N/A;   • HYSTERECTOMY     • OVARIAN CYST REMOVAL     • TOTAL ABDOMINAL HYSTERECTOMY WITH SALPINGO OOPHORECTOMY     • URETEROSCOPY LASER LITHOTRIPSY WITH STENT INSERTION Left 2020    Procedure: URETEROSCOPY, LEFT RETROGRADE PYLEOGERAM WITH STENT INSERTION;  Surgeon: Mulugeta Saldana MD;  Location[facility];  Service: Urology;  Laterality: Left;       Family History:   Family History   Problem Relation Age of Onset   • Cancer Other    • Diabetes Other    • Migraines Other    • Alcohol abuse Father    • Diabetes Father        Social History:   Social History     Socioeconomic History   • Marital status:    Tobacco Use   • Smoking status: Former     Types: Cigarettes     Quit date: 1998     Years since quittin.8   • Smokeless tobacco: Never   Vaping Use   • Vaping Use: Never used   Substance and Sexual Activity   • Alcohol use: Yes     Comment: maybe twice a month   • Drug use: Never   • Sexual activity: Not Currently     Partners: Male     Comment: hysterectomy       Medications:     Current Outpatient Medications:   •  amLODIPine (NORVASC) 5 MG tablet, Take 1 tablet by mouth Daily., Disp: 90 tablet, Rfl: 0  •  apixaban (ELIQUIS) 5 MG tablet tablet, Take 1 tablet by mouth 2 (Two) Times a Day., Disp: 180 tablet, Rfl: 2  •  aspirin (aspirin) 81 MG EC tablet, Take 1 tablet by mouth Daily., Disp: , Rfl:   •  atorvastatin (LIPITOR) 80 MG tablet, Take 1 tablet by mouth Daily., Disp: 90 tablet, Rfl: 1  •  azelastine (ASTELIN) 0.1 % nasal spray, 1 spray into the nostril(s) as directed by provider 2 (Two) Times a Day As Needed for Rhinitis or Allergies. Use in each nostril as directed, Disp: 90 each, Rfl: 1  •  busPIRone (BUSPAR) 5 MG tablet, Take 1 tablet by mouth 2 (Two) Times a Day As Needed (anxiety)., Disp: 40 tablet, Rfl: 0  •  Docusate Calcium (STOOL SOFTENER PO), Take 240 mg by mouth Daily., Disp: , Rfl:   •  enalapril (VASOTEC) 20 MG tablet, Take 1 tablet  by mouth Every 12 (Twelve) Hours., Disp: 180 tablet, Rfl: 1  •  Estradiol 10 % cream, , Disp: , Rfl:   •  fluconazole (DIFLUCAN) 150 MG tablet, Take 1 tablet by mouth Daily for 3 days then take as needed, Disp: 40 tablet, Rfl: 0  •  flunisolide (NASALIDE) 25 MCG/ACT (0.025%) solution nasal spray, Inhale 1 spray Every 12 (Twelve) Hours., Disp: 75 mL, Rfl: 1  •  Fluticasone-Umeclidin-Vilant (Trelegy Ellipta) 100-62.5-25 MCG/INH inhaler, Inhale 1 puff Daily., Disp: 28 each, Rfl: 0  •  furosemide (LASIX) 20 MG tablet, Take 1 tablet by mouth 2 (Two) Times a Day., Disp: 60 tablet, Rfl: 11  •  glyburide (DIAbeta) 1.25 MG tablet, Take 1 tablet by mouth 2 (Two) Times a Day Before Meals., Disp: 180 tablet, Rfl: 1  •  ipratropium-albuterol (DUO-NEB) 0.5-2.5 mg/3 ml nebulizer, Inhale 1 vial (3 mL) by nebulization Every 6 (Six) Hours As Needed for Wheezing or Shortness of Air., Disp: 360 mL, Rfl: 0  •  levocetirizine (XYZAL) 5 MG tablet, Take 1 tablet by mouth Every Evening., Disp: 90 tablet, Rfl: 1  •  montelukast (SINGULAIR) 10 MG tablet, TAKE 1 TABLET BY MOUTH EVERY NIGHT., Disp: 90 tablet, Rfl: 1  •  ondansetron (Zofran) 4 MG tablet, Take 1 tablet by mouth Every 8 (Eight) Hours As Needed for Nausea or Vomiting., Disp: 180 tablet, Rfl: 2  •  oxybutynin XL (DITROPAN XL) 15 MG 24 hr tablet, TAKE 1 TABLET BY MOUTH DAILY., Disp: 90 tablet, Rfl: 0  •  oxyCODONE-acetaminophen (PERCOCET) 5-325 MG per tablet, Take 1 tablet by mouth Every 6 (Six) Hours As Needed., Disp: , Rfl:   •  pantoprazole (Protonix) 20 MG EC tablet, Take 1 tablet by mouth Daily., Disp: 90 tablet, Rfl: 0  •  promethazine (PHENERGAN) 25 MG tablet, Take 25 mg by mouth Every 6 (Six) Hours As Needed., Disp: , Rfl:   •  Rimegepant Sulfate (Nurtec) 75 MG tablet dispersible tablet, Take 1 tablet by mouth As Needed (migraine). May repeat 1 hour after first dose if needed, not to exceed 1 additional dose., Disp: 4 tablet, Rfl: 0  •  rOPINIRole (REQUIP) 0.25 MG tablet, 0.5  "tab po qhs x 2 nights; then 1 tabs po x 5 nights; then 2 tabs po qhs each night thereafter., Disp: 180 tablet, Rfl: 1  •  senna (SENOKOT) 8.6 MG tablet, Take 1 tablet by mouth Daily., Disp: , Rfl:   •  sotalol (BETAPACE) 80 MG tablet, Take 1 tablet by mouth Every 12 (Twelve) Hours., Disp: 180 tablet, Rfl: 1  •  spironolactone (ALDACTONE) 25 MG tablet, Take 1 tablet by mouth Daily., Disp: 30 tablet, Rfl: 11  •  tiZANidine (ZANAFLEX) 4 MG tablet, Take 1 tablet by mouth At Night As Needed for Muscle Spasms., Disp: 90 tablet, Rfl: 1  •  traMADol (ULTRAM) 50 MG tablet, Take 1 tablet by mouth Every 6 (Six) Hours As Needed for Moderate Pain., Disp: 30 tablet, Rfl: 0  •  traZODone (DESYREL) 50 MG tablet, Take 50 mg by mouth Every Night., Disp: , Rfl:   •  dicyclomine (BENTYL) 10 MG/5ML syrup, Take 10 mL by mouth 3 (Three) Times a Day As Needed., Disp: 473 mL, Rfl: 1  •  phenazopyridine (PYRIDIUM) 100 MG tablet, Take 1 tablet by mouth 4 (Four) Times a Day As Needed., Disp: 30 tablet, Rfl: 0    Current Facility-Administered Medications:   •  cyanocobalamin injection 1,000 mcg, 1,000 mcg, Intramuscular, Q28 Days, Humberto Wells DO, 1,000 mcg at 06/03/22 1706    Allergies:   Allergies   Allergen Reactions   • Metformin Hallucinations   • Codeine Itching       Objective     Physical Exam:   Vital Signs:   Vitals:    11/07/22 1609   BP: 130/68   BP Location: Right arm   Patient Position: Sitting   Cuff Size: Adult   Pulse: 71   Resp: 12   Temp: 97.4 °F (36.3 °C)   TempSrc: Temporal   SpO2: 99%   Weight: 102 kg (225 lb)   Height: 172.7 cm (68\")     Body mass index is 34.21 kg/m².     Constitutional: NAD, WDWN.   Neurological: A + O x 3  Psych: Normal mood and affect    Labs  Brief Urine Lab Results     None          Lab Results   Component Value Date    GLUCOSE 147 (H) 08/06/2021    CALCIUM 9.4 08/06/2021     08/06/2021    K 3.8 08/06/2021    CO2 24.9 08/06/2021     08/06/2021    BUN 17 08/06/2021    CREATININE 0.67 " 08/06/2021    EGFRIFNONA 87 08/06/2021    BCR 25.4 (H) 08/06/2021    ANIONGAP 11.1 08/06/2021       Lab Results   Component Value Date    WBC 8.82 08/06/2021    HGB 13.0 08/06/2021    HCT 38.6 08/06/2021    MCV 93.0 08/06/2021     08/06/2021       PVR  Post-void residual performed by staff - ***    I have personally reviewed her labs and post void residual imaging.     Assessment / Plan    Assessment  Ms. Servin is a 72 y.o. female with *** urinary incontinence.    We discussed the AUA guidelines for urge urinary incontinence.  We discussed lifestyle changes such as weight reduction and caffeine reduction, as well as medications such as anticholinergics and beta agonist.  We discussed third line therapies, such as Botox, InterStim, and PTNS.  The patient has elected ***.  We discussed the risks, benefits, and alternatives to this approach.  She voiced her understanding and wished to proceed.    ***We discussed the AUA guidelines for management of stress urinary incontinence.  We discussed served of management with lifestyle changes like weight reduction and pelvic floor physical therapy.  We discussed surgical management with either mid urethral sling versus cystoscopy with bulking agent injection.  The patient has elected ***.  We discussed the risks, benefits, and alternatives.  The patient voiced her understanding and wished to proceed.    Plan  1. ***    Follow Up:   No follow-ups on file.    MAICO Green  [facility] [place]

## 2022-11-07 NOTE — PROGRESS NOTES
Office Visit Female LUTS     Patient Name: Shannon Servin  : 1950   MRN: [id number]     Chief Complaint:   Chief Complaint   Patient presents with   • Establish Care   • recurrent uti   • Urinary Incontinence     Mixed (urge & stress)        Referring Provider: Humberto Wells DO    History of Present Illness: Shannon Servin is a 72 y.o. female who presents today with complaint of urinary incontinence for several years.   Will go urinate does not completely empty then she will proceed to do this 2-3 times to get completely empty bladder, feels she has to strain to completely empty her bladder   Pt has primarily mixed urinary incontinence.  Urge incontinence is her most bothersome symptom.    Severity: Pt uses 3-5 pads a day and has tried oxybutynin in the past  Associated Sx: Pt has  h/o daytime frequency, urgency and nocturia.      h/o straining, and incomplete voiding.     No h/o recurrent UTI, hematuria, previous nephrolithiasis    No vaginal discharge or bleeding.  No h/o something coming out of vagina   Occasional constipation patient takes daily laxative  2 vaginal deliveries    Subjective      Review of System:   Constitutional: No fevers or chills  Respiratory: Negative for shortness of breath or wheezing  Gastrointestinal: No constipation, nausea or vomiting  Genitourinary: Urinary incontinence  Past Medical History:   Past Medical History:   Diagnosis Date   • Afib ([facility])    • Allergic    • Arthritis    • Asthma 2022   • Colon polyp    • COPD (chronic obstructive pulmonary disease) ([facility]) 2022 ?????   • Diabetes mellitus ([facility])    • Edema    • Elevated cholesterol    • Fibromyalgia, primary    • History of recurrent UTIs    • Hypertension    • Impaired functional mobility, balance, gait, and endurance    • Kidney calculi    • Low back pain    • Migraines    • Pneumonia    • Sleep apnea        Past Surgical History:   Past Surgical History:   Procedure Laterality Date   •  COLONOSCOPY N/A 2021    Procedure: COLONOSCOPY WITH BIOPSY, COLD SNARE POLYPECTOMY, HOT SNARE POLYPECTOMY;  Surgeon: Cam Parra MD;  Location: [facility] ENDOSCOPY;  Service: Gastroenterology;  Laterality: N/A;   • HYSTERECTOMY     • OVARIAN CYST REMOVAL     • TOTAL ABDOMINAL HYSTERECTOMY WITH SALPINGO OOPHORECTOMY     • URETEROSCOPY LASER LITHOTRIPSY WITH STENT INSERTION Left 2020    Procedure: URETEROSCOPY, LEFT RETROGRADE PYLEOGERAM WITH STENT INSERTION;  Surgeon: Mulugeta Saldana MD;  Location: [facility] OR;  Service: Urology;  Laterality: Left;       Family History:   Family History   Problem Relation Age of Onset   • Cancer Other    • Diabetes Other    • Migraines Other    • Alcohol abuse Father    • Diabetes Father        Social History:   Social History     Socioeconomic History   • Marital status:    Tobacco Use   • Smoking status: Former     Types: Cigarettes     Quit date: 1998     Years since quittin.8   • Smokeless tobacco: Never   Vaping Use   • Vaping Use: Never used   Substance and Sexual Activity   • Alcohol use: Yes     Comment: maybe twice a month   • Drug use: Never   • Sexual activity: Not Currently     Partners: Male     Comment: hysterectomy       Medications:     Current Outpatient Medications:   •  amLODIPine (NORVASC) 5 MG tablet, Take 1 tablet by mouth Daily., Disp: 90 tablet, Rfl: 0  •  apixaban (ELIQUIS) 5 MG tablet tablet, Take 1 tablet by mouth 2 (Two) Times a Day., Disp: 180 tablet, Rfl: 2  •  aspirin (aspirin) 81 MG EC tablet, Take 1 tablet by mouth Daily., Disp: , Rfl:   •  atorvastatin (LIPITOR) 80 MG tablet, Take 1 tablet by mouth Daily., Disp: 90 tablet, Rfl: 1  •  azelastine (ASTELIN) 0.1 % nasal spray, 1 spray into the nostril(s) as directed by provider 2 (Two) Times a Day As Needed for Rhinitis or Allergies. Use in each nostril as directed, Disp: 90 each, Rfl: 1  •  busPIRone (BUSPAR) 5 MG tablet, Take 1 tablet by mouth 2 (Two) Times a Day As  Needed (anxiety)., Disp: 40 tablet, Rfl: 0  •  Docusate Calcium (STOOL SOFTENER PO), Take 240 mg by mouth Daily., Disp: , Rfl:   •  enalapril (VASOTEC) 20 MG tablet, Take 1 tablet by mouth Every 12 (Twelve) Hours., Disp: 180 tablet, Rfl: 1  •  Estradiol 10 % cream, , Disp: , Rfl:   •  fluconazole (DIFLUCAN) 150 MG tablet, Take 1 tablet by mouth Daily for 3 days then take as needed, Disp: 40 tablet, Rfl: 0  •  flunisolide (NASALIDE) 25 MCG/ACT (0.025%) solution nasal spray, Inhale 1 spray Every 12 (Twelve) Hours., Disp: 75 mL, Rfl: 1  •  Fluticasone-Umeclidin-Vilant (Trelegy Ellipta) 100-62.5-25 MCG/INH inhaler, Inhale 1 puff Daily., Disp: 28 each, Rfl: 0  •  furosemide (LASIX) 20 MG tablet, Take 1 tablet by mouth 2 (Two) Times a Day., Disp: 60 tablet, Rfl: 11  •  glyburide (DIAbeta) 1.25 MG tablet, Take 1 tablet by mouth 2 (Two) Times a Day Before Meals., Disp: 180 tablet, Rfl: 1  •  ipratropium-albuterol (DUO-NEB) 0.5-2.5 mg/3 ml nebulizer, Inhale 1 vial (3 mL) by nebulization Every 6 (Six) Hours As Needed for Wheezing or Shortness of Air., Disp: 360 mL, Rfl: 0  •  levocetirizine (XYZAL) 5 MG tablet, Take 1 tablet by mouth Every Evening., Disp: 90 tablet, Rfl: 1  •  montelukast (SINGULAIR) 10 MG tablet, TAKE 1 TABLET BY MOUTH EVERY NIGHT., Disp: 90 tablet, Rfl: 1  •  ondansetron (Zofran) 4 MG tablet, Take 1 tablet by mouth Every 8 (Eight) Hours As Needed for Nausea or Vomiting., Disp: 180 tablet, Rfl: 2  •  oxybutynin XL (DITROPAN XL) 15 MG 24 hr tablet, TAKE 1 TABLET BY MOUTH DAILY., Disp: 90 tablet, Rfl: 0  •  oxyCODONE-acetaminophen (PERCOCET) 5-325 MG per tablet, Take 1 tablet by mouth Every 6 (Six) Hours As Needed., Disp: , Rfl:   •  pantoprazole (Protonix) 20 MG EC tablet, Take 1 tablet by mouth Daily., Disp: 90 tablet, Rfl: 0  •  promethazine (PHENERGAN) 25 MG tablet, Take 25 mg by mouth Every 6 (Six) Hours As Needed., Disp: , Rfl:   •  Rimegepant Sulfate (Nurtec) 75 MG tablet dispersible tablet, Take 1  "tablet by mouth As Needed (migraine). May repeat 1 hour after first dose if needed, not to exceed 1 additional dose., Disp: 4 tablet, Rfl: 0  •  rOPINIRole (REQUIP) 0.25 MG tablet, 0.5 tab po qhs x 2 nights; then 1 tabs po x 5 nights; then 2 tabs po qhs each night thereafter., Disp: 180 tablet, Rfl: 1  •  senna (SENOKOT) 8.6 MG tablet, Take 1 tablet by mouth Daily., Disp: , Rfl:   •  sotalol (BETAPACE) 80 MG tablet, Take 1 tablet by mouth Every 12 (Twelve) Hours., Disp: 180 tablet, Rfl: 1  •  spironolactone (ALDACTONE) 25 MG tablet, Take 1 tablet by mouth Daily., Disp: 30 tablet, Rfl: 11  •  tiZANidine (ZANAFLEX) 4 MG tablet, Take 1 tablet by mouth At Night As Needed for Muscle Spasms., Disp: 90 tablet, Rfl: 1  •  traMADol (ULTRAM) 50 MG tablet, Take 1 tablet by mouth Every 6 (Six) Hours As Needed for Moderate Pain., Disp: 30 tablet, Rfl: 0  •  traZODone (DESYREL) 50 MG tablet, Take 50 mg by mouth Every Night., Disp: , Rfl:   •  dicyclomine (BENTYL) 10 MG/5ML syrup, Take 10 mL by mouth 3 (Three) Times a Day As Needed., Disp: 473 mL, Rfl: 1  •  phenazopyridine (PYRIDIUM) 100 MG tablet, Take 1 tablet by mouth 4 (Four) Times a Day As Needed., Disp: 30 tablet, Rfl: 0    Current Facility-Administered Medications:   •  cyanocobalamin injection 1,000 mcg, 1,000 mcg, Intramuscular, Q28 Days, Humberto Wells DO, 1,000 mcg at 06/03/22 1706    Allergies:   Allergies   Allergen Reactions   • Metformin Hallucinations   • Codeine Itching       Objective     Physical Exam:   Vital Signs:   Vitals:    11/07/22 1609   BP: 130/68   BP Location: Right arm   Patient Position: Sitting   Cuff Size: Adult   Pulse: 71   Resp: 12   Temp: 97.4 °F (36.3 °C)   TempSrc: Temporal   SpO2: 99%   Weight: 102 kg (225 lb)   Height: 172.7 cm (68\")     Body mass index is 34.21 kg/m².     Constitutional: NAD, WDWN.   Neurological: A + O x 3  Psych: Normal mood and affect    Labs  Brief Urine Lab Results  (Last result in the past 365 days)      Color   " Clarity   Blood   Leuk Est   Nitrite   Protein   CREAT   Urine HCG        11/07/22 1618 Yellow   Cloudy   Trace   Negative   Negative   Negative                 Lab Results   Component Value Date    GLUCOSE 147 (H) 08/06/2021    CALCIUM 9.4 08/06/2021     08/06/2021    K 3.8 08/06/2021    CO2 24.9 08/06/2021     08/06/2021    BUN 17 08/06/2021    CREATININE 0.67 08/06/2021    EGFRIFNONA 87 08/06/2021    BCR 25.4 (H) 08/06/2021    ANIONGAP 11.1 08/06/2021       Lab Results   Component Value Date    WBC 8.82 08/06/2021    HGB 13.0 08/06/2021    HCT 38.6 08/06/2021    MCV 93.0 08/06/2021     08/06/2021       PVR  Post-void residual performed by staff - 0ml    I have personally reviewed her labs and post void residual imaging.     Assessment / Plan    Assessment  Ms. Servin is a 72 y.o. female with mixed urinary incontinence.    We discussed the AUA guidelines for urge urinary incontinence.  We discussed lifestyle changes such as weight reduction and caffeine reduction, as well as medications such as anticholinergics and beta agonist.  We discussed third line therapies, such as Botox, InterStim, and PTNS.  The patient has elected PNE.  We discussed the risks, benefits, and alternatives to this approach.  She voiced her understanding and wished to proceed.    We discussed the AUA guidelines for management of stress urinary incontinence.  We discussed served of management with lifestyle changes like weight reduction and pelvic floor physical therapy.  We discussed surgical management with either mid urethral sling versus cystoscopy with bulking agent injection.  The patient has elected to monitor DARRYL is not as bothersome as urge incontinence.  We discussed the risks, benefits, and alternatives.  The patient voiced her understanding and wished to proceed.    Plan  1. Consented for PNE will consult with Dr. Saab and schedule patient    Follow Up:   No follow-ups on file.    MAICO Green  [facility] [place]  [facility] [place]

## 2022-11-08 DIAGNOSIS — N39.41 URGE INCONTINENCE OF URINE: Primary | ICD-10-CM

## 2022-11-29 ENCOUNTER — ANESTHESIA EVENT (OUTPATIENT)
Dept: PERIOP | Facility: HOSPITAL | Age: 72
End: 2022-11-29

## 2022-11-29 RX ORDER — SODIUM CHLORIDE 9 MG/ML
40 INJECTION, SOLUTION INTRAVENOUS AS NEEDED
Status: CANCELLED | OUTPATIENT
Start: 2022-11-29

## 2022-11-29 RX ORDER — SODIUM CHLORIDE 0.9 % (FLUSH) 0.9 %
10 SYRINGE (ML) INJECTION EVERY 12 HOURS SCHEDULED
Status: CANCELLED | OUTPATIENT
Start: 2022-11-29

## 2022-11-29 RX ORDER — ENALAPRIL MALEATE 20 MG/1
20 TABLET ORAL EVERY 12 HOURS SCHEDULED
Qty: 180 TABLET | Refills: 0 | Status: SHIPPED | OUTPATIENT
Start: 2022-11-29 | End: 2023-03-20

## 2022-11-29 RX ORDER — SODIUM CHLORIDE 0.9 % (FLUSH) 0.9 %
10 SYRINGE (ML) INJECTION AS NEEDED
Status: CANCELLED | OUTPATIENT
Start: 2022-11-29

## 2022-11-29 RX ORDER — FAMOTIDINE 10 MG/ML
20 INJECTION, SOLUTION INTRAVENOUS ONCE
Status: CANCELLED | OUTPATIENT
Start: 2022-11-29 | End: 2022-11-29

## 2022-11-30 ENCOUNTER — ANESTHESIA (OUTPATIENT)
Dept: PERIOP | Facility: HOSPITAL | Age: 72
End: 2022-11-30

## 2022-11-30 ENCOUNTER — HOSPITAL ENCOUNTER (OUTPATIENT)
Facility: HOSPITAL | Age: 72
Setting detail: HOSPITAL OUTPATIENT SURGERY
Discharge: HOME | End: 2022-11-30
Attending: SURGERY | Admitting: SURGERY

## 2022-11-30 ENCOUNTER — APPOINTMENT (OUTPATIENT)
Dept: GENERAL RADIOLOGY | Facility: HOSPITAL | Age: 72
End: 2022-11-30

## 2022-11-30 VITALS
SYSTOLIC BLOOD PRESSURE: 92 MMHG | WEIGHT: 225 LBS | RESPIRATION RATE: 16 BRPM | OXYGEN SATURATION: 96 % | HEIGHT: 68 IN | BODY MASS INDEX: 34.1 KG/M2 | DIASTOLIC BLOOD PRESSURE: 42 MMHG | TEMPERATURE: 98 F | HEART RATE: 48 BPM

## 2022-11-30 LAB
ANION GAP SERPL CALCULATED.3IONS-SCNC: 11 MMOL/L (ref 5–15)
BUN SERPL-MCNC: 34 MG/DL (ref 8–23)
BUN/CREAT SERPL: 38.2 (ref 7–25)
CALCIUM SPEC-SCNC: 9.1 MG/DL (ref 8.6–10.5)
CHLORIDE SERPL-SCNC: 103 MMOL/L (ref 98–107)
CO2 SERPL-SCNC: 23 MMOL/L (ref 22–29)
CREAT SERPL-MCNC: 0.89 MG/DL (ref 0.57–1)
DEPRECATED RDW RBC AUTO: 41.2 FL (ref 37–54)
EGFRCR SERPLBLD CKD-EPI 2021: 68.6 ML/MIN/1.73
ERYTHROCYTE [DISTWIDTH] IN BLOOD BY AUTOMATED COUNT: 12.2 % (ref 12.3–15.4)
GLUCOSE BLDC GLUCOMTR-MCNC: 117 MG/DL (ref 70–130)
GLUCOSE SERPL-MCNC: 128 MG/DL (ref 65–99)
HCT VFR BLD AUTO: 42.3 % (ref 34–46.6)
HGB BLD-MCNC: 14.5 G/DL (ref 12–15.9)
MCH RBC QN AUTO: 31.5 PG (ref 26.6–33)
MCHC RBC AUTO-ENTMCNC: 34.3 G/DL (ref 31.5–35.7)
MCV RBC AUTO: 92 FL (ref 79–97)
PLATELET # BLD AUTO: 200 10*3/MM3 (ref 140–450)
PMV BLD AUTO: 10.6 FL (ref 6–12)
POTASSIUM SERPL-SCNC: 4.3 MMOL/L (ref 3.5–5.2)
RBC # BLD AUTO: 4.6 10*6/MM3 (ref 3.77–5.28)
SODIUM SERPL-SCNC: 137 MMOL/L (ref 136–145)
WBC NRBC COR # BLD: 11.78 10*3/MM3 (ref 3.4–10.8)

## 2022-11-30 PROCEDURE — 25010000002 HEPARIN (PORCINE) PER 1000 UNITS: Performed by: NURSE ANESTHETIST, CERTIFIED REGISTERED

## 2022-11-30 PROCEDURE — C1894 INTRO/SHEATH, NON-LASER: HCPCS | Performed by: SURGERY

## 2022-11-30 PROCEDURE — 25010000002 HEPARIN (PORCINE) PER 1000 UNITS: Performed by: SURGERY

## 2022-11-30 PROCEDURE — 82962 GLUCOSE BLOOD TEST: CPT

## 2022-11-30 PROCEDURE — C1769 GUIDE WIRE: HCPCS | Performed by: SURGERY

## 2022-11-30 PROCEDURE — 75625 CONTRAST EXAM ABDOMINL AORTA: CPT

## 2022-11-30 PROCEDURE — 0 LIDOCAINE 1 % SOLUTION: Performed by: SURGERY

## 2022-11-30 PROCEDURE — 25010000002 FENTANYL CITRATE (PF) 100 MCG/2ML SOLUTION: Performed by: NURSE ANESTHETIST, CERTIFIED REGISTERED

## 2022-11-30 PROCEDURE — C1876 STENT, NON-COA/NON-COV W/DEL: HCPCS | Performed by: SURGERY

## 2022-11-30 PROCEDURE — 25010000002 IOPAMIDOL 61 % SOLUTION: Performed by: SURGERY

## 2022-11-30 PROCEDURE — 85027 COMPLETE CBC AUTOMATED: CPT | Performed by: ANESTHESIOLOGY

## 2022-11-30 PROCEDURE — 80048 BASIC METABOLIC PNL TOTAL CA: CPT | Performed by: ANESTHESIOLOGY

## 2022-11-30 DEVICE — PREMOUNTED STENT SYSTEM
Type: IMPLANTABLE DEVICE | Site: ARTERIAL | Status: FUNCTIONAL
Brand: EXPRESS® SD RENAL/BILIARY

## 2022-11-30 RX ORDER — HYDROMORPHONE HYDROCHLORIDE 1 MG/ML
0.5 INJECTION, SOLUTION INTRAMUSCULAR; INTRAVENOUS; SUBCUTANEOUS
Status: DISCONTINUED | OUTPATIENT
Start: 2022-11-30 | End: 2022-11-30 | Stop reason: HOSPADM

## 2022-11-30 RX ORDER — ONDANSETRON 2 MG/ML
4 INJECTION INTRAMUSCULAR; INTRAVENOUS ONCE AS NEEDED
Status: DISCONTINUED | OUTPATIENT
Start: 2022-11-30 | End: 2022-11-30 | Stop reason: HOSPADM

## 2022-11-30 RX ORDER — LIDOCAINE HYDROCHLORIDE 10 MG/ML
0.5 INJECTION, SOLUTION EPIDURAL; INFILTRATION; INTRACAUDAL; PERINEURAL ONCE AS NEEDED
Status: COMPLETED | OUTPATIENT
Start: 2022-11-30 | End: 2022-11-30

## 2022-11-30 RX ORDER — PROMETHAZINE HYDROCHLORIDE 25 MG/1
25 TABLET ORAL ONCE AS NEEDED
Status: DISCONTINUED | OUTPATIENT
Start: 2022-11-30 | End: 2022-11-30 | Stop reason: HOSPADM

## 2022-11-30 RX ORDER — SODIUM CHLORIDE 0.9 % (FLUSH) 0.9 %
3-10 SYRINGE (ML) INJECTION AS NEEDED
Status: DISCONTINUED | OUTPATIENT
Start: 2022-11-30 | End: 2022-11-30 | Stop reason: HOSPADM

## 2022-11-30 RX ORDER — LIDOCAINE HYDROCHLORIDE 10 MG/ML
INJECTION, SOLUTION INFILTRATION; PERINEURAL AS NEEDED
Status: DISCONTINUED | OUTPATIENT
Start: 2022-11-30 | End: 2022-11-30 | Stop reason: HOSPADM

## 2022-11-30 RX ORDER — SODIUM CHLORIDE 0.9 % (FLUSH) 0.9 %
3 SYRINGE (ML) INJECTION EVERY 12 HOURS SCHEDULED
Status: DISCONTINUED | OUTPATIENT
Start: 2022-11-30 | End: 2022-11-30 | Stop reason: HOSPADM

## 2022-11-30 RX ORDER — LABETALOL HYDROCHLORIDE 5 MG/ML
5 INJECTION, SOLUTION INTRAVENOUS
Status: DISCONTINUED | OUTPATIENT
Start: 2022-11-30 | End: 2022-11-30 | Stop reason: HOSPADM

## 2022-11-30 RX ORDER — DROPERIDOL 2.5 MG/ML
0.62 INJECTION, SOLUTION INTRAMUSCULAR; INTRAVENOUS
Status: DISCONTINUED | OUTPATIENT
Start: 2022-11-30 | End: 2022-11-30 | Stop reason: HOSPADM

## 2022-11-30 RX ORDER — DROPERIDOL 2.5 MG/ML
0.62 INJECTION, SOLUTION INTRAMUSCULAR; INTRAVENOUS ONCE AS NEEDED
Status: DISCONTINUED | OUTPATIENT
Start: 2022-11-30 | End: 2022-11-30 | Stop reason: HOSPADM

## 2022-11-30 RX ORDER — HEPARIN SODIUM 1000 [USP'U]/ML
INJECTION, SOLUTION INTRAVENOUS; SUBCUTANEOUS AS NEEDED
Status: DISCONTINUED | OUTPATIENT
Start: 2022-11-30 | End: 2022-11-30 | Stop reason: SURG

## 2022-11-30 RX ORDER — HYDROCODONE BITARTRATE AND ACETAMINOPHEN 5; 325 MG/1; MG/1
1 TABLET ORAL ONCE AS NEEDED
Status: DISCONTINUED | OUTPATIENT
Start: 2022-11-30 | End: 2022-11-30 | Stop reason: HOSPADM

## 2022-11-30 RX ORDER — MAGNESIUM HYDROXIDE 1200 MG/15ML
LIQUID ORAL AS NEEDED
Status: DISCONTINUED | OUTPATIENT
Start: 2022-11-30 | End: 2022-11-30 | Stop reason: HOSPADM

## 2022-11-30 RX ORDER — SODIUM CHLORIDE 0.9 % (FLUSH) 0.9 %
10 SYRINGE (ML) INJECTION AS NEEDED
Status: DISCONTINUED | OUTPATIENT
Start: 2022-11-30 | End: 2022-11-30 | Stop reason: HOSPADM

## 2022-11-30 RX ORDER — FAMOTIDINE 20 MG/1
20 TABLET, FILM COATED ORAL ONCE
Status: COMPLETED | OUTPATIENT
Start: 2022-11-30 | End: 2022-11-30

## 2022-11-30 RX ORDER — PROMETHAZINE HYDROCHLORIDE 25 MG/1
25 SUPPOSITORY RECTAL ONCE AS NEEDED
Status: DISCONTINUED | OUTPATIENT
Start: 2022-11-30 | End: 2022-11-30 | Stop reason: HOSPADM

## 2022-11-30 RX ORDER — SODIUM CHLORIDE 9 MG/ML
40 INJECTION, SOLUTION INTRAVENOUS AS NEEDED
Status: DISCONTINUED | OUTPATIENT
Start: 2022-11-30 | End: 2022-11-30 | Stop reason: HOSPADM

## 2022-11-30 RX ORDER — IPRATROPIUM BROMIDE AND ALBUTEROL SULFATE 2.5; .5 MG/3ML; MG/3ML
3 SOLUTION RESPIRATORY (INHALATION) ONCE AS NEEDED
Status: DISCONTINUED | OUTPATIENT
Start: 2022-11-30 | End: 2022-11-30 | Stop reason: HOSPADM

## 2022-11-30 RX ORDER — FENTANYL CITRATE 50 UG/ML
INJECTION, SOLUTION INTRAMUSCULAR; INTRAVENOUS AS NEEDED
Status: DISCONTINUED | OUTPATIENT
Start: 2022-11-30 | End: 2022-11-30 | Stop reason: SURG

## 2022-11-30 RX ORDER — FENTANYL CITRATE 50 UG/ML
50 INJECTION, SOLUTION INTRAMUSCULAR; INTRAVENOUS
Status: DISCONTINUED | OUTPATIENT
Start: 2022-11-30 | End: 2022-11-30 | Stop reason: HOSPADM

## 2022-11-30 RX ORDER — ACETAMINOPHEN 325 MG/1
650 TABLET ORAL EVERY 4 HOURS PRN
Status: DISCONTINUED | OUTPATIENT
Start: 2022-11-30 | End: 2022-11-30 | Stop reason: HOSPADM

## 2022-11-30 RX ORDER — KETAMINE HCL IN NACL, ISO-OSM 100MG/10ML
SYRINGE (ML) INJECTION AS NEEDED
Status: DISCONTINUED | OUTPATIENT
Start: 2022-11-30 | End: 2022-11-30 | Stop reason: SURG

## 2022-11-30 RX ORDER — DEXMEDETOMIDINE HYDROCHLORIDE 4 UG/ML
INJECTION, SOLUTION INTRAVENOUS CONTINUOUS PRN
Status: DISCONTINUED | OUTPATIENT
Start: 2022-11-30 | End: 2022-11-30 | Stop reason: SURG

## 2022-11-30 RX ORDER — SODIUM CHLORIDE, SODIUM LACTATE, POTASSIUM CHLORIDE, CALCIUM CHLORIDE 600; 310; 30; 20 MG/100ML; MG/100ML; MG/100ML; MG/100ML
9 INJECTION, SOLUTION INTRAVENOUS CONTINUOUS
Status: DISCONTINUED | OUTPATIENT
Start: 2022-11-30 | End: 2022-11-30 | Stop reason: HOSPADM

## 2022-11-30 RX ORDER — NALOXONE HCL 0.4 MG/ML
0.4 VIAL (ML) INJECTION AS NEEDED
Status: DISCONTINUED | OUTPATIENT
Start: 2022-11-30 | End: 2022-11-30 | Stop reason: HOSPADM

## 2022-11-30 RX ORDER — MIDAZOLAM HYDROCHLORIDE 1 MG/ML
0.5 INJECTION INTRAMUSCULAR; INTRAVENOUS
Status: DISCONTINUED | OUTPATIENT
Start: 2022-11-30 | End: 2022-11-30 | Stop reason: HOSPADM

## 2022-11-30 RX ADMIN — DEXMEDETOMIDINE HYDROCHLORIDE 1 MCG/KG/HR: 4 INJECTION, SOLUTION INTRAVENOUS at 10:51

## 2022-11-30 RX ADMIN — LIDOCAINE HYDROCHLORIDE 0.2 ML: 10 INJECTION, SOLUTION EPIDURAL; INFILTRATION; INTRACAUDAL; PERINEURAL at 07:50

## 2022-11-30 RX ADMIN — FAMOTIDINE 20 MG: 20 TABLET ORAL at 08:11

## 2022-11-30 RX ADMIN — FENTANYL CITRATE 100 MCG: 50 INJECTION, SOLUTION INTRAMUSCULAR; INTRAVENOUS at 10:49

## 2022-11-30 RX ADMIN — HEPARIN SODIUM 10000 UNITS: 1000 INJECTION, SOLUTION INTRAVENOUS; SUBCUTANEOUS at 11:08

## 2022-11-30 RX ADMIN — Medication 30 MG: at 11:00

## 2022-11-30 RX ADMIN — Medication 20 MG: at 10:51

## 2022-11-30 RX ADMIN — SODIUM CHLORIDE, POTASSIUM CHLORIDE, SODIUM LACTATE AND CALCIUM CHLORIDE 9 ML/HR: 600; 310; 30; 20 INJECTION, SOLUTION INTRAVENOUS at 07:50

## 2022-12-05 ENCOUNTER — TELEPHONE (OUTPATIENT)
Dept: UROLOGY | Facility: CLINIC | Age: 72
End: 2022-12-05

## 2022-12-05 DIAGNOSIS — G25.81 RESTLESS LEGS SYNDROME: ICD-10-CM

## 2022-12-05 DIAGNOSIS — E11.44 DIABETIC AMYOTROPHY ASSOCIATED WITH TYPE 2 DIABETES MELLITUS: ICD-10-CM

## 2022-12-05 RX ORDER — ROPINIROLE 0.25 MG/1
TABLET, FILM COATED ORAL
Qty: 180 TABLET | Refills: 0 | Status: SHIPPED | OUTPATIENT
Start: 2022-12-05 | End: 2023-03-20 | Stop reason: SDUPTHER

## 2022-12-05 NOTE — TELEPHONE ENCOUNTER
"Provider: DR. MILLER  Caller: MARIANELA JONES  Relationship to Patient: SELF  Phone Number: [phone]  Reason for Call: RESCHEDULE APPT  Additional notes: [facility] DR. [facility]ILL[facility] (12/07/22 @ 1PM). PT IS HAVING A HARD TIME FROM SURGERY DONE ON 11/30/22.    PT IS UNAVAILABLE FOR NEW APPT ON THE FOLLOWING DAYS: 12/08, 12/23, 12/14, 12/20, 12/29, 01/08, 01/13, 01/14, 01/20, 12/29.     ANYTIME IS GOOD FOR CALLBACK.     ---UNABLE TO WARM TRANSFER.   "

## 2022-12-07 ENCOUNTER — HOSPITAL ENCOUNTER (OUTPATIENT)
Dept: PREOP | Facility: HOSPITAL | Age: 72
End: 2022-12-07

## 2022-12-12 RX ORDER — AMLODIPINE BESYLATE 5 MG/1
5 TABLET ORAL DAILY
Qty: 90 TABLET | Refills: 0 | Status: SHIPPED | OUTPATIENT
Start: 2022-12-12 | End: 2023-03-20

## 2022-12-19 DIAGNOSIS — J30.9 CHRONIC ALLERGIC RHINITIS: Chronic | ICD-10-CM

## 2022-12-19 DIAGNOSIS — R10.84 ABDOMINAL PAIN, GENERALIZED: ICD-10-CM

## 2022-12-19 DIAGNOSIS — R10.13 ABDOMINAL PAIN, EPIGASTRIC: ICD-10-CM

## 2022-12-19 DIAGNOSIS — K21.9 GERD WITHOUT ESOPHAGITIS: ICD-10-CM

## 2022-12-19 RX ORDER — PANTOPRAZOLE SODIUM 20 MG/1
20 TABLET, DELAYED RELEASE ORAL DAILY
Qty: 90 TABLET | Refills: 0 | Status: SHIPPED | OUTPATIENT
Start: 2022-12-19 | End: 2023-01-03

## 2022-12-19 RX ORDER — MONTELUKAST SODIUM 10 MG/1
10 TABLET ORAL NIGHTLY
Qty: 90 TABLET | Refills: 0 | Status: SHIPPED | OUTPATIENT
Start: 2022-12-19 | End: 2023-02-15

## 2022-12-21 ENCOUNTER — TELEPHONE (OUTPATIENT)
Dept: UROLOGY | Facility: CLINIC | Age: 72
End: 2022-12-21

## 2022-12-21 ENCOUNTER — HOSPITAL ENCOUNTER (OUTPATIENT)
Dept: PREOP | Facility: HOSPITAL | Age: 72
Discharge: HOME | End: 2022-12-21
Admitting: UROLOGY

## 2022-12-21 VITALS
RESPIRATION RATE: 18 BRPM | HEART RATE: 74 BPM | BODY MASS INDEX: 33.65 KG/M2 | OXYGEN SATURATION: 96 % | DIASTOLIC BLOOD PRESSURE: 74 MMHG | SYSTOLIC BLOOD PRESSURE: 128 MMHG | WEIGHT: 222 LBS | TEMPERATURE: 99 F | HEIGHT: 68 IN

## 2022-12-21 DIAGNOSIS — N39.41 URGE INCONTINENCE OF URINE: ICD-10-CM

## 2022-12-21 PROCEDURE — C1897 LEAD, NEUROSTIM TEST KIT: HCPCS

## 2022-12-21 PROCEDURE — A9270 NON-COVERED ITEM OR SERVICE: HCPCS | Performed by: UROLOGY

## 2022-12-21 PROCEDURE — 64561 IMPLANT NEUROELECTRODES: CPT | Performed by: UROLOGY

## 2022-12-21 PROCEDURE — 63710000001 SULFAMETHOXAZOLE-TRIMETHOPRIM 800-160 MG TABLET: Performed by: UROLOGY

## 2022-12-21 PROCEDURE — C1787 PATIENT PROGR, NEUROSTIM: HCPCS

## 2022-12-21 RX ORDER — SULFAMETHOXAZOLE AND TRIMETHOPRIM 800; 160 MG/1; MG/1
1 TABLET ORAL ONCE
Status: COMPLETED | OUTPATIENT
Start: 2022-12-21 | End: 2022-12-21

## 2022-12-21 RX ORDER — LIDOCAINE HYDROCHLORIDE AND EPINEPHRINE BITARTRATE 20; .01 MG/ML; MG/ML
20 INJECTION, SOLUTION SUBCUTANEOUS 2 TIMES DAILY
Status: DISCONTINUED | OUTPATIENT
Start: 2022-12-21 | End: 2022-12-22 | Stop reason: HOSPADM

## 2022-12-21 RX ADMIN — SULFAMETHOXAZOLE AND TRIMETHOPRIM 1 TABLET: 800; 160 TABLET ORAL at 13:19

## 2022-12-21 NOTE — TELEPHONE ENCOUNTER
Provider: DR. MILLER  Caller: BRANDON @  [facility]  Relationship to Patient: PROVIDER  Phone Number: [phone]  Reason for Call: SCHEDULE 1 WEEK FOLLOW UP  When was the patient last seen: 12/21/22  Additional notes: BRANDON FROM [facility] CALLED TO SCHEDULE 1 WEEK REMOVAL OF PNE PLACEMENT FOR PATIENT. PT NEEDS TO SEE DR. MILLER. NO DATES AVAILABLE BEFORE 01/23/23. PLEASE CALL [facility]ON [phone]. THEY CANNOT DISCHARGE THE PATIENT UNTIL THIS IS DONE.       ---UNABLE TO WARM TRANSFER

## 2022-12-21 NOTE — DISCHARGE INSTRUCTIONS
Home Care After Interstim Test Leads  The following instructions will help you care for yourself, or be cared for upon your return home today.  These are guidelines for your care right after the procedure only.     Diet  Continue your regular diet today.    Activity  Start normal activities in twenty-four (24) hours  Try to avoid any extreme physical activity while the leads are in.    Wound Care and Hygiene  Front showering or sponge bath is ok    What to Expect after Procedure  Soreness over the needle site  Mild bleeding at the needle site.    Call your Doctor  Severe pain not controlled by oral medication  Temperature above 101.5 degrees  Inability to urinate within eight (8) hours after surgery  Drainage from the incision    Other Contacts  Urology Office:  [id number]   [place] [address]  [phone] office  [phone] fax    Follow up Appointment  You have a follow up scheduled next week

## 2022-12-21 NOTE — PROCEDURES
Procedure Note     SURGEON: Marek Saab MD    PREOPERATIVE DIAGNOSIS: Refractory Urge Urinary Incontinence     POSTOPERATIVE DIAGNOSIS: Same     PROCEDURE PERFORMED:   1. Basic Evaluation (PNE) without fluoroscopy using the enhanced Verify™ System - (CPT Code: 41783-15), bilateral lead placement    ANESTHESIA: Lidocaine 2%     BLOOD LOSS: Minimal.     SPECIMEN: None.     COMPLICATION: None.     INDICATION FOR PROCEDURE: Pt Shannon Servin is a 72 y.o. y.o.-year-old with Refractory Overactive bladder / UUI.  After discussion a decision was made to proceed with a trial of InterStim placement with basic evaluation.     DESCRIPTION OF THE PROCEDURE:   The Patient was properly identified and placed in prone position. Pillows were placed under lower abdomen to flatten sacrum and under shins to allow the toes to dangle freely. A ground pad was placed on the bottom of the patient’s foot and the proximal ends of the test stimulation cable were connected to the ground pad and the external neurostimulator (ENS). Patient was prepped and draped in usual sterile fashion.   The sciatic notches and sacral midline were identified via palpation. The level of S3 was identified by measuring 9cm from the tip of the coccyx. Local injection of 2% Lidocaine was administered at foramen needle entry point located 2cm lateral to the sacral midline and 2cm cephalad of sciatic notch level. A foramen needle was introduced at an approximate 60 degree angle, feeling for the foraminal margins until S3 was identified. Proper needle position was confirmed by the patient identifying location of stimulation sensation; and direct observation of the lifting of the perineum or “bellowing,” and plantar flexion of the great toe utilizing the test stimulation cable, ENS and Verify™ .     The foramen needle stylet was removed, and a percutaneous lead was inserted to proper depth identified by markers on the lead. The lead was tested by connecting  the test stimulation cable to the proximal lead electrode and testing with the ENS and . Upon response confirmation, the foramen needle was removed by sliding over the percutaneous lead. The foramen needle and lead stylet were removed while securing lead location. Retesting to confirm appropriate lead response was completed.   The above procedure was performed again on the contralateral side.     The leads were connected to the patient cables. The Patient’s skin was cleaned and external cabling was secured by covering with transparent dressing. Estimated blood loss was minimal. Post procedure, the patient was programmed with the ENS and Verify™  to optimum sensation via the lead and provided utilization instructions prior to discharge. Patient will complete a bladder diary during testing period to help document results of this procedure. The patient will follow up in 1 week for full interstim implantation if they have >50% benefit.

## 2022-12-28 ENCOUNTER — OFFICE VISIT (OUTPATIENT)
Dept: UROLOGY | Facility: CLINIC | Age: 72
End: 2022-12-28

## 2022-12-28 VITALS
BODY MASS INDEX: 33.65 KG/M2 | HEART RATE: 67 BPM | DIASTOLIC BLOOD PRESSURE: 78 MMHG | WEIGHT: 222 LBS | SYSTOLIC BLOOD PRESSURE: 124 MMHG | TEMPERATURE: 97.6 F | OXYGEN SATURATION: 97 % | HEIGHT: 68 IN

## 2022-12-28 DIAGNOSIS — N39.41 URGE INCONTINENCE: Primary | ICD-10-CM

## 2022-12-28 PROCEDURE — 99024 POSTOP FOLLOW-UP VISIT: CPT | Performed by: PHYSICIAN ASSISTANT

## 2022-12-28 RX ORDER — GABAPENTIN 100 MG/1
300 CAPSULE ORAL NIGHTLY
COMMUNITY
Start: 2022-12-12 | End: 2023-02-22 | Stop reason: SDUPTHER

## 2022-12-28 NOTE — PROGRESS NOTES
Chief Complaint   Patient presents with   • Follow-up   • Urinary Incontinence     Follow-up Lead removal        HPI  Ms. Servin is a 72 y.o. female with history of mixed UI s/p PNE who presents for follow up.     At this visit, has had greater than 50% benefit from PNE and would like to undergo implant.    Past Medical History:   Diagnosis Date   • Afib (HCC)    • Arthritis    • Colon polyp 2021   • COPD (chronic obstructive pulmonary disease) (HCC) april 2022 ?????   • Depression    • Diabetes mellitus (HCC)    • Edema    • Elevated cholesterol    • Fibromyalgia, primary 2020   • History of recurrent UTIs    • Hypertension    • Impaired functional mobility, balance, gait, and endurance    • Kidney calculi    • Low back pain    • Migraines    • Pneumonia 1995   • Seasonal allergies    • Sleep apnea    • Urinary incontinence    • Urinary tract infection worst-2020    had them all my life       Past Surgical History:   Procedure Laterality Date   • ANGIOPLASTY AORTIC N/A 11/30/2022    Procedure: MESENTERIC ANGIOGRAM WITH INFERIOR MESENTERIC ARTERY STENT;  Surgeon: Parker Shah MD;  Location: [facility];  Service: Vascular;  Laterality: N/A;  FLUORO 6 min 30 sec  DOSE 691mGy  CONTRAST 35ml    • COLON SURGERY  colonoscopy in 2021   • COLONOSCOPY N/A 06/01/2021    Procedure: COLONOSCOPY WITH BIOPSY, COLD SNARE POLYPECTOMY, HOT SNARE POLYPECTOMY;  Surgeon: Cam Parra MD;  Location: [facility] ENDOSCOPY;  Service: Gastroenterology;  Laterality: N/A;   • CYSTOSCOPY  2000's   • HERNIA REPAIR  1975   • HYSTERECTOMY     • KIDNEY STONE SURGERY  2020   • OVARIAN CYST REMOVAL     • TOTAL ABDOMINAL HYSTERECTOMY WITH SALPINGO OOPHORECTOMY     • URETEROSCOPY LASER LITHOTRIPSY WITH STENT INSERTION Left 02/20/2020    Procedure: URETEROSCOPY, LEFT RETROGRADE PYLEOGERAM WITH STENT INSERTION;  Surgeon: Mulugeta Saldana MD;  Location: [facility] OR;  Service: Urology;  Laterality: Left;         Current Outpatient Medications:    •  amLODIPine (NORVASC) 5 MG tablet, TAKE 1 TABLET BY MOUTH DAILY., Disp: 90 tablet, Rfl: 0  •  apixaban (ELIQUIS) 5 MG tablet tablet, Take 1 tablet by mouth 2 (Two) Times a Day., Disp: 180 tablet, Rfl: 2  •  aspirin (aspirin) 81 MG EC tablet, Take 1 tablet by mouth Daily., Disp: , Rfl:   •  atorvastatin (LIPITOR) 80 MG tablet, Take 1 tablet by mouth Daily., Disp: 90 tablet, Rfl: 1  •  azelastine (ASTELIN) 0.1 % nasal spray, 1 spray into the nostril(s) as directed by provider 2 (Two) Times a Day As Needed for Rhinitis or Allergies. Use in each nostril as directed, Disp: 90 each, Rfl: 1  •  busPIRone (BUSPAR) 5 MG tablet, Take 1 tablet by mouth 2 (Two) Times a Day As Needed (anxiety)., Disp: 40 tablet, Rfl: 0  •  dicyclomine (BENTYL) 10 MG/5ML syrup, Take 10 mL by mouth 3 (Three) Times a Day As Needed., Disp: 473 mL, Rfl: 1  •  Docusate Calcium (STOOL SOFTENER PO), Take 240 mg by mouth Daily., Disp: , Rfl:   •  enalapril (VASOTEC) 20 MG tablet, TAKE 1 TABLET BY MOUTH EVERY 12 (TWELVE) HOURS., Disp: 180 tablet, Rfl: 0  •  Estradiol 10 % cream, , Disp: , Rfl:   •  fluconazole (DIFLUCAN) 150 MG tablet, Take 1 tablet by mouth Daily for 3 days then take as needed, Disp: 40 tablet, Rfl: 0  •  flunisolide (NASALIDE) 25 MCG/ACT (0.025%) solution nasal spray, Inhale 1 spray Every 12 (Twelve) Hours., Disp: 75 mL, Rfl: 1  •  Fluticasone-Umeclidin-Vilant (Trelegy Ellipta) 100-62.5-25 MCG/INH inhaler, Inhale 1 puff Daily., Disp: 28 each, Rfl: 0  •  furosemide (LASIX) 20 MG tablet, Take 1 tablet by mouth 2 (Two) Times a Day., Disp: 60 tablet, Rfl: 11  •  gabapentin (NEURONTIN) 100 MG capsule, , Disp: , Rfl:   •  glyburide (DIAbeta) 1.25 MG tablet, Take 1 tablet by mouth 2 (Two) Times a Day Before Meals., Disp: 180 tablet, Rfl: 1  •  ipratropium-albuterol (DUO-NEB) 0.5-2.5 mg/3 ml nebulizer, Inhale 1 vial (3 mL) by nebulization Every 6 (Six) Hours As Needed for Wheezing or Shortness of Air., Disp: 360 mL, Rfl: 0  •   levocetirizine (XYZAL) 5 MG tablet, Take 1 tablet by mouth Every Evening., Disp: 90 tablet, Rfl: 1  •  montelukast (SINGULAIR) 10 MG tablet, TAKE 1 TABLET BY MOUTH EVERY NIGHT., Disp: 90 tablet, Rfl: 0  •  ondansetron (Zofran) 4 MG tablet, Take 1 tablet by mouth Every 8 (Eight) Hours As Needed for Nausea or Vomiting., Disp: 180 tablet, Rfl: 2  •  oxybutynin XL (DITROPAN XL) 15 MG 24 hr tablet, TAKE 1 TABLET BY MOUTH DAILY., Disp: 90 tablet, Rfl: 0  •  oxyCODONE-acetaminophen (PERCOCET) 5-325 MG per tablet, Take 1 tablet by mouth Every 6 (Six) Hours As Needed., Disp: , Rfl:   •  pantoprazole (PROTONIX) 20 MG EC tablet, TAKE 1 TABLET BY MOUTH DAILY., Disp: 90 tablet, Rfl: 0  •  promethazine (PHENERGAN) 25 MG tablet, Take 25 mg by mouth Every 6 (Six) Hours As Needed., Disp: , Rfl:   •  Rimegepant Sulfate (Nurtec) 75 MG tablet dispersible tablet, Take 1 tablet by mouth As Needed (migraine). May repeat 1 hour after first dose if needed, not to exceed 1 additional dose., Disp: 4 tablet, Rfl: 0  •  rOPINIRole (REQUIP) 0.25 MG tablet, TAKE 0.5 TABLET BY MOUTH EVERY NIGHT AT BEDTIME X 2 NIGHTS; THEN 1 TABS BY MOUTH X 5 NIGHTS; THEN 2 TABS BY MOUTH EVERY NIGHT AT BEDTIME EACH NIGHT THEREAFTER., Disp: 180 tablet, Rfl: 0  •  senna (SENOKOT) 8.6 MG tablet, Take 1 tablet by mouth Daily., Disp: , Rfl:   •  sotalol (BETAPACE) 80 MG tablet, Take 1 tablet by mouth Every 12 (Twelve) Hours., Disp: 180 tablet, Rfl: 1  •  spironolactone (ALDACTONE) 25 MG tablet, Take 1 tablet by mouth Daily., Disp: 30 tablet, Rfl: 11  •  tiZANidine (ZANAFLEX) 4 MG tablet, Take 1 tablet by mouth At Night As Needed for Muscle Spasms., Disp: 90 tablet, Rfl: 1  •  traMADol (ULTRAM) 50 MG tablet, Take 1 tablet by mouth Every 6 (Six) Hours As Needed for Moderate Pain., Disp: 30 tablet, Rfl: 0  •  traZODone (DESYREL) 50 MG tablet, Take 50 mg by mouth Every Night., Disp: , Rfl:     Current Facility-Administered Medications:   •  cyanocobalamin injection 1,000  "mcg, 1,000 mcg, Intramuscular, Q28 Days, Humberto Wells DO, 1,000 mcg at 06/03/22 1706     Physical Exam  Visit Vitals  /78   Pulse 67   Temp 97.6 °F (36.4 °C)   Ht 172.7 cm (68\")   Wt 101 kg (222 lb)   SpO2 97%   BMI 33.75 kg/m²       Labs  Brief Urine Lab Results  (Last result in the past 365 days)      Color   Clarity   Blood   Leuk Est   Nitrite   Protein   CREAT   Urine HCG        11/07/22 1618 Yellow   Cloudy   Trace   Negative   Negative   Negative                 Lab Results   Component Value Date    GLUCOSE 128 (H) 11/30/2022    CALCIUM 9.1 11/30/2022     11/30/2022    K 4.3 11/30/2022    CO2 23.0 11/30/2022     11/30/2022    BUN 34 (H) 11/30/2022    CREATININE 0.89 11/30/2022    EGFRIFNONA 87 08/06/2021    BCR 38.2 (H) 11/30/2022    ANIONGAP 11.0 11/30/2022       Lab Results   Component Value Date    WBC 11.78 (H) 11/30/2022    HGB 14.5 11/30/2022    HCT 42.3 11/30/2022    MCV 92.0 11/30/2022     11/30/2022            Assessment  72 y.o. female with history of mixed UUI who underwent PNE this past week.  She had greater than 50% improvement in symptoms and is extremely pleased.  She had quite a bit of back pain due to bone spurs and positioning.  She is still willing to undergo implant and eager for the same.    Plan  1. Schedule for interstim per Dr. Saab    ON ELIQUIS FOR AFIB, will need to hold anticoagulation prior x3 days      "

## 2022-12-29 DIAGNOSIS — N39.41 URGE INCONTINENCE: Primary | ICD-10-CM

## 2022-12-29 RX ORDER — OXYBUTYNIN CHLORIDE 15 MG/1
15 TABLET, EXTENDED RELEASE ORAL DAILY
Qty: 90 TABLET | Refills: 0 | Status: SHIPPED | OUTPATIENT
Start: 2022-12-29 | End: 2023-04-03

## 2023-01-03 DIAGNOSIS — R10.84 ABDOMINAL PAIN, GENERALIZED: ICD-10-CM

## 2023-01-03 DIAGNOSIS — K21.9 GERD WITHOUT ESOPHAGITIS: ICD-10-CM

## 2023-01-03 DIAGNOSIS — R10.13 ABDOMINAL PAIN, EPIGASTRIC: ICD-10-CM

## 2023-01-03 RX ORDER — PANTOPRAZOLE SODIUM 20 MG/1
20 TABLET, DELAYED RELEASE ORAL DAILY
Qty: 90 TABLET | Refills: 0 | Status: SHIPPED | OUTPATIENT
Start: 2023-01-03

## 2023-01-19 NOTE — PRE-PROCEDURE INSTRUCTIONS
PAT phone history completed with pt for upcoming procedure on 1/25/23, with Dr. Saab.     PAT PASS GIVEN/REVIEWED WITH PT.  VERBALIZED UNDERSTANDING OF THE FOLLOWING:  DO NOT EAT, DRINK, SMOKE, USE SMOKELESS TOBACCO OR CHEW GUM AFTER MIDNIGHT THE NIGHT BEFORE SURGERY.  THIS ALSO INCLUDES HARD CANDIES AND MINTS.    DO NOT SHAVE THE AREA TO BE OPERATED ON AT LEAST 48 HOURS PRIOR TO THE PROCEDURE.  DO NOT WEAR MAKE UP OR NAIL POLISH.  DO NOT LEAVE IN ANY PIERCING OR WEAR JEWELRY THE DAY OF SURGERY.      DO NOT USE ADHESIVES IF YOU WEAR DENTURES.    DO NOT WEAR EYE CONTACTS; BRING IN YOUR GLASSES.    ONLY TAKE MEDICATION THE MORNING OF YOUR PROCEDURE IF INSTRUCTED BY YOUR SURGEON WITH ENOUGH WATER TO SWALLOW THE MEDICATION.  IF YOUR SURGEON DID NOT SPECIFY WHICH MEDICATIONS TO TAKE, YOU WILL NEED TO CALL THEIR OFFICE FOR FURTHER INSTRUCTIONS AND DO AS THEY INSTRUCT.    LEAVE ANYTHING YOU CONSIDER VALUABLE AT HOME.    YOU WILL NEED TO ARRANGE FOR SOMEONE TO DRIVE YOU HOME AFTER SURGERY.  IT IS RECOMMENDED THAT YOU DO NOT DRIVE, WORK, DRINK ALCOHOL OR MAKE MAJOR DECISIONS FOR AT LEAST 24 HOURS AFTER YOUR PROCEDURE IS COMPLETE.      THE DAY OF YOUR PROCEDURE, BRING IN THE FOLLOWING IF APPLICABLE:   PICTURE ID AND INSURANCE/MEDICARE OR MEDICAID CARDS/ANY CO-PAY THAT MAY BE DUE   COPY OF ADVANCED DIRECTIVE/LIVING WILL/POWER OR    CPAP/BIPAP/INHALERS   SKIN PREP SHEET   YOUR PREADMISSION TESTING PASS (IF NOT A PHONE HISTORY)

## 2023-01-20 ENCOUNTER — OFFICE VISIT (OUTPATIENT)
Dept: FAMILY MEDICINE CLINIC | Facility: CLINIC | Age: 73
End: 2023-01-20
Payer: MEDICARE

## 2023-01-20 ENCOUNTER — PREP FOR SURGERY (OUTPATIENT)
Dept: OTHER | Facility: HOSPITAL | Age: 73
End: 2023-01-20
Payer: MEDICARE

## 2023-01-20 VITALS
HEIGHT: 68 IN | DIASTOLIC BLOOD PRESSURE: 78 MMHG | TEMPERATURE: 97.6 F | BODY MASS INDEX: 35.73 KG/M2 | SYSTOLIC BLOOD PRESSURE: 122 MMHG | HEART RATE: 68 BPM | OXYGEN SATURATION: 96 % | RESPIRATION RATE: 15 BRPM

## 2023-01-20 DIAGNOSIS — N39.46 MIXED URGE AND STRESS INCONTINENCE: ICD-10-CM

## 2023-01-20 DIAGNOSIS — M12.811 RIGHT ROTATOR CUFF TEAR ARTHROPATHY: ICD-10-CM

## 2023-01-20 DIAGNOSIS — I10 ESSENTIAL HYPERTENSION: ICD-10-CM

## 2023-01-20 DIAGNOSIS — Z79.01 CHRONIC ANTICOAGULATION: Chronic | ICD-10-CM

## 2023-01-20 DIAGNOSIS — E11.9 TYPE 2 DIABETES MELLITUS TREATED WITHOUT INSULIN: ICD-10-CM

## 2023-01-20 DIAGNOSIS — E11.44 DIABETIC AMYOTROPHY ASSOCIATED WITH TYPE 2 DIABETES MELLITUS: Primary | ICD-10-CM

## 2023-01-20 DIAGNOSIS — M25.511 ARTHRALGIA OF RIGHT SHOULDER REGION: ICD-10-CM

## 2023-01-20 DIAGNOSIS — M75.101 RIGHT ROTATOR CUFF TEAR ARTHROPATHY: ICD-10-CM

## 2023-01-20 DIAGNOSIS — I48.0 PAROXYSMAL ATRIAL FIBRILLATION: ICD-10-CM

## 2023-01-20 DIAGNOSIS — M75.41 IMPINGEMENT SYNDROME OF RIGHT SHOULDER: Chronic | ICD-10-CM

## 2023-01-20 DIAGNOSIS — J44.9 CHRONIC OBSTRUCTIVE BRONCHITIS: ICD-10-CM

## 2023-01-20 LAB
EXPIRATION DATE: NORMAL
HBA1C MFR BLD: 7.1 %
Lab: NORMAL

## 2023-01-20 PROCEDURE — 99214 OFFICE O/P EST MOD 30 MIN: CPT | Performed by: FAMILY MEDICINE

## 2023-01-20 PROCEDURE — 3051F HG A1C>EQUAL 7.0%<8.0%: CPT | Performed by: FAMILY MEDICINE

## 2023-01-20 PROCEDURE — 83036 HEMOGLOBIN GLYCOSYLATED A1C: CPT | Performed by: FAMILY MEDICINE

## 2023-01-24 ENCOUNTER — ANESTHESIA EVENT (OUTPATIENT)
Dept: PERIOP | Facility: HOSPITAL | Age: 73
End: 2023-01-24
Payer: MEDICARE

## 2023-01-24 ENCOUNTER — PREP FOR SURGERY (OUTPATIENT)
Dept: OTHER | Facility: HOSPITAL | Age: 73
End: 2023-01-24
Payer: MEDICARE

## 2023-01-25 ENCOUNTER — ANESTHESIA (OUTPATIENT)
Dept: PERIOP | Facility: HOSPITAL | Age: 73
End: 2023-01-25
Payer: MEDICARE

## 2023-01-25 ENCOUNTER — APPOINTMENT (OUTPATIENT)
Dept: GENERAL RADIOLOGY | Facility: HOSPITAL | Age: 73
End: 2023-01-25
Payer: MEDICARE

## 2023-01-25 ENCOUNTER — HOSPITAL ENCOUNTER (OUTPATIENT)
Facility: HOSPITAL | Age: 73
Setting detail: HOSPITAL OUTPATIENT SURGERY
Discharge: HOME | End: 2023-01-25
Attending: UROLOGY | Admitting: UROLOGY
Payer: MEDICARE

## 2023-01-25 VITALS
RESPIRATION RATE: 16 BRPM | BODY MASS INDEX: 35.61 KG/M2 | HEIGHT: 68 IN | OXYGEN SATURATION: 98 % | DIASTOLIC BLOOD PRESSURE: 70 MMHG | TEMPERATURE: 97.4 F | SYSTOLIC BLOOD PRESSURE: 140 MMHG | HEART RATE: 68 BPM | WEIGHT: 235 LBS

## 2023-01-25 DIAGNOSIS — N39.46 MIXED URGE AND STRESS INCONTINENCE: Primary | ICD-10-CM

## 2023-01-25 DIAGNOSIS — N39.41 URGE INCONTINENCE: ICD-10-CM

## 2023-01-25 LAB — GLUCOSE BLDC GLUCOMTR-MCNC: 140 MG/DL (ref 70–130)

## 2023-01-25 PROCEDURE — 95972 ALYS CPLX SP/PN NPGT W/PRGRM: CPT | Performed by: UROLOGY

## 2023-01-25 PROCEDURE — 82962 GLUCOSE BLOOD TEST: CPT

## 2023-01-25 PROCEDURE — 76000 FLUOROSCOPY <1 HR PHYS/QHP: CPT

## 2023-01-25 PROCEDURE — 0 LIDOCAINE 1 % SOLUTION: Performed by: UROLOGY

## 2023-01-25 PROCEDURE — 25010000002 PROPOFOL 200 MG/20ML EMULSION: Performed by: NURSE ANESTHETIST, CERTIFIED REGISTERED

## 2023-01-25 PROCEDURE — 25010000002 ONDANSETRON PER 1 MG: Performed by: NURSE ANESTHETIST, CERTIFIED REGISTERED

## 2023-01-25 PROCEDURE — 25010000002 SUCCINYLCHOLINE PER 20 MG: Performed by: NURSE ANESTHETIST, CERTIFIED REGISTERED

## 2023-01-25 PROCEDURE — C1767 GENERATOR, NEURO NON-RECHARG: HCPCS | Performed by: UROLOGY

## 2023-01-25 PROCEDURE — C1778 LEAD, NEUROSTIMULATOR: HCPCS | Performed by: UROLOGY

## 2023-01-25 PROCEDURE — 25010000002 DEXAMETHASONE PER 1 MG: Performed by: NURSE ANESTHETIST, CERTIFIED REGISTERED

## 2023-01-25 PROCEDURE — 64561 IMPLANT NEUROELECTRODES: CPT | Performed by: UROLOGY

## 2023-01-25 PROCEDURE — C1787 PATIENT PROGR, NEUROSTIM: HCPCS | Performed by: UROLOGY

## 2023-01-25 PROCEDURE — 64590 INS/RPL PRPH SAC/GSTR NPG/R: CPT | Performed by: UROLOGY

## 2023-01-25 PROCEDURE — 25010000002 FENTANYL CITRATE (PF) 100 MCG/2ML SOLUTION: Performed by: NURSE ANESTHETIST, CERTIFIED REGISTERED

## 2023-01-25 DEVICE — NEUROSTM SACRAL/NRV INTERSTIMX NONRECHG: Type: IMPLANTABLE DEVICE | Site: BACK | Status: FUNCTIONAL

## 2023-01-25 DEVICE — KT LD NEUROSTM INTERSTIM SURESCAN FULLBDY 4.32MM: Type: IMPLANTABLE DEVICE | Site: BACK | Status: FUNCTIONAL

## 2023-01-25 RX ORDER — LIDOCAINE HYDROCHLORIDE 20 MG/ML
INJECTION, SOLUTION INTRAVENOUS AS NEEDED
Status: DISCONTINUED | OUTPATIENT
Start: 2023-01-25 | End: 2023-01-25 | Stop reason: SURG

## 2023-01-25 RX ORDER — FENTANYL CITRATE 50 UG/ML
INJECTION, SOLUTION INTRAMUSCULAR; INTRAVENOUS AS NEEDED
Status: DISCONTINUED | OUTPATIENT
Start: 2023-01-25 | End: 2023-01-25 | Stop reason: SURG

## 2023-01-25 RX ORDER — DEXAMETHASONE SODIUM PHOSPHATE 4 MG/ML
INJECTION, SOLUTION INTRA-ARTICULAR; INTRALESIONAL; INTRAMUSCULAR; INTRAVENOUS; SOFT TISSUE AS NEEDED
Status: DISCONTINUED | OUTPATIENT
Start: 2023-01-25 | End: 2023-01-25 | Stop reason: SURG

## 2023-01-25 RX ORDER — MEPERIDINE HYDROCHLORIDE 25 MG/ML
12.5 INJECTION INTRAMUSCULAR; INTRAVENOUS; SUBCUTANEOUS
Status: DISCONTINUED | OUTPATIENT
Start: 2023-01-25 | End: 2023-01-25 | Stop reason: HOSPADM

## 2023-01-25 RX ORDER — OXYCODONE HYDROCHLORIDE 5 MG/1
5 TABLET ORAL EVERY 6 HOURS PRN
Qty: 5 TABLET | Refills: 0 | Status: SHIPPED | OUTPATIENT
Start: 2023-01-25 | End: 2023-03-08

## 2023-01-25 RX ORDER — ROCURONIUM BROMIDE 10 MG/ML
INJECTION, SOLUTION INTRAVENOUS AS NEEDED
Status: DISCONTINUED | OUTPATIENT
Start: 2023-01-25 | End: 2023-01-25 | Stop reason: SURG

## 2023-01-25 RX ORDER — SODIUM CHLORIDE 9 MG/ML
25 INJECTION, SOLUTION INTRAVENOUS CONTINUOUS
Status: DISCONTINUED | OUTPATIENT
Start: 2023-01-25 | End: 2023-01-25 | Stop reason: HOSPADM

## 2023-01-25 RX ORDER — LORAZEPAM 2 MG/ML
1 INJECTION INTRAMUSCULAR
Status: DISCONTINUED | OUTPATIENT
Start: 2023-01-25 | End: 2023-01-25 | Stop reason: HOSPADM

## 2023-01-25 RX ORDER — SUCCINYLCHOLINE CHLORIDE 20 MG/ML
INJECTION INTRAMUSCULAR; INTRAVENOUS AS NEEDED
Status: DISCONTINUED | OUTPATIENT
Start: 2023-01-25 | End: 2023-01-25 | Stop reason: SURG

## 2023-01-25 RX ORDER — ACETAMINOPHEN 500 MG
1000 TABLET ORAL EVERY 6 HOURS
Qty: 30 TABLET | Refills: 0 | Status: SHIPPED | OUTPATIENT
Start: 2023-01-25 | End: 2023-01-28

## 2023-01-25 RX ORDER — SODIUM CHLORIDE 0.9 % (FLUSH) 0.9 %
10 SYRINGE (ML) INJECTION AS NEEDED
Status: DISCONTINUED | OUTPATIENT
Start: 2023-01-25 | End: 2023-01-25 | Stop reason: HOSPADM

## 2023-01-25 RX ORDER — PROPOFOL 10 MG/ML
INJECTION, EMULSION INTRAVENOUS AS NEEDED
Status: DISCONTINUED | OUTPATIENT
Start: 2023-01-25 | End: 2023-01-25 | Stop reason: SURG

## 2023-01-25 RX ORDER — PROCHLORPERAZINE EDISYLATE 5 MG/ML
10 INJECTION INTRAMUSCULAR; INTRAVENOUS ONCE AS NEEDED
Status: DISCONTINUED | OUTPATIENT
Start: 2023-01-25 | End: 2023-01-25 | Stop reason: HOSPADM

## 2023-01-25 RX ORDER — CLINDAMYCIN PHOSPHATE 900 MG/50ML
900 INJECTION INTRAVENOUS ONCE
Status: COMPLETED | OUTPATIENT
Start: 2023-01-25 | End: 2023-01-25

## 2023-01-25 RX ORDER — DOCUSATE SODIUM 100 MG/1
100 CAPSULE, LIQUID FILLED ORAL 2 TIMES DAILY
Qty: 15 CAPSULE | Refills: 1 | Status: SHIPPED | OUTPATIENT
Start: 2023-01-25 | End: 2023-03-08

## 2023-01-25 RX ORDER — SULFAMETHOXAZOLE AND TRIMETHOPRIM 800; 160 MG/1; MG/1
1 TABLET ORAL 2 TIMES DAILY
Qty: 10 TABLET | Refills: 0 | Status: SHIPPED | OUTPATIENT
Start: 2023-01-25 | End: 2023-01-30

## 2023-01-25 RX ORDER — ONDANSETRON 2 MG/ML
INJECTION INTRAMUSCULAR; INTRAVENOUS AS NEEDED
Status: DISCONTINUED | OUTPATIENT
Start: 2023-01-25 | End: 2023-01-25 | Stop reason: SURG

## 2023-01-25 RX ORDER — BUPIVACAINE HYDROCHLORIDE AND EPINEPHRINE 5; 5 MG/ML; UG/ML
INJECTION, SOLUTION EPIDURAL; INTRACAUDAL; PERINEURAL AS NEEDED
Status: DISCONTINUED | OUTPATIENT
Start: 2023-01-25 | End: 2023-01-25 | Stop reason: HOSPADM

## 2023-01-25 RX ORDER — LIDOCAINE HYDROCHLORIDE 10 MG/ML
INJECTION, SOLUTION INFILTRATION; PERINEURAL AS NEEDED
Status: DISCONTINUED | OUTPATIENT
Start: 2023-01-25 | End: 2023-01-25 | Stop reason: HOSPADM

## 2023-01-25 RX ADMIN — SUCCINYLCHOLINE CHLORIDE 200 MG: 20 INJECTION, SOLUTION INTRAMUSCULAR; INTRAVENOUS at 08:26

## 2023-01-25 RX ADMIN — DEXAMETHASONE SODIUM PHOSPHATE 4 MG: 4 INJECTION, SOLUTION INTRAMUSCULAR; INTRAVENOUS at 08:26

## 2023-01-25 RX ADMIN — LIDOCAINE HYDROCHLORIDE 40 MG: 20 INJECTION, SOLUTION INTRAVENOUS at 08:26

## 2023-01-25 RX ADMIN — ONDANSETRON 4 MG: 2 INJECTION INTRAMUSCULAR; INTRAVENOUS at 08:26

## 2023-01-25 RX ADMIN — ROCURONIUM BROMIDE 20 MG: 10 INJECTION INTRAVENOUS at 08:26

## 2023-01-25 RX ADMIN — FENTANYL CITRATE 100 MCG: 50 INJECTION INTRAMUSCULAR; INTRAVENOUS at 08:26

## 2023-01-25 RX ADMIN — PROPOFOL 150 MG: 10 INJECTION, EMULSION INTRAVENOUS at 08:26

## 2023-01-25 RX ADMIN — CLINDAMYCIN PHOSPHATE 900 MG: 900 INJECTION, SOLUTION INTRAVENOUS at 08:32

## 2023-01-25 NOTE — ANESTHESIA PROCEDURE NOTES
Airway  Urgency: elective    Date/Time: 1/25/2023 8:28 AM  Airway not difficult    General Information and Staff    Patient location during procedure: OR  CRNA/CAA: Renaldo Jean CRNA    Indications and Patient Condition  Indications for airway management: airway protection    Preoxygenated: yes  Mask difficulty assessment: 1 - vent by mask    Final Airway Details  Final airway type: endotracheal airway      Successful airway: ETT  Cuffed: yes   Successful intubation technique: direct laryngoscopy  Facilitating devices/methods: intubating stylet  Endotracheal tube insertion site: oral  Blade: Lowe  Blade size: 2  ETT size (mm): 7.0  Cormack-Lehane Classification: grade IIa - partial view of glottis  Placement verified by: chest auscultation and capnometry   Cuff volume (mL): 10  Measured from: lips  ETT/EBT  to lips (cm): 22  Number of attempts at approach: 1  Assessment: lips, teeth, and gum same as pre-op and atraumatic intubation    Additional Comments  Airway placed without problems. Dentition and Lips as pre-induction. ETT cuff inflated to minimal occlusive pressure.

## 2023-01-25 NOTE — ANESTHESIA PREPROCEDURE EVALUATION
Anesthesia Evaluation     Patient summary reviewed and Nursing notes reviewed   NPO Solid Status: > 8 hours  NPO Liquid Status: > 2 hours           Airway   Mallampati: II  TM distance: >3 FB  Neck ROM: limited  Possible difficult intubation  Dental    (+) poor dentition    Pulmonary     breath sounds clear to auscultation  (+) a smoker Former, COPD (MDI ) moderate, asthma,sleep apnea,   Pneumonia: remote   Cardiovascular     ECG reviewed  Patient on routine beta blocker  Rhythm: regular  Rate: normal    (+) hypertension, dysrhythmias Atrial Fib, Paroxysmal Atrial Fib, hyperlipidemia,     ROS comment: ECG  NSR  ECHO EF >51%     Neuro/Psych  (+) headaches, numbness, psychiatric history,      ROS Comment: Recent fall 6 weeks ago  Occipital skin hematoma CT head OK   GI/Hepatic/Renal/Endo    (+) obesity,   diabetes mellitus (A1C 6,4 ) type 2 poorly controlled,     Musculoskeletal     (+) myalgias,   Abdominal    Substance History      OB/GYN          Other   arthritis,          Phys Exam Other: Stiff rigid neck                 Anesthesia Plan    ASA 3     general     intravenous induction     Anesthetic plan, risks, benefits, and alternatives have been provided, discussed and informed consent has been obtained with: patient.  Pre-procedure education provided  Plan discussed with CRNA.        CODE STATUS:

## 2023-01-25 NOTE — ADDENDUM NOTE
Addendum  created 01/25/23 0927 by Renaldo Jean, CRNA    Review and Sign - Ready for Procedure, Review and Sign - Signed

## 2023-01-25 NOTE — ANESTHESIA POSTPROCEDURE EVALUATION
Patient: Shannon Servin    Procedure Summary     Date: 01/25/23 Room / Location: [facility] OR  /  [facility]    Anesthesia Start: 0826 Anesthesia Stop: 0918    Procedure: INTERSTIM STAGES 1 AND 2 LEAD AND GENERATOR PLACEMENT Diagnosis:       Urge incontinence      (Urge incontinence [N39.41])    Surgeons: Marek Saab MD Provider: Renaldo Jean CRNA    Anesthesia Type: general ASA Status: 3          Anesthesia Type: general    Vitals  Vitals Value Taken Time   BP     Temp     Pulse     Resp     SpO2 97 % 01/25/23 0918   Vitals shown include unvalidated device data.        Post Anesthesia Care and Evaluation    Patient location during evaluation: PACU  Patient participation: complete - patient participated  Level of consciousness: awake and alert and awake  Pain score: 3  Pain management: adequate    Airway patency: patent  Anesthetic complications: No anesthetic complications  PONV Status: controlled  Cardiovascular status: acceptable, hemodynamically stable and stable  Respiratory status: acceptable and nasal cannula  Hydration status: acceptable    Comments: Vital signs as noted in nursing documentation as per protocol.

## 2023-01-25 NOTE — OP NOTE
Operative Report      SURGEON: Marek Saab MD    PREOPERATIVE DIAGNOSIS: Refractory Urge Urinary Incontinence     POSTOPERATIVE DIAGNOSIS: Same     PROCEDURE PERFORMED:   1. First stage InterStim (placement of right S3 nerve stimulator lead). (CPT 90811)  2. Fluoroscopic guidance for needle placement. (61395-58)  3. Implantable pulse generator placement (72113)  4. Intra operative device programming    ANESTHESIA: Monitored anesthesia care    BLOOD LOSS: Minimal.     SPECIMEN: None.     COMPLICATION: None.     INDICATION FOR PROCEDURE: Pt Shannon Servin is a 72 y.o. y.o.-year-old with Refractory Overactive bladder / UUI.  After discussion a decision was made to proceed with a trial of InterStim placement. The patient had received greater than 50% benefit from the PNE placement, Therefore the decision was made to proceed with a combined stage I and stage II InterStim placement. We discussed perioperative complication including bleeding, infection, lead migration, lead malfunction, pain at the leak site and possible need to replace the battery. An informed consent was obtained and placed in chart.     DESCRIPTION OF THE PROCEDURE:   The patient was taken to the operating theater, Identified by name and medical record number, and placed in supine position. Sequential compression  devices applied to both lower extremities. Pt underwent monitored anesthesia. The patient received intravenous antibiotics prior to the surgical procedure. An operative time-out was performed identifying the patient, the procedure to be performed and the surgeon. The patient was placed in prone position, carefully padding all pressure points. Surgical site was sterilely prepped and draped in a standard manner.     We performed fluoroscopy to danny anatomical landmarks including the midline, ischial spine and S3 foramen. Local anesthetic was injected on right and left side at the level of S3 foramen site. We then used a spinal needle to  intubate the foramen on the right and left sides. We were able to successfully intubate both the S3 foramen, and this was tested to verify the exact location of S3 foramen.     On lead testing, we got an excellent motor response on the above side. At this point, a decision  was made to proceed with lead placement on that side. We used a  bidirectional needle through the spinal needle under fluoroscopy control. Following this, we used the lead introducer sheath over the bidirectional needle. Then we placed tined leads under fluoroscopy control, making sure that lead #3 is just at the level of anterior plate on lateral film. We tested all 4 electrodes and we got excellent motor response on all the 4 electrodes. The leads were deployed by releasing the lead itroducer sheath.     We created a future generator site by creating a pocket on the left side, approximately 3 cm in dimension. This incision was made with the help of a 15-knife and this deepened with the help of  Bovie electrocautery. With a combination of blunt and sharp dissection, we were able to create a nice pocket just above the gluteus  antonio muscle into the subcutaneous tissue. The pocket was then irrigated copiously with antibiotic solution. With the help of a tunneling device, we tunneled the wire. The IPG was connected to the tined lead wire and this was  secured in place. The generator was placed in the previously created pocket. The subcutaneous tissue was closed with 3-0 vicryl and the skin was closed with 4-0 vicryl suture in a running fashion.     We tested the device intraoperatively with the help of BidRazor test generator device and all circuits were functioning well. There was no evidence of short circuit, no impedance. This concluded the procedure. The patient was transferred to PACU in stable condition. The instrument, needle and sponge count was correct x2.

## 2023-01-25 NOTE — DISCHARGE INSTRUCTIONS
Home Care After Interstim placement  The following instructions will help you care for yourself, or be cared for upon your return home today.  These are guidelines for your care right after surgery only.     Diet  Drink plenty of liquids and eat light meals today.    Start your regular diet tomorrow.    Activity  Start normal activities in twenty-four (24) hours.    Wound Care and Hygiene  No restrictions, start normal routine.  Keep dressing on until follow up visit.    Shower and bathing  You may shower 48 hours after surgery  You may take a bath in 2 weeks  Please avoid swimming for 2-4 weeks    Anesthesia Precautions & Expectations  After anesthesia, rest for 24 hours.  Do not drive, drink alcoholic beverages or make any important decisions during this time.  General anesthesia may cause a sore throat, jaw discomfort or muscle aches.  These symptoms can last for one or two days.     What to Expect after Surgery  Soreness over the incision.  Mild bleeding at the incision site.    Call your Doctor  Severe pain not controlled by oral medication  Temperature above 101.5 degrees  Inability to urinate within eight (8) hours after surgery  Drainage from the incision    Other Contacts  Urology Office:  [address]   [place] [id number] [phone] office  [phone] fax      No pushing, pulling, tugging,  heavy lifting, or strenuous activity.  No major decision making, driving, or drinking alcoholic beverages for 24 hours. ( due to the medications you have  received)  Always use good hand hygiene/washing techniques.  NO driving while taking pain medications.    * if you have an incision:  Check your incision area every day for signs of infection.   Check for:  * more redness, swelling, or pain  *more fluid or blood  *warmth  *pus or bad smell

## 2023-01-25 NOTE — H&P
CC  No chief complaint on file.       HPI  Shannon Servin is a 72 y.o. with history of   1. Urge incontinence         No recent fevers or new LUTS  Does not take any blood thinners    Past Medical History  Past Medical History:   Diagnosis Date   • Afib (HCC)    • Arthritis    • Colon polyp 2021   • COPD (chronic obstructive pulmonary disease) (HCC) april 2022 ?????   • Depression    • Diabetes mellitus (HCC)    • Edema    • Elevated cholesterol    • Fibromyalgia, primary 2020   • History of recurrent UTIs    • Hypertension    • Impaired functional mobility, balance, gait, and endurance    • Kidney calculi    • Low back pain    • Migraines    • Pneumonia 1995   • Seasonal allergies    • Sleep apnea    • Urinary incontinence    • Urinary tract infection worst-2020    had them all my life       Past Surgical History  Past Surgical History:   Procedure Laterality Date   • ANGIOPLASTY AORTIC N/A 11/30/2022    Procedure: MESENTERIC ANGIOGRAM WITH INFERIOR MESENTERIC ARTERY STENT;  Surgeon: Parker Shah MD;  Location: [facility];  Service: Vascular;  Laterality: N/A;  FLUORO 6 min 30 sec  DOSE 691mGy  CONTRAST 35ml    • COLON SURGERY  colonoscopy in 2021   • COLONOSCOPY N/A 06/01/2021    Procedure: COLONOSCOPY WITH BIOPSY, COLD SNARE POLYPECTOMY, HOT SNARE POLYPECTOMY;  Surgeon: Cam Parra MD;  Location: [facility] ENDOSCOPY;  Service: Gastroenterology;  Laterality: N/A;   • CYSTOSCOPY  2000's   • HERNIA REPAIR  1975   • HYSTERECTOMY     • KIDNEY STONE SURGERY  2020   • OVARIAN CYST REMOVAL     • TOTAL ABDOMINAL HYSTERECTOMY WITH SALPINGO OOPHORECTOMY     • URETEROSCOPY LASER LITHOTRIPSY WITH STENT INSERTION Left 02/20/2020    Procedure: URETEROSCOPY, LEFT RETROGRADE PYLEOGERAM WITH STENT INSERTION;  Surgeon: Mulugeta Saldana MD;  Location: [facility] OR;  Service: Urology;  Laterality: Left;       Medications    Current Facility-Administered Medications:   •  clindamycin (CLEOCIN) 900 mg in dextrose 5% 50 mL  "IVPB (premix), 900 mg, Intravenous, Once, Marek Saab MD  •  sodium chloride 0.9 % flush 10 mL, 10 mL, Intravenous, PRN, Marek Saab MD  •  sodium chloride 0.9 % infusion, 25 mL/hr, Intravenous, Continuous, Marek Saab MD    Allergies  Allergies   Allergen Reactions   • Metformin Hallucinations     Other reaction(s): Hallucinations       Social History  Social History     Socioeconomic History   • Marital status:    Tobacco Use   • Smoking status: Former     Packs/day: 1.50     Years: 40.00     Pack years: 60.00     Types: Cigarettes     Start date: 1955     Quit date: 1998     Years since quittin.8     Passive exposure: Past   • Smokeless tobacco: Never   Vaping Use   • Vaping Use: Never used   Substance and Sexual Activity   • Alcohol use: Yes     Alcohol/week: 2.0 standard drinks     Types: 2 Drinks containing 0.5 oz of alcohol per week     Comment: maybe twice a month   • Drug use: Never   • Sexual activity: Defer     Comment: hysterectomy       Review of Systems  Constitutional: No fevers or chills  Skin: Negative for rash  Endocrine: No heat/cold intolerance   Cardiovascular: Negative for chest pain or dyspnea on exertion  Respiratory: Negative for shortness of breath or wheezing  Gastrointestinal: No constipation, nausea or vomiting  Genitourinary: Negative for new lower urinary tract symptoms, current gross hematuria or dysuria.  Musculoskeletal: No flank pain  Neurological:  Negative for frequent headaches or dizziness  Lymph/Heme: Negative for leg swelling or calf pain.    Physical Exam  Visit Vitals  /70 (BP Location: Right arm, Patient Position: Sitting)   Pulse 73   Temp 97.9 °F (36.6 °C) (Temporal)   Resp 20   Ht 172.7 cm (68\")   Wt 107 kg (235 lb)   SpO2 94%   BMI 35.73 kg/m²     Constitutional: NAD, WDWN.   HEENT: NCAT. Conjunctivae normal.  MMM.    Cardiovascular: Regular rate.  Pulmonary/Chest: Respirations are even and non-labored " bilaterally.  Abdominal: Soft. No distension, tenderness, masses or guarding. No CVA tenderness.  Neurological: A + O x 3.  Cranial Nerves II-XII grossly intact. Normal gait.  Extremities: SYLVESTER x 4, Warm. No clubbing.  No cyanosis.    Skin: Pink, warm and dry.  No rashes noted.  Psychiatric:  Normal mood and affect    Labs & Imaging  Lab Results   Component Value Date    GLUCOSE 128 (H) 11/30/2022    CALCIUM 9.1 11/30/2022     11/30/2022    K 4.3 11/30/2022    CO2 23.0 11/30/2022     11/30/2022    BUN 34 (H) 11/30/2022    CREATININE 0.89 11/30/2022    EGFRIFNONA 87 08/06/2021    BCR 38.2 (H) 11/30/2022    ANIONGAP 11.0 11/30/2022     Lab Results   Component Value Date    WBC 11.78 (H) 11/30/2022    HGB 14.5 11/30/2022    HCT 42.3 11/30/2022    MCV 92.0 11/30/2022     11/30/2022     Brief Urine Lab Results  (Last result in the past 365 days)      Color   Clarity   Blood   Leuk Est   Nitrite   Protein   CREAT   Urine HCG        11/07/22 1618 Yellow   Cloudy   Trace   Negative   Negative   Negative                    No results found.        Assessment  Shannon Servin is a 72 y.o. female who presents with the following diagnosis:  1. Urge incontinence         Plan  1. To OR for INTERSTIM STAGES 1 AND 2 LEAD AND GENERATOR PLACEMENT     Marek Saab MD

## 2023-02-10 RX ORDER — TIZANIDINE 4 MG/1
4 TABLET ORAL NIGHTLY PRN
Qty: 90 TABLET | Refills: 0 | Status: SHIPPED | OUTPATIENT
Start: 2023-02-10

## 2023-02-15 PROBLEM — R10.30 LOWER ABDOMINAL PAIN: Status: RESOLVED | Noted: 2021-05-27 | Resolved: 2023-02-15

## 2023-02-15 PROBLEM — M25.511 ARTHRALGIA OF RIGHT SHOULDER REGION: Status: ACTIVE | Noted: 2023-02-15

## 2023-02-15 PROBLEM — N20.1 LEFT URETERAL STONE: Status: RESOLVED | Noted: 2020-02-19 | Resolved: 2023-02-15

## 2023-02-15 PROBLEM — M12.811 RIGHT ROTATOR CUFF TEAR ARTHROPATHY: Status: ACTIVE | Noted: 2023-02-15

## 2023-02-15 PROBLEM — N39.41 URGE INCONTINENCE: Status: RESOLVED | Noted: 2022-12-29 | Resolved: 2023-02-15

## 2023-02-15 PROBLEM — M75.41 IMPINGEMENT SYNDROME OF RIGHT SHOULDER: Chronic | Status: ACTIVE | Noted: 2020-06-18

## 2023-02-15 PROBLEM — I48.91 ATRIAL FIBRILLATION, UNSPECIFIED TYPE: Status: RESOLVED | Noted: 2022-07-22 | Resolved: 2023-02-15

## 2023-02-15 PROBLEM — M25.811 OTHER SPECIFIED JOINT DISORDERS, RIGHT SHOULDER: Status: RESOLVED | Noted: 2020-06-18 | Resolved: 2023-02-15

## 2023-02-15 PROBLEM — R10.84 CHRONIC GENERALIZED ABDOMINAL PAIN: Status: RESOLVED | Noted: 2022-06-20 | Resolved: 2023-02-15

## 2023-02-15 PROBLEM — M54.50 ACUTE LOW BACK PAIN: Status: RESOLVED | Noted: 2020-02-25 | Resolved: 2023-02-15

## 2023-02-15 PROBLEM — M75.101 RIGHT ROTATOR CUFF TEAR ARTHROPATHY: Status: ACTIVE | Noted: 2023-02-15

## 2023-02-15 PROBLEM — G89.29 CHRONIC GENERALIZED ABDOMINAL PAIN: Status: RESOLVED | Noted: 2022-06-20 | Resolved: 2023-02-15

## 2023-02-15 NOTE — PROGRESS NOTES
Established Patient        Chief Complaint:   Chief Complaint   Patient presents with   • Diabetes        Shannon Servin is a 72 y.o. female    History of Present Illness:   Answers for HPI/ROS submitted by the patient on 1/19/2023  Please describe your symptoms.: follow up  Have you had these symptoms before?: Yes  How long have you been having these symptoms?: Greater than 2 weeks  Please list any medications you are currently taking for this condition.: you have them all  Please describe any probable cause for these symptoms. : N/A  What is the primary reason for your visit?: Other    Here today in scheduled follow-up visit of her diabetes mellitus, diabetic amyotrophy, atrial fibrillation, chronic anticoagulation, chronic obstructive bronchitis, mixed urge/stress incontinence, arthralgia of the right shoulder as a result of impingement syndrome and rotator cuff arthropathy, as well as hypertension.    She denies any problems with her current medication regimen.  She is anxious to have surgical intervention to her right shoulder, hopeful to have an improvement to her ADLs.  She denies any cyclical/persistent hypoglycemic episodes.  Denies any active chest pain, no reports of syncope, palpitations or vertigo.  Maintains an active lifestyle, balanced dietary intake.  She reports good hydration habits.  She denies any hematuria, no reports of BRB/BTS.  Denies any epistaxis or hemoptysis.      Subjective     The following portions of the patient's history were reviewed and updated as appropriate: allergies, current medications, past family history, past medical history, past social history, past surgical history and problem list.    Allergies   Allergen Reactions   • Metformin Hallucinations     Other reaction(s): Hallucinations     Review of Systems:    1. Constitutional: Negative for fever. Negative for chills, diaphoresis; malaise/fatigue of chronic nature, and without unexpected weight  "change.   2. HENT: No dysphagia; no changes to vision/hearing/smell/taste; no epistaxis  3. Eyes: Negative for redness and visual disturbance.   4. Respiratory: Chronic cough, no hemoptysis reported.  Productive of clear/yellow phlegm at times.  She does report wheezing and coughing with laughing or prolonged talking, as well as with cold air exposure.  5. Cardiovascular: Negative for chest pain and palpitations.   6. Gastrointestinal: As per above.  7. Endocrine: Negative for cold intolerance and heat intolerance.   8. Genitourinary: Negative for difficulty urinating, dysuria and frequency.   9. Musculoskeletal: Chronic arthralgias, back pain and myalgias.  Significant limitation to range of motion of right shoulder.  10. Skin: Chronic thickened/yellow discoloration to the numerous toes of bilateral feet, worse to the bilateral great toes.  11. Neurological: Negative for syncope, weakness.  As per above otherwise.  12. Hematological: Negative for adenopathy. Does not bruise/bleed easily.   13. Psychiatric/Behavioral: Negative for confusion. The patient is not nervous/anxious.    Objective     Physical Exam   Vital Signs: /78   Pulse 68   Temp 97.6 °F (36.4 °C)   Resp 15   Ht 172.7 cm (68\")   SpO2 96%   BMI 35.73 kg/m²     General Appearance: alert, oriented x 3, no acute distress.  Pleasant and interactive during questioning/examination.  Well-nourished and developed female.  Skin: warm and dry.  Changes to bilateral feet and ankles consistent with tinea, signs of excoriation noted, some hyperkeratotic changes additionally.  Stasis dermatitis noted additionally.  Thickened/yellowish discoloration to bilateral toenails, worse to the great toenails.  HEENT: Atraumatic.  pupils round and reactive to light and accommodation, oral mucosa pink and moist.  Nares patent without epistaxis.  External auditory canals are patent tympanic membranes intact.  Neck: supple, no JVD, trachea midline.  No " thyromegaly  Lungs: CTA, unlabored breathing effort.  Heart: RRR, normal S1 and S2, no S3, no rub.  Abdomen: soft, non-tender, no palpable bladder, present bowel sounds to auscultation ×4.  No guarding or rigidity.  No CVA tenderness.  Extremities: no clubbing, cyanosis.  Symmetric muscle strength and development.  Nonpitting edematous changes noted to the mid tibias bilaterally, brawny changes associated with venous stasis, chronic in nature.  Ambulates independently.  Impaired range of motion to right shoulder.  Apley scratch test limited due to discomfort of the right shoulder, normal deltoid tone/development.  Negative pushoff, positive empty can sign, as well as shrug sign.  Zuniga/Neer painful, cross body abduction painful as well.  Symmetric  strength bilaterally.  Neuro: normal speech and mental status.  Cranial nerves II through XII intact.  No anosmia. DTR 2+; proprioception intact.  No focal motor/sensory deficits.    Assessment and Plan      Assessment/Plan:   Diagnoses and all orders for this visit:    1. Diabetic amyotrophy associated with type 2 diabetes mellitus (HCC) (Primary)    2. Type 2 diabetes mellitus treated without insulin (HCC)  -     POC Glycosylated Hemoglobin (Hb A1C)    3. Chronic obstructive bronchitis (HCC)    4. Chronic anticoagulation    5. Paroxysmal atrial fibrillation (HCC)    6. Mixed urge and stress incontinence    7. Impingement syndrome of right shoulder    8. Arthralgia of right shoulder region    9. Right rotator cuff tear arthropathy    10. Essential hypertension      Keep scheduled appointment with orthopedic surgery concerning right shoulder subacromial decompression and distal clavicle resection, as well as rotator cuff repair.    Continue avoidance of prolonged fasting.  Maintain healthy dietary choices, as well as adequate hydration status.  Continue home blood glucose monitoring.  Hemoglobin A1c demonstrates excellent blood glucose control.    Vital signs  demonstrate hemodynamic stability, both heart rate and blood pressure are at goal.  Continue anticoagulation, she will discontinue Eliquis on the day preceding surgery, able to restart immediately after surgery.    Continue current respiratory regimen.  Demonstrates no increased work of breathing at this time.    Continue oxybutynin, no dosing adjustment needed at this time.      Discussion Summary:    Discussed plan of care in detail with pt today; pt verb understanding and agrees.    I spent 35 minutes caring for Shannon on this date of service. This time includes time spent by me in the following activities:preparing for the visit, performing a medically appropriate examination and/or evaluation , counseling and educating the patient/family/caregiver, ordering medications, tests, or procedures, documenting information in the medical record, independently interpreting results and communicating that information with the patient/family/caregiver and care coordination    I have reviewed and updated all copied forward information, as appropriate.  I attest to the accuracy and relevance of any unchanged information.    Follow up:  No follow-ups on file.     There are no Patient Instructions on file for this visit.    Humberto Wells DO  02/15/23  09:10 EST

## 2023-02-20 ENCOUNTER — TELEPHONE (OUTPATIENT)
Dept: CARDIOLOGY | Facility: CLINIC | Age: 73
End: 2023-02-20
Payer: MEDICARE

## 2023-02-20 DIAGNOSIS — Z79.01 CHRONIC ANTICOAGULATION: Chronic | ICD-10-CM

## 2023-02-20 DIAGNOSIS — I48.0 PAROXYSMAL ATRIAL FIBRILLATION: ICD-10-CM

## 2023-02-20 NOTE — TELEPHONE ENCOUNTER
Contacted patient. Out of Eliquis samples at the moment. Sent rx to [facility] for patient. Gave patient a free 30 coupon card. Placed patient on Eliquis list for when office does receive samples she will be called.

## 2023-02-20 NOTE — TELEPHONE ENCOUNTER
Caller: Shannon Servin    Relationship: Self    Best call back number: [phone]    What is the best time to reach you: ANYTIME     Who are you requesting to speak with (clinical staff, provider,  specific staff member): ANYONE     What was the call regarding: PATIENT WOULD LIKE TO KNOW IF THERE ARE ELIQUIS SAMPLES AVAILABLE IN THE OFFICE FOR HER  TO .     Do you require a callback: YES

## 2023-02-21 NOTE — TELEPHONE ENCOUNTER
Physical Therapy Daily Progress Note    Subjective   Ramon Abdelsayed reports: his low back is feeling much better, but his shoulders feel like they are getting tighter and tighter. He is not sure why. He reports he has been on his computer more than usual.       Objective   See Exercise, Manual, and Modality Logs for complete treatment.     Pt demonstrates tightness in bilateral upper traps and rhomboids. He is hypomobile throughout the thoracic spine.     Assessment/Plan   Pt tolerated treatment well. Cupping relieved symptoms significantly but he still felt he had a tight spot at his left shoulder blade. He was educated on purchasing theracane to try to self massage the area to reduce tightness. Pt would benefit from continued skilled PT. Plan to re assess next visit.     Progress per Plan of Care           SELF PAY    Latisha Dang, PT, DPT  Physical Therapist   Patient notified.   Yes

## 2023-03-06 RX ORDER — GABAPENTIN 100 MG/1
300 CAPSULE ORAL NIGHTLY
Qty: 90 CAPSULE | Refills: 2 | Status: SHIPPED | OUTPATIENT
Start: 2023-03-06

## 2023-03-08 ENCOUNTER — OFFICE VISIT (OUTPATIENT)
Dept: UROLOGY | Facility: CLINIC | Age: 73
End: 2023-03-08
Payer: MEDICARE

## 2023-03-08 VITALS
BODY MASS INDEX: 35.61 KG/M2 | HEIGHT: 68 IN | RESPIRATION RATE: 16 BRPM | WEIGHT: 235 LBS | SYSTOLIC BLOOD PRESSURE: 130 MMHG | DIASTOLIC BLOOD PRESSURE: 78 MMHG | TEMPERATURE: 97.4 F

## 2023-03-08 DIAGNOSIS — Z45.42 ENCOUNTER FOR ADJUSTMENT AND MANAGEMENT OF NEUROSTIMULATOR: Primary | ICD-10-CM

## 2023-03-08 PROCEDURE — 3078F DIAST BP <80 MM HG: CPT | Performed by: PHYSICIAN ASSISTANT

## 2023-03-08 PROCEDURE — 3075F SYST BP GE 130 - 139MM HG: CPT | Performed by: PHYSICIAN ASSISTANT

## 2023-03-08 PROCEDURE — 1159F MED LIST DOCD IN RCRD: CPT | Performed by: PHYSICIAN ASSISTANT

## 2023-03-08 PROCEDURE — 1160F RVW MEDS BY RX/DR IN RCRD: CPT | Performed by: PHYSICIAN ASSISTANT

## 2023-03-08 PROCEDURE — 95971 ALYS SMPL SP/PN NPGT W/PRGRM: CPT | Performed by: PHYSICIAN ASSISTANT

## 2023-03-08 PROCEDURE — 99024 POSTOP FOLLOW-UP VISIT: CPT | Performed by: PHYSICIAN ASSISTANT

## 2023-03-08 RX ORDER — MONTELUKAST SODIUM 10 MG/1
TABLET ORAL
COMMUNITY
Start: 2023-03-06

## 2023-03-08 NOTE — PROGRESS NOTES
Chief Complaint   Patient presents with   • Urge Incontinence     1 month interstim post op        HPI  Ms. Servin is a 72 y.o. female with history of mixed UI s/p interstim 01/25/23, JIMY-BSO, nephrolithiasis who presents for follow up.     At this visit, has made some amplitude adjustments, but is not having UUI control.    Past Medical History:   Diagnosis Date   • Afib (HCC)    • Arthritis    • Colon polyp 2021   • COPD (chronic obstructive pulmonary disease) ([facility]) april 2022 ?????   • Depression    • Diabetes mellitus (HCC)    • Edema    • Elevated cholesterol    • Fibromyalgia, primary 2020   • History of recurrent UTIs    • Hypertension    • Impaired functional mobility, balance, gait, and endurance    • Kidney calculi    • Low back pain    • Migraines    • Pneumonia 1995   • Seasonal allergies    • Sleep apnea    • Urinary incontinence    • Urinary tract infection worst-2020    had them all my life       Past Surgical History:   Procedure Laterality Date   • ANGIOPLASTY AORTIC N/A 11/30/2022    Procedure: MESENTERIC ANGIOGRAM WITH INFERIOR MESENTERIC ARTERY STENT;  Surgeon: Parker Shah MD;  Location: [facility];  Service: Vascular;  Laterality: N/A;  FLUORO 6 min 30 sec  DOSE 691mGy  CONTRAST 35ml    • COLON SURGERY  colonoscopy in 2021   • COLONOSCOPY N/A 06/01/2021    Procedure: COLONOSCOPY WITH BIOPSY, COLD SNARE POLYPECTOMY, HOT SNARE POLYPECTOMY;  Surgeon: Cam Parra MD;  Location: [facility] ENDOSCOPY;  Service: Gastroenterology;  Laterality: N/A;   • CYSTOSCOPY  2000's   • HERNIA REPAIR  1975   • HYSTERECTOMY     • INTERSTIM PLACEMENT N/A 1/25/2023    Procedure: INTERSTIM STAGES 1 AND 2 LEAD AND GENERATOR PLACEMENT;  Surgeon: Marek Saab MD;  Location: [facility] OR;  Service: Urology;  Laterality: N/A;   • KIDNEY STONE SURGERY  2020   • OVARIAN CYST REMOVAL     • TOTAL ABDOMINAL HYSTERECTOMY WITH SALPINGO OOPHORECTOMY     • URETEROSCOPY LASER LITHOTRIPSY WITH STENT INSERTION Left  02/20/2020    Procedure: URETEROSCOPY, LEFT RETROGRADE PYLEOGERAM WITH STENT INSERTION;  Surgeon: Mulugeta Saldana MD;  Location: [facility];  Service: Urology;  Laterality: Left;         Current Outpatient Medications:   •  amLODIPine (NORVASC) 5 MG tablet, TAKE 1 TABLET BY MOUTH DAILY., Disp: 90 tablet, Rfl: 0  •  apixaban (ELIQUIS) 5 MG tablet tablet, Take 1 tablet by mouth 2 (Two) Times a Day., Disp: 60 tablet, Rfl: 0  •  dicyclomine (BENTYL) 10 MG/5ML syrup, Take 10 mL by mouth 3 (Three) Times a Day As Needed., Disp: 473 mL, Rfl: 1  •  Docusate Calcium (STOOL SOFTENER PO), Take 240 mg by mouth Daily., Disp: , Rfl:   •  enalapril (VASOTEC) 20 MG tablet, TAKE 1 TABLET BY MOUTH EVERY 12 (TWELVE) HOURS., Disp: 180 tablet, Rfl: 0  •  fluconazole (DIFLUCAN) 150 MG tablet, Take 1 tablet by mouth Daily for 3 days then take as needed, Disp: 40 tablet, Rfl: 0  •  Fluticasone-Umeclidin-Vilant (Trelegy Ellipta) 100-62.5-25 MCG/INH inhaler, Inhale 1 puff Daily., Disp: 28 each, Rfl: 0  •  gabapentin (NEURONTIN) 100 MG capsule, Take 3 capsules by mouth Every Night., Disp: 90 capsule, Rfl: 2  •  glyburide (DIAbeta) 1.25 MG tablet, Take 1 tablet by mouth 2 (Two) Times a Day Before Meals., Disp: 180 tablet, Rfl: 1  •  montelukast (SINGULAIR) 10 MG tablet, , Disp: , Rfl:   •  ondansetron (Zofran) 4 MG tablet, Take 1 tablet by mouth Every 8 (Eight) Hours As Needed for Nausea or Vomiting., Disp: 180 tablet, Rfl: 2  •  oxybutynin XL (DITROPAN XL) 15 MG 24 hr tablet, TAKE 1 TABLET BY MOUTH DAILY., Disp: 90 tablet, Rfl: 0  •  pantoprazole (PROTONIX) 20 MG EC tablet, TAKE 1 TABLET BY MOUTH DAILY., Disp: 90 tablet, Rfl: 0  •  promethazine (PHENERGAN) 25 MG tablet, Take 1 tablet by mouth Every 6 (Six) Hours As Needed., Disp: , Rfl:   •  rOPINIRole (REQUIP) 0.25 MG tablet, TAKE 0.5 TABLET BY MOUTH EVERY NIGHT AT BEDTIME X 2 NIGHTS; THEN 1 TABS BY MOUTH X 5 NIGHTS; THEN 2 TABS BY MOUTH EVERY NIGHT AT BEDTIME EACH NIGHT THEREAFTER. (Patient  "taking differently: Take 2 tablets by mouth Every Night. TAKE 0.5 TABLET BY MOUTH EVERY NIGHT AT BEDTIME X 2 NIGHTS; THEN 1 TABS BY MOUTH X 5 NIGHTS; THEN 2 TABS BY MOUTH EVERY NIGHT AT BEDTIME EACH NIGHT THEREAFTER.), Disp: 180 tablet, Rfl: 0  •  senna (SENOKOT) 8.6 MG tablet, Take 1 tablet by mouth Daily., Disp: , Rfl:   •  sotalol (BETAPACE) 80 MG tablet, Take 1 tablet by mouth Every 12 (Twelve) Hours., Disp: 180 tablet, Rfl: 1  •  spironolactone (ALDACTONE) 25 MG tablet, Take 1 tablet by mouth Daily., Disp: 30 tablet, Rfl: 11  •  tiZANidine (ZANAFLEX) 4 MG tablet, TAKE 1 TABLET BY MOUTH AT NIGHT AS NEEDED FOR MUSCLE SPASMS., Disp: 90 tablet, Rfl: 0  •  traMADol (ULTRAM) 50 MG tablet, Take 1 tablet by mouth Every 6 (Six) Hours As Needed for Moderate Pain., Disp: 30 tablet, Rfl: 0  •  traZODone (DESYREL) 50 MG tablet, Take 1 tablet by mouth Every Night., Disp: , Rfl:     Current Facility-Administered Medications:   •  cyanocobalamin injection 1,000 mcg, 1,000 mcg, Intramuscular, Q28 Days, Humberto Wells DO, 1,000 mcg at 06/03/22 1706     Physical Exam  Visit Vitals  /78 (BP Location: Left arm, Patient Position: Sitting, Cuff Size: Adult)   Temp 97.4 °F (36.3 °C) (Temporal)   Resp 16   Ht 172.7 cm (67.99\")   Wt 107 kg (235 lb)   BMI 35.74 kg/m²       Labs  Brief Urine Lab Results  (Last result in the past 365 days)      Color   Clarity   Blood   Leuk Est   Nitrite   Protein   CREAT   Urine HCG        11/07/22 1618 Yellow   Cloudy   Trace   Negative   Negative   Negative                 Lab Results   Component Value Date    GLUCOSE 128 (H) 11/30/2022    CALCIUM 9.1 11/30/2022     11/30/2022    K 4.3 11/30/2022    CO2 23.0 11/30/2022     11/30/2022    BUN 34 (H) 11/30/2022    CREATININE 0.89 11/30/2022    EGFRIFNONA 87 08/06/2021    BCR 38.2 (H) 11/30/2022    ANIONGAP 11.0 11/30/2022       Lab Results   Component Value Date    WBC 11.78 (H) 11/30/2022    HGB 14.5 11/30/2022    HCT 42.3 11/30/2022 "    MCV 92.0 11/30/2022     11/30/2022         Assessment  72 y.o. female with history of mixed UI s/p interstim 01/25/23, JIMY-BSO, nephrolithiasis.    She presents on program 2 with an amplitude of 2.4, impedance check passed, battery okay.  She has no sensation in the perineum.  Increasing the amplitude produces sensation over the battery.  I changed her to program 3 and began increasing amplitude.  Programming stops at Program 3, 3.4mA, 190us, 14Hz.  Sensation of mild pressure along the right labia majora, comfortable.    Plan  1.  Follow-up in 1 month for reassessment of InterStim settings  2.  Obtain UA at follow-up

## 2023-03-20 ENCOUNTER — OFFICE VISIT (OUTPATIENT)
Dept: FAMILY MEDICINE CLINIC | Facility: CLINIC | Age: 73
End: 2023-03-20
Payer: MEDICARE

## 2023-03-20 VITALS
OXYGEN SATURATION: 96 % | BODY MASS INDEX: 35.74 KG/M2 | HEART RATE: 85 BPM | HEIGHT: 68 IN | SYSTOLIC BLOOD PRESSURE: 140 MMHG | DIASTOLIC BLOOD PRESSURE: 100 MMHG

## 2023-03-20 DIAGNOSIS — I10 ESSENTIAL HYPERTENSION: ICD-10-CM

## 2023-03-20 DIAGNOSIS — J44.1 ACUTE EXACERBATION OF CHRONIC OBSTRUCTIVE BRONCHITIS: Primary | ICD-10-CM

## 2023-03-20 DIAGNOSIS — I48.0 PAROXYSMAL ATRIAL FIBRILLATION: ICD-10-CM

## 2023-03-20 DIAGNOSIS — E11.44 DIABETIC AMYOTROPHY ASSOCIATED WITH TYPE 2 DIABETES MELLITUS: ICD-10-CM

## 2023-03-20 DIAGNOSIS — Z79.01 CHRONIC ANTICOAGULATION: Chronic | ICD-10-CM

## 2023-03-20 DIAGNOSIS — G25.81 RESTLESS LEGS SYNDROME: ICD-10-CM

## 2023-03-20 DIAGNOSIS — E11.9 TYPE 2 DIABETES MELLITUS TREATED WITHOUT INSULIN: ICD-10-CM

## 2023-03-20 RX ORDER — ATORVASTATIN CALCIUM 80 MG/1
TABLET, FILM COATED ORAL
Qty: 90 TABLET | Refills: 0 | Status: SHIPPED | OUTPATIENT
Start: 2023-03-20

## 2023-03-20 RX ORDER — AMLODIPINE BESYLATE 5 MG/1
5 TABLET ORAL DAILY
Qty: 90 TABLET | Refills: 0 | Status: SHIPPED | OUTPATIENT
Start: 2023-03-20

## 2023-03-20 RX ORDER — DEXAMETHASONE 0.5 MG/1
TABLET ORAL
Qty: 42 TABLET | Refills: 0 | Status: SHIPPED | OUTPATIENT
Start: 2023-03-20

## 2023-03-20 RX ORDER — ROPINIROLE 0.5 MG/1
0.5 TABLET, FILM COATED ORAL NIGHTLY
Qty: 90 TABLET | Refills: 3 | Status: SHIPPED | OUTPATIENT
Start: 2023-03-20

## 2023-03-20 RX ORDER — ENALAPRIL MALEATE 20 MG/1
20 TABLET ORAL EVERY 12 HOURS SCHEDULED
Qty: 180 TABLET | Refills: 0 | OUTPATIENT
Start: 2023-03-20

## 2023-03-20 RX ORDER — ATORVASTATIN CALCIUM 80 MG/1
80 TABLET, FILM COATED ORAL DAILY
Qty: 90 TABLET | Refills: 0 | OUTPATIENT
Start: 2023-03-20

## 2023-03-20 RX ORDER — CEFDINIR 300 MG/1
300 CAPSULE ORAL 2 TIMES DAILY
Qty: 14 CAPSULE | Refills: 0 | Status: SHIPPED | OUTPATIENT
Start: 2023-03-20

## 2023-03-20 RX ORDER — METHYLPREDNISOLONE ACETATE 80 MG/ML
80 INJECTION, SUSPENSION INTRA-ARTICULAR; INTRALESIONAL; INTRAMUSCULAR; SOFT TISSUE ONCE
Status: COMPLETED | OUTPATIENT
Start: 2023-03-20 | End: 2023-03-20

## 2023-03-20 RX ORDER — ENALAPRIL MALEATE 20 MG/1
20 TABLET ORAL EVERY 12 HOURS SCHEDULED
Qty: 180 TABLET | Refills: 0 | Status: SHIPPED | OUTPATIENT
Start: 2023-03-20

## 2023-03-20 RX ORDER — AMLODIPINE BESYLATE 5 MG/1
5 TABLET ORAL DAILY
Qty: 90 TABLET | Refills: 0 | OUTPATIENT
Start: 2023-03-20

## 2023-03-20 RX ORDER — CEFTRIAXONE 1 G/1
1 INJECTION, POWDER, FOR SOLUTION INTRAMUSCULAR; INTRAVENOUS ONCE
Status: COMPLETED | OUTPATIENT
Start: 2023-03-20 | End: 2023-03-20

## 2023-03-20 RX ORDER — ROPINIROLE 0.25 MG/1
TABLET, FILM COATED ORAL
Qty: 180 TABLET | Refills: 0 | Status: CANCELLED | OUTPATIENT
Start: 2023-03-20

## 2023-03-20 RX ADMIN — METHYLPREDNISOLONE ACETATE 80 MG: 80 INJECTION, SUSPENSION INTRA-ARTICULAR; INTRALESIONAL; INTRAMUSCULAR; SOFT TISSUE at 16:21

## 2023-03-20 RX ADMIN — CEFTRIAXONE 1 G: 1 INJECTION, POWDER, FOR SOLUTION INTRAMUSCULAR; INTRAVENOUS at 16:20

## 2023-03-20 NOTE — PROGRESS NOTES
Established Patient        Chief Complaint:   Chief Complaint   Patient presents with   • Cough     Complaints of cough, shortness of breath, and chest congestion. Patient states that that this has been on going for about a week now.   • Shortness of Breath            • URI     Complaints of chest congestion          Shannon Servin is a 72 y.o. female    History of Present Illness:   Here today with complaints of cough, upper airway congestion and malaise/fatigue, symptoms x -10 days.  Denies any known sick contacts.  She reports heavy phlegm production at times, productive of green/yellow/clear discoloration.  Denies hemoptysis or epistaxis.  Denies fever, chills or night sweats.    Also here in follow-up of her restless leg syndrome, diabetic amyotrophy, chronic anticoagulation and atrial fibrillation.    She denies any cyclical/persistent hypoglycemia.  Denies chest pain, syncope, palpitations or vertigo.  Denies hematuria, no BRB/BTS reported.  Subjective     The following portions of the patient's history were reviewed and updated as appropriate: allergies, current medications, past family history, past medical history, past social history, past surgical history and problem list.    Allergies   Allergen Reactions   • Metformin Hallucinations     Other reaction(s): Hallucinations     Review of Systems:    1. Constitutional: Negative for fever. Negative for chills, diaphoresis; malaise/fatigue of chronic nature, and without unexpected weight change.   2. HENT: No dysphagia; no changes to vision/hearing/smell/taste; no epistaxis  3. Eyes: Negative for redness and visual disturbance.   4. Respiratory: Chronic cough, no hemoptysis reported.  As per above.  She does report wheezing and coughing with laughing or prolonged talking, as well as with cold air exposure.  5. Cardiovascular: Negative for chest pain and palpitations.   6. Gastrointestinal: As per above.  7. Endocrine: Negative for cold  "intolerance and heat intolerance.   8. Genitourinary: Negative for difficulty urinating, dysuria and frequency.   9. Musculoskeletal: Chronic arthralgias, back pain and myalgias.  Significant limitation to range of motion of right shoulder.  10. Skin: Chronic thickened/yellow discoloration to the numerous toes of bilateral feet, worse to the bilateral great toes.  11. Neurological: Negative for syncope, weakness.  As per above otherwise.  12. Hematological: Negative for adenopathy. Does not bruise/bleed easily.   13. Psychiatric/Behavioral: Negative for confusion. The patient is not nervous/anxious.    Objective     Physical Exam   Vital Signs: /100   Pulse 85   Ht 172.7 cm (67.99\")   SpO2 96%   BMI 35.74 kg/m²     General Appearance: alert, oriented x 3, no acute distress.  Pleasant and interactive during questioning/examination.  Well-nourished and developed female.  Skin: warm and dry.  Stasis dermatitis noted additionally.    HEENT: Atraumatic.  pupils round and reactive to light and accommodation, oral mucosa pink and moist.  Nares patent without epistaxis.  External auditory canals are patent tympanic membranes intact.  Neck: supple, no JVD, trachea midline.  No thyromegaly  Lungs: Rhonchus, referred, airway congestion throughout all lung fields, partially cleared with cough, unlabored breathing effort.  Audible air exchange noted all lung fields.  End expiratory wheezes bilaterally.  Heart: RRR, normal S1 and S2, no S3, no rub.  Abdomen: soft, non-tender, no palpable bladder, present bowel sounds to auscultation ×4.  No guarding or rigidity.  No CVA tenderness.  Extremities: no clubbing, cyanosis.  Symmetric muscle strength and development.  Nonpitting edematous changes noted to the mid tibias bilaterally, brawny changes associated with venous stasis, chronic in nature.  Ambulates independently.  Impaired range of motion to right shoulder.  Apley scratch test limited due to discomfort of the right " shoulder, normal deltoid tone/development.  Negative pushoff, positive empty can sign, as well as shrug sign.  Zuniga/Neer painful, cross body abduction painful as well.  Symmetric  strength bilaterally.  Neuro: normal speech and mental status.  Cranial nerves II through XII intact.  No anosmia. DTR 2+; proprioception intact.  No focal motor/sensory deficits.    Advance Care Planning   ACP discussion was held with the patient during this visit. Patient does not have an advance directive, information provided.       Assessment and Plan      Assessment/Plan:   Diagnoses and all orders for this visit:    1. Acute exacerbation of chronic obstructive bronchitis (Primary)  -     methylPREDNISolone acetate (DEPO-medrol) injection 80 mg  -     cefTRIAXone (ROCEPHIN) injection 1 g  -     dexamethasone (DECADRON) 0.5 MG tablet; 6 po x2d; then 5 po x 2d; then 4 po x 2d; then 3 po x 2d; then 2 po x 2d; then 1 po x2d; then STOP  Dispense: 42 tablet; Refill: 0  -     cefdinir (OMNICEF) 300 MG capsule; Take 1 capsule by mouth 2 (Two) Times a Day.  Dispense: 14 capsule; Refill: 0    2. Restless legs syndrome  -     rOPINIRole (REQUIP) 0.5 MG tablet; Take 1 tablet by mouth Every Night.  Dispense: 90 tablet; Refill: 3    3. Diabetic amyotrophy associated with type 2 diabetes mellitus  -     rOPINIRole (REQUIP) 0.5 MG tablet; Take 1 tablet by mouth Every Night.  Dispense: 90 tablet; Refill: 3    4. Paroxysmal atrial fibrillation    5. Chronic anticoagulation    6. Essential hypertension    7. Type 2 diabetes mellitus treated without insulin      Refill provided for ropinirole.    Patient is given a 1 g IM injection of Rocephin, as well as 80 mg Depo-Medrol IM.  She is provided a 7-day course of cefdinir, as well as a 12-day taper of oral dexamethasone.  I have asked that she notify the office should her symptoms not improve, or if she develops any ill effects to the above medications.    Heart rate well controlled, blood pressure  is unfortunately not.  I have asked that she routinely monitor blood pressure at home.  I do suspect it is a result of her acute illness.  Continue low-sodium/salt/caffeine dietary intake.  Maintain appropriate hydration status.    Avoid prolonged fasting periods, continue current treatment regimen for diabetes mellitus.    Continue anticoagulation, demonstrates no findings of active bleeding.      Discussion Summary:    Discussed plan of care in detail with pt today; pt verb understanding and agrees.    I spent 30 minutes caring for Shannon on this date of service. This time includes time spent by me in the following activities:preparing for the visit, performing a medically appropriate examination and/or evaluation , counseling and educating the patient/family/caregiver, ordering medications, tests, or procedures, documenting information in the medical record, independently interpreting results and communicating that information with the patient/family/caregiver and care coordination    I have reviewed and updated all copied forward information, as appropriate.  I attest to the accuracy and relevance of any unchanged information.    Follow up:  Return in about 1 week (around 3/27/2023) for Recheck.     There are no Patient Instructions on file for this visit.    Humberto Wells DO  04/12/23  10:22 EDT

## 2023-03-29 ENCOUNTER — TELEPHONE (OUTPATIENT)
Dept: FAMILY MEDICINE CLINIC | Facility: CLINIC | Age: 73
End: 2023-03-29

## 2023-03-29 NOTE — TELEPHONE ENCOUNTER
Caller: Shannon Servin    Relationship: Self    Best call back number:      [phone]     Which medication are you concerned about:     Fluticasone-Umeclidin-Vilant (Trelegy Ellipta) 100-62.5-25 MCG/INH inhaler    Who prescribed you this medication:     DR SOLIS    What are your concerns:     PATIENT REQUESTED A VERBAL APPROVAL FROM DR SOLIS TO REPLACE THE TRELEGY TO BREZTRI INHALER INSTEAD    PATIENT STATED ONCE SHE HAS THE VERBAL APPROVAL, SHE WILL CONTACT THE COMPANY TO FILL THE BREZTRI INHALER

## 2023-03-30 NOTE — TELEPHONE ENCOUNTER
Caller: Shannon Servin    Relationship to patient: Self    Best call back number: [phone]    Patient is needing: PATIENT STATED THAT SHE WOULD LIKE TO HAVE A VERBAL OKAY WITH PROVIDER BEFORE SWITCHING TO A SIMILAR INHALER THAT COST LESS    PLEASE ADVISE

## 2023-04-03 RX ORDER — OXYBUTYNIN CHLORIDE 15 MG/1
15 TABLET, EXTENDED RELEASE ORAL DAILY
Qty: 90 TABLET | Refills: 0 | Status: SHIPPED | OUTPATIENT
Start: 2023-04-03

## 2023-04-05 ENCOUNTER — TELEPHONE (OUTPATIENT)
Dept: FAMILY MEDICINE CLINIC | Facility: CLINIC | Age: 73
End: 2023-04-05
Payer: MEDICARE

## 2023-04-05 NOTE — TELEPHONE ENCOUNTER
----- Message from Shannon Servin sent at 4/5/2023 10:34 AM EDT -----  Regarding: change Deny Simmons  Contact: [phone]  Thank you, but all I needed was a verbal ok so I could give it to my script company [email or web address]. They will contact Dr. Wells for a written script.

## 2023-04-11 ENCOUNTER — TELEPHONE (OUTPATIENT)
Dept: CARDIOLOGY | Facility: CLINIC | Age: 73
End: 2023-04-11
Payer: MEDICARE

## 2023-04-11 NOTE — TELEPHONE ENCOUNTER
"Spoke with patient. States she cannot afford the $200 copay and doesn't \"want to go down that road again\" in regards to the Coumadin. Patient states she will \"just go without\". I explained the risks to the patient. Also advised patient there is patient assistance she can apply for, but that still doesn't solve the issue of being with out the medication currently.  "

## 2023-04-11 NOTE — TELEPHONE ENCOUNTER
Farideh Martin MA          4:26 PM  Note      Pt states that her Xarelto will be the same cost has Eliquis, the cost is $230 for a 90 day supply. PA was not needed for Xarelto as [employer] covers it. Please advise        This is from July of last year.  I am happy to call in either of these.  We can try to assist with patient assistance again.  It was at this time that Dr. hSirley started her on warfarin.  That is the only readily available option right now, I think.  Let me know if she changes her mind.

## 2023-04-11 NOTE — TELEPHONE ENCOUNTER
We do have samples of Xarelto in stock. Should we try and switch the patient until we can get her applied for patient assistance?

## 2023-04-11 NOTE — TELEPHONE ENCOUNTER
Most recent telephone encounter I have regarding anticoagulants, she was started on warfarin and referred to an INR clinic.  We can't rely on samples for long-term anticoagulation; we just don't have a reliable/predictable supply of these meds.  There is no way to know when we will have any more.    So does the patient need a new script or a medication change?

## 2023-04-11 NOTE — TELEPHONE ENCOUNTER
Caller: Shannon Servin    Relationship: Self    Best call back number: [phone]    What is the best time to reach you: ANYTIME      What was the call regarding: THE PATIENT NEEDS SOME SAMPLES OF ELIQUIS. SHE IS CURRENTLY OUT AND HAS NOT TAKEN ANY TODAY. PLEASE REACH OUT TO PATIENT.    Do you require a callback: YES

## 2023-04-12 NOTE — TELEPHONE ENCOUNTER
I'm fine with that.  The patient assistance didn't fit her budget last year, but maybe they have expanded coverage this year.  You can give her Xarelto 20 mg daily (or sample equivalents to total 20 mg daily).

## 2023-04-12 NOTE — TELEPHONE ENCOUNTER
Patient was okay to change to Xarelto. I have placed her samples at the  for paperwork, along with the patient assistance application. States her  will stop by today or tomorrow to  and she will get her income information dropped off at our office as well.

## 2023-04-17 RX ORDER — MONTELUKAST SODIUM 10 MG/1
TABLET ORAL
Qty: 180 TABLET | Refills: 0 | Status: SHIPPED | OUTPATIENT
Start: 2023-04-17

## 2023-04-21 ENCOUNTER — OFFICE VISIT (OUTPATIENT)
Dept: FAMILY MEDICINE CLINIC | Facility: CLINIC | Age: 73
End: 2023-04-21
Payer: MEDICARE

## 2023-04-21 VITALS
OXYGEN SATURATION: 94 % | WEIGHT: 236 LBS | TEMPERATURE: 97 F | HEART RATE: 64 BPM | HEIGHT: 68 IN | DIASTOLIC BLOOD PRESSURE: 80 MMHG | RESPIRATION RATE: 18 BRPM | SYSTOLIC BLOOD PRESSURE: 118 MMHG | BODY MASS INDEX: 35.77 KG/M2

## 2023-04-21 DIAGNOSIS — E11.9 TYPE 2 DIABETES MELLITUS TREATED WITHOUT INSULIN: Primary | ICD-10-CM

## 2023-04-21 LAB
EXPIRATION DATE: NORMAL
HBA1C MFR BLD: 7.1 %
Lab: NORMAL

## 2023-05-02 RX ORDER — OXYBUTYNIN CHLORIDE 15 MG/1
15 TABLET, EXTENDED RELEASE ORAL DAILY
Qty: 90 TABLET | Refills: 0 | OUTPATIENT
Start: 2023-05-02

## 2023-05-03 RX ORDER — LEVOCETIRIZINE DIHYDROCHLORIDE 5 MG/1
5 TABLET, FILM COATED ORAL EVERY EVENING
Qty: 90 TABLET | Refills: 0 | OUTPATIENT
Start: 2023-05-03

## 2023-05-03 NOTE — PROGRESS NOTES
Established Patient        Chief Complaint:   Chief Complaint   Patient presents with   • Diabetes     followup        Shannon Servin is a 72 y.o. female    History of Present Illness:   Answers for HPI/ROS submitted by the patient on 4/14/2023  Please describe your symptoms.: diabetes, med changes  Have you had these symptoms before?: Yes  How long have you been having these symptoms?: Greater than 2 weeks  Please list any medications you are currently taking for this condition.: you have the list, but my Cardiologist has changed Eliquis to Xarelto temporarily and I need to find another medication in exchange for Trilogy  Please describe any probable cause for these symptoms. : no money  What is the primary reason for your visit?: Other        Here today in scheduled follow-up visit of her diabetes mellitus.    She denies chest pain, syncope, palpitations or vertigo.  Responded well to treatment of acute upper respiratory infection/reactive airway exacerbation at previous visit.  Denies fever, chills or night sweats.  Rating all p.o. intake, denies aspiration/dysphagia.    She denies cyclical/persistent hypoglycemic episodes.  Subjective     The following portions of the patient's history were reviewed and updated as appropriate: allergies, current medications, past family history, past medical history, past social history, past surgical history and problem list.    Allergies   Allergen Reactions   • Metformin Hallucinations     Other reaction(s): Hallucinations     Review of Systems:    1. Constitutional: Negative for fever. Negative for chills, diaphoresis; malaise/fatigue of chronic nature, and without unexpected weight change.   2. HENT: No dysphagia; no changes to vision/hearing/smell/taste; no epistaxis  3. Eyes: Negative for redness and visual disturbance.   4. Respiratory: Chronic cough, no hemoptysis reported.    5. Cardiovascular: Negative for chest pain and palpitations.  "  6. Gastrointestinal: Denies BRB/BTS.  No abdominal discomfort.  7. Endocrine: Negative for cold intolerance and heat intolerance.   8. Genitourinary: Negative for difficulty urinating, dysuria and frequency.   9. Musculoskeletal: Chronic arthralgias, back pain and myalgias.  Significant limitation to range of motion of right shoulder.  10. Skin: Chronic thickened/yellow discoloration to the numerous toes of bilateral feet, worse to the bilateral great toes.  11. Neurological: Negative for syncope, weakness.  As per above otherwise.  12. Hematological: Negative for adenopathy. Does not bruise/bleed easily.   13. Psychiatric/Behavioral: Negative for confusion. The patient is not nervous/anxious.    Objective     Physical Exam   Vital Signs: /80   Pulse 64   Temp 97 °F (36.1 °C)   Resp 18   Ht 172.7 cm (68\")   Wt 107 kg (236 lb)   SpO2 94%   BMI 35.88 kg/m²     General Appearance: alert, oriented x 3, no acute distress.  Pleasant and interactive during questioning/examination.  Well-nourished and developed female.  Skin: warm and dry.  Stasis dermatitis noted additionally.    HEENT: Atraumatic.  pupils round and reactive to light and accommodation, oral mucosa pink and moist.  Nares patent without epistaxis.  External auditory canals are patent tympanic membranes intact.  Neck: supple, no JVD, trachea midline.  No thyromegaly  Lungs: Rhonchus, referred, upper airway congestion, cleared with cough, unlabored breathing effort.  Audible air exchange noted all lung fields.   Heart: RRR, normal S1 and S2, no S3, no rub.  Abdomen: soft, non-tender, no palpable bladder, present bowel sounds to auscultation ×4.  No guarding or rigidity.  No CVA tenderness.  Extremities: no clubbing, cyanosis.  Symmetric muscle strength and development.  Nonpitting edematous changes noted to the mid tibias bilaterally, brawny changes associated with venous stasis, chronic in nature.  Ambulates independently.  Impaired range of " motion to right shoulder.  Apley scratch test limited due to discomfort of the right shoulder, normal deltoid tone/development.  Negative pushoff, positive empty can sign, as well as shrug sign.  Zuniga/Neer painful, cross body abduction painful as well.  Symmetric  strength bilaterally.  Neuro: normal speech and mental status.  Cranial nerves II through XII intact.  No anosmia. DTR 2+; proprioception intact.  No focal motor/sensory deficits.    Assessment and Plan      Assessment/Plan:   Diagnoses and all orders for this visit:    1. Type 2 diabetes mellitus treated without insulin (Primary)  -     POC Glycosylated Hemoglobin (Hb A1C)      Stability noted on review of hemoglobin A1c today.  I have stressed the importance of continued healthy dietary choices and avoidance of prolonged fasting periods.  Maintain adequate hydration status.    Signs demonstrate hemodynamic stability      Discussion Summary:    Discussed plan of care in detail with pt today; pt verb understanding and agrees.    I spent 20 minutes caring for Shannon on this date of service. This time includes time spent by me in the following activities:preparing for the visit, performing a medically appropriate examination and/or evaluation , counseling and educating the patient/family/caregiver, ordering medications, tests, or procedures, documenting information in the medical record, independently interpreting results and communicating that information with the patient/family/caregiver and care coordination    I have reviewed and updated all copied forward information, as appropriate.  I attest to the accuracy and relevance of any unchanged information.    Follow up:  No follow-ups on file.     There are no Patient Instructions on file for this visit.    Humberto Wells DO  05/03/23  11:48 EDT

## 2023-05-05 NOTE — TELEPHONE ENCOUNTER
Caller: Shannon Servin    Relationship: Self    Best call back number: [phone]    Requested Prescriptions:   Requested Prescriptions     Pending Prescriptions Disp Refills   • traZODone (DESYREL) 50 MG tablet       Sig: Take 1 tablet by mouth Every Night.        Pharmacy where request should be sent: [facility] - [place] [address] - [phone]  - [phone]     Last office visit with prescribing clinician: 4/21/2023   Last telemedicine visit with prescribing clinician: 7/24/2023   Next office visit with prescribing clinician: 7/24/2023     Additional details provided by patient: BEEN OUT OF THE 3 DAYS     Does the patient have less than a 3 day supply:  [x] Yes  [] No    Would you like a call back once the refill request has been completed: [x] Yes [] No    If the office needs to give you a call back, can they leave a voicemail: [] Yes [] No    Toni Deng Rep   05/05/23 12:18 EDT

## 2023-05-09 RX ORDER — TRAZODONE HYDROCHLORIDE 50 MG/1
50 TABLET ORAL NIGHTLY
Qty: 30 TABLET | Refills: 1 | Status: SHIPPED | OUTPATIENT
Start: 2023-05-09

## 2023-05-10 RX ORDER — TIZANIDINE 4 MG/1
4 TABLET ORAL NIGHTLY PRN
Qty: 90 TABLET | Refills: 0 | Status: SHIPPED | OUTPATIENT
Start: 2023-05-10

## 2023-05-17 DIAGNOSIS — E11.9 TYPE 2 DIABETES MELLITUS TREATED WITHOUT INSULIN: Primary | ICD-10-CM

## 2023-05-17 RX ORDER — SEMAGLUTIDE 1.34 MG/ML
0.25 INJECTION, SOLUTION SUBCUTANEOUS WEEKLY
Qty: 3 ML | Refills: 1 | Status: SHIPPED | OUTPATIENT
Start: 2023-05-17

## 2023-05-17 RX ORDER — SOTALOL HYDROCHLORIDE 80 MG/1
80 TABLET ORAL EVERY 12 HOURS SCHEDULED
Qty: 180 TABLET | Refills: 0 | Status: SHIPPED | OUTPATIENT
Start: 2023-05-17

## 2023-06-19 RX ORDER — ENALAPRIL MALEATE 20 MG/1
20 TABLET ORAL EVERY 12 HOURS SCHEDULED
Qty: 180 TABLET | Refills: 0 | Status: SHIPPED | OUTPATIENT
Start: 2023-06-19

## 2023-06-19 RX ORDER — AMLODIPINE BESYLATE 5 MG/1
5 TABLET ORAL DAILY
Qty: 90 TABLET | Refills: 0 | Status: SHIPPED | OUTPATIENT
Start: 2023-06-19

## 2023-06-19 RX ORDER — FUROSEMIDE 20 MG/1
TABLET ORAL
Qty: 420 TABLET | Refills: 0 | Status: SHIPPED | OUTPATIENT
Start: 2023-06-19

## 2023-06-19 RX ORDER — GABAPENTIN 100 MG/1
300 CAPSULE ORAL NIGHTLY
Qty: 90 CAPSULE | Refills: 1 | Status: SHIPPED | OUTPATIENT
Start: 2023-06-19

## 2023-06-19 RX ORDER — ATORVASTATIN CALCIUM 80 MG/1
TABLET, FILM COATED ORAL
Qty: 90 TABLET | Refills: 0 | Status: SHIPPED | OUTPATIENT
Start: 2023-06-19

## 2023-06-19 NOTE — TELEPHONE ENCOUNTER
Rx Refill Note  Requested Prescriptions     Pending Prescriptions Disp Refills   • gabapentin (NEURONTIN) 100 MG capsule [Pharmacy Med Name: GABAPENTIN 100MG CAP] 90 capsule 1     Sig: TAKE 3 CAPSULES BY MOUTH EVERY NIGHT.     Signed Prescriptions Disp Refills   • atorvastatin (LIPITOR) 80 MG tablet 90 tablet 0     Sig: TAKE 1 TABLET BY MOUTH DAILY.     Authorizing Provider: DEMOND SOLIS     Ordering User: JYOTI CARDOZO   • enalapril (VASOTEC) 20 MG tablet 180 tablet 0     Sig: TAKE 1 TABLET BY MOUTH EVERY 12 (TWELVE) HOURS.     Authorizing Provider: DEMOND SOLIS     Ordering User: JYOTI CARDOZO   • amLODIPine (NORVASC) 5 MG tablet 90 tablet 0     Sig: TAKE 1 TABLET BY MOUTH DAILY.     Authorizing Provider: DEMOND SOLIS     Ordering User: JYOTI CARDOZO   • furosemide (LASIX) 20 MG tablet 420 tablet 0     Sig: TAKE 1 TABLET BY MOUTH TWICE DAILY     Authorizing Provider: DEMOND SOLIS     Ordering User: JYOTI CARDOZO      Last office visit with prescribing clinician: 4/21/2023   Last telemedicine visit with prescribing clinician: Visit date not found   Next office visit with prescribing clinician: 7/24/2023                         Would you like a call back once the refill request has been completed: [] Yes [] No    If the office needs to give you a call back, can they leave a voicemail: [] Yes [] No    Jyoti Cardozo MA  06/19/23, 12:03 EDT

## 2023-06-22 ENCOUNTER — TELEPHONE (OUTPATIENT)
Dept: FAMILY MEDICINE CLINIC | Facility: CLINIC | Age: 73
End: 2023-06-22

## 2023-06-22 NOTE — TELEPHONE ENCOUNTER
Caller: YASMEEN    Relationship:     CALLER FROM [facility] call back number:       [phone]     What is the best time to reach you:     BUSINESS HOURS    Who are you requesting to speak with (clinical staff, provider,  specific staff member):     DR SOLIS    What was the call regarding:     CALLER MADE CONTACT TO ALERT DR SOLIS OF AN INVITATION FOR A PEER TO PEER TELEPHONE CONFERENCE REGARDING PRIOR AUTHORIZATION FOR HOME VENTILATOR, REFERENCE NUMBER - [phone]    CALLER STATED PEER TO PEER PRIOR AUTHORIZATION WILL  MONDAY,

## 2023-07-24 ENCOUNTER — OFFICE VISIT (OUTPATIENT)
Dept: FAMILY MEDICINE CLINIC | Facility: CLINIC | Age: 73
End: 2023-07-24
Payer: MEDICARE

## 2023-07-24 VITALS
DIASTOLIC BLOOD PRESSURE: 64 MMHG | TEMPERATURE: 97 F | RESPIRATION RATE: 16 BRPM | HEIGHT: 68 IN | OXYGEN SATURATION: 97 % | HEART RATE: 64 BPM | SYSTOLIC BLOOD PRESSURE: 114 MMHG | BODY MASS INDEX: 36.04 KG/M2

## 2023-07-24 DIAGNOSIS — J44.9 CHRONIC OBSTRUCTIVE BRONCHITIS: ICD-10-CM

## 2023-07-24 DIAGNOSIS — E11.9 TYPE 2 DIABETES MELLITUS TREATED WITHOUT INSULIN: ICD-10-CM

## 2023-07-24 DIAGNOSIS — F41.8 DEPRESSION WITH ANXIETY: ICD-10-CM

## 2023-07-24 DIAGNOSIS — R30.0 DYSURIA: Primary | ICD-10-CM

## 2023-07-24 DIAGNOSIS — Z79.01 CHRONIC ANTICOAGULATION: Chronic | ICD-10-CM

## 2023-07-24 DIAGNOSIS — Z23 NEED FOR PNEUMOCOCCAL 20-VALENT CONJUGATE VACCINATION: ICD-10-CM

## 2023-07-24 DIAGNOSIS — F43.20 ADULT SITUATIONAL STRESS DISORDER: ICD-10-CM

## 2023-07-24 DIAGNOSIS — I10 ESSENTIAL HYPERTENSION: ICD-10-CM

## 2023-07-24 DIAGNOSIS — E11.44: ICD-10-CM

## 2023-07-24 DIAGNOSIS — I48.0 PAROXYSMAL ATRIAL FIBRILLATION: ICD-10-CM

## 2023-07-24 LAB
BILIRUB BLD-MCNC: NEGATIVE MG/DL
CLARITY, POC: CLEAR
COLOR UR: ABNORMAL
EXPIRATION DATE: ABNORMAL
EXPIRATION DATE: NORMAL
GLUCOSE UR STRIP-MCNC: NEGATIVE MG/DL
HBA1C MFR BLD: 6.7 %
KETONES UR QL: NEGATIVE
LEUKOCYTE EST, POC: ABNORMAL
Lab: ABNORMAL
Lab: NORMAL
NITRITE UR-MCNC: POSITIVE MG/ML
PH UR: 6 [PH] (ref 5–8)
PROT UR STRIP-MCNC: NEGATIVE MG/DL
RBC # UR STRIP: ABNORMAL /UL
SP GR UR: 1.02 (ref 1–1.03)
UROBILINOGEN UR QL: ABNORMAL

## 2023-07-24 RX ORDER — LANCETS 30 GAUGE
1 EACH MISCELLANEOUS 2 TIMES DAILY
Qty: 100 EACH | Refills: 5 | Status: SHIPPED | OUTPATIENT
Start: 2023-07-24 | End: 2023-07-25 | Stop reason: SDUPTHER

## 2023-07-24 RX ORDER — FLUTICASONE FUROATE, UMECLIDINIUM BROMIDE AND VILANTEROL TRIFENATATE 100; 62.5; 25 UG/1; UG/1; UG/1
1 POWDER RESPIRATORY (INHALATION)
Qty: 60 EACH | Refills: 0 | Status: SHIPPED | OUTPATIENT
Start: 2023-07-24

## 2023-07-24 RX ORDER — HYDROCODONE BITARTRATE AND ACETAMINOPHEN 5; 325 MG/1; MG/1
1 TABLET ORAL EVERY 8 HOURS PRN
Qty: 40 TABLET | Refills: 0 | Status: SHIPPED | OUTPATIENT
Start: 2023-07-24

## 2023-07-24 RX ORDER — BLOOD-GLUCOSE METER
1 KIT MISCELLANEOUS AS NEEDED
Qty: 1 EACH | Refills: 0 | Status: SHIPPED | OUTPATIENT
Start: 2023-07-24 | End: 2023-07-25 | Stop reason: SDUPTHER

## 2023-07-24 RX ORDER — GABAPENTIN 600 MG/1
600 TABLET ORAL NIGHTLY
Qty: 30 TABLET | Refills: 2 | Status: SHIPPED | OUTPATIENT
Start: 2023-07-24 | End: 2023-08-21 | Stop reason: ALTCHOICE

## 2023-07-24 NOTE — PROGRESS NOTES
Established Patient        Chief Complaint:   Chief Complaint   Patient presents with    Diabetes     Pt stated she needs paper scripts for diabetic supplies    Edema     Feet and legs    Obesity    Constipation     Pt c/o severe constipation 3 times in the past 3 months        Shannon Servin is a 73 y.o. female    History of Present Illness:   Answers submitted by the patient for this visit:  Primary Reason for Visit (Submitted on 7/22/2023)  What is the primary reason for your visit?: Painful Urination  Painful Urination Questionnaire (Submitted on 7/22/2023)  Chief Complaint: Dysuria  Chronicity: recurrent  Onset: 1 to 4 weeks ago  Frequency: every urination  Progression since onset: unchanged  Pain quality: burning  Pain - numeric: 6/10  Fever: no fever  Sexually active?: No  History of pyelonephritis?: Yes  chills: Yes  discharge: No  flank pain: Yes  frequency: Yes  urgency: Yes      Tramadol not as effective; Gabapentin has dec effects.    Not taking ozempic d/t GI effects.    Severe swelling to lalit LE.  Subjective     The following portions of the patient's history were reviewed and updated as appropriate: allergies, current medications, past family history, past medical history, past social history, past surgical history and problem list.    Allergies   Allergen Reactions    Metformin Hallucinations     Other reaction(s): Hallucinations     Review of Systems:    Constitutional: Negative for fever. Negative for chills, diaphoresis; malaise/fatigue of chronic nature, and without unexpected weight change.   HENT: No dysphagia; no changes to vision/hearing/smell/taste; no epistaxis  Eyes: Negative for redness and visual disturbance.   Respiratory: Chronic cough, no hemoptysis reported.    Cardiovascular: Negative for chest pain and palpitations.   Gastrointestinal: Denies BRB/BTS.  No abdominal discomfort.  Endocrine: Negative for cold intolerance and heat intolerance.   Genitourinary:  "Negative for difficulty urinating, dysuria and frequency.   Musculoskeletal: Chronic arthralgias, back pain and myalgias.  Significant limitation to range of motion of right shoulder.  Skin: Chronic thickened/yellow discoloration to the numerous toes of bilateral feet, worse to the bilateral great toes.  Neurological: Negative for syncope, weakness.  As per above otherwise.  Hematological: Negative for adenopathy. Does not bruise/bleed easily.   Psychiatric/Behavioral: Negative for confusion. The patient is not nervous/anxious.    Objective     Physical Exam   Vital Signs: /64   Pulse 64   Temp 97 øF (36.1 øC)   Resp 16   Ht 172.7 cm (67.99\")   SpO2 97%   BMI 36.04 kg/mý     General Appearance: alert, oriented x 3, no acute distress.  Pleasant and interactive during questioning/examination.  Well-nourished and developed female.  Skin: warm and dry.  Stasis dermatitis noted additionally.    HEENT: Atraumatic.  pupils round and reactive to light and accommodation, oral mucosa pink and moist.  Nares patent without epistaxis.  External auditory canals are patent tympanic membranes intact.  Neck: supple, no JVD, trachea midline.  No thyromegaly  Lungs: Rhonchus, referred, upper airway congestion, cleared with cough, unlabored breathing effort.  Audible air exchange noted all lung fields.   Heart: RRR, normal S1 and S2, no S3, no rub.  Abdomen: soft, non-tender, no palpable bladder, present bowel sounds to auscultation x4.  No guarding or rigidity.  No CVA tenderness.  Extremities: no clubbing, cyanosis.  Symmetric muscle strength and development.  Nonpitting edematous changes noted to the mid tibias bilaterally, brawny changes associated with venous stasis, chronic in nature.  Ambulates independently.  Impaired range of motion to right shoulder.  Apley scratch test limited due to discomfort of the right shoulder, normal deltoid tone/development.  Negative pushoff, positive empty can sign, as well as shrug " sign.  Zuniga/Neer painful, cross body abduction painful as well.  Symmetric  strength bilaterally.  Neuro: normal speech and mental status.  Cranial nerves II through XII intact.  No anosmia. DTR 2+; proprioception intact.  No focal motor/sensory deficits.    Assessment and Plan      Assessment/Plan:   Diagnoses and all orders for this visit:    1. Dysuria (Primary)  -     Urine Culture - , Urine, Clean Catch  -     POC Urinalysis Dipstick, Automated    2. Need for pneumococcal 20-valent conjugate vaccination  -     Pneumococcal Conjugate Vaccine 20-Valent (PCV20)    3. Type 2 diabetes mellitus treated without insulin  -     POC Glycosylated Hemoglobin (Hb A1C)  -     glyburide micronized (GLYNASE) 1.5 MG tablet; Take 1 tablet by mouth Daily With Breakfast.  Dispense: 90 tablet; Refill: 1  -     Discontinue: glucose blood test strip; 1 each by Other route 2 (Two) Times a Day.  Dispense: 100 each; Refill: 5  -     Discontinue: Lancets misc; 1 Units 2 (Two) Times a Day.  Dispense: 100 each; Refill: 5  -     Discontinue: glucose monitor monitoring kit; 1 each As Needed (blood glucose monitoring).  Dispense: 1 each; Refill: 0  -     POC Microalbumin  -     Discontinue: glucose blood test strip; 1 each by Other route 2 (Two) Times a Day.  Dispense: 100 each; Refill: 5  -     Discontinue: glucose monitor monitoring kit; 1 each As Needed (blood glucose monitoring).  Dispense: 1 each; Refill: 0  -     Lancets misc; 1 Units 2 (Two) Times a Day.  Dispense: 100 each; Refill: 5    4. Paroxysmal atrial fibrillation    5. Chronic anticoagulation    6. Essential hypertension    7. Chronic obstructive bronchitis    8. 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  Dispense: 30 tablet; Refill: 2  -     HYDROcodone-acetaminophen (Norco) 5-325 MG per tablet; Take 1 tablet by mouth  Every 8 (Eight) Hours As Needed for Moderate Pain or Severe Pain.  Dispense: 40 tablet; Refill: 0    9. Adult situational stress disorder  -     Ambulatory Referral to Behavioral Health    10. Depression with anxiety  -     Ambulatory Referral to Behavioral Health      Stop amlodipine; will change glyburide to micronized formulation; hopeful both will improved edema.    Trial of norco, will follow clinically.    Referral to  per patient request for medication and counseling.    VSS, appears HD asymptomatic; plan to monitor closely after d/c amlodipine.    Pneumonia vaccine today.          Discussion Summary:    Discussed plan of care in detail with pt today; pt verb understanding and agrees.        I have reviewed and updated all copied forward information, as appropriate.  I attest to the accuracy and relevance of any unchanged information.    Follow up:  Return in about 4 weeks (around 8/21/2023) for Recheck, Next scheduled follow up.     There are no Patient Instructions on file for this visit.    Humberto Wells,   08/28/23  13:24 EDT

## 2023-07-24 NOTE — TELEPHONE ENCOUNTER
Patient wanting to get a refill on Trelegy sent into [facility] pharmacy

## 2023-07-25 ENCOUNTER — TELEPHONE (OUTPATIENT)
Dept: PEDIATRICS | Facility: OTHER | Age: 73
End: 2023-07-25
Payer: MEDICARE

## 2023-07-25 ENCOUNTER — TELEPHONE (OUTPATIENT)
Dept: FAMILY MEDICINE CLINIC | Facility: CLINIC | Age: 73
End: 2023-07-25

## 2023-07-25 RX ORDER — BLOOD-GLUCOSE METER
1 KIT MISCELLANEOUS AS NEEDED
Qty: 1 EACH | Refills: 0 | Status: SHIPPED | OUTPATIENT
Start: 2023-07-25 | End: 2023-08-08 | Stop reason: SDUPTHER

## 2023-07-25 RX ORDER — LANCETS 30 GAUGE
1 EACH MISCELLANEOUS 2 TIMES DAILY
Qty: 100 EACH | Refills: 5 | Status: SHIPPED | OUTPATIENT
Start: 2023-07-25

## 2023-07-25 NOTE — TELEPHONE ENCOUNTER
PT CALLED STATED THAT SHE CAME IN YESTERDAY FOR APPT AND FORGOT TO GET HER WRITTEN PRESCRIPTION FOR BLOOD GLUCOSE TEST STRIPS, PT WILL LIKE TO KNOW IF SHE CAN COME IN AND GET WRITTEN PRESCRIPTION, STATED THAT INSURANCE WILL NOT ACCEPT UNLESS ITS NOT A WRITTEN PRESCRIPTION.    PLEASE ADVISE.cALL BACK:[id number]

## 2023-07-27 LAB
BACTERIA UR CULT: ABNORMAL
BACTERIA UR CULT: ABNORMAL
OTHER ANTIBIOTIC SUSC ISLT: ABNORMAL

## 2023-07-31 ENCOUNTER — TELEPHONE (OUTPATIENT)
Dept: FAMILY MEDICINE CLINIC | Facility: CLINIC | Age: 73
End: 2023-07-31

## 2023-07-31 RX ORDER — SULFAMETHOXAZOLE AND TRIMETHOPRIM 800; 160 MG/1; MG/1
1 TABLET ORAL 2 TIMES DAILY
Qty: 14 TABLET | Refills: 0 | Status: SHIPPED | OUTPATIENT
Start: 2023-07-31 | End: 2023-08-07

## 2023-07-31 NOTE — TELEPHONE ENCOUNTER
Patient calling to let us know that [facility] will be sending a fax for the monitor. [facility] is unable to fill.

## 2023-08-02 ENCOUNTER — TELEPHONE (OUTPATIENT)
Dept: CARDIOLOGY | Facility: CLINIC | Age: 73
End: 2023-08-02
Payer: MEDICARE

## 2023-08-07 ENCOUNTER — TELEPHONE (OUTPATIENT)
Dept: FAMILY MEDICINE CLINIC | Facility: CLINIC | Age: 73
End: 2023-08-07
Payer: MEDICARE

## 2023-08-07 NOTE — TELEPHONE ENCOUNTER
PATIENT CHECKING THE STATUS OF HER MESSAGE    Caller: Shannon Servin    Relationship: Self    Best call back number: [phone]     Requested Prescriptions:   Requested Prescriptions      No prescriptions requested or ordered in this encounter      [facility] GLUCOMETER    Pharmacy where request should be sent: [facility] - [facility] [address] - [phone]  - [phone] FX     Last office visit with prescribing clinician: 7/24/2023   Last telemedicine visit with prescribing clinician: Visit date not found   Next office visit with prescribing clinician: 8/21/2023     Additional details provided by patient: NEEDS ASAP    Does the patient have less than a 3 day supply:  [x] Yes  [] No    Would you like a call back once the refill request has been completed: [] Yes [x] No    If the office needs to give you a call back, can they leave a voicemail: [] Yes [x] No    Toni Connor   08/07/23 08:55 EDT

## 2023-08-07 NOTE — TELEPHONE ENCOUNTER
PATIENT IS COMPLETELY OUT OF LANCETS AND STRIPS FOR METER. SHE IS NEEDING A SCRIPT BE FAXED TO INSURANCE COMPANY. [phone]

## 2023-08-08 ENCOUNTER — TELEPHONE (OUTPATIENT)
Dept: FAMILY MEDICINE CLINIC | Facility: CLINIC | Age: 73
End: 2023-08-08
Payer: MEDICARE

## 2023-08-08 DIAGNOSIS — E11.9 TYPE 2 DIABETES MELLITUS TREATED WITHOUT INSULIN: ICD-10-CM

## 2023-08-08 RX ORDER — BLOOD-GLUCOSE METER
1 KIT MISCELLANEOUS AS NEEDED
Qty: 1 EACH | Refills: 0 | Status: SHIPPED | OUTPATIENT
Start: 2023-08-08

## 2023-08-08 RX ORDER — TIZANIDINE 4 MG/1
4 TABLET ORAL NIGHTLY PRN
Qty: 90 TABLET | Refills: 0 | OUTPATIENT
Start: 2023-08-08

## 2023-08-08 NOTE — TELEPHONE ENCOUNTER
Rx Refill Note  Requested Prescriptions     Pending Prescriptions Disp Refills    tiZANidine (ZANAFLEX) 4 MG tablet [Pharmacy Med Name: TIZANIDINE 4MG TAB] 90 tablet 0     Sig: TAKE 1 TABLET BY MOUTH AT NIGHT AS NEEDED FOR MUSCLE SPASMS.    traZODone (DESYREL) 50 MG tablet [Pharmacy Med Name: TRAZODONE 50MG TAB] 30 tablet 0     Sig: TAKE 1 TABLET BY MOUTH EVERY NIGHT.      Last office visit with prescribing clinician: 7/24/2023   Last telemedicine visit with prescribing clinician: Visit date not found   Next office visit with prescribing clinician: 8/8/2023                         Would you like a call back once the refill request has been completed: [] Yes [] No    If the office needs to give you a call back, can they leave a voicemail: [] Yes [] No    Zenaida Lowe MA  08/08/23, 17:24 EDT

## 2023-08-08 NOTE — TELEPHONE ENCOUNTER
Caller: Can[place], Shannon    Relationship: Self    Best call back number: [phone]     What is the best time to reach you: ANY    Who are you requesting to speak with (clinical staff, provider,  specific staff member): CLINICAL    Do you know the name of the person who called: SHANNON    What was the call regarding: PATIENT STATES SHE NEEDS A CALL BACK TO DISCUSS HER CPAP MACHINE. SAYS HER NEW INSURANCE DENIED HER REQUEST AND THEY NEED ADDITIONAL INFORMATION FROM DR SOLIS TO APPEAL. PLEASE CALL AND ADVISE.

## 2023-08-09 ENCOUNTER — TELEPHONE (OUTPATIENT)
Dept: FAMILY MEDICINE CLINIC | Facility: CLINIC | Age: 73
End: 2023-08-09
Payer: MEDICARE

## 2023-08-09 NOTE — TELEPHONE ENCOUNTER
Caller: Shannon Servin    Relationship: Self    Best call back number:  [phone]    Requested Prescriptions:   Requested Prescriptions      No prescriptions requested or ordered in this encounter      TIZANidine (ZANAFLEX) 4 MG tablet    Pharmacy where request should be sent:      Last office visit with prescribing clinician: 7/24/2023   Last telemedicine visit with prescribing clinician: Visit date not found   Next office visit with prescribing clinician: 8/21/2023     Additional details provided by patient:     PHARMACY SENT IN REQUEST  IT WAS NOT FILLED   SO PATIENT SENT IN REQUEST VIA [employer]    STILL DOES NOT HAVE TIZANIDINE    PATIENT HAS BEEN WITHOUT FOR SEVERAL DAYS AND IF SHE DOES NOT GET IT SHE WILL GET A MIGRAINE    Does the patient have less than a 3 day supply:  [x] Yes  [] No    Would you like a call back once the refill request has been completed: [] Yes [x] No    If the office needs to give you a call back, can they leave a voicemail: [] Yes [x] No    Deepa Chandra, PCT   08/09/23 16:32 EDT

## 2023-08-10 RX ORDER — TIZANIDINE 4 MG/1
4 TABLET ORAL NIGHTLY PRN
Qty: 90 TABLET | Refills: 0 | Status: SHIPPED | OUTPATIENT
Start: 2023-08-10

## 2023-08-10 RX ORDER — TRAZODONE HYDROCHLORIDE 50 MG/1
50 TABLET ORAL NIGHTLY
Qty: 30 TABLET | Refills: 0 | Status: SHIPPED | OUTPATIENT
Start: 2023-08-10

## 2023-08-10 NOTE — TELEPHONE ENCOUNTER
Patient called and said she is out of her Tizanidine. She has migraines and needs this medication sent to [facility].  Sending request to other provider since pcp is gone for the day.

## 2023-08-14 NOTE — TELEPHONE ENCOUNTER
PATIENT CAME IN AND IS NEEDING THE STRIPS TO BE SENT IN AS THE ACCU CHECK ARNAUD CHECK GUIDE STRIPS. THE ONES SHE RECEIVED DOES NOT WORK IN METER SHE HAS.

## 2023-08-15 RX ORDER — SOTALOL HYDROCHLORIDE 80 MG/1
80 TABLET ORAL EVERY 12 HOURS SCHEDULED
Qty: 180 TABLET | Refills: 0 | Status: SHIPPED | OUTPATIENT
Start: 2023-08-15

## 2023-08-16 ENCOUNTER — OFFICE VISIT (OUTPATIENT)
Dept: BEHAVIORAL HEALTH | Facility: CLINIC | Age: 73
End: 2023-08-16
Payer: MEDICARE

## 2023-08-16 DIAGNOSIS — F41.1 GENERALIZED ANXIETY DISORDER: ICD-10-CM

## 2023-08-16 DIAGNOSIS — F33.0 MILD EPISODE OF RECURRENT MAJOR DEPRESSIVE DISORDER: Primary | ICD-10-CM

## 2023-08-16 DIAGNOSIS — F43.9 TRAUMA AND STRESSOR-RELATED DISORDER: ICD-10-CM
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
## 2023-08-16 NOTE — TELEPHONE ENCOUNTER
PATIENT IS NEEDING LANCETS FOR HER ACCU CHECK ARNAUD CHECK GUIDE FOR HER GLUCOSE METER. THE ONE THAT WAS SENT IN DOES NOT WORK ON HER METER. PLEASE ADVISE

## 2023-08-17 RX ORDER — BLOOD SUGAR DIAGNOSTIC
STRIP MISCELLANEOUS
Qty: 200 EACH | Refills: 12 | Status: SHIPPED | OUTPATIENT
Start: 2023-08-17

## 2023-08-21 ENCOUNTER — OFFICE VISIT (OUTPATIENT)
Dept: FAMILY MEDICINE CLINIC | Facility: CLINIC | Age: 73
End: 2023-08-21
Payer: MEDICARE

## 2023-08-21 VITALS
HEART RATE: 82 BPM | RESPIRATION RATE: 18 BRPM | OXYGEN SATURATION: 95 % | TEMPERATURE: 98 F | HEIGHT: 68 IN | BODY MASS INDEX: 36.83 KG/M2 | DIASTOLIC BLOOD PRESSURE: 88 MMHG | SYSTOLIC BLOOD PRESSURE: 128 MMHG | WEIGHT: 243 LBS

## 2023-08-21 DIAGNOSIS — E11.9 TYPE 2 DIABETES MELLITUS TREATED WITHOUT INSULIN: ICD-10-CM

## 2023-08-21 DIAGNOSIS — N39.0 RECURRENT UTI: ICD-10-CM

## 2023-08-21 DIAGNOSIS — M75.101 RIGHT ROTATOR CUFF TEAR ARTHROPATHY: ICD-10-CM

## 2023-08-21 DIAGNOSIS — M25.511 ARTHRALGIA OF RIGHT SHOULDER REGION: ICD-10-CM

## 2023-08-21 DIAGNOSIS — M21.611 BUNION OF GREAT TOE OF RIGHT FOOT: ICD-10-CM

## 2023-08-21 DIAGNOSIS — M75.41 IMPINGEMENT SYNDROME OF RIGHT SHOULDER: Primary | Chronic | ICD-10-CM

## 2023-08-21 DIAGNOSIS — M12.811 RIGHT ROTATOR CUFF TEAR ARTHROPATHY: ICD-10-CM

## 2023-08-21 DIAGNOSIS — M77.31 CALCANEAL SPUR OF FOOT, RIGHT: ICD-10-CM

## 2023-08-21 DIAGNOSIS — T50.905A ADVERSE EFFECT OF DRUG, INITIAL ENCOUNTER: ICD-10-CM

## 2023-08-21 DIAGNOSIS — E11.44 DIABETIC AMYOTROPHY ASSOCIATED WITH TYPE 2 DIABETES MELLITUS: ICD-10-CM

## 2023-08-21 DIAGNOSIS — F51.01 PRIMARY INSOMNIA: ICD-10-CM

## 2023-08-21 DIAGNOSIS — J44.9 CHRONIC OBSTRUCTIVE BRONCHITIS: ICD-10-CM

## 2023-08-21 RX ORDER — TIRZEPATIDE 2.5 MG/.5ML
2.5 INJECTION, SOLUTION SUBCUTANEOUS WEEKLY
Qty: 6 ML | Refills: 1 | Status: SHIPPED | OUTPATIENT
Start: 2023-08-21 | End: 2023-08-21 | Stop reason: SDUPTHER

## 2023-08-21 RX ORDER — PREGABALIN 50 MG/1
50 CAPSULE ORAL 3 TIMES DAILY
Qty: 90 CAPSULE | Refills: 0 | Status: SHIPPED | OUTPATIENT
Start: 2023-08-21

## 2023-08-21 RX ORDER — TIRZEPATIDE 2.5 MG/.5ML
2.5 INJECTION, SOLUTION SUBCUTANEOUS WEEKLY
Qty: 6 ML | Refills: 1 | Status: SHIPPED | OUTPATIENT
Start: 2023-08-21

## 2023-08-21 RX ORDER — DOXYCYCLINE HYCLATE 100 MG/1
100 CAPSULE ORAL 2 TIMES DAILY
Qty: 28 CAPSULE | Refills: 0 | Status: SHIPPED | OUTPATIENT
Start: 2023-08-21 | End: 2023-09-04

## 2023-08-21 NOTE — PROGRESS NOTES
Established Patient        Chief Complaint:   Chief Complaint   Patient presents with    Weight Loss    Med Refill    Arm Pain    Pain     In feet and toes    Urinary Tract Infection        Shannon Servin is a 73 y.o. female    History of Present Illness:   Answers submitted by the patient for this visit:  Primary Reason for Visit (Submitted on 8/15/2023)  What is the primary reason for your visit?: Shortness of Breath  Shortness of Breath Questionnaire (Submitted on 8/15/2023)  Chief Complaint: Shortness of breath  Chronicity: chronic  Onset: more than 1 month ago  Frequency: daily  Progression since onset: gradually worsening  abdominal pain: No  chest pain: No  claudication: Yes  coryza: No  ear pain: No  fever: No  leg swelling: Yes  neck pain: Yes  orthopnea: Yes  Aggravating factors: any activity    Adverse GI effects of Ozempic  Subjective     The following portions of the patient's history were reviewed and updated as appropriate: allergies, current medications, past family history, past medical history, past social history, past surgical history and problem list.    Allergies   Allergen Reactions    Metformin Hallucinations     Other reaction(s): Hallucinations     Review of Systems:    Constitutional: Negative for fever. Negative for chills, diaphoresis; malaise/fatigue of chronic nature, and without unexpected weight change.   HENT: No dysphagia; no changes to vision/hearing/smell/taste; no epistaxis  Eyes: Negative for redness and visual disturbance.   Respiratory: Chronic cough, no hemoptysis reported.    Cardiovascular: Negative for chest pain and palpitations.   Gastrointestinal: Denies BRB/BTS.  No abdominal discomfort.  Endocrine: Negative for cold intolerance and heat intolerance.   Genitourinary: Negative for difficulty urinating, dysuria and frequency.   Musculoskeletal: Chronic arthralgias, back pain and myalgias.  Significant limitation to range of motion of right  "shoulder.  Skin: Chronic thickened/yellow discoloration to the numerous toes of bilateral feet, worse to the bilateral great toes.  Neurological: Negative for syncope, weakness.  As per above otherwise.  Hematological: Negative for adenopathy. Does not bruise/bleed easily.   Psychiatric/Behavioral: Negative for confusion. The patient is not nervous/anxious.    Objective     Physical Exam   Vital Signs: /88   Pulse 82   Temp 98 °F (36.7 °C)   Resp 18   Ht 172.7 cm (67.99\")   Wt 110 kg (243 lb)   SpO2 95%   BMI 36.96 kg/m²     General Appearance: alert, oriented x 3, no acute distress.  Pleasant and interactive during questioning/examination.  Well-nourished and developed female.  Skin: warm and dry.  Stasis dermatitis noted additionally.    HEENT: Atraumatic.  pupils round and reactive to light and accommodation, oral mucosa pink and moist.  Nares patent without epistaxis.  External auditory canals are patent tympanic membranes intact.  Neck: supple, no JVD, trachea midline.  No thyromegaly  Lungs: Rhonchus, referred, upper airway congestion, cleared with cough, unlabored breathing effort.  Audible air exchange noted all lung fields.   Heart: RRR, normal S1 and S2, no S3, no rub.  Abdomen: soft, non-tender, no palpable bladder, present bowel sounds to auscultation ×4.  No guarding or rigidity.  No CVA tenderness.  Extremities: no clubbing, cyanosis.  Symmetric muscle strength and development.  Nonpitting edematous changes noted to the mid tibias bilaterally, brawny changes associated with venous stasis, chronic in nature.  Ambulates independently.  Impaired range of motion to right shoulder.  Apley scratch test limited due to discomfort of the right shoulder, normal deltoid tone/development.  Negative pushoff, positive empty can sign, as well as shrug sign.  Zuniga/Neer painful, cross body abduction painful as well.  Symmetric  strength bilaterally.  Neuro: normal speech and mental status.  Cranial " nerves II through XII intact.  No anosmia. DTR 2+; proprioception intact.  No focal motor/sensory deficits.    Advance Care Planning   ACP discussion was held with the patient during this visit. Patient does not have an advance directive, information provided.       Assessment and Plan      Assessment/Plan:   Diagnoses and all orders for this visit:    1. Impingement syndrome of right shoulder (Primary)  -     pregabalin (LYRICA) 50 MG capsule; Take 1 capsule by mouth 3 (Three) Times a Day.  Dispense: 90 capsule; Refill: 0    2. Right rotator cuff tear arthropathy  -     pregabalin (LYRICA) 50 MG capsule; Take 1 capsule by mouth 3 (Three) Times a Day.  Dispense: 90 capsule; Refill: 0    3. Arthralgia of right shoulder region  -     pregabalin (LYRICA) 50 MG capsule; Take 1 capsule by mouth 3 (Three) Times a Day.  Dispense: 90 capsule; Refill: 0    4. Diabetic amyotrophy associated with type 2 diabetes mellitus  -     pregabalin (LYRICA) 50 MG capsule; Take 1 capsule by mouth 3 (Three) Times a Day.  Dispense: 90 capsule; Refill: 0    5. Chronic obstructive bronchitis    6. Primary insomnia    7. Recurrent UTI  -     doxycycline (VIBRAMYCIN) 100 MG capsule; Take 1 capsule by mouth 2 (Two) Times a Day for 14 days.  Dispense: 28 capsule; Refill: 0    8. Adverse effect of drug, initial encounter    9. Type 2 diabetes mellitus treated without insulin  -     Discontinue: Tirzepatide (Mounjaro) 2.5 MG/0.5ML solution pen-injector; Inject 0.5 mL under the skin into the appropriate area as directed 1 (One) Time Per Week. To include any needed supplies for administration  Dispense: 6 mL; Refill: 1  -     Tirzepatide (Mounjaro) 2.5 MG/0.5ML solution pen-injector; Inject 0.5 mL under the skin into the appropriate area as directed 1 (One) Time Per Week. To include any needed supplies for administration  Dispense: 6 mL; Refill: 1  -     Ambulatory Referral to Podiatry    10. Bunion of great toe of right foot  -     Ambulatory  Referral to Podiatry    11. Calcaneal spur of foot, right  -     Ambulatory Referral to Podiatry      Noted calcaneal spur of right foot, as well as significant bunion with valgus deformity of great toe.  Referral placed to podiatry due to her comorbid condition of diabetes mellitus.    Utilization of Mounjaro to aid in blood glucose control.  Patient was intolerant to Ozempic.    Patient has a history of recurrent UTI, prescription for doxycycline provided to her today.  I have discussed the need to take the medication with food, as well as the increased photosensitivity while taking it.  She verbalized understanding.    Begin trial of pregabalin.  I have asked that she notify the office should she develop any ill effects to the new medication.    Stressed the importance of healthy dietary choices, as well as the need to avoid prolonged fasting periods as best able.  Maintain adequate/appropriate head duration status.    Vital signs demonstrate hemodynamic stability.    Discussion Summary:    Discussed plan of care in detail with pt today; pt verb understanding and agrees.    I spent 40 minutes caring for Shannon on this date of service. This time includes time spent by me in the following activities:preparing for the visit, performing a medically appropriate examination and/or evaluation , counseling and educating the patient/family/caregiver, ordering medications, tests, or procedures, documenting information in the medical record, and care coordination    I have reviewed and updated all copied forward information, as appropriate.  I attest to the accuracy and relevance of any unchanged information.    Follow up:  Return in about 6 weeks (around 10/2/2023) for Medicare Wellness, subsequent.     There are no Patient Instructions on file for this visit.    Humberto Wells DO  08/21/23  14:25 EDT

## 2023-08-29 RX ORDER — SPIRONOLACTONE 25 MG/1
25 TABLET ORAL DAILY
Qty: 30 TABLET | Refills: 1 | Status: SHIPPED | OUTPATIENT
Start: 2023-08-29

## 2023-09-06 RX ORDER — TRAZODONE HYDROCHLORIDE 50 MG/1
50 TABLET ORAL NIGHTLY
Qty: 30 TABLET | Refills: 0 | Status: SHIPPED | OUTPATIENT
Start: 2023-09-06

## 2023-09-07 ENCOUNTER — ANTICOAGULATION VISIT (OUTPATIENT)
Dept: FAMILY MEDICINE CLINIC | Facility: CLINIC | Age: 73
End: 2023-09-07
Payer: MEDICARE

## 2023-09-18 RX ORDER — ENALAPRIL MALEATE 20 MG/1
20 TABLET ORAL EVERY 12 HOURS SCHEDULED
Qty: 180 TABLET | Refills: 0 | Status: SHIPPED | OUTPATIENT
Start: 2023-09-18

## 2023-09-25 ENCOUNTER — TELEPHONE (OUTPATIENT)
Dept: FAMILY MEDICINE CLINIC | Facility: CLINIC | Age: 73
End: 2023-09-25

## 2023-09-25 DIAGNOSIS — J44.89 CHRONIC OBSTRUCTIVE BRONCHITIS: ICD-10-CM

## 2023-09-25 RX ORDER — IPRATROPIUM BROMIDE AND ALBUTEROL SULFATE 2.5; .5 MG/3ML; MG/3ML
3 SOLUTION RESPIRATORY (INHALATION) EVERY 6 HOURS PRN
Qty: 360 ML | Refills: 0 | Status: SHIPPED | OUTPATIENT
Start: 2023-09-25
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
## 2023-09-26 RX ORDER — ATORVASTATIN CALCIUM 80 MG/1
TABLET, FILM COATED ORAL
Qty: 90 TABLET | Refills: 0 | Status: SHIPPED | OUTPATIENT
Start: 2023-09-26

## 2023-09-27 DIAGNOSIS — I48.0 PAROXYSMAL ATRIAL FIBRILLATION: Primary | ICD-10-CM

## 2023-10-01 DIAGNOSIS — R10.84 ABDOMINAL PAIN, GENERALIZED: ICD-10-CM

## 2023-10-01 DIAGNOSIS — M25.511 ARTHRALGIA OF RIGHT SHOULDER REGION: ICD-10-CM

## 2023-10-01 DIAGNOSIS — R10.13 ABDOMINAL PAIN, EPIGASTRIC: ICD-10-CM

## 2023-10-01 DIAGNOSIS — M75.41 IMPINGEMENT SYNDROME OF RIGHT SHOULDER: Chronic | ICD-10-CM

## 2023-10-01 DIAGNOSIS — M75.101 RIGHT ROTATOR CUFF TEAR ARTHROPATHY: ICD-10-CM

## 2023-10-01 DIAGNOSIS — E11.44 DIABETIC AMYOTROPHY ASSOCIATED WITH TYPE 2 DIABETES MELLITUS: ICD-10-CM

## 2023-10-01 DIAGNOSIS — M12.811 RIGHT ROTATOR CUFF TEAR ARTHROPATHY: ICD-10-CM

## 2023-10-01 DIAGNOSIS — K21.9 GERD WITHOUT ESOPHAGITIS: ICD-10-CM

## 2023-10-02 ENCOUNTER — OFFICE VISIT (OUTPATIENT)
Dept: FAMILY MEDICINE CLINIC | Facility: CLINIC | Age: 73
End: 2023-10-02
Payer: MEDICARE

## 2023-10-02 ENCOUNTER — HOSPITAL ENCOUNTER (OUTPATIENT)
Dept: CT IMAGING | Facility: HOSPITAL | Age: 73
Discharge: HOME | End: 2023-10-02
Admitting: FAMILY MEDICINE
Payer: MEDICARE

## 2023-10-02 VITALS
OXYGEN SATURATION: 96 % | WEIGHT: 238 LBS | SYSTOLIC BLOOD PRESSURE: 114 MMHG | TEMPERATURE: 97 F | DIASTOLIC BLOOD PRESSURE: 84 MMHG | HEIGHT: 68 IN | HEART RATE: 63 BPM | BODY MASS INDEX: 36.07 KG/M2 | RESPIRATION RATE: 18 BRPM

## 2023-10-02 DIAGNOSIS — M21.611 BUNION OF GREAT TOE OF RIGHT FOOT: ICD-10-CM

## 2023-10-02 DIAGNOSIS — R10.13 ABDOMINAL PAIN, EPIGASTRIC: ICD-10-CM

## 2023-10-02 DIAGNOSIS — Z87.891 PERSONAL HISTORY OF TOBACCO USE, PRESENTING HAZARDS TO HEALTH: ICD-10-CM

## 2023-10-02 DIAGNOSIS — J44.89 CHRONIC OBSTRUCTIVE BRONCHITIS: ICD-10-CM

## 2023-10-02 DIAGNOSIS — J44.1 ACUTE EXACERBATION OF CHRONIC OBSTRUCTIVE BRONCHITIS: ICD-10-CM

## 2023-10-02 DIAGNOSIS — R10.84 ABDOMINAL PAIN, GENERALIZED: ICD-10-CM

## 2023-10-02 DIAGNOSIS — Z78.0 POSTMENOPAUSAL: ICD-10-CM

## 2023-10-02 DIAGNOSIS — Z12.31 ENCOUNTER FOR SCREENING MAMMOGRAM FOR BREAST CANCER: ICD-10-CM

## 2023-10-02 DIAGNOSIS — F51.01 PRIMARY INSOMNIA: ICD-10-CM

## 2023-10-02 DIAGNOSIS — R04.2 HEMOPTYSIS: ICD-10-CM

## 2023-10-02 DIAGNOSIS — E11.44 DIABETIC AMYOTROPHY ASSOCIATED WITH TYPE 2 DIABETES MELLITUS: ICD-10-CM

## 2023-10-02 DIAGNOSIS — Z00.00 MEDICARE ANNUAL WELLNESS VISIT, SUBSEQUENT: Primary | ICD-10-CM

## 2023-10-02 DIAGNOSIS — Z79.01 CHRONIC ANTICOAGULATION: Chronic | ICD-10-CM

## 2023-10-02 DIAGNOSIS — K21.9 GERD WITHOUT ESOPHAGITIS: ICD-10-CM

## 2023-10-02 DIAGNOSIS — E78.5 DYSLIPIDEMIA: Chronic | ICD-10-CM

## 2023-10-02 DIAGNOSIS — Z23 IMMUNIZATION DUE: ICD-10-CM

## 2023-10-02 DIAGNOSIS — I10 ESSENTIAL HYPERTENSION: ICD-10-CM

## 2023-10-02 DIAGNOSIS — Z13.820 ENCOUNTER FOR SCREENING FOR OSTEOPOROSIS: ICD-10-CM

## 2023-10-02 DIAGNOSIS — G47.33 OBSTRUCTIVE SLEEP APNEA OF ADULT: ICD-10-CM

## 2023-10-02 DIAGNOSIS — I48.0 PAROXYSMAL ATRIAL FIBRILLATION: ICD-10-CM

## 2023-10-02 DIAGNOSIS — M77.31 CALCANEAL SPUR OF FOOT, RIGHT: ICD-10-CM

## 2023-10-02 DIAGNOSIS — R41.3 EPISODIC MEMORY LOSS: ICD-10-CM

## 2023-10-02 DIAGNOSIS — N39.46 MIXED URGE AND STRESS INCONTINENCE: ICD-10-CM

## 2023-10-02 DIAGNOSIS — E11.9 CONTROLLED TYPE 2 DIABETES MELLITUS WITHOUT COMPLICATION, WITHOUT LONG-TERM CURRENT USE OF INSULIN: ICD-10-CM

## 2023-10-02 LAB
EXPIRATION DATE: NORMAL
Lab: NORMAL
POC CREATININE URINE: 50
POC MICROALBUMIN URINE: 10

## 2023-10-02 PROCEDURE — 71250 CT THORAX DX C-: CPT

## 2023-10-02 RX ORDER — FLUTICASONE FUROATE, UMECLIDINIUM BROMIDE AND VILANTEROL TRIFENATATE 100; 62.5; 25 UG/1; UG/1; UG/1
1 POWDER RESPIRATORY (INHALATION)
Qty: 60 EACH | Refills: 0 | OUTPATIENT
Start: 2023-10-02

## 2023-10-02 NOTE — PROGRESS NOTES
The ABCs of the Annual Wellness Visit  Subsequent Medicare Wellness Visit    Subjective      Shannon Servin is a 73 y.o. female who presents for a Subsequent Medicare Wellness Visit.    The following portions of the patient's history were reviewed and   updated as appropriate: allergies, current medications, past family history, past medical history, past social history, past surgical history, and problem list.    Also here with complaints of newly developed hemoptysis.  She has had persistence of upper airway symptoms, including heavy phlegm production.  Persistent periods of coughing, producing green/yellow/clear phlegm at times.  Occasional hemoptysis has become more concerning, as this is a distinct change in her clinical condition.  She has had some periods of improvement while taking steroids and antibiotics in the past, although nothing sustained.  She denies focal aspiration.  Denies fever, chills or night sweats.    Compared to one year ago, the patient feels her physical   health is worse.    Compared to one year ago, the patient feels her mental   health is the same.    Recent Hospitalizations:  She was not admitted to the hospital during the last year.       Current Medical Providers:  Patient Care Team:  Humberto Wells DO as PCP - General (Family Medicine)  Susie Kulkarni PA-C as Physician Assistant (Physician Assistant)    Outpatient Medications Prior to Visit   Medication Sig Dispense Refill    atorvastatin (LIPITOR) 80 MG tablet TAKE 1 TABLET BY MOUTH DAILY. 90 tablet 0    Docusate Calcium (STOOL SOFTENER PO) Take 240 mg by mouth Daily.      enalapril (VASOTEC) 20 MG tablet Take 1 tablet by mouth Every 12 (Twelve) Hours. 180 tablet 0    fluconazole (Diflucan) 100 MG tablet Take 1 tablet by mouth Daily. 1 tab po daily x 5 days 5 tablet 1    Fluticasone-Umeclidin-Vilant (Trelegy Ellipta) 100-62.5-25 MCG/ACT inhaler Inhale 1 puff Daily. 60 each 0    furosemide (LASIX) 20 MG tablet TAKE 1 TABLET  BY MOUTH TWICE DAILY 420 tablet 0    glucose blood (Accu-Chek Magdalena Plus) test strip Use as instructed 200 each 12    glucose blood test strip 1 each by Other route 2 (Two) Times a Day. 100 each 5    glucose monitor monitoring kit 1 each As Needed (blood glucose monitoring). 1 each 0    glyburide micronized (GLYNASE) 1.5 MG tablet Take 1 tablet by mouth Daily With Breakfast. 90 tablet 1    HYDROcodone-acetaminophen (Norco) 5-325 MG per tablet Take 1 tablet by mouth Every 8 (Eight) Hours As Needed for Moderate Pain or Severe Pain. 40 tablet 0    ipratropium-albuterol (DUO-NEB) 0.5-2.5 mg/3 ml nebulizer Inhale 1 vial (3 mL) by nebulization Every 6 (Six) Hours As Needed for Wheezing or Shortness of Air. 360 mL 0    Lancets misc 1 Units 2 (Two) Times a Day. 100 each 5    montelukast (SINGULAIR) 10 MG tablet TAKE 1 TABLET BY MOUTH EVERY  tablet 0    ondansetron (Zofran) 4 MG tablet Take 1 tablet by mouth Every 8 (Eight) Hours As Needed for Nausea or Vomiting. 180 tablet 2    oxybutynin XL (DITROPAN XL) 15 MG 24 hr tablet Take 1 tablet by mouth Daily. 90 tablet 0    pantoprazole (PROTONIX) 20 MG EC tablet TAKE 1 TABLET BY MOUTH DAILY. 90 tablet 0    pregabalin (LYRICA) 50 MG capsule Take 1 capsule by mouth 3 (Three) Times a Day. 90 capsule 0    promethazine (PHENERGAN) 25 MG tablet Take 1 tablet by mouth Every 6 (Six) Hours As Needed.      rOPINIRole (REQUIP) 0.5 MG tablet Take 1 tablet by mouth Every Night. 90 tablet 3    senna (SENOKOT) 8.6 MG tablet Take 1 tablet by mouth Daily.      sotalol (BETAPACE) 80 MG tablet Take 1 tablet by mouth Every 12 (Twelve) Hours. 180 tablet 0    spironolactone (ALDACTONE) 25 MG tablet Take 1 tablet by mouth Daily. 30 tablet 1    Tirzepatide (Mounjaro) 2.5 MG/0.5ML solution pen-injector Inject 0.5 mL under the skin into the appropriate area as directed 1 (One) Time Per Week. To include any needed supplies for administration 6 mL 1    tiZANidine (ZANAFLEX) 4 MG tablet TAKE 1 TABLET  BY MOUTH AT NIGHT AS NEEDED FOR MUSCLE SPASMS. 90 tablet 0    traZODone (DESYREL) 50 MG tablet Take 1 tablet by mouth Every Night. 30 tablet 0     Facility-Administered Medications Prior to Visit   Medication Dose Route Frequency Provider Last Rate Last Admin    cyanocobalamin injection 1,000 mcg  1,000 mcg Intramuscular Q28 Days Humberto Wells DO   1,000 mcg at 06/03/22 1706       Opioid medication/s are on active medication list.  and I have evaluated her active treatment plan and pain score trends (see table).  There were no vitals filed for this visit.  I have reviewed the chart for potential of high risk medication and harmful drug interactions in the elderly.          Aspirin is not on active medication list.  Aspirin use is not indicated based on review of current medical condition/s. Risk of harm outweighs potential benefits.  .    Patient Active Problem List   Diagnosis    Paroxysmal atrial fibrillation    Essential hypertension    Chronic constipation    Encounter for screening for malignant neoplasm of colon    Bilateral lower extremity edema    Chronic anticoagulation    Dyslipidemia    Type 2 diabetes mellitus treated without insulin    Chronic allergic rhinitis    Primary insomnia    Obstructive sleep apnea of adult    Keratosis of plantar aspect of foot    Adult situational stress disorder    Nocturnal hypoxia    Has daytime drowsiness    Malaise and fatigue    Onychomycosis of toenail    Chronic obstructive bronchitis    Vitamin B12 deficiency    Migraine without aura and without status migrainosus, not intractable    Diabetic amyotrophy associated with type 2 diabetes mellitus    Restless legs syndrome    Abdominal pain, epigastric    Body mass index (BMI) 36.0-36.9, adult    Calculus of kidney    Long term (current) use of aspirin    Other intervertebral disc degeneration, lumbar region    Presence of urogenital implants    Primary generalized (osteo)arthritis    Sciatica, unspecified side     "Impingement syndrome of right shoulder    Arteriosclerosis, mesenteric artery    Recurrent UTI    Mixed urge and stress incontinence    Right rotator cuff tear arthropathy    Arthralgia of right shoulder region    Drug reaction    Calcaneal spur of foot, right    Bunion of great toe of right foot    Personal history of tobacco use, presenting hazards to health     Advance Care Planning   Advance Care Planning     Advance Directive is on file.  ACP discussion was held with the patient during this visit. Patient has an advance directive in EMR which is still valid.     Review of Systems:     Constitutional: Negative for fever. Negative for chills, diaphoresis; malaise/fatigue of chronic nature, and without unexpected weight change.   HENT: No dysphagia; no changes to vision/hearing/smell/taste; no epistaxis  Eyes: Negative for redness and visual disturbance.   Respiratory: Persistent cough, productive of clear/yellow/green phlegm at times, occasional hemoptysis as per above.  Cardiovascular: Negative for chest pain and palpitations.   Gastrointestinal: Denies BRB/BTS.  No abdominal discomfort.  Endocrine: Negative for cold intolerance and heat intolerance.   Genitourinary: Negative for difficulty urinating, dysuria and frequency.   Musculoskeletal: Chronic arthralgias, back pain and myalgias.  Significant limitation to range of motion of right shoulder.  Skin: Chronic thickened/yellow discoloration to the numerous toes of bilateral feet, worse to the bilateral great toes.  Neurological: Negative for syncope, weakness.  As per above otherwise.  Hematological: Negative for adenopathy. Does not bruise/bleed easily.   Psychiatric/Behavioral: Negative for confusion. The patient is not nervous/anxious.          Objective    Vitals:    10/02/23 1126   BP: 114/84   Pulse: 63   Resp: 18   Temp: 97 °F (36.1 °C)   SpO2: 96%   Weight: 108 kg (238 lb)   Height: 172.7 cm (67.99\")     Estimated body mass index is 36.2 kg/m² as " "calculated from the following:    Height as of this encounter: 172.7 cm (67.99\").    Weight as of this encounter: 108 kg (238 lb).    Class 2 Severe Obesity (BMI >=35 and <=39.9). Obesity-related health conditions include the following: obstructive sleep apnea, hypertension, diabetes mellitus, dyslipidemias, GERD, osteoarthritis, lower extremity venous stasis disease, and urinary stress incontinence. Obesity is improving with lifestyle modifications. BMI is is above average; BMI management plan is completed. We discussed low calorie, low carb based diet program, portion control, increasing exercise, joining a fitness center or start home based exercise program, and Weight Watchers or other Commercial based weight reduction program.    General Appearance: alert, oriented x 3, no acute distress.  Pleasant and interactive during questioning/examination.  Well-nourished and developed female.  Skin: warm and dry.  Stasis dermatitis noted additionally.    HEENT: Atraumatic.  pupils round and reactive to light and accommodation, oral mucosa pink and moist.  Nares patent without epistaxis.  External auditory canals are patent tympanic membranes intact.  Neck: supple, no JVD, trachea midline.  No thyromegaly  Lungs: Severely rhonchus, referred, airway congestion throughout all lung fields.  Prolonged expiratory phase bilaterally.  End expiratory wheezes bilaterally.  Heart: RRR, normal S1 and S2, no S3, no rub.  Abdomen: soft, non-tender, no palpable bladder, present bowel sounds to auscultation ×4.  No guarding or rigidity.  No CVA tenderness.  Extremities: no clubbing, cyanosis.  Symmetric muscle strength and development.  Nonpitting edematous changes noted to the mid tibias bilaterally, brawny changes associated with venous stasis, chronic in nature.  Ambulates independently.  Impaired range of motion to right shoulder.  Apley scratch test limited due to discomfort of the right shoulder, normal deltoid tone/development.  " Negative pushoff, positive empty can sign, as well as shrug sign.  Zuniga/Neer painful, cross body abduction painful as well.  Symmetric  strength bilaterally.  Neuro: normal speech and mental status.  Cranial nerves II through XII intact.  No anosmia. DTR 2+; proprioception intact.  No focal motor/sensory deficits.    Does the patient have evidence of cognitive impairment?   No    Lab Results   Component Value Date    HGBA1C 6.7 2023          HEALTH RISK ASSESSMENT    Smoking Status:  Social History     Tobacco Use   Smoking Status Former    Packs/day: 1.50    Years: 33.00    Pack years: 49.50    Types: Cigarettes    Start date: 1965    Quit date: 3/1/1998    Years since quittin.6    Passive exposure: Past   Smokeless Tobacco Never     Alcohol Consumption:  Social History     Substance and Sexual Activity   Alcohol Use Yes    Comment: maybe twice a month     Fall Risk Screen:    ERLINDA Fall Risk Assessment was completed, and patient is at LOW risk for falls.Assessment completed on:10/2/2023    Depression Screening:      10/2/2023    11:27 AM   PHQ-2/PHQ-9 Depression Screening   Little Interest or Pleasure in Doing Things 0-->not at all   Feeling Down, Depressed or Hopeless 0-->not at all   PHQ-9: Brief Depression Severity Measure Score 0       Health Habits and Functional and Cognitive Screening:      10/2/2023    11:28 AM   Functional & Cognitive Status   Do you have difficulty preparing food and eating? No   Do you have difficulty bathing yourself, getting dressed or grooming yourself? No   Do you have difficulty using the toilet? No   Do you have difficulty moving around from place to place? Yes   Do you have trouble with steps or getting out of a bed or a chair? Yes   Current Diet Well Balanced Diet   Dental Exam Not up to date   Eye Exam Up to date   Exercise (times per week) 1 times per week   Current Exercises Include House Cleaning   Do you need help using the phone?  No   Are you deaf  or do you have serious difficulty hearing?  No   Do you need help to go to places out of walking distance? Yes   Do you need help shopping? Yes   Do you need help preparing meals?  No   Do you need help with housework?  Yes   Do you need help with laundry? Yes   Do you need help taking your medications? No   Do you need help managing money? No   Do you ever drive or ride in a car without wearing a seat belt? No       Age-appropriate Screening Schedule:  Refer to the list below for future screening recommendations based on patient's age, sex and/or medical conditions. Orders for these recommended tests are listed in the plan section. The patient has been provided with a written plan.    Health Maintenance   Topic Date Due    MAMMOGRAM  Never done    DXA SCAN  Never done    BMI FOLLOWUP  Never done    HEPATITIS C SCREENING  Never done    LIPID PANEL  Never done    ZOSTER VACCINE (2 of 2) 09/24/2022    DIABETIC FOOT EXAM  03/11/2023    DIABETIC EYE EXAM  06/08/2023    COVID-19 Vaccine (5 - 2023-24 season) 09/01/2023    HEMOGLOBIN A1C  01/24/2024    ANNUAL WELLNESS VISIT  10/02/2024    URINE MICROALBUMIN  10/02/2024    COLORECTAL CANCER SCREENING  06/01/2031    TDAP/TD VACCINES (2 - Td or Tdap) 10/02/2033    INFLUENZA VACCINE  Completed    Pneumococcal Vaccine 65+  Completed                  CMS Preventative Services Quick Reference  Risk Factors Identified During Encounter:    None Identified    The above risks/problems have been discussed with the patient.  Pertinent information has been shared with the patient in the After Visit Summary.    Diagnoses and all orders for this visit:    1. Medicare annual wellness visit, subsequent (Primary)    2. Controlled type 2 diabetes mellitus without complication, without long-term current use of insulin  -     POC Microalbumin    3. Paroxysmal atrial fibrillation    4. Essential hypertension    5. Dyslipidemia    6. Chronic anticoagulation    7. Mixed urge and stress  incontinence    8. Diabetic amyotrophy associated with type 2 diabetes mellitus    9. Chronic obstructive bronchitis  -     CT Chest Without Contrast; Future  -     Ambulatory Referral to Pulmonology    10. Primary insomnia    11. Encounter for screening for osteoporosis  -     Dexa Bone Density, Axial (Every 2 Years); Future    12. Encounter for screening mammogram for breast cancer  -     Mammo Screening Digital Tomosynthesis Bilateral With CAD; Future    13. Immunization due  -     Fluzone High-Dose 65+yrs (2712-7679)  -     Tdap vaccine greater than or equal to 8yo IM    14. Hemoptysis  -     CT Chest Without Contrast; Future  -     Ambulatory Referral to Pulmonology    15. Personal history of tobacco use, presenting hazards to health  -     CT Chest Without Contrast; Future  -     Ambulatory Referral to Pulmonology    16. Postmenopausal  -     Dexa Bone Density, Axial (Every 2 Years); Future    17. Obstructive sleep apnea of adult    18. Calcaneal spur of foot, right  -     Ambulatory Referral to Podiatry    19. Bunion of great toe of right foot  -     Ambulatory Referral to Podiatry    20. Episodic memory loss  -     Ambulatory Referral to Neurology        Follow Up:   Next Medicare Wellness visit to be scheduled in 1 year.      An After Visit Summary and PPPS were made available to the patient.    I have discussed age/gender specific preventative healthcare issues in detail with patient today.  I have answered all of the questions.    I have placed a referral for second opinion with regards to her right foot abnormalities.    Patient has requested referral to neurology to evaluate her memory deficits.    Continue NIPPV treatment of her obstructive sleep apnea.    Due to the recalcitrant/resistant nature of her chronic obstructive bronchitis exacerbation, I have recommended a CT scan of her chest, particularly given the new onset of periodic hemoptysis.  I have placed a referral to be seen by pulmonology  urgently, as she will most likely need bronchoscopy.  She does verbalize understanding that a prolonged course of steroid tapering may be necessary.    I have placed an order for an updated bone density.    Updated mammography ordered today.    I have updated her Tdap and flu vaccinations today.    Urine microalbumin today.    I spent 55 minutes caring for Shannon on this date of service; this includes 25 minutes spent in management of her Medicare wellness issues/concerns, as well as an additional 30 minutes spent in management of hemoptysis, acute on chronic obstructive bronchitis and memory deficits. This time includes time spent by me in the following activities:preparing for the visit, performing a medically appropriate examination and/or evaluation , counseling and educating the patient/family/caregiver, ordering medications, tests, or procedures, documenting information in the medical record, independently interpreting results and communicating that information with the patient/family/caregiver, and care coordination    I have reviewed and updated all copied forward information, as appropriate.  I attest to the accuracy and relevance of any unchanged information.

## 2023-10-02 NOTE — PATIENT INSTRUCTIONS
Advance Care Planning and Advance Directives     You make decisions on a daily basis - decisions about where you want to live, your career, your home, your life. Perhaps one of the most important decisions you face is your choice for future medical care. Take time to talk with your family and your healthcare team and start planning today.  Advance Care Planning is a process that can help you:  Understand possible future healthcare decisions in light of your own experiences  Reflect on those decision in light of your goals and values  Discuss your decisions with those closest to you and the healthcare professionals that care for you  Make a plan by creating a document that reflects your wishes    Surrogate Decision Maker  In the event of a medical emergency, which has left you unable to communicate or to make your own decisions, you would need someone to make decisions for you.  It is important to discuss your preferences for medical treatment with this person while you are in good health.     Qualities of a surrogate decision maker:  Willing to take on this role and responsibility  Knows what you want for future medical care  Willing to follow your wishes even if they don't agree with them  Able to make difficult medical decisions under stressful circumstances    Advance Directives  These are legal documents you can create that will guide your healthcare team and decision maker(s) when needed. These documents can be stored in the electronic medical record.    Living Will - a legal document to guide your care if you have a terminal condition or a serious illness and are unable to communicate. States vary by statute in document names/types, but most forms may include one or more of the following:        -  Directions regarding life-prolonging treatments        -  Directions regarding artificially provided nutrition/hydration        -  Choosing a healthcare decision maker        -  Direction regarding organ/tissue  donation    Durable Power of  for Healthcare - this document names an -in-fact to make medical decisions for you, but it may also allow this person to make personal and financial decisions for you. Please seek the advice of an  if you need this type of document.    **Advance Directives are not required and no one may discriminate against you if you do not sign one.    Medical Orders  Many states allow specific forms/orders signed by your physician to record your wishes for medical treatment in your current state of health. This form, signed in personal communication with your physician, addresses resuscitation and other medical interventions that you may or may not want.      For more information or to schedule a time with a [facility] Advance Care Planning Facilitator contact: [facility] or call [phone] and someone will contact you directly.  You are due for adacel Tdap vaccination. (provides protection against tetanus, diptheria and whooping cough) Please  get the immunization at your local pharmacy at your earliest convenience. This immunization will next be due in 10 years. Please click on the link for more information about this vaccine.    [facility] Tdap Vaccine Information      Medicare Wellness  Personal Prevention Plan of Service     Date of Office Visit:    Encounter Provider:  Humberto Wells DO  Place of Service:  [facility]  Patient Name: Shannon Servin  :  1950    As part of the Medicare Wellness portion of your visit today, we are providing you with this personalized preventive plan of services (PPPS). This plan is based upon recommendations of the [place] Preventive Services Task Force (USPSTF) and the Advisory Committee on Immunization Practices (ACIP).    This lists the preventive care services that should be considered, and provides dates of when you are due. Items listed as completed are up-to-date and do not  require any further intervention.    Health Maintenance   Topic Date Due   • MAMMOGRAM  Never done   • URINE MICROALBUMIN  Never done   • DXA SCAN  Never done   • BMI FOLLOWUP  Never done   • TDAP/TD VACCINES (1 - Tdap) Never done   • HEPATITIS C SCREENING  Never done   • LIPID PANEL  Never done   • ZOSTER VACCINE (2 of 2) 09/24/2022   • ANNUAL WELLNESS VISIT  03/11/2023   • DIABETIC FOOT EXAM  03/11/2023   • DIABETIC EYE EXAM  06/08/2023   • INFLUENZA VACCINE  08/01/2023   • COVID-19 Vaccine (5 - 2023-24 season) 09/01/2023   • HEMOGLOBIN A1C  01/24/2024   • COLORECTAL CANCER SCREENING  06/01/2031   • Pneumococcal Vaccine 65+  Completed       Orders Placed This Encounter   Procedures   • Mammo Screening Digital Tomosynthesis Bilateral With CAD     Use HCPCS/CPT Code 18315 as an add-on code to 08608 when tomosynthesis is used in addition to 2-D mammography     Standing Status:   Future     Standing Expiration Date:   10/2/2024     Order Specific Question:   Reason for Exam:     Answer:   screening mammography     Order Specific Question:   Release to patient     Answer:   Routine Release [[id number]]   • Fluzone High-Dose 65+yrs ([id number])   • POC Microalbumin     Order Specific Question:   Release to patient     Answer:   Routine Release [[id number]]       No follow-ups on file.

## 2023-10-03 RX ORDER — PANTOPRAZOLE SODIUM 20 MG/1
20 TABLET, DELAYED RELEASE ORAL DAILY
Qty: 90 TABLET | Refills: 0 | Status: SHIPPED | OUTPATIENT
Start: 2023-10-03

## 2023-10-03 RX ORDER — OXYBUTYNIN CHLORIDE 15 MG/1
15 TABLET, EXTENDED RELEASE ORAL DAILY
Qty: 90 TABLET | Refills: 0 | OUTPATIENT
Start: 2023-10-03

## 2023-10-03 RX ORDER — PREGABALIN 50 MG/1
50 CAPSULE ORAL 3 TIMES DAILY
Qty: 90 CAPSULE | Refills: 1 | Status: SHIPPED | OUTPATIENT
Start: 2023-10-03

## 2023-10-03 RX ORDER — OXYBUTYNIN CHLORIDE 15 MG/1
15 TABLET, EXTENDED RELEASE ORAL DAILY
Qty: 90 TABLET | Refills: 0 | Status: SHIPPED | OUTPATIENT
Start: 2023-10-03

## 2023-10-03 RX ORDER — TRAZODONE HYDROCHLORIDE 50 MG/1
50 TABLET ORAL NIGHTLY
Qty: 30 TABLET | Refills: 0 | Status: SHIPPED | OUTPATIENT
Start: 2023-10-03

## 2023-10-03 RX ORDER — PANTOPRAZOLE SODIUM 20 MG/1
20 TABLET, DELAYED RELEASE ORAL DAILY
Qty: 90 TABLET | Refills: 0 | OUTPATIENT
Start: 2023-10-03

## 2023-10-03 NOTE — TELEPHONE ENCOUNTER
Rx Refill Note  Requested Prescriptions     Pending Prescriptions Disp Refills    pregabalin (LYRICA) 50 MG capsule 90 capsule 0     Sig: Take 1 capsule by mouth 3 (Three) Times a Day.     Signed Prescriptions Disp Refills    pantoprazole (PROTONIX) 20 MG EC tablet 90 tablet 0     Sig: Take 1 tablet by mouth Daily.     Authorizing Provider: DEMOND SOLIS     Ordering User: ASHLEIGH LAMB    oxybutynin XL (DITROPAN XL) 15 MG 24 hr tablet 90 tablet 0     Sig: Take 1 tablet by mouth Daily.     Authorizing Provider: DEMOND SOLIS     Ordering User: ASHLEIGH LAMB    traZODone (DESYREL) 50 MG tablet 30 tablet 0     Sig: Take 1 tablet by mouth Every Night.     Authorizing Provider: DEMOND SOLIS     Ordering User: ASHLEIGH LAMB      Last office visit with prescribing clinician: 8/21/2023   Last telemedicine visit with prescribing clinician: Visit date not found   Next office visit with prescribing clinician: 10/2/2023                         Would you like a call back once the refill request has been completed: [] Yes [] No    If the office needs to give you a call back, can they leave a voicemail: [] Yes [] No    Ashleigh Lamb MA  10/03/23, 08:38 EDT

## 2023-10-05 ENCOUNTER — TELEPHONE (OUTPATIENT)
Dept: CARDIOLOGY | Facility: CLINIC | Age: 73
End: 2023-10-05
Payer: MEDICARE

## 2023-10-05 NOTE — TELEPHONE ENCOUNTER
[facility] called stating that the Pt is needing cardiac clearance for a steroid Injection. Per Dr Shirley Pt needs to have stress test done. I called [facility] and notified them. Ortho states that they will let the Pt know and notify the Pt she needs a FUP as well.

## 2023-10-06 ENCOUNTER — OFFICE VISIT (OUTPATIENT)
Dept: PULMONOLOGY | Facility: CLINIC | Age: 73
End: 2023-10-06
Payer: MEDICARE

## 2023-10-06 VITALS
WEIGHT: 236 LBS | OXYGEN SATURATION: 97 % | HEART RATE: 57 BPM | SYSTOLIC BLOOD PRESSURE: 126 MMHG | BODY MASS INDEX: 35.77 KG/M2 | HEIGHT: 68 IN | DIASTOLIC BLOOD PRESSURE: 70 MMHG

## 2023-10-06 DIAGNOSIS — J44.9 COPD, SEVERE: ICD-10-CM

## 2023-10-06 DIAGNOSIS — J44.1 COPD WITH ACUTE EXACERBATION: ICD-10-CM

## 2023-10-06 DIAGNOSIS — R04.2 HEMOPTYSIS: Primary | ICD-10-CM

## 2023-10-06 RX ORDER — METHYLPREDNISOLONE SODIUM SUCCINATE 125 MG/2ML
125 INJECTION, POWDER, LYOPHILIZED, FOR SOLUTION INTRAMUSCULAR; INTRAVENOUS ONCE
Status: COMPLETED | OUTPATIENT
Start: 2023-10-06 | End: 2023-10-06

## 2023-10-06 RX ORDER — PREDNISONE 20 MG/1
40 TABLET ORAL DAILY
Qty: 10 TABLET | Refills: 0 | Status: SHIPPED | OUTPATIENT
Start: 2023-10-06

## 2023-10-06 RX ORDER — GLYBURIDE 1.25 MG/1
TABLET ORAL
COMMUNITY
Start: 2023-07-24

## 2023-10-06 RX ORDER — DOXYCYCLINE HYCLATE 100 MG/1
100 CAPSULE ORAL 2 TIMES DAILY
Qty: 20 CAPSULE | Refills: 0 | Status: SHIPPED | OUTPATIENT
Start: 2023-10-06

## 2023-10-06 RX ORDER — GABAPENTIN 600 MG/1
600 TABLET ORAL NIGHTLY
COMMUNITY
Start: 2023-09-20

## 2023-10-06 RX ADMIN — METHYLPREDNISOLONE SODIUM SUCCINATE 125 MG: 125 INJECTION, POWDER, LYOPHILIZED, FOR SOLUTION INTRAMUSCULAR; INTRAVENOUS at 11:33

## 2023-10-06 NOTE — PROGRESS NOTES
"     Follow Up Office Visit      Patient Name: Shannon Servin    Chief Complaint:    Chief Complaint   Patient presents with    Coughing Up Blood     Coughing up blood since 9/11/2023. Patient had CT scan on 10/2/2023       History of Present Illness: Shannon Servin is a 73 y.o. female with COPD who is here today due to hemoptysis. She was last seen in clinic in October 2022. She notes that she developed a URI about 1 month ago, which caused a significant amount of coughing and increased need for nebulizer treatments. Cough has been productive of sputum streaked with blood. She saw her PCP recently who ordered a chest CT scan and recommended pulmonary evaluation. Chest CT suggested bronchitis but no concerning findings otherwise (patient with known breast tissue abnormality, which she says is chronic from silicone injections in the past). Notably, her cough and wheezing have eased off over the past 2 days and she has not had further hemoptysis over the course of time. She says that she is on an anticoagulant (?Eliquis) for a. Fib, though this is not on her medication list.    Duration: \"Bronchial condition her whole life\"    Subjective      Review of Systems:  Review of Systems   Constitutional:  Negative for fever and unexpected weight change.   Respiratory:  Positive for cough, shortness of breath and wheezing.    Cardiovascular:  Negative for chest pain and leg swelling.        Past Medical History:   Past Medical History:   Diagnosis Date    Afib     Arthritis     Asthma COPD    Colon polyp 2021    COPD (chronic obstructive pulmonary disease) april 2022 ?????    Depression     Diabetes mellitus     Edema     Elevated cholesterol     Fibromyalgia, primary 2020    History of recurrent UTIs     Hypertension     Impaired functional mobility, balance, gait, and endurance     Kidney calculi     Low back pain     Migraines     Pneumonia 1995    Seasonal allergies     Sleep apnea     Urinary incontinence     Urinary tract " infection worst-2020    had them all my life       Past Surgical History:   Past Surgical History:   Procedure Laterality Date    ANGIOPLASTY AORTIC N/A 2022    Procedure: MESENTERIC ANGIOGRAM WITH INFERIOR MESENTERIC ARTERY STENT;  Surgeon: Parker Shah MD;  Location: [facility];  Service: Vascular;  Laterality: N/A;  FLUORO 6 min 30 sec  DOSE 691mGy  CONTRAST 35ml     COLON SURGERY  colonoscopy in     COLONOSCOPY N/A 2021    Procedure: COLONOSCOPY WITH BIOPSY, COLD SNARE POLYPECTOMY, HOT SNARE POLYPECTOMY;  Surgeon: Cam Parra MD;  Location: [facility] ENDOSCOPY;  Service: Gastroenterology;  Laterality: N/A;    CYSTOSCOPY      HERNIA REPAIR      HYSTERECTOMY      INTERSTIM PLACEMENT N/A 2023    Procedure: INTERSTIM STAGES 1 AND 2 LEAD AND GENERATOR PLACEMENT;  Surgeon: Marek Saab MD;  Location: [facility] OR;  Service: Urology;  Laterality: N/A;    KIDNEY STONE SURGERY      OVARIAN CYST REMOVAL      SUBTOTAL HYSTERECTOMY  ????    TOTAL ABDOMINAL HYSTERECTOMY WITH SALPINGO OOPHORECTOMY      URETEROSCOPY LASER LITHOTRIPSY WITH STENT INSERTION Left 2020    Procedure: URETEROSCOPY, LEFT RETROGRADE PYLEOGERAM WITH STENT INSERTION;  Surgeon: Mulugeta Saldana MD;  Location: [facility] OR;  Service: Urology;  Laterality: Left;       Family History:   Family History   Problem Relation Age of Onset    Cancer Other     Diabetes Other     Migraines Other     Alcohol abuse Father     Diabetes Father     Cancer Father        Social History:   Social History     Socioeconomic History    Marital status:    Tobacco Use    Smoking status: Former     Packs/day: 1.50     Years: 33.00     Additional pack years: 0.00     Total pack years: 49.50     Types: Cigarettes     Start date: 1965     Quit date: 3/1/1998     Years since quittin.6     Passive exposure: Past    Smokeless tobacco: Never   Vaping Use    Vaping Use: Never used   Substance and Sexual  Activity    Alcohol use: Yes     Comment: maybe twice a month    Drug use: Never    Sexual activity: Not Currently     Partners: Male     Birth control/protection: Hysterectomy     Comment: hysterectomy       Current Medications:     Current Outpatient Medications:     atorvastatin (LIPITOR) 80 MG tablet, TAKE 1 TABLET BY MOUTH DAILY., Disp: 90 tablet, Rfl: 0    Docusate Calcium (STOOL SOFTENER PO), Take 240 mg by mouth Daily., Disp: , Rfl:     enalapril (VASOTEC) 20 MG tablet, Take 1 tablet by mouth Every 12 (Twelve) Hours., Disp: 180 tablet, Rfl: 0    fluconazole (Diflucan) 100 MG tablet, Take 1 tablet by mouth Daily. 1 tab po daily x 5 days, Disp: 5 tablet, Rfl: 1    Fluticasone-Umeclidin-Vilant (Trelegy Ellipta) 100-62.5-25 MCG/ACT inhaler, Inhale 1 puff Daily., Disp: 60 each, Rfl: 0    furosemide (LASIX) 20 MG tablet, TAKE 1 TABLET BY MOUTH TWICE DAILY, Disp: 420 tablet, Rfl: 0    gabapentin (NEURONTIN) 600 MG tablet, Take 1 tablet by mouth Every Night., Disp: , Rfl:     glucose blood (Accu-Chek Magdalena Plus) test strip, Use as instructed, Disp: 200 each, Rfl: 12    glucose blood test strip, 1 each by Other route 2 (Two) Times a Day., Disp: 100 each, Rfl: 5    glucose monitor monitoring kit, 1 each As Needed (blood glucose monitoring)., Disp: 1 each, Rfl: 0    glyburide (DIAbeta) 1.25 MG tablet, , Disp: , Rfl:     HYDROcodone-acetaminophen (Norco) 5-325 MG per tablet, Take 1 tablet by mouth Every 8 (Eight) Hours As Needed for Moderate Pain or Severe Pain., Disp: 40 tablet, Rfl: 0    ipratropium-albuterol (DUO-NEB) 0.5-2.5 mg/3 ml nebulizer, Inhale 1 vial (3 mL) by nebulization Every 6 (Six) Hours As Needed for Wheezing or Shortness of Air., Disp: 360 mL, Rfl: 0    Lancets misc, 1 Units 2 (Two) Times a Day., Disp: 100 each, Rfl: 5    montelukast (SINGULAIR) 10 MG tablet, TAKE 1 TABLET BY MOUTH EVERY DAY, Disp: 180 tablet, Rfl: 0    ondansetron (Zofran) 4 MG tablet, Take 1 tablet by mouth Every 8 (Eight) Hours As  "Needed for Nausea or Vomiting., Disp: 180 tablet, Rfl: 2    oxybutynin XL (DITROPAN XL) 15 MG 24 hr tablet, Take 1 tablet by mouth Daily., Disp: 90 tablet, Rfl: 0    pantoprazole (PROTONIX) 20 MG EC tablet, Take 1 tablet by mouth Daily., Disp: 90 tablet, Rfl: 0    pregabalin (LYRICA) 50 MG capsule, Take 1 capsule by mouth 3 (Three) Times a Day., Disp: 90 capsule, Rfl: 1    promethazine (PHENERGAN) 25 MG tablet, Take 1 tablet by mouth Every 6 (Six) Hours As Needed., Disp: , Rfl:     rOPINIRole (REQUIP) 0.5 MG tablet, Take 1 tablet by mouth Every Night., Disp: 90 tablet, Rfl: 3    senna (SENOKOT) 8.6 MG tablet, Take 1 tablet by mouth Daily., Disp: , Rfl:     sotalol (BETAPACE) 80 MG tablet, Take 1 tablet by mouth Every 12 (Twelve) Hours., Disp: 180 tablet, Rfl: 0    spironolactone (ALDACTONE) 25 MG tablet, Take 1 tablet by mouth Daily., Disp: 30 tablet, Rfl: 1    tiZANidine (ZANAFLEX) 4 MG tablet, TAKE 1 TABLET BY MOUTH AT NIGHT AS NEEDED FOR MUSCLE SPASMS., Disp: 90 tablet, Rfl: 0    traZODone (DESYREL) 50 MG tablet, Take 1 tablet by mouth Every Night., Disp: 30 tablet, Rfl: 0    Tirzepatide (Mounjaro) 2.5 MG/0.5ML solution pen-injector, Inject 0.5 mL under the skin into the appropriate area as directed 1 (One) Time Per Week. To include any needed supplies for administration, Disp: 6 mL, Rfl: 1    Current Facility-Administered Medications:     cyanocobalamin injection 1,000 mcg, 1,000 mcg, Intramuscular, Q28 Days, Humberto Wells DO, 1,000 mcg at 06/03/22 1706     Allergies:   Allergies   Allergen Reactions    Metformin Hallucinations     Other reaction(s): Hallucinations       Objective     Physical Exam:  Vital Signs:   Vitals:    10/06/23 1021   BP: 126/70   Pulse: 57   SpO2: 97%   Weight: 107 kg (236 lb)   Height: 172.7 cm (68\")     Body mass index is 35.88 kg/mý.    Physical Exam  Vitals reviewed.   Constitutional:       General: She is not in acute distress.     Appearance: She is not toxic-appearing.   HENT: "      Head: Normocephalic and atraumatic.      Mouth/Throat:      Mouth: Mucous membranes are moist.   Eyes:      Conjunctiva/sclera: Conjunctivae normal.   Cardiovascular:      Heart sounds: Normal heart sounds.   Pulmonary:      Effort: Pulmonary effort is normal.      Breath sounds: Normal breath sounds.   Abdominal:      General: There is no distension.      Palpations: Abdomen is soft.   Musculoskeletal:         General: No swelling.      Cervical back: Neck supple.   Skin:     General: Skin is warm and dry.      Findings: No rash.   Neurological:      General: No focal deficit present.      Mental Status: She is alert and oriented to person, place, and time.   Psychiatric:         Mood and Affect: Mood normal.         Behavior: Behavior normal.       Results Review:   October 2023 chest CT scan showed no acute findings in the chest. Mild bronchial wall thickening, correlate for possible bronchitis. Emphysema. No suspicious pulmonary  nodule, mass or consolidation. Coronary artery disease present. Bilateral dense breast tissue with multifocal nodularity and multiple calcifications.    Assessment / Plan      Assessment/Plan:   Diagnoses and all orders for this visit:    1. Hemoptysis (Primary)  Scant amounts over the past 1 month, though is improving as there has been no occurrence over the past 2 days. Chest CT scan results reviewed and discussed with patient. Hemoptysis has most likely been apparent secondary to COPD exacerbation with strenuous cough, +/- anticoagulant use. I did call her pharmacy to confirm medications. [facility] had no record of Eliquis or Xarelto on file. Has been prescribed warfarin in the past, though is not an active prescription. She may be getting DOAC samples from one of her providers but appears to need clarification at her upcoming cardiology appt. Patient advised to notify the clinic if recurrent hemoptysis is apparent post exacerbation treatment as bronchoscopy may need to  be considered.    2. COPD with acute exacerbation  -     methylPREDNISolone sodium succinate (SOLU-Medrol) injection 125 mg  -     predniSONE (DELTASONE) 20 MG tablet; Take 2 tablets by mouth Daily.  Dispense: 10 tablet; Refill: 0  -     doxycycline (VIBRAMYCIN) 100 MG capsule; Take 1 capsule by mouth 2 (Two) Times a Day.  Dispense: 20 capsule; Refill: 0  Treat current exacerbation as noted above. Discussed possible side effects of medications. Risk and benefits of the medication were discussed with patient.     3. COPD, severe  Continue current inhaled regimen. We discussed the risk and benefits of inhaled corticosteroids. Patient instructed to take them on a regular basis as prescribed. Patient instructed to rinse their mouth out after each use.     Follow Up:   Return in about 6 months (around 4/6/2024) for Recheck.  The patient was counseled on diagnostic results, risks and benefits of treatment options, risk factor modifications and the importance of treatment compliance. The patient was advised to contact the clinic with concerns or worsening symptoms.     MAICO Reveles   Pulmonary Medicine [place]

## 2023-10-12 ENCOUNTER — OFFICE VISIT (OUTPATIENT)
Dept: FAMILY MEDICINE CLINIC | Facility: CLINIC | Age: 73
End: 2023-10-12
Payer: MEDICARE

## 2023-10-12 ENCOUNTER — TELEPHONE (OUTPATIENT)
Dept: FAMILY MEDICINE CLINIC | Facility: CLINIC | Age: 73
End: 2023-10-12

## 2023-10-12 VITALS
HEART RATE: 80 BPM | OXYGEN SATURATION: 94 % | SYSTOLIC BLOOD PRESSURE: 118 MMHG | HEIGHT: 68 IN | DIASTOLIC BLOOD PRESSURE: 76 MMHG | TEMPERATURE: 97 F | BODY MASS INDEX: 35.77 KG/M2 | RESPIRATION RATE: 18 BRPM | WEIGHT: 236 LBS

## 2023-10-12 DIAGNOSIS — Z79.01 CHRONIC ANTICOAGULATION: Chronic | ICD-10-CM

## 2023-10-12 DIAGNOSIS — K58.1 IRRITABLE BOWEL SYNDROME WITH CONSTIPATION: ICD-10-CM

## 2023-10-12 DIAGNOSIS — J44.1 ACUTE EXACERBATION OF CHRONIC OBSTRUCTIVE BRONCHITIS: ICD-10-CM

## 2023-10-12 DIAGNOSIS — I48.0 PAROXYSMAL ATRIAL FIBRILLATION: Primary | ICD-10-CM

## 2023-10-12 DIAGNOSIS — M65.4 DE QUERVAIN'S TENOSYNOVITIS, RIGHT: ICD-10-CM

## 2023-10-12 RX ORDER — METHYLPREDNISOLONE ACETATE 80 MG/ML
80 INJECTION, SUSPENSION INTRA-ARTICULAR; INTRALESIONAL; INTRAMUSCULAR; SOFT TISSUE ONCE
Status: COMPLETED | OUTPATIENT
Start: 2023-10-12 | End: 2023-10-12

## 2023-10-12 RX ADMIN — METHYLPREDNISOLONE ACETATE 80 MG: 80 INJECTION, SUSPENSION INTRA-ARTICULAR; INTRALESIONAL; INTRAMUSCULAR; SOFT TISSUE at 13:21

## 2023-10-12 NOTE — TELEPHONE ENCOUNTER
Per call from Heather Iyer (Brandee), CT Chest is pending denial at this time. Case is set to close tomorrow morning (10/13). States that reviewers pending denial reason is that advanced imaging is not generally indicated for COPD. States that you can do [id number] review if you choose by calling [phone]. No reference number needed, just patient name and . P2P review must be done by EOD today to avoid finalization of denial. Let me know if you want to do the P2P review and I can assist you with getting [facility] on the phone if you would like me to do that.

## 2023-10-12 NOTE — PROGRESS NOTES
Established Patient        Chief Complaint:   Chief Complaint   Patient presents with    Follow-up     Pt stated she is feeling better but still has wheezing    Cough    Wheezing    Med Refill    Numbness     Right thumb        Shannon Servin is a 73 y.o. female    History of Present Illness:   Answers submitted by the patient for this visit:  Primary Reason for Visit (Submitted on 10/5/2023)  What is the primary reason for your visit?: Cough  Cough Questionnaire (Submitted on 10/5/2023)  Chief Complaint: Cough  Chronicity: recurrent  Onset: 1 to 4 weeks ago  Progression since onset: gradually improving  Frequency: constantly  Cough characteristics: productive of sputum  chest pain: Yes  heartburn: Yes  nasal congestion: Yes  postnasal drip: Yes  shortness of breath: Yes  sore throat: Yes  wheezing: Yes  Aggravated by: dust, lying down, stress    Patient has undergone CT scan that was ordered at previous visit.  She reports significant improvement to her symptoms with use of steroids and current inhaled medication regimen.  She was also seen by pulmonology, and although they documented normal lung findings, she was started on prednisone orally, as well as given Solu-Medrol in the office.    She continues to have painful paresthesias to the right thumb.  Worsened with certain movement and positions.  She has used yaa chi exercises in an effort to improve carpal tunnel involvement.    Subjective     The following portions of the patient's history were reviewed and updated as appropriate: allergies, current medications, past family history, past medical history, past social history, past surgical history and problem list.    Allergies   Allergen Reactions    Metformin Hallucinations     Other reaction(s): Hallucinations     Review of Systems:    Constitutional: Negative for fever. Negative for chills, diaphoresis; malaise/fatigue of chronic nature, and without unexpected weight change.  "  HENT: No dysphagia; no changes to vision/hearing/smell/taste; no epistaxis  Eyes: Negative for redness and visual disturbance.   Respiratory: Chronic cough, history of hemoptysis.  Cardiovascular: Negative for chest pain and palpitations.   Gastrointestinal: Denies BRB/BTS.  No abdominal discomfort.  Endocrine: Negative for cold intolerance and heat intolerance.   Genitourinary: Negative for difficulty urinating, dysuria and frequency.   Musculoskeletal: Chronic arthralgias, back pain and myalgias.  Significant limitation to range of motion of right shoulder.  Painful paresthesia to right thumb as per above.  Skin: Chronic thickened/yellow discoloration to the numerous toes of bilateral feet, worse to the bilateral great toes.  Neurological: Negative for syncope, weakness.  As per above otherwise.  Hematological: Negative for adenopathy. Does not bruise/bleed easily.   Psychiatric/Behavioral: Negative for confusion. The patient is not nervous/anxious.    Objective     Physical Exam   Vital Signs: /76   Pulse 80   Temp 97 øF (36.1 øC)   Resp 18   Ht 172.7 cm (67.99\")   Wt 107 kg (236 lb)   SpO2 94%   BMI 35.89 kg/mý     General Appearance: alert, oriented x 3, no acute distress.  Pleasant and interactive during questioning/examination.  Well-nourished and developed female.  Skin: warm and dry.  Stasis dermatitis noted additionally.    HEENT: Atraumatic.  pupils round and reactive to light and accommodation, oral mucosa pink and moist.  Nares patent without epistaxis.  External auditory canals are patent tympanic membranes intact.  Neck: supple, no JVD, trachea midline.  No thyromegaly  Lungs: Rhonchus, referred, upper airway congestion, cleared with cough, unlabored breathing effort.  Audible air exchange noted all lung fields.  Expiratory wheezes throughout.  Prolonged expiratory phase bilaterally.  Heart: RRR, normal S1 and S2, no S3, no rub.  Abdomen: soft, non-tender, no palpable bladder, present " bowel sounds to auscultation x4.  No guarding or rigidity.  No CVA tenderness.  Extremities: no clubbing, cyanosis.  Symmetric muscle strength and development.  Nonpitting edematous changes noted to the mid tibias bilaterally, brawny changes associated with venous stasis, chronic in nature.  Ambulates independently.  Impaired range of motion to right shoulder.  Apley scratch test limited due to discomfort of the right shoulder, normal deltoid tone/development.  Negative pushoff, positive empty can sign, as well as shrug sign.  Zuniga/Neer painful, cross body abduction painful as well.  Symmetric  strength bilaterally.  De Quervain's test positive to the right hand.  Neuro: normal speech and mental status.  Cranial nerves II through XII intact.  No anosmia. DTR 2+; proprioception intact.  No focal motor/sensory deficits.        Assessment and Plan      Assessment/Plan:   Diagnoses and all orders for this visit:    1. Paroxysmal atrial fibrillation (Primary)    2. Chronic anticoagulation    3. Acute exacerbation of chronic obstructive bronchitis  -     methylPREDNISolone acetate (DEPO-medrol) injection 80 mg    4. Irritable bowel syndrome with constipation  -     linaclotide (Linzess) 145 MCG capsule capsule; Take 1 capsule by mouth Every Morning Before Breakfast.  Dispense: 90 capsule; Refill: 2    5. De Quervain's tenosynovitis, right      Patient is provided an additional 80 mg IM injection of Depo-Medrol in office today.  She should be finishing her oral steroid therapy within the next 1 to 2 days.  I provided her with an additional sample of Trelegy inhaler.  She will utilize once daily.  She is clinically dealing with continued symptoms of acute on chronic obstructive bronchitis.  Although significantly improved, her symptoms are still quite problematic.  I have asked that she keep scheduled follow-up appointment with pulmonology.    Patient has a longstanding history of irritable bowel syndrome with  constipation, she has tried numerous therapies to aid in regulation of her bowel function.  The only medication has provided substantial benefit to her has been Linzess, provided as samples in the past.  She would like to have a prescription for this medication today.  In the past she has tried laxatives, stool softeners and MiraLAX.    Vital signs demonstrate hemodynamic stability, blood pressure is at goal.  Heart rate well controlled.  Continue anticoagulation.  Currently on Xarelto, she gets this medication directly from the  and a cost saving program.    I have discussed results of most recent CT scan with her in detail today.  Also did appear to appear to ensure coverage from her medical insurance for the test.  Testing demonstrates no cancerous process.  No focal area of infection.    We currently do not have a right hand thumb spica brace in the office; I have asked our DME company, [employer], to provide patient with a one size fits all right WTO thumb spica brace.  Patient is to utilize ice compress therapy to the affected area as needed.    She has demonstrated hyperglycemia is related to steroid treatment of her respiratory symptoms.  I recommended that she follow-up here in approximately 6 weeks time, at which point we can repeat her hemoglobin A1c.    Discussion Summary:    Discussed plan of care in detail with pt today; pt verb understanding and agrees.    I spent 40 minutes caring for Shannon on this date of service. This time includes time spent by me in the following activities:preparing for the visit, performing a medically appropriate examination and/or evaluation , counseling and educating the patient/family/caregiver, ordering medications, tests, or procedures, documenting information in the medical record, and care coordination    I have reviewed and updated all copied forward information, as appropriate.  I attest to the accuracy and relevance of any unchanged information.    Follow  up:  Return in about 6 weeks (around 11/23/2023) for Recheck.     There are no Patient Instructions on file for this visit.    Humberto Wells,   10/12/23  14:10 EDT

## 2023-10-16 RX ORDER — MONTELUKAST SODIUM 10 MG/1
10 TABLET ORAL DAILY
Qty: 180 TABLET | Refills: 0 | Status: SHIPPED | OUTPATIENT
Start: 2023-10-16

## 2023-10-17 ENCOUNTER — HOSPITAL ENCOUNTER (EMERGENCY)
Facility: HOSPITAL | Age: 73
Discharge: HOME | End: 2023-10-17
Attending: EMERGENCY MEDICINE
Payer: MEDICARE

## 2023-10-17 ENCOUNTER — APPOINTMENT (OUTPATIENT)
Dept: GENERAL RADIOLOGY | Facility: HOSPITAL | Age: 73
End: 2023-10-17
Payer: MEDICARE

## 2023-10-17 VITALS
OXYGEN SATURATION: 98 % | SYSTOLIC BLOOD PRESSURE: 132 MMHG | HEART RATE: 71 BPM | BODY MASS INDEX: 37.35 KG/M2 | DIASTOLIC BLOOD PRESSURE: 56 MMHG | TEMPERATURE: 98 F | HEIGHT: 67 IN | WEIGHT: 238 LBS | RESPIRATION RATE: 18 BRPM

## 2023-10-17 DIAGNOSIS — J20.9 ACUTE BRONCHITIS, UNSPECIFIED ORGANISM: Primary | ICD-10-CM

## 2023-10-17 LAB
ALBUMIN SERPL-MCNC: 3.9 G/DL (ref 3.5–5.2)
ALBUMIN/GLOB SERPL: 1.4 G/DL
ALP SERPL-CCNC: 78 U/L (ref 39–117)
ALT SERPL W P-5'-P-CCNC: 21 U/L (ref 1–33)
ANION GAP SERPL CALCULATED.3IONS-SCNC: 9.4 MMOL/L (ref 5–15)
AST SERPL-CCNC: 19 U/L (ref 1–32)
B PARAPERT DNA SPEC QL NAA+PROBE: NOT DETECTED
B PERT DNA SPEC QL NAA+PROBE: NOT DETECTED
BASOPHILS # BLD AUTO: 0.01 10*3/MM3 (ref 0–0.2)
BASOPHILS NFR BLD AUTO: 0.1 % (ref 0–1.5)
BILIRUB SERPL-MCNC: 0.5 MG/DL (ref 0–1.2)
BUN SERPL-MCNC: 20 MG/DL (ref 8–23)
BUN/CREAT SERPL: 26.3 (ref 7–25)
C PNEUM DNA NPH QL NAA+NON-PROBE: NOT DETECTED
CALCIUM SPEC-SCNC: 10.3 MG/DL (ref 8.6–10.5)
CHLORIDE SERPL-SCNC: 100 MMOL/L (ref 98–107)
CO2 SERPL-SCNC: 24.6 MMOL/L (ref 22–29)
CREAT SERPL-MCNC: 0.76 MG/DL (ref 0.57–1)
DEPRECATED RDW RBC AUTO: 39.9 FL (ref 37–54)
EGFRCR SERPLBLD CKD-EPI 2021: 82.3 ML/MIN/1.73
EOSINOPHIL # BLD AUTO: 0.08 10*3/MM3 (ref 0–0.4)
EOSINOPHIL NFR BLD AUTO: 0.8 % (ref 0.3–6.2)
ERYTHROCYTE [DISTWIDTH] IN BLOOD BY AUTOMATED COUNT: 12.2 % (ref 12.3–15.4)
FLUAV SUBTYP SPEC NAA+PROBE: NOT DETECTED
FLUBV RNA ISLT QL NAA+PROBE: NOT DETECTED
GLOBULIN UR ELPH-MCNC: 2.7 GM/DL
GLUCOSE SERPL-MCNC: 225 MG/DL (ref 65–99)
HADV DNA SPEC NAA+PROBE: NOT DETECTED
HCOV 229E RNA SPEC QL NAA+PROBE: NOT DETECTED
HCOV HKU1 RNA SPEC QL NAA+PROBE: NOT DETECTED
HCOV NL63 RNA SPEC QL NAA+PROBE: NOT DETECTED
HCOV OC43 RNA SPEC QL NAA+PROBE: NOT DETECTED
HCT VFR BLD AUTO: 42 % (ref 34–46.6)
HGB BLD-MCNC: 14.5 G/DL (ref 12–15.9)
HMPV RNA NPH QL NAA+NON-PROBE: NOT DETECTED
HOLD SPECIMEN: NORMAL
HPIV1 RNA ISLT QL NAA+PROBE: NOT DETECTED
HPIV2 RNA SPEC QL NAA+PROBE: NOT DETECTED
HPIV3 RNA NPH QL NAA+PROBE: NOT DETECTED
HPIV4 P GENE NPH QL NAA+PROBE: NOT DETECTED
IMM GRANULOCYTES # BLD AUTO: 0.03 10*3/MM3 (ref 0–0.05)
IMM GRANULOCYTES NFR BLD AUTO: 0.3 % (ref 0–0.5)
LYMPHOCYTES # BLD AUTO: 1.85 10*3/MM3 (ref 0.7–3.1)
LYMPHOCYTES NFR BLD AUTO: 19.5 % (ref 19.6–45.3)
M PNEUMO IGG SER IA-ACNC: NOT DETECTED
MCH RBC QN AUTO: 31.2 PG (ref 26.6–33)
MCHC RBC AUTO-ENTMCNC: 34.5 G/DL (ref 31.5–35.7)
MCV RBC AUTO: 90.3 FL (ref 79–97)
MONOCYTES # BLD AUTO: 0.32 10*3/MM3 (ref 0.1–0.9)
MONOCYTES NFR BLD AUTO: 3.4 % (ref 5–12)
NEUTROPHILS NFR BLD AUTO: 7.18 10*3/MM3 (ref 1.7–7)
NEUTROPHILS NFR BLD AUTO: 75.9 % (ref 42.7–76)
NRBC BLD AUTO-RTO: 0 /100 WBC (ref 0–0.2)
PLATELET # BLD AUTO: 148 10*3/MM3 (ref 140–450)
PMV BLD AUTO: 10.8 FL (ref 6–12)
POTASSIUM SERPL-SCNC: 5.1 MMOL/L (ref 3.5–5.2)
PROT SERPL-MCNC: 6.6 G/DL (ref 6–8.5)
RBC # BLD AUTO: 4.65 10*6/MM3 (ref 3.77–5.28)
RHINOVIRUS RNA SPEC NAA+PROBE: NOT DETECTED
RSV RNA NPH QL NAA+NON-PROBE: NOT DETECTED
SARS-COV-2 RNA NPH QL NAA+NON-PROBE: NOT DETECTED
SODIUM SERPL-SCNC: 134 MMOL/L (ref 136–145)
TROPONIN T SERPL HS-MCNC: 8 NG/L
WBC NRBC COR # BLD: 9.47 10*3/MM3 (ref 3.4–10.8)
WHOLE BLOOD HOLD COAG: NORMAL
WHOLE BLOOD HOLD SPECIMEN: NORMAL

## 2023-10-17 PROCEDURE — 99284 EMERGENCY DEPT VISIT MOD MDM: CPT

## 2023-10-17 PROCEDURE — 85025 COMPLETE CBC W/AUTO DIFF WBC: CPT | Performed by: EMERGENCY MEDICINE

## 2023-10-17 PROCEDURE — 63710000001 PREDNISONE PER 5 MG: Performed by: EMERGENCY MEDICINE

## 2023-10-17 PROCEDURE — 84484 ASSAY OF TROPONIN QUANT: CPT | Performed by: EMERGENCY MEDICINE

## 2023-10-17 PROCEDURE — 71045 X-RAY EXAM CHEST 1 VIEW: CPT

## 2023-10-17 PROCEDURE — 94640 AIRWAY INHALATION TREATMENT: CPT

## 2023-10-17 PROCEDURE — 80053 COMPREHEN METABOLIC PANEL: CPT | Performed by: EMERGENCY MEDICINE

## 2023-10-17 PROCEDURE — 36415 COLL VENOUS BLD VENIPUNCTURE: CPT

## 2023-10-17 PROCEDURE — 0202U NFCT DS 22 TRGT SARS-COV-2: CPT | Performed by: EMERGENCY MEDICINE

## 2023-10-17 PROCEDURE — 63710000001 ONDANSETRON ODT 4 MG TABLET DISPERSIBLE: Performed by: EMERGENCY MEDICINE

## 2023-10-17 PROCEDURE — 94761 N-INVAS EAR/PLS OXIMETRY MLT: CPT

## 2023-10-17 PROCEDURE — 93005 ELECTROCARDIOGRAM TRACING: CPT | Performed by: EMERGENCY MEDICINE

## 2023-10-17 RX ORDER — IPRATROPIUM BROMIDE AND ALBUTEROL SULFATE 2.5; .5 MG/3ML; MG/3ML
3 SOLUTION RESPIRATORY (INHALATION) ONCE
Status: COMPLETED | OUTPATIENT
Start: 2023-10-17 | End: 2023-10-17

## 2023-10-17 RX ORDER — SPIRONOLACTONE 25 MG/1
25 TABLET ORAL DAILY
Qty: 30 TABLET | Refills: 6 | Status: SHIPPED | OUTPATIENT
Start: 2023-10-17

## 2023-10-17 RX ORDER — SODIUM CHLORIDE 0.9 % (FLUSH) 0.9 %
10 SYRINGE (ML) INJECTION AS NEEDED
Status: DISCONTINUED | OUTPATIENT
Start: 2023-10-17 | End: 2023-10-17 | Stop reason: HOSPADM

## 2023-10-17 RX ORDER — ONDANSETRON 4 MG/1
4 TABLET, ORALLY DISINTEGRATING ORAL ONCE
Status: COMPLETED | OUTPATIENT
Start: 2023-10-17 | End: 2023-10-17

## 2023-10-17 RX ADMIN — ONDANSETRON 4 MG: 4 TABLET, ORALLY DISINTEGRATING ORAL at 13:54

## 2023-10-17 RX ADMIN — PREDNISONE 60 MG: 50 TABLET ORAL at 13:54

## 2023-10-17 RX ADMIN — IPRATROPIUM BROMIDE AND ALBUTEROL SULFATE 3 ML: .5; 3 SOLUTION RESPIRATORY (INHALATION) at 14:01

## 2023-10-17 NOTE — ED PROVIDER NOTES
"Subjective   History of Present Illness  73-year-old female presents to the ED with multiple complaints.  The patient states that she just does not feel well.  Notes diffuse generalized body aches.  States that she woke up this morning and was nauseous.  Did not vomit.  She also states that she felt pressure throughout her chest.  Patient does note that she is getting over a recent \"bronchial issue\".  Does have a history of A-fib asthma and COPD.  States that she has had a slight cough and wheeze.  No lightheadedness or dizziness.  No abdominal pain.  No fever or chills.  No other complaints at this time.    Review of Systems   Respiratory:  Positive for chest tightness.    Cardiovascular:  Positive for chest pain.   Gastrointestinal:  Positive for nausea.   Musculoskeletal:  Positive for myalgias.   All other systems reviewed and are negative.      Past Medical History:   Diagnosis Date    Afib     Arthritis     Asthma COPD    Colon polyp 2021    COPD (chronic obstructive pulmonary disease) april 2022 ?????    Depression     Diabetes mellitus     Edema     Elevated cholesterol     Fibromyalgia, primary 2020    History of recurrent UTIs     Hypertension     Impaired functional mobility, balance, gait, and endurance     Kidney calculi     Low back pain     Migraines     Pneumonia 1995    Seasonal allergies     Sleep apnea     Urinary incontinence     Urinary tract infection worst-2020    had them all my life       Allergies   Allergen Reactions    Metformin Hallucinations     Other reaction(s): Hallucinations       Past Surgical History:   Procedure Laterality Date    ANGIOPLASTY AORTIC N/A 11/30/2022    Procedure: MESENTERIC ANGIOGRAM WITH INFERIOR MESENTERIC ARTERY STENT;  Surgeon: Parker Shah MD;  Location: [facility];  Service: Vascular;  Laterality: N/A;  FLUORO 6 min 30 sec  DOSE 691mGy  CONTRAST 35ml     COLON SURGERY  colonoscopy in 2021    COLONOSCOPY N/A 06/01/2021    Procedure: COLONOSCOPY WITH " BIOPSY, COLD SNARE POLYPECTOMY, HOT SNARE POLYPECTOMY;  Surgeon: Cam Parra MD;  Location: [facility] ENDOSCOPY;  Service: Gastroenterology;  Laterality: N/A;    CYSTOSCOPY      HERNIA REPAIR      HYSTERECTOMY      INTERSTIM PLACEMENT N/A 2023    Procedure: INTERSTIM STAGES 1 AND 2 LEAD AND GENERATOR PLACEMENT;  Surgeon: Marek Saab MD;  Location: [facility] OR;  Service: Urology;  Laterality: N/A;    KIDNEY STONE SURGERY      OVARIAN CYST REMOVAL      SUBTOTAL HYSTERECTOMY  ????    TOTAL ABDOMINAL HYSTERECTOMY WITH SALPINGO OOPHORECTOMY      URETEROSCOPY LASER LITHOTRIPSY WITH STENT INSERTION Left 2020    Procedure: URETEROSCOPY, LEFT RETROGRADE PYLEOGERAM WITH STENT INSERTION;  Surgeon: Mulugeta Saldana MD;  Location: [facility] OR;  Service: Urology;  Laterality: Left;       Family History   Problem Relation Age of Onset    Cancer Other     Diabetes Other     Migraines Other     Alcohol abuse Father     Diabetes Father     Cancer Father        Social History     Socioeconomic History    Marital status:    Tobacco Use    Smoking status: Former     Packs/day: 1.50     Years: 33.00     Additional pack years: 0.00     Total pack years: 49.50     Types: Cigarettes     Start date: 1965     Quit date: 3/1/1998     Years since quittin.6     Passive exposure: Past    Smokeless tobacco: Never   Vaping Use    Vaping Use: Never used   Substance and Sexual Activity    Alcohol use: Yes     Comment: maybe twice a month    Drug use: Never    Sexual activity: Not Currently     Partners: Male     Birth control/protection: Hysterectomy     Comment: hysterectomy           Objective   Physical Exam  Vitals and nursing note reviewed.   Constitutional:       General: She is not in acute distress.     Appearance: She is well-developed. She is not diaphoretic.   HENT:      Head: Normocephalic and atraumatic.      Nose: Nose normal.   Eyes:      Conjunctiva/sclera: Conjunctivae  normal.   Cardiovascular:      Rate and Rhythm: Normal rate and regular rhythm.      Heart sounds: Normal heart sounds.   Pulmonary:      Effort: Pulmonary effort is normal. No respiratory distress.      Breath sounds: Wheezing present.      Comments: Normal respiratory effort.  Faint wheeze bilateral lung fields.  Abdominal:      General: There is no distension.      Palpations: Abdomen is soft.      Tenderness: There is no abdominal tenderness. There is no guarding.   Musculoskeletal:         General: No deformity.   Neurological:      Mental Status: She is alert and oriented to person, place, and time.      Cranial Nerves: No cranial nerve deficit.         Procedures           ED Course           EKG interpreted by me.  Sinus rhythm.  Bradycardic.  Rate of 54.  No obvious ST or T wave changes.  Normal EKG                                Medical Decision Making  Problems Addressed:  Acute bronchitis, unspecified organism: complicated acute illness or injury    Amount and/or Complexity of Data Reviewed  Labs: ordered.  Radiology: ordered.  ECG/medicine tests: ordered.    Risk  Prescription drug management.    Chief complaint: Chest pain, body aches, nausea    Differential diagnosis includes but not limited to: STEMI, NSTEMI, pleurisy, pneumonia, pneumothorax, viral illness, acute bronchitis, asthma/COPD exacerbation, chest wall pain, other    Patient arrives stable, afebrile, without respiratory distress with initial vitals interpreted by myself.  Pertinent initial vitals include within normal limits    Plan: CBC CMP EKG respiratory panel chest x-ray    Results from initial plan were reviewed and interpreted by myself and include: Respiratory panel negative.  CBC reassuring.  CMP reassuring.  Troponin negative.    Consultations N/A    Reevaluation resting comfortably, requesting to be discharged    Discussion: Patient presented to the ED with multiple complaints.  Work-up is most consistent with likely acute  bronchitis.  She was feeling better after symptomatic treatment.  Appropriate for discharge and follow-up outpatient needed.  Patient with this plan.  Low suspicion for ACS given negative EKG and normal troponin chest pain is worse with breathing and cough.    Diagnostic information from other sources includes: Review of previous visits, prior labs, prior imaging, available notes from prior evaluations or visits with specialists, medication list, allergies, past medical history, past surgical history    Interventions in the ED included: DuoNeb, prednisone, Zofran, cardiac monitoring    Disposition plan: Discharge    Final diagnoses:   Acute bronchitis, unspecified organism       ED Disposition  ED Disposition       ED Disposition   Discharge    Condition   Stable    Comment   --               Humberto Wells, DO  [address] [place] [address]  [phone]               Where to Get Your Medications        These medications were sent to [facility] - [place], [place] - [address] - [phone]  - [phone]  [address], [facility] [address]      Phone: [phone]   spironolactone 25 MG tablet          Medication List      No changes were made to your prescriptions during this visit.            Robin Pulliam,   10/22/23 2233

## 2023-10-23 DIAGNOSIS — R10.84 ABDOMINAL PAIN, GENERALIZED: ICD-10-CM

## 2023-10-23 DIAGNOSIS — J44.89 CHRONIC OBSTRUCTIVE BRONCHITIS: ICD-10-CM

## 2023-10-23 DIAGNOSIS — M75.41 IMPINGEMENT SYNDROME OF RIGHT SHOULDER: Chronic | ICD-10-CM

## 2023-10-23 DIAGNOSIS — K21.9 GERD WITHOUT ESOPHAGITIS: ICD-10-CM

## 2023-10-23 DIAGNOSIS — M25.511 ARTHRALGIA OF RIGHT SHOULDER REGION: ICD-10-CM

## 2023-10-23 DIAGNOSIS — K58.1 IRRITABLE BOWEL SYNDROME WITH CONSTIPATION: ICD-10-CM

## 2023-10-23 DIAGNOSIS — R10.13 ABDOMINAL PAIN, EPIGASTRIC: ICD-10-CM

## 2023-10-23 DIAGNOSIS — E11.9 TYPE 2 DIABETES MELLITUS TREATED WITHOUT INSULIN: ICD-10-CM

## 2023-10-23 DIAGNOSIS — E11.44 DIABETIC AMYOTROPHY ASSOCIATED WITH TYPE 2 DIABETES MELLITUS: ICD-10-CM

## 2023-10-23 DIAGNOSIS — M75.101 RIGHT ROTATOR CUFF TEAR ARTHROPATHY: ICD-10-CM

## 2023-10-23 DIAGNOSIS — M12.811 RIGHT ROTATOR CUFF TEAR ARTHROPATHY: ICD-10-CM

## 2023-10-23 DIAGNOSIS — G25.81 RESTLESS LEGS SYNDROME: ICD-10-CM

## 2023-10-23 NOTE — TELEPHONE ENCOUNTER
Patient is wanting all her medications transferred over to [facility].  Fax # [phone]

## 2023-10-24 RX ORDER — OXYBUTYNIN CHLORIDE 15 MG/1
15 TABLET, EXTENDED RELEASE ORAL DAILY
Qty: 90 TABLET | Refills: 1 | Status: SHIPPED | OUTPATIENT
Start: 2023-10-24

## 2023-10-24 RX ORDER — ENALAPRIL MALEATE 20 MG/1
20 TABLET ORAL EVERY 12 HOURS SCHEDULED
Qty: 180 TABLET | Refills: 0 | Status: SHIPPED | OUTPATIENT
Start: 2023-10-24

## 2023-10-24 RX ORDER — TIZANIDINE 4 MG/1
4 TABLET ORAL NIGHTLY PRN
Qty: 90 TABLET | Refills: 0 | Status: SHIPPED | OUTPATIENT
Start: 2023-10-24

## 2023-10-24 RX ORDER — ONDANSETRON 4 MG/1
4 TABLET, FILM COATED ORAL EVERY 8 HOURS PRN
Qty: 270 TABLET | Refills: 0 | Status: SHIPPED | OUTPATIENT
Start: 2023-10-24

## 2023-10-24 RX ORDER — BLOOD SUGAR DIAGNOSTIC
1 STRIP MISCELLANEOUS 2 TIMES DAILY
Qty: 200 EACH | Refills: 1 | Status: SHIPPED | OUTPATIENT
Start: 2023-10-24

## 2023-10-24 RX ORDER — IPRATROPIUM BROMIDE AND ALBUTEROL SULFATE 2.5; .5 MG/3ML; MG/3ML
3 SOLUTION RESPIRATORY (INHALATION) EVERY 6 HOURS PRN
Qty: 360 ML | Refills: 1 | Status: SHIPPED | OUTPATIENT
Start: 2023-10-24

## 2023-10-24 RX ORDER — BLOOD-GLUCOSE METER
1 KIT MISCELLANEOUS AS NEEDED
Qty: 1 EACH | Refills: 0 | Status: SHIPPED | OUTPATIENT
Start: 2023-10-24

## 2023-10-24 RX ORDER — ATORVASTATIN CALCIUM 80 MG/1
80 TABLET, FILM COATED ORAL DAILY
Qty: 90 TABLET | Refills: 1 | Status: SHIPPED | OUTPATIENT
Start: 2023-10-24

## 2023-10-24 RX ORDER — LANCETS 30 GAUGE
1 EACH MISCELLANEOUS 2 TIMES DAILY
Qty: 200 EACH | Refills: 1 | Status: SHIPPED | OUTPATIENT
Start: 2023-10-24

## 2023-10-24 RX ORDER — MONTELUKAST SODIUM 10 MG/1
10 TABLET ORAL DAILY
Qty: 90 TABLET | Refills: 1 | Status: SHIPPED | OUTPATIENT
Start: 2023-10-24

## 2023-10-24 RX ORDER — FUROSEMIDE 20 MG/1
20 TABLET ORAL 2 TIMES DAILY
Qty: 180 TABLET | Refills: 1 | Status: SHIPPED | OUTPATIENT
Start: 2023-10-24

## 2023-10-24 RX ORDER — ROPINIROLE 0.5 MG/1
0.5 TABLET, FILM COATED ORAL NIGHTLY
Qty: 90 TABLET | Refills: 1 | Status: SHIPPED | OUTPATIENT
Start: 2023-10-24

## 2023-10-24 RX ORDER — PANTOPRAZOLE SODIUM 20 MG/1
20 TABLET, DELAYED RELEASE ORAL DAILY
Qty: 90 TABLET | Refills: 1 | Status: SHIPPED | OUTPATIENT
Start: 2023-10-24

## 2023-10-24 RX ORDER — SOTALOL HYDROCHLORIDE 80 MG/1
80 TABLET ORAL EVERY 12 HOURS SCHEDULED
Qty: 180 TABLET | Refills: 0 | Status: SHIPPED | OUTPATIENT
Start: 2023-10-24

## 2023-10-24 RX ORDER — TRAZODONE HYDROCHLORIDE 50 MG/1
50 TABLET ORAL NIGHTLY
Qty: 90 TABLET | Refills: 1 | Status: SHIPPED | OUTPATIENT
Start: 2023-10-24

## 2023-10-24 NOTE — TELEPHONE ENCOUNTER
Rx Refill Note  Requested Prescriptions     Pending Prescriptions Disp Refills    pregabalin (LYRICA) 50 MG capsule 270 capsule 0     Sig: Take 1 capsule by mouth 3 (Three) Times a Day.     Signed Prescriptions Disp Refills    atorvastatin (LIPITOR) 80 MG tablet 90 tablet 1     Sig: Take 1 tablet by mouth Daily.     Authorizing Provider: DEMOND SOLIS     Ordering User: NASH CORRAL    glucose blood (Accu-Chek Magdalena Plus) test strip 200 each 1     Si each by Other route 2 (Two) Times a Day. Use as instructed     Authorizing Provider: DEMOND SOLIS     Ordering User: NASH CORRAL    furosemide (LASIX) 20 MG tablet 180 tablet 1     Sig: Take 1 tablet by mouth 2 (Two) Times a Day.     Authorizing Provider: DEMOND SOLIS     Ordering User: NASH CORRAL    enalapril (VASOTEC) 20 MG tablet 180 tablet 0     Sig: Take 1 tablet by mouth Every 12 (Twelve) Hours.     Authorizing Provider: DEMOND SOLIS     Ordering User: NASH CORRAL    glucose monitor monitoring kit 1 each 0     Sig: Use 1 each As Needed (blood glucose monitoring).     Authorizing Provider: DEMOND SOLIS     Ordering User: NASH CORRAL    ipratropium-albuterol (DUO-NEB) 0.5-2.5 mg/3 ml nebulizer 360 mL 1     Sig: Inhale 1 vial (3 mL) by nebulization Every 6 (Six) Hours As Needed for Wheezing or Shortness of Air.     Authorizing Provider: DEMOND SOLIS     Ordering User: NASH CORRAL    Lancets misc 200 each 1     Sig: Use 1 Units 2 (Two) Times a Day.     Authorizing Provider: DEMOND SOLIS     Ordering User: NASH CORRAL    montelukast (SINGULAIR) 10 MG tablet 90 tablet 1     Sig: Take 1 tablet by mouth Daily.     Authorizing Provider: DEMOND SOLIS     Ordering User: NASH CORRAL    linaclotide (Linzess) 145 MCG capsule capsule 90 capsule 1     Sig: Take 1 capsule by mouth Every Morning Before Breakfast.     Authorizing Provider: DEMOND SOLIS     Ordering User: NASH CORRAL     ondansetron (Zofran) 4 MG tablet 270 tablet 0     Sig: Take 1 tablet by mouth Every 8 (Eight) Hours As Needed for Nausea or Vomiting.     Authorizing Provider: DEMOND SOLIS     Ordering User: LIVIA ROQUE    oxybutynin XL (DITROPAN XL) 15 MG 24 hr tablet 90 tablet 1     Sig: Take 1 tablet by mouth Daily.     Authorizing Provider: DEMOND SOLIS     Ordering User: LIVIA ROQUE    pantoprazole (PROTONIX) 20 MG EC tablet 90 tablet 1     Sig: Take 1 tablet by mouth Daily.     Authorizing Provider: DEMOND SOLIS     Ordering User: LIVIA ROQUE    sotalol (BETAPACE) 80 MG tablet 180 tablet 0     Sig: Take 1 tablet by mouth Every 12 (Twelve) Hours.     Authorizing Provider: DEMOND SOLIS     Ordering User: LIVIA ROQUE    tiZANidine (ZANAFLEX) 4 MG tablet 90 tablet 0     Sig: Take 1 tablet by mouth At Night As Needed for Muscle Spasms.     Authorizing Provider: DEMOND SOLIS     Ordering User: LIVIA ROQUE    rOPINIRole (REQUIP) 0.5 MG tablet 90 tablet 1     Sig: Take 1 tablet by mouth Every Night.     Authorizing Provider: DEMOND SOLIS     Ordering User: LIVIA ROQUE    traZODone (DESYREL) 50 MG tablet 90 tablet 1     Sig: Take 1 tablet by mouth Every Night.     Authorizing Provider: DEMOND SOILS     Ordering User: LIVIA ROQUE      Last office visit with prescribing clinician: 10/12/2023   Last telemedicine visit with prescribing clinician: Visit date not found   Next office visit with prescribing clinician: 11/20/2023    Ok to fill lyrica through mail order?        Livia Roque MA  10/24/23, 16:23 EDT

## 2023-10-26 RX ORDER — PREGABALIN 50 MG/1
50 CAPSULE ORAL 3 TIMES DAILY
Qty: 270 CAPSULE | Refills: 0 | Status: SHIPPED | OUTPATIENT
Start: 2023-10-26

## 2023-11-06 ENCOUNTER — TELEPHONE (OUTPATIENT)
Dept: FAMILY MEDICINE CLINIC | Facility: CLINIC | Age: 73
End: 2023-11-06

## 2023-11-06 NOTE — TELEPHONE ENCOUNTER
Pt called and said that due to her insurance, the Pregabablin was sent to a home delivery pharmacy. She stated that she has been waiting on that medication for 2 weeks and every time that she calls to ask them about it, they won't give her any update on when it will be in other than soon. She said that she ran out of the medication on 11/04/2023 and was needing something called in today if all possible to [facility].

## 2023-11-06 NOTE — TELEPHONE ENCOUNTER
PATIENT IS CALLING WITH [facility] AND THEY ARE ASKING TO HAVE THE PREGABALIN BE SEN TO [facility]. SHE IS OUR OF THIS MEDICATION AND IS IN DESPERATE NEED OF IT.

## 2023-11-07 NOTE — TELEPHONE ENCOUNTER
PATIENT CALLED IN REALLY NEEDS HER PREGABALIN CALLED INTO [facility] TODAY SHE HAS BEEN OUT AND REALLY NEEDS IT.ASAP

## 2023-11-09 ENCOUNTER — HOSPITAL ENCOUNTER (OUTPATIENT)
Dept: NUCLEAR MEDICINE | Facility: HOSPITAL | Age: 73
Discharge: HOME | End: 2023-11-09
Payer: MEDICARE

## 2023-11-09 DIAGNOSIS — R06.09 DOE (DYSPNEA ON EXERTION): ICD-10-CM

## 2023-11-09 PROCEDURE — 93017 CV STRESS TEST TRACING ONLY: CPT

## 2023-11-09 PROCEDURE — 0 TECHNETIUM SESTAMIBI: Performed by: NURSE PRACTITIONER

## 2023-11-09 PROCEDURE — 78452 HT MUSCLE IMAGE SPECT MULT: CPT

## 2023-11-09 PROCEDURE — 25010000002 REGADENOSON 0.4 MG/5ML SOLUTION: Performed by: NURSE PRACTITIONER

## 2023-11-09 PROCEDURE — A9500 TC99M SESTAMIBI: HCPCS | Performed by: NURSE PRACTITIONER

## 2023-11-09 RX ORDER — REGADENOSON 0.08 MG/ML
0.4 INJECTION, SOLUTION INTRAVENOUS
Status: COMPLETED | OUTPATIENT
Start: 2023-11-09 | End: 2023-11-09

## 2023-11-09 RX ADMIN — TECHNETIUM TC 99M SESTAMIBI 1 DOSE: 1 INJECTION INTRAVENOUS at 08:00

## 2023-11-09 RX ADMIN — REGADENOSON 0.4 MG: 0.08 INJECTION, SOLUTION INTRAVENOUS at 08:00

## 2023-11-10 ENCOUNTER — HOSPITAL ENCOUNTER (OUTPATIENT)
Dept: NUCLEAR MEDICINE | Facility: HOSPITAL | Age: 73
Discharge: HOME | End: 2023-11-10
Payer: MEDICARE

## 2023-11-10 LAB
BH CV REST NUCLEAR ISOTOPE DOSE: 29 MCI
BH CV STRESS COMMENTS STAGE 1: NORMAL
BH CV STRESS DOSE REGADENOSON STAGE 1: 0.4
BH CV STRESS DURATION MIN STAGE 1: 0
BH CV STRESS DURATION SEC STAGE 1: 10
BH CV STRESS NUCLEAR ISOTOPE DOSE: 34.5 MCI
BH CV STRESS PROTOCOL 1: NORMAL
BH CV STRESS RECOVERY BP: NORMAL MMHG
BH CV STRESS RECOVERY HR: 84 BPM
BH CV STRESS STAGE 1: 1
LV EF NUC BP: 73 %
MAXIMAL PREDICTED HEART RATE: 146 BPM
PERCENT MAX PREDICTED HR: 68.49 %
STRESS BASELINE BP: NORMAL MMHG
STRESS BASELINE HR: 67 BPM
STRESS PERCENT HR: 81 %
STRESS POST PEAK BP: NORMAL MMHG
STRESS POST PEAK HR: 100 BPM
STRESS TARGET HR: 124 BPM

## 2023-11-10 PROCEDURE — 0 TECHNETIUM SESTAMIBI: Performed by: NURSE PRACTITIONER

## 2023-11-10 PROCEDURE — A9500 TC99M SESTAMIBI: HCPCS | Performed by: NURSE PRACTITIONER

## 2023-11-10 RX ADMIN — TECHNETIUM TC 99M SESTAMIBI 1 DOSE: 1 INJECTION INTRAVENOUS at 07:15

## 2023-11-13 ENCOUNTER — TELEPHONE (OUTPATIENT)
Dept: CARDIOLOGY | Facility: CLINIC | Age: 73
End: 2023-11-13
Payer: MEDICARE

## 2023-11-13 NOTE — TELEPHONE ENCOUNTER
----- Message from Elsy Cool MA sent at 11/13/2023 10:37 AM EST -----  Regarding: FW: Stress test result  Contact: [phone]  Is patient okay to proceed?  ----- Message -----  From: Shannon Servin  Sent: 11/13/2023  10:30 AM EST  To: [facility]  Subject: Stress test result                               Dr. Shirley, since my stress test was normal, will you please contact my Artho  and release my shot for neck pain for the Epidural? My Ortho doc is : [facility], [phone] Please!!!!

## 2023-11-20 ENCOUNTER — OFFICE VISIT (OUTPATIENT)
Dept: FAMILY MEDICINE CLINIC | Facility: CLINIC | Age: 73
End: 2023-11-20
Payer: MEDICARE

## 2023-11-20 ENCOUNTER — OFFICE VISIT (OUTPATIENT)
Dept: BEHAVIORAL HEALTH | Facility: CLINIC | Age: 73
End: 2023-11-20
Payer: MEDICARE

## 2023-11-20 VITALS
HEART RATE: 66 BPM | TEMPERATURE: 98 F | SYSTOLIC BLOOD PRESSURE: 130 MMHG | DIASTOLIC BLOOD PRESSURE: 88 MMHG | WEIGHT: 234 LBS | OXYGEN SATURATION: 95 % | HEIGHT: 67 IN | BODY MASS INDEX: 36.73 KG/M2 | RESPIRATION RATE: 18 BRPM

## 2023-11-20 DIAGNOSIS — J44.9 COPD, SEVERE: ICD-10-CM

## 2023-11-20 DIAGNOSIS — E11.65 TYPE 2 DIABETES MELLITUS WITH HYPERGLYCEMIA, WITHOUT LONG-TERM CURRENT USE OF INSULIN: Chronic | ICD-10-CM

## 2023-11-20 DIAGNOSIS — I10 ESSENTIAL HYPERTENSION: ICD-10-CM

## 2023-11-20 DIAGNOSIS — M12.811 RIGHT ROTATOR CUFF TEAR ARTHROPATHY: ICD-10-CM

## 2023-11-20 DIAGNOSIS — F33.0 MILD EPISODE OF RECURRENT MAJOR DEPRESSIVE DISORDER: Primary | ICD-10-CM

## 2023-11-20 DIAGNOSIS — M75.101 RIGHT ROTATOR CUFF TEAR ARTHROPATHY: ICD-10-CM

## 2023-11-20 DIAGNOSIS — F43.9 TRAUMA AND STRESSOR-RELATED DISORDER: ICD-10-CM

## 2023-11-20 DIAGNOSIS — M75.41 IMPINGEMENT SYNDROME OF RIGHT SHOULDER: Chronic | ICD-10-CM

## 2023-11-20 DIAGNOSIS — I48.0 PAROXYSMAL ATRIAL FIBRILLATION: ICD-10-CM

## 2023-11-20 DIAGNOSIS — Z79.01 CHRONIC ANTICOAGULATION: Chronic | ICD-10-CM

## 2023-11-20 DIAGNOSIS — J44.89 CHRONIC OBSTRUCTIVE BRONCHITIS: ICD-10-CM

## 2023-11-20 DIAGNOSIS — R60.0 BILATERAL LOWER EXTREMITY EDEMA: ICD-10-CM

## 2023-11-20 DIAGNOSIS — E11.44 DIABETIC AMYOTROPHY ASSOCIATED WITH TYPE 2 DIABETES MELLITUS: Primary | ICD-10-CM

## 2023-11-20 DIAGNOSIS — M25.511 ARTHRALGIA OF RIGHT SHOULDER REGION: ICD-10-CM

## 2023-11-20 LAB
EXPIRATION DATE: ABNORMAL
HBA1C MFR BLD: 8.8 % (ref 4.5–5.7)
Lab: ABNORMAL

## 2023-11-20 PROCEDURE — 90834 PSYTX W PT 45 MINUTES: CPT | Performed by: COUNSELOR

## 2023-11-20 RX ORDER — PREGABALIN 100 MG/1
100 CAPSULE ORAL 3 TIMES DAILY
Start: 2023-11-20

## 2023-11-20 RX ORDER — SPIRONOLACTONE 25 MG/1
25 TABLET ORAL DAILY
Qty: 90 TABLET | Refills: 2 | Status: SHIPPED | OUTPATIENT
Start: 2023-11-20

## 2023-11-20 NOTE — PROGRESS NOTES
Established Patient        Chief Complaint:   Chief Complaint   Patient presents with    Follow-up    Dizziness     x1-2 weeks     Med Refill     Trelegy, Linzess    COPD     Needs script for non invasive ventilator because of severe copd- [employer]     Foot Pain     Cramps     Cough        Shannon Servin is a 73 y.o. female    History of Present Illness:   Answers submitted by the patient for this visit:  Other (Submitted on 11/13/2023)  Please describe your symptoms.: Follow up  Have you had these symptoms before?: Yes  How long have you been having these symptoms?: Greater than 2 weeks  Please list any medications you are currently taking for this condition.: See listed  Please describe any probable cause for these symptoms. : Follow up  Primary Reason for Visit (Submitted on 11/13/2023)  What is the primary reason for your visit?: Other    Patient noticed significant benefit with use of pregabalin, however she has seen a wear off effect of the medication, attributable to its low-dose.  She is requesting a potential dose adjustment if appropriate.    Patient states her bowel function has responded quite well to use of Linzess, and her respiratory symptoms have improved considerably with use of Trelegy.  She would like samples of either medication if available.    Patient does need updated auto CPAP, known history of chronic obstructive bronchitis, severe COPD and obstructive sleep apnea.    Patient reports 1 to 2 weeks of paroxysmal positional vertigo.  Chronic in nature, although more noticeable over the last 10 to 14 days.    Subjective     The following portions of the patient's history were reviewed and updated as appropriate: allergies, current medications, past family history, past medical history, past social history, past surgical history and problem list.    Allergies   Allergen Reactions    Metformin Hallucinations     Other reaction(s): Hallucinations     Review of  "Systems:    Constitutional: Negative for fever. Negative for chills, diaphoresis; malaise/fatigue of chronic nature, and without unexpected weight change.   HENT: No dysphagia; no changes to vision/hearing/smell/taste; no epistaxis  Eyes: Negative for redness and visual disturbance.   Respiratory: Chronic cough, history of hemoptysis.  Cardiovascular: Negative for chest pain and palpitations.   Gastrointestinal: Denies BRB/BTS.  No abdominal discomfort.  Endocrine: Negative for cold intolerance and heat intolerance.   Genitourinary: Negative for difficulty urinating, dysuria and frequency.   Musculoskeletal: Chronic arthralgias, back pain and myalgias.  Significant limitation to range of motion of right shoulder.  Painful paresthesia to right thumb as per above.  Skin: Chronic thickened/yellow discoloration to the numerous toes of bilateral feet, worse to the bilateral great toes.  Neurological: Negative for syncope, weakness.  As per above otherwise.  Hematological: Negative for adenopathy. Does not bruise/bleed easily.   Psychiatric/Behavioral: Negative for confusion. The patient is not nervous/anxious.    Objective     Physical Exam   Vital Signs: /88   Pulse 66   Temp 98 °F (36.7 °C)   Resp 18   Ht 170.2 cm (67.01\")   Wt 106 kg (234 lb)   SpO2 95%   BMI 36.64 kg/m²     General Appearance: alert, oriented x 3, no acute distress.  Pleasant and interactive during questioning/examination.  Well-nourished and developed female.  Skin: warm and dry.  Stasis dermatitis noted additionally.    HEENT: Atraumatic.  pupils round and reactive to light and accommodation, oral mucosa pink and moist.  Nares patent without epistaxis.  External auditory canals are patent tympanic membranes intact.  Neck: supple, no JVD, trachea midline.  No thyromegaly  Lungs: Rhonchus, referred, upper airway congestion, cleared with cough, unlabored breathing effort.  Audible air exchange noted all lung fields.  Expiratory wheezes " throughout.  Prolonged expiratory phase bilaterally.  Heart: RRR, normal S1 and S2, no S3, no rub.  Abdomen: soft, non-tender, no palpable bladder, present bowel sounds to auscultation ×4.  No guarding or rigidity.  No CVA tenderness.  Extremities: no clubbing, cyanosis.  Symmetric muscle strength and development.  Nonpitting edematous changes noted to the mid tibias bilaterally, brawny changes associated with venous stasis, chronic in nature.  Ambulates independently.  Impaired range of motion to right shoulder.  Apley scratch test limited due to discomfort of the right shoulder, normal deltoid tone/development.  Negative pushoff, positive empty can sign, as well as shrug sign.  Zuniga/Neer painful, cross body abduction painful as well.  Symmetric  strength bilaterally.  De Quervain's test positive to the right hand.  Neuro: normal speech and mental status.  Cranial nerves II through XII intact.  No anosmia. DTR 2+; proprioception intact.  No focal motor/sensory deficits.        Assessment and Plan      Assessment/Plan:   Diagnoses and all orders for this visit:    1. 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    2. Impingement syndrome of right shoulder  -     pregabalin (LYRICA) 100 MG capsule; Take 1 capsule by mouth 3 (Three) Times a Day.    3. Right rotator cuff tear arthropathy  -     pregabalin (LYRICA) 100 MG capsule; Take 1 capsule by mouth 3 (Three) Times a Day.    4. Arthralgia of right shoulder region  -     pregabalin (LYRICA) 100 MG capsule; Take 1 capsule by mouth 3 (Three) Times a Day.    5. Paroxysmal atrial fibrillation  -     Basic Metabolic Panel    6. Essential hypertension  -     Basic Metabolic Panel    7. Chronic anticoagulation    8. Type 2 diabetes mellitus with hyperglycemia, without long-term current use of insulin  -     POC Glycosylated Hemoglobin (Hb A1C)    9.  Bilateral lower extremity edema  -     spironolactone (ALDACTONE) 25 MG tablet; Take 1 tablet by mouth Daily.  Dispense: 90 tablet; Refill: 2  -     Basic Metabolic Panel    10. Chronic obstructive bronchitis  -     PAP Therapy    11. COPD, severe  -     PAP Therapy      Increased dosing of pregabalin to 100 mg 3 times daily.  Plan to follow clinically.  She will utilize current supply until exhausted, then refill can be sent for the new dosing.    I have placed orders for auto CPAP, NIPPV, and treatment of her severe COPD and chronic obstructive bronchitis, as well as obstructive sleep apnea.    Refill provided for spironolactone.  Vital signs demonstrate hemodynamic stability.    Hemoglobin A1c has worsened slightly, I have stressed the importance of healthy dietary choices and the need to maintain appropriate hydration status.  Short interval repeat will be in 6 weeks.    Surveillance BMP today.    Discussion Summary:    Discussed plan of care in detail with pt today; pt verb understanding and agrees.    I spent 30 minutes caring for Shannon on this date of service. This time includes time spent by me in the following activities:preparing for the visit, performing a medically appropriate examination and/or evaluation , counseling and educating the patient/family/caregiver, ordering medications, tests, or procedures, documenting information in the medical record, and care coordination    I have reviewed and updated all copied forward information, as appropriate.  I attest to the accuracy and relevance of any unchanged information.    Follow up:  Return in about 6 weeks (around 1/1/2024) for Recheck, Med Change/New Meds.     There are no Patient Instructions on file for this visit.    Humberto Wells,   11/20/23  15:44 EST

## 2023-11-21 LAB
BUN SERPL-MCNC: 24 MG/DL (ref 8–27)
BUN/CREAT SERPL: 28 (ref 12–28)
CALCIUM SERPL-MCNC: 9.9 MG/DL (ref 8.7–10.3)
CHLORIDE SERPL-SCNC: 101 MMOL/L (ref 96–106)
CO2 SERPL-SCNC: 21 MMOL/L (ref 20–29)
CREAT SERPL-MCNC: 0.86 MG/DL (ref 0.57–1)
EGFRCR SERPLBLD CKD-EPI 2021: 71 ML/MIN/1.73
GLUCOSE SERPL-MCNC: 107 MG/DL (ref 70–99)
POTASSIUM SERPL-SCNC: 4.6 MMOL/L (ref 3.5–5.2)
SODIUM SERPL-SCNC: 138 MMOL/L (ref 134–144)

## 2023-12-04 ENCOUNTER — TELEPHONE (OUTPATIENT)
Dept: CARDIOLOGY | Facility: CLINIC | Age: 73
End: 2023-12-04
Payer: MEDICARE

## 2023-12-04 ENCOUNTER — PATIENT MESSAGE (OUTPATIENT)
Dept: FAMILY MEDICINE CLINIC | Facility: CLINIC | Age: 73
End: 2023-12-04
Payer: MEDICARE

## 2023-12-04 DIAGNOSIS — M25.511 ARTHRALGIA OF RIGHT SHOULDER REGION: ICD-10-CM

## 2023-12-04 DIAGNOSIS — M12.811 RIGHT ROTATOR CUFF TEAR ARTHROPATHY: ICD-10-CM

## 2023-12-04 DIAGNOSIS — E11.44 DIABETIC AMYOTROPHY ASSOCIATED WITH TYPE 2 DIABETES MELLITUS: ICD-10-CM

## 2023-12-04 DIAGNOSIS — M75.101 RIGHT ROTATOR CUFF TEAR ARTHROPATHY: ICD-10-CM

## 2023-12-04 DIAGNOSIS — M75.41 IMPINGEMENT SYNDROME OF RIGHT SHOULDER: Chronic | ICD-10-CM

## 2023-12-04 RX ORDER — PREGABALIN 100 MG/1
100 CAPSULE ORAL 3 TIMES DAILY
Qty: 90 CAPSULE | Refills: 2 | Status: SHIPPED | OUTPATIENT
Start: 2023-12-04

## 2023-12-04 NOTE — TELEPHONE ENCOUNTER
From: Shannon Servin  To: Humberto Wells  Sent: 12/4/2023 9:52 AM EST  Subject: pharmacy    Can you call in a script for my Pregabalin to [facility]? Can you call it in for a 90-day supply?  I am going to have to switch all my scripts back to [facility]. I know that is a pain for you guys but I don't have a choice because [facility] can't seem to fill my scripts on time or at all. They are the worst.   Thanks for your help!

## 2023-12-04 NOTE — TELEPHONE ENCOUNTER
Caller: Shannon Servin    Relationship: Self    Best call back number: [phone]     What is the best time to reach you: ANYTIME     Who are you requesting to speak with (clinical staff, provider,  specific staff member): CLINICAL    What was the call regarding: PT HAS BEEN A PT IN OUR OFFICE SINCE 2020, PT WAS PUT ON BLOOD THINNER IN 2020. PT HAD A STRESS TEST SO SHE COULD GET APPROVED TO DROP THE BLOOD THINNER SO SHE COULD GET A epidural in HER neck PT STATES PROVIDER SENT A LETTER TO THE ORTHO STATING HE DIDNT prescribed HER THIS BLOOD THINNER. PT STATES SHE IS IN A LOT OF PAIN AND NEEDS CALLED BACK ASAP TO DISCUSS.     Is it okay if the provider responds through MyChart: CALL

## 2023-12-04 NOTE — TELEPHONE ENCOUNTER
I don't see a blood thinner on her med list.  If she is taking Eliquis or Xarelto, she can hold 3-5 day prior to procedure and then resume post procedure day 1.    Will you please update her list?

## 2023-12-05 NOTE — TELEPHONE ENCOUNTER
Information has been sent to patient and her provider. Faxed to . Patient takes Xarelto 20 mg one tablet daily. Medication list has been updated.

## 2023-12-07 ENCOUNTER — OFFICE VISIT (OUTPATIENT)
Dept: FAMILY MEDICINE CLINIC | Facility: CLINIC | Age: 73
End: 2023-12-07
Payer: MEDICARE

## 2023-12-07 VITALS
RESPIRATION RATE: 16 BRPM | HEART RATE: 84 BPM | OXYGEN SATURATION: 97 % | DIASTOLIC BLOOD PRESSURE: 82 MMHG | SYSTOLIC BLOOD PRESSURE: 126 MMHG | BODY MASS INDEX: 36.73 KG/M2 | TEMPERATURE: 97.9 F | WEIGHT: 234 LBS | HEIGHT: 67 IN

## 2023-12-07 DIAGNOSIS — J06.9 ACUTE URI: ICD-10-CM

## 2023-12-07 DIAGNOSIS — J30.9 ALLERGIC RHINITIS, UNSPECIFIED SEASONALITY, UNSPECIFIED TRIGGER: ICD-10-CM

## 2023-12-07 DIAGNOSIS — J02.9 ACUTE PHARYNGITIS, UNSPECIFIED ETIOLOGY: Primary | ICD-10-CM

## 2023-12-07 LAB
EXPIRATION DATE: NORMAL
EXPIRATION DATE: NORMAL
FLUAV AG UPPER RESP QL IA.RAPID: NOT DETECTED
FLUBV AG UPPER RESP QL IA.RAPID: NOT DETECTED
INTERNAL CONTROL: NORMAL
INTERNAL CONTROL: NORMAL
Lab: NORMAL
Lab: NORMAL
S PYO AG THROAT QL: NEGATIVE
SARS-COV-2 AG UPPER RESP QL IA.RAPID: NOT DETECTED

## 2023-12-07 RX ORDER — METHYLPREDNISOLONE ACETATE 40 MG/ML
40 INJECTION, SUSPENSION INTRA-ARTICULAR; INTRALESIONAL; INTRAMUSCULAR; SOFT TISSUE ONCE
Status: COMPLETED | OUTPATIENT
Start: 2023-12-07 | End: 2023-12-07

## 2023-12-07 RX ORDER — AMOXICILLIN 500 MG/1
1000 CAPSULE ORAL 2 TIMES DAILY
Qty: 40 CAPSULE | Refills: 0 | Status: SHIPPED | OUTPATIENT
Start: 2023-12-07 | End: 2023-12-07 | Stop reason: SDUPTHER

## 2023-12-07 RX ORDER — DIPHENHYDRAMINE, LIDOCAINE, NYSTATIN
5 KIT ORAL 4 TIMES DAILY
Qty: 200 ML | Refills: 1 | Status: SHIPPED | OUTPATIENT
Start: 2023-12-07 | End: 2023-12-14

## 2023-12-07 RX ORDER — AMOXICILLIN 500 MG/1
500 CAPSULE ORAL 2 TIMES DAILY
Qty: 20 CAPSULE | Refills: 0 | Status: SHIPPED | OUTPATIENT
Start: 2023-12-07 | End: 2023-12-17

## 2023-12-07 RX ADMIN — METHYLPREDNISOLONE ACETATE 40 MG: 40 INJECTION, SUSPENSION INTRA-ARTICULAR; INTRALESIONAL; INTRAMUSCULAR; SOFT TISSUE at 15:43

## 2023-12-07 NOTE — PROGRESS NOTES
"    Office Note     Name: Shannon Servin  : 1950   MRN: [id number]     Chief Complaint:  Sore Throat    Subjective     History of Present Illness:  Shannon Servin is a 73 y.o. female who presents today for sore throat.    Sore Throat   This is a new problem. The current episode started yesterday. The problem has been gradually worsening. Neither side of throat is experiencing more pain than the other. There has been no fever. The pain is severe. Associated symptoms include congestion, coughing, a hoarse voice, a plugged ear sensation, neck pain and trouble swallowing. Pertinent negatives include no abdominal pain, diarrhea, drooling, ear discharge, ear pain, headaches, shortness of breath, stridor, swollen glands or vomiting. She has had no exposure to strep or mono. She has tried cool liquids for the symptoms. The treatment provided mild relief.        Review of Systems   HENT:  Positive for congestion, hoarse voice, sore throat and trouble swallowing. Negative for drooling, ear discharge, ear pain and swollen glands.    Respiratory:  Positive for cough. Negative for shortness of breath and stridor.    Gastrointestinal:  Negative for abdominal pain, diarrhea and vomiting.   Musculoskeletal:  Positive for neck pain.              I have reviewed the following portions of the patient's history and these were updated and discussed with the patient as appropriate: past family history, past medical history, past social history, past surgical history, and problem list.     Objective     Vital Signs  /82   Pulse 84   Temp 97.9 °F (36.6 °C) (Oral)   Resp 16   Ht 170.2 cm (67.01\")   Wt 106 kg (234 lb)   SpO2 97%   BMI 36.64 kg/m²   Estimated body mass index is 36.64 kg/m² as calculated from the following:    Height as of this encounter: 170.2 cm (67.01\").    Weight as of this encounter: 106 kg (234 lb).    Physical Exam  Vitals reviewed.   Constitutional:       General: She is not in acute " distress.     Appearance: Normal appearance. She is not ill-appearing.   HENT:      Head: Normocephalic.      Right Ear: Tympanic membrane, ear canal and external ear normal.      Left Ear: Tympanic membrane, ear canal and external ear normal.      Mouth/Throat:      Mouth: Mucous membranes are moist.      Pharynx: Posterior oropharyngeal erythema present. No oropharyngeal exudate.      Tonsils: No tonsillar exudate or tonsillar abscesses. 2+ on the right. 2+ on the left.   Eyes:      General: No scleral icterus.        Right eye: No discharge.         Left eye: No discharge.      Extraocular Movements: Extraocular movements intact.   Neck:      Vascular: No carotid bruit.   Cardiovascular:      Rate and Rhythm: Normal rate and regular rhythm.   Pulmonary:      Effort: Pulmonary effort is normal. No respiratory distress.      Breath sounds: Normal breath sounds. No stridor. No wheezing, rhonchi or rales.   Chest:      Chest wall: No tenderness.   Musculoskeletal:         General: Normal range of motion.      Cervical back: Normal range of motion and neck supple. No rigidity or tenderness.   Lymphadenopathy:      Cervical: Cervical adenopathy present.   Skin:     General: Skin is warm and dry.   Neurological:      Mental Status: She is alert and oriented to person, place, and time.   Psychiatric:         Mood and Affect: Mood normal.         Behavior: Behavior normal.                      Assessment and Plan     Diagnoses and all orders for this visit:    1. Acute pharyngitis, unspecified etiology (Primary)  -     nystatin susp + lidocaine viscous (MAGIC MOUTHWASH) oral suspension; Swish and swallow 5 mL 4 (Four) Times a Day for 7 days.  Dispense: 200 mL; Refill: 1  -     methylPREDNISolone acetate (DEPO-medrol) injection 40 mg  -     amoxicillin (AMOXIL) 500 MG capsule; Take 1 capsule by mouth 2 (Two) Times a Day for 10 days.  Dispense: 20 capsule; Refill: 0  -     POCT SARS-CoV-2 Antigen CECE + Flu  -     POCT rapid  strep A    2. Acute URI    3. Allergic rhinitis, unspecified seasonality, unspecified trigger    Other orders  -     Discontinue: amoxicillin (AMOXIL) 500 MG capsule; Take 2 capsules by mouth 2 (Two) Times a Day for 10 days.  Dispense: 40 capsule; Refill: 0    Rapid strep and POC COVID/influenza test negative  Physical exam reveals erythematous oropharynx and some tonsillitis  Based on symptoms we will treat for bacterial pharyngitis with amoxicillin  Depo-Medrol shot given today with plan to help decrease inflammation of the oropharynx  Magic mouthwash prescription given to help with symptoms  Patient may also utilize salt water gargles  Increase fluid intake  Hot or cold fluids as per patient's comfort             Discussion Summary:     Discussed plan of care in detail with patient today. Patient was encouraged to keep me informed of any acute changes, lack of improvement, or any new concerning symptoms.  Patient is also aware of reasons to seek emergent care. Patient verbalized understanding and agrees with plan of care.    This visit was billed based on time.  I spent 30 minutes caring for Shannon Servin on this date of service. This time includes time spent by me in the following activities:preparing for the visit, reviewing tests, obtaining and/or reviewing a separately obtained history, performing a medically appropriate examination and/or evaluation , counseling and educating the patient/family/caregiver, ordering medications, tests, or procedures, referring and communicating with other health care professionals , documenting information in the medical record, independently interpreting results and communicating that information with the patient/family/caregiver, and care coordination.    Follow Up  Return if symptoms worsen or fail to improve.    At [facility], we believe that sharing information builds trust and better relationships. You are receiving this note because you recently visited [facility]. It is possible you will see health information before a provider has talked with you about it. This kind of information can be easy to misunderstand. To help you fully understand what it means for your health, we urge you to discuss this note with your provider.    Niko Cannon MD, MPH  [facility] ED[facility] Melendrez

## 2023-12-18 RX ORDER — BUDESONIDE, GLYCOPYRROLATE, AND FORMOTEROL FUMARATE 160; 9; 4.8 UG/1; UG/1; UG/1
2 AEROSOL, METERED RESPIRATORY (INHALATION) 2 TIMES DAILY
Qty: 10.7 G | Refills: 4 | OUTPATIENT
Start: 2023-12-18
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
## 2023-12-21 ENCOUNTER — TELEPHONE (OUTPATIENT)
Dept: FAMILY MEDICINE CLINIC | Facility: CLINIC | Age: 73
End: 2023-12-21

## 2023-12-21 NOTE — TELEPHONE ENCOUNTER
Dawit Murray with [employer] called regarding a fax for Shannon's Respiratory Machine that needs to be reauthorized. It was faxed on Monday. There are two forms. One needs a signature and the other needs verbiage in the last note that has quotations. Please locate fax and put in Dr. Wells box.

## 2023-12-22 ENCOUNTER — TELEPHONE (OUTPATIENT)
Dept: UROLOGY | Facility: CLINIC | Age: 73
End: 2023-12-22

## 2023-12-22 NOTE — TELEPHONE ENCOUNTER
Provider: ISHAN WILSON    Caller: Shannon Servin    Relationship to Patient: Self     Phone Number: [phone]    Reason for Call: IMPLANT NOT WORKING PROPERLY    When was the patient last seen: 03/08/23    Notes:  MS. DOBSON CALLED TO REPORT THAT HER IMPLANT IS NOT WORKING PROPERLY. PLEASE CALL MS. SERVIN TO DISCUSS.     ---UNABLE TO WARM TRANSFER

## 2023-12-27 NOTE — TELEPHONE ENCOUNTER
FABIAN ALMONTE CALLED AGAIN AND IS NEEDING THE LAST NOTE FROM DR OSLIS SO THAT PATIENT AND CONTINUE TO GET THIS RESP MACHINE.

## 2024-01-02 ENCOUNTER — PRIOR AUTHORIZATION (OUTPATIENT)
Dept: FAMILY MEDICINE CLINIC | Facility: CLINIC | Age: 74
End: 2024-01-02
Payer: MEDICARE

## 2024-01-02 ENCOUNTER — OFFICE VISIT (OUTPATIENT)
Dept: FAMILY MEDICINE CLINIC | Facility: CLINIC | Age: 74
End: 2024-01-02
Payer: MEDICARE

## 2024-01-02 VITALS
OXYGEN SATURATION: 95 % | RESPIRATION RATE: 16 BRPM | DIASTOLIC BLOOD PRESSURE: 84 MMHG | SYSTOLIC BLOOD PRESSURE: 138 MMHG | HEART RATE: 84 BPM | BODY MASS INDEX: 36.64 KG/M2 | TEMPERATURE: 98 F | HEIGHT: 67 IN

## 2024-01-02 DIAGNOSIS — E11.44 DIABETIC AMYOTROPHY ASSOCIATED WITH TYPE 2 DIABETES MELLITUS: ICD-10-CM

## 2024-01-02 DIAGNOSIS — N65.0 BREAST RECONSTRUCTION DEFORMITY: ICD-10-CM

## 2024-01-02 DIAGNOSIS — R60.0 BILATERAL LOWER EXTREMITY EDEMA: ICD-10-CM

## 2024-01-02 DIAGNOSIS — J44.89 CHRONIC OBSTRUCTIVE BRONCHITIS: ICD-10-CM

## 2024-01-02 DIAGNOSIS — R10.13 ABDOMINAL PAIN, EPIGASTRIC: ICD-10-CM

## 2024-01-02 DIAGNOSIS — I48.0 PAROXYSMAL ATRIAL FIBRILLATION: Primary | ICD-10-CM

## 2024-01-02 DIAGNOSIS — K21.9 GERD WITHOUT ESOPHAGITIS: ICD-10-CM

## 2024-01-02 DIAGNOSIS — I10 ESSENTIAL HYPERTENSION: ICD-10-CM

## 2024-01-02 DIAGNOSIS — R10.84 ABDOMINAL PAIN, GENERALIZED: ICD-10-CM

## 2024-01-02 DIAGNOSIS — G25.81 RESTLESS LEGS SYNDROME: ICD-10-CM

## 2024-01-02 DIAGNOSIS — N39.46 MIXED URGE AND STRESS INCONTINENCE: ICD-10-CM

## 2024-01-02 DIAGNOSIS — Z98.82 PRESENCE OF SILICONE BREAST IMPLANT: ICD-10-CM

## 2024-01-02 DIAGNOSIS — Z79.01 CHRONIC ANTICOAGULATION: Chronic | ICD-10-CM

## 2024-01-02 DIAGNOSIS — Z12.39 ENCOUNTER FOR BREAST CANCER SCREENING USING NON-MAMMOGRAM MODALITY: ICD-10-CM

## 2024-01-02 DIAGNOSIS — E78.5 DYSLIPIDEMIA: Chronic | ICD-10-CM

## 2024-01-02 DIAGNOSIS — E11.65 TYPE 2 DIABETES MELLITUS WITH HYPERGLYCEMIA, WITHOUT LONG-TERM CURRENT USE OF INSULIN: Chronic | ICD-10-CM

## 2024-01-02 RX ORDER — OXYBUTYNIN CHLORIDE 15 MG/1
15 TABLET, EXTENDED RELEASE ORAL DAILY
Qty: 90 TABLET | Refills: 3 | Status: SHIPPED | OUTPATIENT
Start: 2024-01-02

## 2024-01-02 RX ORDER — SPIRONOLACTONE 25 MG/1
25 TABLET ORAL DAILY
Qty: 90 TABLET | Refills: 3 | Status: SHIPPED | OUTPATIENT
Start: 2024-01-02

## 2024-01-02 RX ORDER — ROPINIROLE 0.5 MG/1
0.5 TABLET, FILM COATED ORAL NIGHTLY
Qty: 90 TABLET | Refills: 3 | Status: SHIPPED | OUTPATIENT
Start: 2024-01-02

## 2024-01-02 RX ORDER — SOTALOL HYDROCHLORIDE 80 MG/1
80 TABLET ORAL EVERY 12 HOURS SCHEDULED
Qty: 180 TABLET | Refills: 3 | Status: SHIPPED | OUTPATIENT
Start: 2024-01-02

## 2024-01-02 RX ORDER — ENALAPRIL MALEATE 20 MG/1
20 TABLET ORAL EVERY 12 HOURS SCHEDULED
Qty: 180 TABLET | Refills: 3 | Status: SHIPPED | OUTPATIENT
Start: 2024-01-02

## 2024-01-02 RX ORDER — ATORVASTATIN CALCIUM 80 MG/1
80 TABLET, FILM COATED ORAL DAILY
Qty: 90 TABLET | Refills: 3 | Status: SHIPPED | OUTPATIENT
Start: 2024-01-02

## 2024-01-02 RX ORDER — PANTOPRAZOLE SODIUM 20 MG/1
20 TABLET, DELAYED RELEASE ORAL DAILY
Qty: 90 TABLET | Refills: 3 | Status: SHIPPED | OUTPATIENT
Start: 2024-01-02

## 2024-01-02 RX ORDER — TRAZODONE HYDROCHLORIDE 50 MG/1
50 TABLET ORAL NIGHTLY
Qty: 90 TABLET | Refills: 1 | Status: SHIPPED | OUTPATIENT
Start: 2024-01-02

## 2024-01-02 RX ORDER — TIZANIDINE 4 MG/1
4 TABLET ORAL NIGHTLY PRN
Qty: 90 TABLET | Refills: 3 | Status: SHIPPED | OUTPATIENT
Start: 2024-01-02

## 2024-01-02 NOTE — TELEPHONE ENCOUNTER
Key: [id number]        Available without authorization. The member is able to fill the requested drug at the pharmacy. If coverage is still needed or requesting prior to the expiration of a current authorization, a request can be made by sending a fax or calling the number on the back of the member's ID card.    I let patient know while they were in office.      PA sent to insurance  Waiting on reply.    Angela Atwood CMA

## 2024-01-02 NOTE — PROGRESS NOTES
Established Patient        Chief Complaint:   Chief Complaint   Patient presents with    Follow-up    Pain     Both feet    Diabetes        Shannon Servin is a 73 y.o. female    History of Present Illness:   Answers submitted by the patient for this visit:  Primary Reason for Visit (Submitted on 12/28/2023)  What is the primary reason for your visit?: Diabetes  Diabetes Questionnaire (Submitted on 12/28/2023)  Chief Complaint: Diabetes problem  Diabetes type: type 2  MedicAlert ID: No  Disease duration: 15 Years  Symptom course: improving  Home blood tests: 1-2 x per day  Monitoring compliance: adequate  Treatment compliance: most of the time  High score: 140-180  Overall: 140-180  blurred vision: Yes  foot paresthesias: Yes  foot ulcerations: Yes  polydipsia: Yes  polyuria: Yes  weight loss: No  blackouts: No  hospitalization: No  nocturnal hypoglycemia: No  required assistance: No  required glucagon: No  confusion: No  headaches: No  speech difficulty: No  sweats: No  tremors: No  Current diet: generally healthy  Meal planning: carbohydrate counting  Exercise: never  Eye exam current: Yes  Sees podiatrist: Yes      Subjective     The following portions of the patient's history were reviewed and updated as appropriate: allergies, current medications, past family history, past medical history, past social history, past surgical history and problem list.    Allergies   Allergen Reactions    Metformin Hallucinations     Other reaction(s): Hallucinations     Review of Systems:    Constitutional: Negative for fever. Negative for chills, diaphoresis; malaise/fatigue of chronic nature, and without unexpected weight change.   HENT: No dysphagia; no changes to vision/hearing/smell/taste; no epistaxis  Eyes: Negative for redness and visual disturbance.   Respiratory: Chronic cough, history of hemoptysis.  Cardiovascular: Negative for chest pain and palpitations.   Gastrointestinal: Denies BRB/BTS.   "No abdominal discomfort.  Endocrine: Negative for cold intolerance and heat intolerance.   Genitourinary: Negative for difficulty urinating, dysuria and frequency.   Musculoskeletal: Chronic arthralgias, back pain and myalgias.  Significant limitation to range of motion of right shoulder.  Painful paresthesia to right thumb as per above.  Skin: Chronic thickened/yellow discoloration to the numerous toes of bilateral feet, worse to the bilateral great toes.  Neurological: Negative for syncope, weakness.  As per above otherwise.  Hematological: Negative for adenopathy. Does not bruise/bleed easily.   Psychiatric/Behavioral: Negative for confusion. The patient is not nervous/anxious.    Objective     Physical Exam   Vital Signs: /84   Pulse 84   Temp 98 °F (36.7 °C)   Resp 16   Ht 170.2 cm (67.01\")   SpO2 95%   BMI 36.64 kg/m²     General Appearance: alert, oriented x 3, no acute distress.  Pleasant and interactive during questioning/examination.  Well-nourished and developed female.  Skin: warm and dry.  Stasis dermatitis noted additionally.    HEENT: Atraumatic.  pupils round and reactive to light and accommodation, oral mucosa pink and moist.  Nares patent without epistaxis.  External auditory canals are patent tympanic membranes intact.  Neck: supple, no JVD, trachea midline.  No thyromegaly  Lungs: Rhonchus, referred, upper airway congestion, cleared with cough, unlabored breathing effort.  Audible air exchange noted all lung fields.  Expiratory wheezes throughout.  Prolonged expiratory phase bilaterally.  Heart: RRR, normal S1 and S2, no S3, no rub.  Abdomen: soft, non-tender, no palpable bladder, present bowel sounds to auscultation ×4.  No guarding or rigidity.  No CVA tenderness.  Extremities: no clubbing, cyanosis.  Symmetric muscle strength and development.  Nonpitting edematous changes noted to the mid tibias bilaterally, brawny changes associated with venous stasis, chronic in nature.  " Ambulates independently.  Impaired range of motion to right shoulder.  Apley scratch test limited due to discomfort of the right shoulder, normal deltoid tone/development.  Negative pushoff, positive empty can sign, as well as shrug sign.  Zuniga/Neer painful, cross body abduction painful as well.  Symmetric  strength bilaterally.  De Quervain's test positive to the right hand.  Neuro: normal speech and mental status.  Cranial nerves II through XII intact.  No anosmia. DTR 2+; proprioception intact.  No focal motor/sensory deficits.        Assessment and Plan      Assessment/Plan:   Diagnoses and all orders for this visit:    1. Paroxysmal atrial fibrillation (Primary)    2. Essential hypertension  -     enalapril (VASOTEC) 20 MG tablet; Take 1 tablet by mouth Every 12 (Twelve) Hours.  Dispense: 180 tablet; Refill: 3    3. Dyslipidemia  -     atorvastatin (LIPITOR) 80 MG tablet; Take 1 tablet by mouth Daily.  Dispense: 90 tablet; Refill: 3    4. Chronic anticoagulation  -     rivaroxaban (XARELTO) 20 MG tablet; Take 1 tablet by mouth Daily.  Dispense: 90 tablet; Refill: 3    5. Type 2 diabetes mellitus with hyperglycemia, without long-term current use of insulin  -     enalapril (VASOTEC) 20 MG tablet; Take 1 tablet by mouth Every 12 (Twelve) Hours.  Dispense: 180 tablet; Refill: 3    6. Diabetic amyotrophy associated with type 2 diabetes mellitus  -     rOPINIRole (REQUIP) 0.5 MG tablet; Take 1 tablet by mouth Every Night.  Dispense: 90 tablet; Refill: 3    7. Chronic obstructive bronchitis    8. Bilateral lower extremity edema  -     spironolactone (ALDACTONE) 25 MG tablet; Take 1 tablet by mouth Daily.  Dispense: 90 tablet; Refill: 3    9. Restless legs syndrome  -     tiZANidine (ZANAFLEX) 4 MG tablet; Take 1 tablet by mouth At Night As Needed for Muscle Spasms.  Dispense: 90 tablet; Refill: 3  -     rOPINIRole (REQUIP) 0.5 MG tablet; Take 1 tablet by mouth Every Night.  Dispense: 90 tablet; Refill:  3    10. Abdominal pain, generalized  -     pantoprazole (PROTONIX) 20 MG EC tablet; Take 1 tablet by mouth Daily.  Dispense: 90 tablet; Refill: 3    11. Abdominal pain, epigastric  -     pantoprazole (PROTONIX) 20 MG EC tablet; Take 1 tablet by mouth Daily.  Dispense: 90 tablet; Refill: 3    12. GERD without esophagitis  -     pantoprazole (PROTONIX) 20 MG EC tablet; Take 1 tablet by mouth Daily.  Dispense: 90 tablet; Refill: 3    13. Mixed urge and stress incontinence  -     oxybutynin XL (DITROPAN XL) 15 MG 24 hr tablet; Take 1 tablet by mouth Daily.  Dispense: 90 tablet; Refill: 3    14. Breast reconstruction deformity  -     MRI Breast Right Without Contrast; Future  -     MRI Breast Left Without Contrast; Future    15. Encounter for screening for malignant neoplasm of breast, unspecified screening modality  -     MRI Breast Right Without Contrast; Future  -     MRI Breast Left Without Contrast; Future    Other orders  -     traZODone (DESYREL) 50 MG tablet; Take 1 tablet by mouth Every Night.  Dispense: 90 tablet; Refill: 1  -     sotalol (BETAPACE) 80 MG tablet; Take 1 tablet by mouth Every 12 (Twelve) Hours.  Dispense: 180 tablet; Refill: 3      Patient has significant abnormality of bilateral breast as result of breast implants.  She has tried mammogram as breast cancer screening in the past, unsuccessful and severely painful, creating distortion after completion of examination.  It was recommended to her in the past that she undergo mammography as a form of silicone implant evaluation and breast cancer screening.    Continue PPI therapy and treatment of GERD.    Vital signs demonstrate hemodynamic stability, blood pressure is at goal.  Heart rate well-controlled.    Continue current treatment regimen for restless leg syndrome.    Continue healthy dietary choices, avoid prolonged fasting periods.  Maintain appropriate hydration status.    Continue statin therapy, as well as low-cholesterol dietary  intake.    Discussion Summary:    Discussed plan of care in detail with pt today; pt verb understanding and agrees.    I spent 40 minutes caring for Shannon on this date of service. This time includes time spent by me in the following activities:preparing for the visit, performing a medically appropriate examination and/or evaluation , counseling and educating the patient/family/caregiver, ordering medications, tests, or procedures, documenting information in the medical record, and care coordination    I have reviewed and updated all copied forward information, as appropriate.  I attest to the accuracy and relevance of any unchanged information.    Follow up:  No follow-ups on file.     There are no Patient Instructions on file for this visit.    Humberto Wells DO  01/02/24  14:57 EST

## 2024-01-03 ENCOUNTER — TELEPHONE (OUTPATIENT)
Dept: FAMILY MEDICINE CLINIC | Facility: CLINIC | Age: 74
End: 2024-01-03
Payer: MEDICARE

## 2024-01-03 NOTE — TELEPHONE ENCOUNTER
DAVID WITH [facility] CAME IN ASKING FOR A LETTER/OFFICE NOTE FOR THEIR BILLING THAT PATIENT REQUIRES CONTINUED USAGE OF A NIV (NON INVASIVE VENT). SHE SAID IT CAN BE ADDED ON TO HER RECENT OFFICE NOTE, OR JUST A SEPARATE LETTER. PLEASE FAX TO HER [phone]

## 2024-01-08 ENCOUNTER — OFFICE VISIT (OUTPATIENT)
Dept: BEHAVIORAL HEALTH | Facility: CLINIC | Age: 74
End: 2024-01-08
Payer: MEDICARE

## 2024-01-08 DIAGNOSIS — F33.0 MILD EPISODE OF RECURRENT MAJOR DEPRESSIVE DISORDER: Primary | ICD-10-CM

## 2024-01-08 PROCEDURE — 90834 PSYTX W PT 45 MINUTES: CPT | Performed by: COUNSELOR

## 2024-01-16 ENCOUNTER — TRANSCRIBE ORDERS (OUTPATIENT)
Dept: FAMILY MEDICINE CLINIC | Facility: CLINIC | Age: 74
End: 2024-01-16
Payer: MEDICARE

## 2024-01-16 PROBLEM — Z98.82 PRESENCE OF SILICONE BREAST IMPLANT: Status: ACTIVE | Noted: 2024-01-16

## 2024-01-19 ENCOUNTER — TELEPHONE (OUTPATIENT)
Dept: UROLOGY | Facility: CLINIC | Age: 74
End: 2024-01-19

## 2024-01-19 NOTE — TELEPHONE ENCOUNTER
Caller: Shannon Servin     Relationship: SELF    Best call back number: [phone]    What is the best time to reach you: ASAP    Who are you requesting to speak with (clinical staff, provider,  specific staff member): FAN    What was the call regarding: PT WAS RETURNING A CALL TO FAN.

## 2024-01-23 ENCOUNTER — TELEPHONE (OUTPATIENT)
Dept: FAMILY MEDICINE CLINIC | Facility: CLINIC | Age: 74
End: 2024-01-23
Payer: MEDICARE
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
## 2024-01-23 NOTE — TELEPHONE ENCOUNTER
"Per mammo dept/radiologist - patient must have a mammogram performed before MRI Breast can be completed/read by radiologist. Per patient mammo order from October, she had previously declined mammogram. I called patient to discuss the need for mammogram with her. Dr Wells had also asked me to confirm with patient what type of augmentation that she has had in the past. Patient stated that at age 19, she had \"silicone shots\" injected into her breasts (no actual implants). States that the silicone has \"clumped together\" and she states that her mammograms cannot be read properly. Patient also states that her last mammogram several years ago caused her significant pain which lasted for several weeks, so she refuses to complete mammogram at this time. States that if she must have a mammogram before the MRI's can be performed, then she will not do them either. I have closed her MRI orders at this time.  "

## 2024-02-06 RX ORDER — FLUTICASONE FUROATE, UMECLIDINIUM BROMIDE AND VILANTEROL TRIFENATATE 200; 62.5; 25 UG/1; UG/1; UG/1
1 POWDER RESPIRATORY (INHALATION) DAILY
Qty: 60 EACH | Refills: 3 | Status: SHIPPED | OUTPATIENT
Start: 2024-02-06

## 2024-02-06 RX ORDER — GLYBURIDE 1.25 MG/1
1.25 TABLET ORAL
Qty: 90 TABLET | Refills: 1 | Status: SHIPPED | OUTPATIENT
Start: 2024-02-06

## 2024-02-06 NOTE — TELEPHONE ENCOUNTER
Rx Refill Note  Requested Prescriptions     Pending Prescriptions Disp Refills    glyburide (DIAbeta) 1.25 MG tablet [Pharmacy Med Name: GLYBURIDE 1.25MG] 90 tablet 0     Sig: TAKE 1 TABLET BY MOUTH DAILY WITH BREAKFAST.      Last office visit with prescribing clinician: 1/2/2024   Last telemedicine visit with prescribing clinician: Visit date not found   Next office visit with prescribing clinician: 4/2/2024                         Would you like a call back once the refill request has been completed: [] Yes [] No    If the office needs to give you a call back, can they leave a voicemail: [] Yes [] No    Zenaida Lowe MA  02/06/24, 16:02 EST

## 2024-02-07 ENCOUNTER — OFFICE VISIT (OUTPATIENT)
Dept: UROLOGY | Facility: CLINIC | Age: 74
End: 2024-02-07
Payer: MEDICARE

## 2024-02-07 VITALS
RESPIRATION RATE: 16 BRPM | TEMPERATURE: 97.1 F | BODY MASS INDEX: 36.73 KG/M2 | WEIGHT: 234 LBS | HEART RATE: 89 BPM | HEIGHT: 67 IN | OXYGEN SATURATION: 98 %

## 2024-02-07 DIAGNOSIS — Z45.42 ENCOUNTER FOR ADJUSTMENT AND MANAGEMENT OF NEUROSTIMULATOR: Primary | ICD-10-CM

## 2024-02-07 LAB
BILIRUB BLD-MCNC: NEGATIVE MG/DL
CLARITY, POC: CLEAR
COLOR UR: YELLOW
EXPIRATION DATE: ABNORMAL
GLUCOSE UR STRIP-MCNC: NEGATIVE MG/DL
KETONES UR QL: NEGATIVE
LEUKOCYTE EST, POC: ABNORMAL
Lab: ABNORMAL
NITRITE UR-MCNC: NEGATIVE MG/ML
PH UR: 6 [PH] (ref 5–8)
PROT UR STRIP-MCNC: NEGATIVE MG/DL
RBC # UR STRIP: NEGATIVE /UL
SP GR UR: 1 (ref 1–1.03)
UROBILINOGEN UR QL: NORMAL

## 2024-02-07 RX ORDER — BUDESONIDE, GLYCOPYRROLATE, AND FORMOTEROL FUMARATE 160; 9; 4.8 UG/1; UG/1; UG/1
AEROSOL, METERED RESPIRATORY (INHALATION)
COMMUNITY
Start: 2023-11-16

## 2024-02-07 RX ORDER — COLLAGENASE SANTYL 250 [ARB'U]/G
OINTMENT TOPICAL
COMMUNITY
Start: 2024-01-31

## 2024-02-07 NOTE — PROGRESS NOTES
InterStim follow-up     Patient Name: Shannon Servin  : 1950   MRN: [id number]     Chief Complaint:   Chief Complaint   Patient presents with    Having trouble with Interstim/Pt having vascular issues     Pt states she reset the interstim with Ban Torres on the phone and it was working until she started furosemide.       Referring Provider: No ref. provider found    History of Present Illness: Shannon Servin is a 73 y.o. female with history below in assessment, who presents for follow up.   Going through 8 pads daily   Patient had done a program change with Ban went from program 3 to programs for her symptoms had improved then she started her furosemide and she has returned to multiple episodes of incontinence daily    Current Program:   4(-3,+0)    Current Amplitude:  1.6    Patient complaint: Increased urinary frequency and urge incontinence episodes    Yes InterStim turned on  No is stimulation uncomfortable  No any changes to diet  No any constipation  Yes Any changes in medication Furosimide was add, she had stopped them on her on previously   No any changes in other medical conditions  No recent fall or recent procedure/surgery    Subjective      Review of System:   As noted in HPI     Past Medical History:   Past Medical History:   Diagnosis Date    Afib     Arthritis     Asthma COPD    Colon polyp     COPD (chronic obstructive pulmonary disease) 2022 ?????    Depression     Diabetes mellitus     Edema     Elevated cholesterol     Fibromyalgia, primary 2020    GERD (gastroesophageal reflux disease) 2023    History of recurrent UTIs     Hypertension     Impaired functional mobility, balance, gait, and endurance     Kidney calculi     Low back pain     Migraines     Pneumonia     Seasonal allergies     Sleep apnea     Urinary incontinence     Urinary tract infection worst-2020    had them all my life       Past Surgical History:   Past Surgical History:   Procedure  Laterality Date    ANGIOPLASTY AORTIC N/A 2022    Procedure: MESENTERIC ANGIOGRAM WITH INFERIOR MESENTERIC ARTERY STENT;  Surgeon: Parker Shah MD;  Location: [facility];  Service: Vascular;  Laterality: N/A;  FLUORO 6 min 30 sec  DOSE 691mGy  CONTRAST 35ml     COLON SURGERY  colonoscopy in     COLONOSCOPY N/A 2021    Procedure: COLONOSCOPY WITH BIOPSY, COLD SNARE POLYPECTOMY, HOT SNARE POLYPECTOMY;  Surgeon: Cam Parra MD;  Location: [facility] ENDOSCOPY;  Service: Gastroenterology;  Laterality: N/A;    CYSTOSCOPY      HERNIA REPAIR      HYSTERECTOMY      INTERSTIM PLACEMENT N/A 2023    Procedure: INTERSTIM STAGES 1 AND 2 LEAD AND GENERATOR PLACEMENT;  Surgeon: Marek Saab MD;  Location: [facility] OR;  Service: Urology;  Laterality: N/A;    KIDNEY STONE SURGERY      OVARIAN CYST REMOVAL      SUBTOTAL HYSTERECTOMY  ????    TOTAL ABDOMINAL HYSTERECTOMY WITH SALPINGO OOPHORECTOMY      URETEROSCOPY LASER LITHOTRIPSY WITH STENT INSERTION Left 2020    Procedure: URETEROSCOPY, LEFT RETROGRADE PYLEOGERAM WITH STENT INSERTION;  Surgeon: Mulugeta Saldana MD;  Location: [facility] OR;  Service: Urology;  Laterality: Left;       Family History:   Family History   Problem Relation Age of Onset    Cancer Other     Diabetes Other     Migraines Other     Alcohol abuse Father     Diabetes Father     Cancer Father        Social History:   Social History     Socioeconomic History    Marital status:    Tobacco Use    Smoking status: Former     Packs/day: 1.50     Years: 33.00     Additional pack years: 0.00     Total pack years: 49.50     Types: Cigarettes     Start date: 1965     Quit date: 3/1/1998     Years since quittin.9     Passive exposure: Past    Smokeless tobacco: Never   Vaping Use    Vaping Use: Never used   Substance and Sexual Activity    Alcohol use: Yes     Comment: maybe twice a month    Drug use: Never    Sexual activity: Not Currently      Partners: Male     Birth control/protection: Hysterectomy     Comment: hysterectomy       Medications:     Current Outpatient Medications:     Breztri Aerosphere 160-9-4.8 MCG/ACT aerosol inhaler, , Disp: , Rfl:     Santyl 250 UNIT/GM ointment, , Disp: , Rfl:     atorvastatin (LIPITOR) 80 MG tablet, Take 1 tablet by mouth Daily., Disp: 90 tablet, Rfl: 3    Docusate Calcium (STOOL SOFTENER PO), Take 240 mg by mouth Daily., Disp: , Rfl:     doxycycline (VIBRAMYCIN) 100 MG capsule, Take 1 capsule by mouth 2 (Two) Times a Day., Disp: 20 capsule, Rfl: 0    enalapril (VASOTEC) 20 MG tablet, Take 1 tablet by mouth Every 12 (Twelve) Hours., Disp: 180 tablet, Rfl: 3    fluconazole (Diflucan) 100 MG tablet, Take 1 tablet by mouth Daily. 1 tab po daily x 5 days, Disp: 5 tablet, Rfl: 1    Fluticasone-Umeclidin-Vilant (Trelegy Ellipta) 200-62.5-25 MCG/ACT aerosol powder , Inhale 1 dose Daily., Disp: 60 each, Rfl: 3    furosemide (LASIX) 20 MG tablet, Take 1 tablet by mouth 2 (Two) Times a Day., Disp: 180 tablet, Rfl: 1    gabapentin (NEURONTIN) 600 MG tablet, Take 1 tablet by mouth Every Night., Disp: , Rfl:     glucose blood (Accu-Chek Magdalena Plus) test strip, 1 each by Other route 2 (Two) Times a Day. Use as instructed, Disp: 200 each, Rfl: 1    glucose blood test strip, 1 each by Other route 2 (Two) Times a Day., Disp: 100 each, Rfl: 5    glucose monitor monitoring kit, Use 1 each As Needed (blood glucose monitoring)., Disp: 1 each, Rfl: 0    glyburide (DIAbeta) 1.25 MG tablet, TAKE 1 TABLET BY MOUTH DAILY WITH BREAKFAST., Disp: 90 tablet, Rfl: 1    HYDROcodone-acetaminophen (Norco) 5-325 MG per tablet, Take 1 tablet by mouth Every 8 (Eight) Hours As Needed for Moderate Pain or Severe Pain., Disp: 40 tablet, Rfl: 0    ipratropium-albuterol (DUO-NEB) 0.5-2.5 mg/3 ml nebulizer, Inhale 1 vial (3 mL) by nebulization Every 6 (Six) Hours As Needed for Wheezing or Shortness of Air., Disp: 360 mL, Rfl: 1    Lancets misc, Use 1  Units 2 (Two) Times a Day., Disp: 200 each, Rfl: 1    linaclotide (Linzess) 145 MCG capsule capsule, Take 1 capsule by mouth Every Morning Before Breakfast., Disp: 90 capsule, Rfl: 1    montelukast (SINGULAIR) 10 MG tablet, Take 1 tablet by mouth Daily., Disp: 90 tablet, Rfl: 1    ondansetron (Zofran) 4 MG tablet, Take 1 tablet by mouth Every 8 (Eight) Hours As Needed for Nausea or Vomiting., Disp: 270 tablet, Rfl: 0    oxybutynin XL (DITROPAN XL) 15 MG 24 hr tablet, Take 1 tablet by mouth Daily., Disp: 90 tablet, Rfl: 3    pantoprazole (PROTONIX) 20 MG EC tablet, Take 1 tablet by mouth Daily., Disp: 90 tablet, Rfl: 3    pregabalin (LYRICA) 100 MG capsule, Take 1 capsule by mouth 3 (Three) Times a Day., Disp: 90 capsule, Rfl: 2    promethazine (PHENERGAN) 25 MG tablet, Take 1 tablet by mouth Every 6 (Six) Hours As Needed., Disp: , Rfl:     rivaroxaban (XARELTO) 20 MG tablet, Take 1 tablet by mouth Daily., Disp: 90 tablet, Rfl: 3    rOPINIRole (REQUIP) 0.5 MG tablet, Take 1 tablet by mouth Every Night., Disp: 90 tablet, Rfl: 3    senna (SENOKOT) 8.6 MG tablet, Take 1 tablet by mouth Daily., Disp: , Rfl:     sotalol (BETAPACE) 80 MG tablet, Take 1 tablet by mouth Every 12 (Twelve) Hours., Disp: 180 tablet, Rfl: 3    spironolactone (ALDACTONE) 25 MG tablet, Take 1 tablet by mouth Daily., Disp: 90 tablet, Rfl: 3    tiZANidine (ZANAFLEX) 4 MG tablet, Take 1 tablet by mouth At Night As Needed for Muscle Spasms., Disp: 90 tablet, Rfl: 3    traZODone (DESYREL) 50 MG tablet, Take 1 tablet by mouth Every Night., Disp: 90 tablet, Rfl: 1    Current Facility-Administered Medications:     cyanocobalamin injection 1,000 mcg, 1,000 mcg, Intramuscular, Q28 Days, Humberto Wells DO, 1,000 mcg at 06/03/22 1706    Allergies:   Allergies   Allergen Reactions    Metformin Hallucinations     Other reaction(s): Hallucinations       Objective     Physical Exam:   Vital Signs:   Visit Vitals  Pulse 89   Temp 97.1 °F (36.2 °C) (Temporal)  "  Resp 16   Ht 170.2 cm (67.01\")   Wt 106 kg (234 lb)   SpO2 98%   BMI 36.64 kg/m²      Body mass index is 36.64 kg/m².     Constitutional: Obese needs assistive walker  Neurological: A + O x 3   Psychiatric:  Normal mood and affect    Labs  Brief Urine Lab Results  (Last result in the past 365 days)        Color   Clarity   Blood   Leuk Est   Nitrite   Protein   CREAT   Urine HCG        02/07/24 1527 Yellow   Clear   Negative   Small (1+)   Negative   Negative                   Lab Results   Component Value Date    GLUCOSE 107 (H) 11/20/2023    CALCIUM 9.9 11/20/2023     11/20/2023    K 4.6 11/20/2023    CO2 21 11/20/2023     11/20/2023    BUN 24 11/20/2023    CREATININE 0.86 11/20/2023    EGFRIFNONA 87 08/06/2021    BCR 28 11/20/2023    ANIONGAP 9.4 10/17/2023       Lab Results   Component Value Date    WBC 9.47 10/17/2023    HGB 14.5 10/17/2023    HCT 42.0 10/17/2023    MCV 90.3 10/17/2023     10/17/2023       Urine Culture          7/24/2023    17:00   Urine Culture   Urine Culture Final report         Radiographic Studies  XR Chest 1 View    Result Date: 10/17/2023  No acute cardiopulmonary process.      This report was signed and finalized on 10/17/2023 1:20 PM by Trisha Demarco MD.        I have reviewed the above labs and imaging.     Assessment / Plan      Assessment/Plan:   Diagnoses and all orders for this visit:    1. Encounter for adjustment and management of neurostimulator (Primary)  -     POC Urinalysis Dipstick, Automated         Yes  Impedance checked  Yes battery checked    Yes amplitude changed  Yes program changed  No program 5 at 2.2 mA felt and bicycle seat    We also discussed decreasing coffee intake in half  And increase water intake    Follow Up:   Return for Follow up Sharrie- March Interstim day .    MAICO Green, NP-C  [facility] [place]   "

## 2024-02-15 PROBLEM — J96.11 CHRONIC RESPIRATORY FAILURE WITH HYPOXIA: Status: ACTIVE | Noted: 2024-02-15

## 2024-02-26 RX ORDER — TRAZODONE HYDROCHLORIDE 50 MG/1
50 TABLET ORAL NIGHTLY
Qty: 90 TABLET | Refills: 1 | Status: SHIPPED | OUTPATIENT
Start: 2024-02-26

## 2024-03-04 ENCOUNTER — TELEPHONE (OUTPATIENT)
Dept: FAMILY MEDICINE CLINIC | Facility: CLINIC | Age: 74
End: 2024-03-04
Payer: MEDICARE

## 2024-03-04 NOTE — TELEPHONE ENCOUNTER
PT IS CALLING TO REQUEST THE PAPERWORK THAT WAS SENT ON 1/30/24 BE SENT BACK TO HER ORTHOPEDIC DOCTOR SO SHE CAN GET HER NEW SHOES.

## 2024-03-11 NOTE — TELEPHONE ENCOUNTER
I reached out to [facility] regarding this paperwork, as this paperwork was originally faxed on 2/13/24.    I am waiting on jordon lu to call me back regarding this matter.

## 2024-03-27 DIAGNOSIS — N39.46 MIXED URGE AND STRESS INCONTINENCE: ICD-10-CM

## 2024-03-27 RX ORDER — OXYBUTYNIN CHLORIDE 15 MG/1
15 TABLET, EXTENDED RELEASE ORAL DAILY
Qty: 90 TABLET | Refills: 3 | Status: SHIPPED | OUTPATIENT
Start: 2024-03-27

## 2024-03-27 NOTE — TELEPHONE ENCOUNTER
Rx Refill Note  Requested Prescriptions     Pending Prescriptions Disp Refills    oxybutynin XL (DITROPAN XL) 15 MG 24 hr tablet 90 tablet 3     Sig: Take 1 tablet by mouth Daily.      Last office visit with prescribing clinician: 1/2/2024   Last telemedicine visit with prescribing clinician: Visit date not found   Next office visit with prescribing clinician: 3/29/2024     Katelynn Tan MA  03/27/24, 09:29 EDT

## 2024-03-29 ENCOUNTER — PRIOR AUTHORIZATION (OUTPATIENT)
Dept: FAMILY MEDICINE CLINIC | Facility: CLINIC | Age: 74
End: 2024-03-29
Payer: MEDICARE

## 2024-03-29 ENCOUNTER — OFFICE VISIT (OUTPATIENT)
Dept: FAMILY MEDICINE CLINIC | Facility: CLINIC | Age: 74
End: 2024-03-29
Payer: MEDICARE

## 2024-03-29 VITALS
DIASTOLIC BLOOD PRESSURE: 86 MMHG | HEART RATE: 71 BPM | OXYGEN SATURATION: 91 % | BODY MASS INDEX: 38.96 KG/M2 | WEIGHT: 248.2 LBS | HEIGHT: 67 IN | SYSTOLIC BLOOD PRESSURE: 150 MMHG

## 2024-03-29 DIAGNOSIS — E11.44: ICD-10-CM

## 2024-03-29 DIAGNOSIS — Z79.01 CHRONIC ANTICOAGULATION: Chronic | ICD-10-CM

## 2024-03-29 DIAGNOSIS — N39.46 MIXED URGE AND STRESS INCONTINENCE: ICD-10-CM

## 2024-03-29 DIAGNOSIS — J44.89 CHRONIC OBSTRUCTIVE BRONCHITIS: ICD-10-CM

## 2024-03-29 DIAGNOSIS — E11.44 DIABETIC AMYOTROPHY ASSOCIATED WITH TYPE 2 DIABETES MELLITUS: ICD-10-CM

## 2024-03-29 DIAGNOSIS — I48.0 PAROXYSMAL ATRIAL FIBRILLATION: Primary | ICD-10-CM

## 2024-03-29 DIAGNOSIS — R60.0 BILATERAL LOWER EXTREMITY EDEMA: ICD-10-CM

## 2024-03-29 DIAGNOSIS — E11.65 TYPE 2 DIABETES MELLITUS WITH HYPERGLYCEMIA, WITHOUT LONG-TERM CURRENT USE OF INSULIN: Chronic | ICD-10-CM

## 2024-03-29 RX ORDER — ALBUTEROL SULFATE AND BUDESONIDE 90; 80 UG/1; UG/1
2 AEROSOL, METERED RESPIRATORY (INHALATION) EVERY 6 HOURS PRN
Qty: 10.7 G | Refills: 5 | Status: SHIPPED | OUTPATIENT
Start: 2024-03-29

## 2024-03-29 RX ORDER — SPIRONOLACTONE 25 MG/1
50 TABLET ORAL 2 TIMES DAILY
Qty: 180 TABLET | Refills: 3 | Status: SHIPPED | OUTPATIENT
Start: 2024-03-29

## 2024-03-29 RX ORDER — HYDROCODONE BITARTRATE AND ACETAMINOPHEN 5; 325 MG/1; MG/1
1 TABLET ORAL EVERY 8 HOURS PRN
Qty: 40 TABLET | Refills: 0 | Status: SHIPPED | OUTPATIENT
Start: 2024-03-29

## 2024-03-29 NOTE — TELEPHONE ENCOUNTER
(Key: [id number])      PA Case: [id number], Status: Approved, Coverage Starts on: 1/1/2024 12:00:00 AM, Coverage Ends on: 3/29/2025 12:00:00 AM.    MAGGY Martinez sent to insurance for Airsupra.  Waiting for reply.    Angela Atwood CMA

## 2024-03-29 NOTE — PROGRESS NOTES
Established Patient        Chief Complaint:   Chief Complaint   Patient presents with    Follow-up     3mo follow up for paroxysmal atrial fibrillation.        Shannon Servin is a 73 y.o. female    History of Present Illness:   Answers submitted by the patient for this visit:  Primary Reason for Visit (Submitted on 3/22/2024)  What is the primary reason for your visit?: Extremity Pain  Lower Extremity Injury Questionnaire (Submitted on 3/22/2024)  Chief Complaint: Extremity pain  Injury: No  Pain location: right heel  Pain quality: aching, burning, shooting, stabbing  Pain - numeric: 9/10  Progression since onset: worse  tingling: Yes  inability to bear weight: Yes  numbness: Yes  lower extremity swelling: Yes  redness: Yes    Here today in scheduled follow-up visit of diabetes mellitus, chronic struct of bronchitis, diabetic amyotrophy, urge/stress incontinence, chronic anticoagulation and atrial fibrillation.    Denies chest pain, syncope, palpitations or vertigo.  Denies fever, chills or night sweats.  Maintains an active lifestyle, balanced dietary intake; reports good hydration habits.  Denies hematuria/dysuria.  Denies any BRB/BTS.  No new rashes.  Denies orthopnea, epistaxis or hemoptysis.    Denies any cyclical/persistent hypoglycemic episodes.    Patient does complain of right heel pain, predominantly involving the calcaneal pad.  Denies any focal injury.    Subjective     The following portions of the patient's history were reviewed and updated as appropriate: allergies, current medications, past family history, past medical history, past social history, past surgical history and problem list.    Allergies   Allergen Reactions    Metformin Hallucinations     Other reaction(s): Hallucinations     Review of Systems:    Constitutional: Negative for fever. Negative for chills, diaphoresis; malaise/fatigue of chronic nature, and without unexpected weight change.   HENT: No dysphagia; no  "changes to vision/hearing/smell/taste; no epistaxis  Eyes: Negative for redness and visual disturbance.   Respiratory: Chronic cough, history of hemoptysis.  Cardiovascular: Negative for chest pain and palpitations.   Gastrointestinal: Denies BRB/BTS.  No abdominal discomfort.  Endocrine: Negative for cold intolerance and heat intolerance.   Genitourinary: Negative for difficulty urinating, dysuria and frequency.   Musculoskeletal: Chronic arthralgias, back pain and myalgias.  Significant limitation to range of motion of right shoulder.  Painful paresthesia to right thumb as per above.  Skin: Chronic thickened/yellow discoloration to the numerous toes of bilateral feet, worse to the bilateral great toes.  Pain on palpation of the right calcaneal pad.  Bakari/Roth sign negative.  Neurological: Negative for syncope, weakness.  As per above otherwise.  Hematological: Negative for adenopathy. Does not bruise/bleed easily.   Psychiatric/Behavioral: Negative for confusion. The patient is not nervous/anxious.    Objective     Physical Exam   Vital Signs: /86   Pulse 71   Ht 170.2 cm (67\")   Wt 113 kg (248 lb 3.2 oz)   SpO2 91%   BMI 38.87 kg/m²     General Appearance: alert, oriented x 3, no acute distress.  Pleasant and interactive during questioning/examination.  Well-nourished and developed female.  Skin: warm and dry.  Stasis dermatitis noted additionally.    HEENT: Atraumatic.  pupils round and reactive to light and accommodation, oral mucosa pink and moist.  Nares patent without epistaxis.  External auditory canals are patent tympanic membranes intact.  Neck: supple, no JVD, trachea midline.  No thyromegaly  Lungs: Rhonchus, referred, upper airway congestion, cleared with cough, unlabored breathing effort.  Audible air exchange noted all lung fields.  Expiratory wheezes throughout.  Prolonged expiratory phase bilaterally.  Heart: RRR, normal S1 and S2, no S3, no rub.  Abdomen: soft, non-tender, no " palpable bladder, present bowel sounds to auscultation ×4.  No guarding or rigidity.  No CVA tenderness.  Extremities: no clubbing, cyanosis.  Symmetric muscle strength and development.  Nonpitting edematous changes noted to the mid tibias bilaterally, brawny changes associated with venous stasis, chronic in nature.  Ambulates independently.  Impaired range of motion to right shoulder.  Apley scratch test limited due to discomfort of the right shoulder, normal deltoid tone/development.  Negative pushoff, positive empty can sign, as well as shrug sign.  Zuniga/Neer painful, cross body abduction painful as well.  Symmetric  strength bilaterally.  De Quervain's test positive to the right hand.  Neuro: normal speech and mental status.  Cranial nerves II through XII intact.  No anosmia. DTR 2+; proprioception intact.  No focal motor/sensory deficits.        Assessment and Plan      Assessment/Plan:   Diagnoses and all orders for this visit:    1. Paroxysmal atrial fibrillation (Primary)    2. Chronic anticoagulation    3. Type 2 diabetes mellitus with hyperglycemia, without long-term current use of insulin    4. Mixed urge and stress incontinence    5. Diabetic amyotrophy associated with type 2 diabetes mellitus    6. Bilateral lower extremity edema  -     spironolactone (ALDACTONE) 25 MG tablet; Take 2 tablets by mouth 2 (Two) Times a Day.  Dispense: 180 tablet; Refill: 3    7. Controlled type 2 diabetes mellitus with diabetic amyotrophy, without long-term current use of insulin  -     HYDROcodone-acetaminophen (Norco) 5-325 MG per tablet; Take 1 tablet by mouth Every 8 (Eight) Hours As Needed for Moderate Pain or Severe Pain.  Dispense: 40 tablet; Refill: 0    8. Chronic obstructive bronchitis  -     Albuterol-Budesonide (Airsupra) 90-80 MCG/ACT aerosol; Inhale 2 puffs Every 6 (Six) Hours As Needed (cough/wheezing).  Dispense: 10.7 g; Refill: 5      Discontinue furosemide; will titrate dosing of spironolactone to  50 mg bid.    Patient does have an already established foot/ankle specialist, plans to see concerning the significant plantar inflammation of the right foot, suspect calcaneal spurring.    I have refilled patient's as needed analgesic today.    Plan trial of air-supra; I have asked the patient notify the office should she develop any ill effects to the new medication.    Vital signs demonstrate hemodynamic stability, heart rate and blood pressure are at goal.  Systolic blood pressure goal is less than 150 at her age.  Continue low-sodium/salt/caffeine dietary intake.  Continue anticoagulation.    Discussion Summary:    Discussed plan of care in detail with pt today; pt verb understanding and agrees.    I spent 35 minutes caring for Shannon on this date of service. This time includes time spent by me in the following activities:preparing for the visit, performing a medically appropriate examination and/or evaluation , counseling and educating the patient/family/caregiver, ordering medications, tests, or procedures, documenting information in the medical record, and care coordination    I have reviewed and updated all copied forward information, as appropriate.  I attest to the accuracy and relevance of any unchanged information.    Follow up:  No follow-ups on file.     There are no Patient Instructions on file for this visit.    Humberto Wells DO  04/09/24  11:42 EDT

## 2024-04-08 RX ORDER — MONTELUKAST SODIUM 10 MG/1
10 TABLET ORAL DAILY
Qty: 90 TABLET | Refills: 1 | Status: SHIPPED | OUTPATIENT
Start: 2024-04-08

## 2024-04-29 DIAGNOSIS — R10.13 ABDOMINAL PAIN, EPIGASTRIC: ICD-10-CM

## 2024-04-29 DIAGNOSIS — K21.9 GERD WITHOUT ESOPHAGITIS: ICD-10-CM

## 2024-04-29 DIAGNOSIS — R10.84 ABDOMINAL PAIN, GENERALIZED: ICD-10-CM

## 2024-05-01 ENCOUNTER — OFFICE VISIT (OUTPATIENT)
Dept: PULMONOLOGY | Facility: CLINIC | Age: 74
End: 2024-05-01
Payer: MEDICARE

## 2024-05-01 VITALS
SYSTOLIC BLOOD PRESSURE: 128 MMHG | HEART RATE: 88 BPM | DIASTOLIC BLOOD PRESSURE: 64 MMHG | HEIGHT: 67 IN | WEIGHT: 241 LBS | BODY MASS INDEX: 37.83 KG/M2 | RESPIRATION RATE: 14 BRPM | OXYGEN SATURATION: 91 %

## 2024-05-01 DIAGNOSIS — J43.9 PULMONARY EMPHYSEMA, UNSPECIFIED EMPHYSEMA TYPE: ICD-10-CM

## 2024-05-01 DIAGNOSIS — J96.12 CHRONIC RESPIRATORY FAILURE WITH HYPERCAPNIA: ICD-10-CM

## 2024-05-01 DIAGNOSIS — J44.9 CHRONIC OBSTRUCTIVE PULMONARY DISEASE, UNSPECIFIED COPD TYPE: Primary | ICD-10-CM

## 2024-05-01 PROCEDURE — 99214 OFFICE O/P EST MOD 30 MIN: CPT | Performed by: INTERNAL MEDICINE

## 2024-05-01 PROCEDURE — 3074F SYST BP LT 130 MM HG: CPT | Performed by: INTERNAL MEDICINE

## 2024-05-01 PROCEDURE — 3078F DIAST BP <80 MM HG: CPT | Performed by: INTERNAL MEDICINE

## 2024-05-01 RX ORDER — PANTOPRAZOLE SODIUM 20 MG/1
20 TABLET, DELAYED RELEASE ORAL DAILY
Qty: 90 TABLET | Refills: 3 | Status: SHIPPED | OUTPATIENT
Start: 2024-05-01

## 2024-05-01 NOTE — PROGRESS NOTES
"  Chief Complaint   Patient presents with    Breathing Problem    Follow-up       Subjective   Shannon Servin is a 73 y.o. female.   Patient was evaluated today for follow up of shortness of breath, chronic respiratory failure and COPD.     Patient says that her symptoms have been stable since the last clinic visit. she reports no recent exacerbations.     Patient is using Trelegy, as prescribed. Exercise tolerance has also remained limited due to multiple issues.     Quit smoking in 1980s.    Patient also has chronic respiratory failure and she doesn't report any issues with the noninvasive ventilation device. Patient says that she is having issues with her current mask.     Patient says that the compliance with the use of the noninvasive device is good and her symptoms of shortness of breath and fatigue/tiredness have improved.       The following portions of the patient's history were reviewed and updated as appropriate: allergies, current medications, past family history, past medical history, past social history, and past surgical history.    Review of Systems   HENT:  Negative for sinus pressure, sneezing and sore throat.    Respiratory:  Positive for shortness of breath and wheezing. Negative for cough and chest tightness.        Objective   Visit Vitals  /64   Pulse 88   Resp 14   Ht 170.2 cm (67.01\") Comment: pt reported   Wt 109 kg (241 lb)   SpO2 91%   BMI 37.74 kg/m²         BMI Readings from Last 3 Encounters:   05/01/24 37.74 kg/m²   03/29/24 38.87 kg/m²   02/07/24 36.64 kg/m²       Physical Exam  Vitals reviewed.   Constitutional:       Appearance: She is well-developed.   HENT:      Head: Atraumatic.      Mouth/Throat:      Comments: Oropharynx was crowded.  Cardiovascular:      Rate and Rhythm: Normal rate and regular rhythm.   Pulmonary:      Effort: Pulmonary effort is normal. No respiratory distress.      Comments: Somewhat hyperresonant to percussion.  Somewhat decreased air " entry.  Mild scattered wheezing noted.   Musculoskeletal:      Cervical back: Neck supple.      Comments: Used a cane.   Skin:     General: Skin is warm and dry.   Neurological:      Mental Status: She is alert and oriented to person, place, and time.           Assessment & Plan   Diagnoses and all orders for this visit:    1. Chronic obstructive pulmonary disease, unspecified COPD type (Primary)    2. Pulmonary emphysema, unspecified emphysema type  -     Alpha - 1 - Antitrypsin Phenotype; Future  -     Overnight Sleep Oximetry Study; Future    3. Chronic respiratory failure with hypercapnia  -     Alpha - 1 - Antitrypsin Phenotype; Future  -     Overnight Sleep Oximetry Study; Future    Other orders  -     tiotropium bromide-olodaterol (STIOLTO RESPIMAT) 2.5-2.5 MCG/ACT aerosol solution inhaler; Inhale 2 puffs Daily.  Dispense: 1 each; Refill: 5  -     ipratropium-albuterol (COMBIVENT RESPIMAT)  MCG/ACT inhaler; Inhale 1 puff 4 (Four) Times a Day As Needed for Wheezing or Shortness of Air.  Dispense: 1 g; Refill: 5           Return in about 3 months (around 8/1/2024) for Recheck, Labs, Overnight P Ox, For Deepthi Retana), ....Also 8 mths w/ Dr. Sahu.    DISCUSSION (if any):  Last CT scan results was reviewed in great detail with the patient.  Results for orders placed during the hospital encounter of 10/02/23    CT Chest Without Contrast Diagnostic    Narrative  CT SCAN OF THE CHEST WITHOUT CONTRAST 10/2/2023 2:37 PM    HISTORY:  Cough, chronic/persisting > 8 weeks, failed empiric treatment;  J44.89-Other specified chronic obstructive pulmonary disease;  R04.2-Hemoptysis; Z87.891-Personal history of nicotine dependence    PROCEDURE:  Axial CT images were obtained from the lung apex to the mid abdomen  without IV contrast. Coronal and sagittal reformatted images generated  from the axial data set and provided for interpretation. This study was  performed with techniques to keep radiation doses as low as  reasonably  achievable, (ALARA). Individualized dose reduction techniques using  automated exposure control or adjustment of mA and/or kV according to  the patient size were employed. 1191 mGy*cm    COMPARISON:  CT thoracic spine dated 9/28/2022. CT chest dated 2/23/2020.    FINDINGS:    CHEST:  Lack of IV contrast somewhat limits evaluation of the thoracic viscera.    Lungs/Pleura:  No suspicious pulmonary nodules or consolidation. Mild to moderate upper  lobe predominant centrilobular emphysema. Central airways are patent.  Mild bronchial wall thickening. No pneumothorax or pleural effusion.    Heart, vessels and mediastinum:  The heart is normal in size. No pericardial effusion. The aorta and  pulmonary arteries are normal in caliber. Coronary artery disease is  present. Aortic atherosclerotic disease present.    Lymph nodes:  No pathologically enlarged thoracic lymph nodes within the limits of a  noncontrast-enhanced examination.    Chest wall:  No acute findings. Bilateral breast tissue is dense with multifocal  nodularity multiple calcifications.    Bones:  No acute fracture.    Upper abdomen:  No acute findings in the upper abdomen.    Impression  No acute findings in the chest. Mild bronchial wall thickening,  correlate for possible bronchitis. Emphysema. No suspicious pulmonary  nodule, mass or consolidation. Coronary artery disease present.    Bilateral dense breast tissue with multifocal nodularity and multiple  calcifications. Mammographic correlation is recommended.          Images personally reviewed, interpreted and dictated by YOSI Armenta..            This report was signed and finalized on 10/2/2023 2:05 PM by YOSI Armenta.        Latest available PFTs were reviewed.  Consistent with severe obstruction. Performed in Oct 2022.    PFTs will be ordered to be done upon follow up.    We have reviewed her pulmonary medications in great detail.    Will start her on Stiolto instead of  Trelegy as she has had multiple episodes of thrush.     Compliance with medications stressed.     Side effects of prescribed medications discussed with the patient    Vaccination status addressed.    Up-to-date with influenza vaccinations.     Up-to-date with pneumonia vaccinations.     It was recommended that the patient consider Prevnar-20 vaccination and discuss it with her PCP, if not already obtained.     Continue treatment with noninvasive ventilation device at the current setting.    she is currently using full facemask    Patient seems to be compliant with NIV device, based on the available data and her account of improved symptoms.     Repeat ABG will be considered, if clinically necessary.    Will order overnight pulse ox.       Dictated utilizing Dragon dictation.    This document was electronically signed by Bob Sahu MD on 05/01/24 at 14:42 EDT

## 2024-05-06 DIAGNOSIS — M75.101 RIGHT ROTATOR CUFF TEAR ARTHROPATHY: ICD-10-CM

## 2024-05-06 DIAGNOSIS — M25.511 ARTHRALGIA OF RIGHT SHOULDER REGION: ICD-10-CM

## 2024-05-06 DIAGNOSIS — E11.44 DIABETIC AMYOTROPHY ASSOCIATED WITH TYPE 2 DIABETES MELLITUS: ICD-10-CM

## 2024-05-06 DIAGNOSIS — M12.811 RIGHT ROTATOR CUFF TEAR ARTHROPATHY: ICD-10-CM

## 2024-05-06 DIAGNOSIS — M75.41 IMPINGEMENT SYNDROME OF RIGHT SHOULDER: Chronic | ICD-10-CM

## 2024-05-06 RX ORDER — PREGABALIN 100 MG/1
100 CAPSULE ORAL 3 TIMES DAILY
Qty: 90 CAPSULE | Refills: 2 | Status: SHIPPED | OUTPATIENT
Start: 2024-05-06

## 2024-05-15 ENCOUNTER — OFFICE VISIT (OUTPATIENT)
Dept: BEHAVIORAL HEALTH | Facility: CLINIC | Age: 74
End: 2024-05-15
Payer: MEDICARE

## 2024-05-15 DIAGNOSIS — R45.4 ANGER: ICD-10-CM

## 2024-05-15 DIAGNOSIS — F43.9 TRAUMA AND STRESSOR-RELATED DISORDER: ICD-10-CM

## 2024-05-15 DIAGNOSIS — F43.23 ADJUSTMENT DISORDER WITH MIXED ANXIETY AND DEPRESSED MOOD: Primary | ICD-10-CM
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
## 2024-05-22 DIAGNOSIS — E11.44 DIABETIC AMYOTROPHY ASSOCIATED WITH TYPE 2 DIABETES MELLITUS: Primary | ICD-10-CM

## 2024-05-23 ENCOUNTER — TELEPHONE (OUTPATIENT)
Dept: PULMONOLOGY | Facility: CLINIC | Age: 74
End: 2024-05-23
Payer: MEDICARE

## 2024-05-23 DIAGNOSIS — G47.34 NOCTURNAL HYPOXIA: Primary | ICD-10-CM

## 2024-05-23 NOTE — TELEPHONE ENCOUNTER
I have discussed the results of the patients overnight pulse oximetry results with them. Patient needed a titration.     Dr Sahu notified pt has agreered to titration study.     Pt notified if she has not heard to schedule the study at the end of three weeks to call us back.

## 2024-05-24 DIAGNOSIS — J43.9 PULMONARY EMPHYSEMA, UNSPECIFIED EMPHYSEMA TYPE: ICD-10-CM

## 2024-05-24 DIAGNOSIS — J96.12 CHRONIC RESPIRATORY FAILURE WITH HYPERCAPNIA: ICD-10-CM

## 2024-05-28 DIAGNOSIS — Z79.01 CHRONIC ANTICOAGULATION: Chronic | ICD-10-CM

## 2024-06-03 ENCOUNTER — OFFICE VISIT (OUTPATIENT)
Dept: FAMILY MEDICINE CLINIC | Facility: CLINIC | Age: 74
End: 2024-06-03
Payer: MEDICARE

## 2024-06-03 VITALS
RESPIRATION RATE: 16 BRPM | SYSTOLIC BLOOD PRESSURE: 128 MMHG | BODY MASS INDEX: 37.73 KG/M2 | DIASTOLIC BLOOD PRESSURE: 64 MMHG | HEIGHT: 67 IN

## 2024-06-03 DIAGNOSIS — E11.44 DIABETIC AMYOTROPHY ASSOCIATED WITH TYPE 2 DIABETES MELLITUS: ICD-10-CM

## 2024-06-03 DIAGNOSIS — Z79.01 CHRONIC ANTICOAGULATION: Chronic | ICD-10-CM

## 2024-06-03 DIAGNOSIS — F43.20 ADULT SITUATIONAL STRESS DISORDER: ICD-10-CM

## 2024-06-03 DIAGNOSIS — N39.46 MIXED URGE AND STRESS INCONTINENCE: ICD-10-CM

## 2024-06-03 DIAGNOSIS — Z59.89 LIVING ACCOMMODATION ISSUES: ICD-10-CM

## 2024-06-03 DIAGNOSIS — S91.301A NON-HEALING OPEN WOUND OF HEEL, RIGHT, INITIAL ENCOUNTER: ICD-10-CM

## 2024-06-03 DIAGNOSIS — I10 ESSENTIAL HYPERTENSION: ICD-10-CM

## 2024-06-03 DIAGNOSIS — E11.65 TYPE 2 DIABETES MELLITUS WITH HYPERGLYCEMIA, WITHOUT LONG-TERM CURRENT USE OF INSULIN: Primary | Chronic | ICD-10-CM

## 2024-06-03 DIAGNOSIS — I48.0 PAROXYSMAL ATRIAL FIBRILLATION: ICD-10-CM

## 2024-06-03 LAB
EXPIRATION DATE: ABNORMAL
HBA1C MFR BLD: 7 % (ref 4.5–5.7)
Lab: ABNORMAL

## 2024-06-03 PROCEDURE — 3074F SYST BP LT 130 MM HG: CPT | Performed by: FAMILY MEDICINE

## 2024-06-03 PROCEDURE — 3078F DIAST BP <80 MM HG: CPT | Performed by: FAMILY MEDICINE

## 2024-06-03 PROCEDURE — 3051F HG A1C>EQUAL 7.0%<8.0%: CPT | Performed by: FAMILY MEDICINE

## 2024-06-03 PROCEDURE — 1125F AMNT PAIN NOTED PAIN PRSNT: CPT | Performed by: FAMILY MEDICINE

## 2024-06-03 PROCEDURE — 83036 HEMOGLOBIN GLYCOSYLATED A1C: CPT | Performed by: FAMILY MEDICINE

## 2024-06-03 PROCEDURE — 99214 OFFICE O/P EST MOD 30 MIN: CPT | Performed by: FAMILY MEDICINE

## 2024-06-03 RX ORDER — MIRABEGRON 50 MG/1
50 TABLET, EXTENDED RELEASE ORAL DAILY
Qty: 30 TABLET | Refills: 0 | COMMUNITY
Start: 2024-06-03

## 2024-06-03 RX ORDER — OXYCODONE HYDROCHLORIDE 5 MG/1
5 TABLET ORAL EVERY 8 HOURS PRN
Qty: 45 TABLET | Refills: 0 | Status: SHIPPED | OUTPATIENT
Start: 2024-06-03

## 2024-06-03 SDOH — ECONOMIC STABILITY - INCOME SECURITY: OTHER PROBLEMS RELATED TO HOUSING AND ECONOMIC CIRCUMSTANCES: Z59.89

## 2024-06-03 NOTE — PROGRESS NOTES
Established Patient        Chief Complaint:   Chief Complaint   Patient presents with    Diabetes     3 mo f/u    Foot Pain     Right foot pain, heel has a sore that will not heal and the pain is radiating through foot and up the back of her leg.     Wrist Pain     Pt has been having terrible hand/wrist and feet pain. She states it keeps her up at night.     Hand Pain        Shannon Servin is a 74 y.o. female    History of Present Illness:   Answers submitted by the patient for this visit:  Primary Reason for Visit (Submitted on 6/3/2024)  What is the primary reason for your visit?: Extremity Pain  Lower Extremity Injury Questionnaire (Submitted on 6/3/2024)  Chief Complaint: Extremity pain  Injury: No  Pain location: right foot  Pain quality: aching, burning, shooting, stabbing  Pain - numeric: 9/10  Progression since onset: worse  tingling: Yes  inability to bear weight: Yes  numbness: Yes  lower extremity swelling: Yes  redness: Yes    Here today in scheduled follow-up visit of her diabetes mellitus, atrial fibrillation, chronic anticoagulation, hypertension, diabetic amyotrophy, mixed urge/stress incontinence, as well as chronic nonhealing open wound of her right heel.    Podiatry following patient for heel wound of R foot; small in size, approx 6 mm in diameter; superficial in nature.  Instructions were for betadine cleaning periodically when changing dressing once weekly.  Instructions to not get wound wet.    Patient reports substantial situational stress as related to the current separation and pending divorce from her .  Patient was hoping for more amicable divorce, but she does feel lonely and without means for continued habitation.  She is fearful that she will be living out of her car soon.  She has been unable to keep appointments with podiatry and other specialist due to fear of cost.    Subjective     The following portions of the patient's history were reviewed and  "updated as appropriate: allergies, current medications, past family history, past medical history, past social history, past surgical history and problem list.    Allergies   Allergen Reactions    Metformin Hallucinations     Other reaction(s): Hallucinations     Review of Systems:    Constitutional: Negative for fever. Negative for chills, diaphoresis; malaise/fatigue of chronic nature, and without unexpected weight change.   HENT: No dysphagia; no changes to vision/hearing/smell/taste; no epistaxis  Eyes: Negative for redness and visual disturbance.   Respiratory: Chronic cough, history of hemoptysis.  Cardiovascular: Negative for chest pain and palpitations.   Gastrointestinal: Denies BRB/BTS.  No abdominal discomfort.  Endocrine: Negative for cold intolerance and heat intolerance.   Genitourinary: Negative for difficulty urinating, dysuria and frequency.   Musculoskeletal: Chronic arthralgias, back pain and myalgias.  Significant limitation to range of motion of right shoulder.  Painful paresthesia to right thumb as per above.  Skin: Chronic thickened/yellow discoloration to the numerous toes of bilateral feet, worse to the bilateral great toes.  Pain on palpation of the right calcaneal pad.  Bakari/Roth sign negative.  Noted wound to right heel.  Neurological: Negative for syncope, weakness.  As per above otherwise.  Hematological: Negative for adenopathy. Does not bruise/bleed easily.   Psychiatric/Behavioral: Negative for confusion. The patient is not nervous/anxious.    Objective     Physical Exam   Vital Signs: /64   Resp 16   Ht 170.2 cm (67.01\")   BMI 37.73 kg/m²     General Appearance: alert, oriented x 3, no acute distress.  Pleasant and interactive during questioning/examination.  Well-nourished and developed female.  Skin: warm and dry.  Stasis dermatitis noted additionally.  There is a noted small eschar to the calcaneal area of the right heel.  Does appear localized only to the " superficial most aspect.  No subcutaneous fat exposure.  HEENT: Atraumatic.  pupils round and reactive to light and accommodation, oral mucosa pink and moist.  Nares patent without epistaxis.  External auditory canals are patent tympanic membranes intact.  Neck: supple, no JVD, trachea midline.  No thyromegaly  Lungs: Rhonchus, referred, upper airway congestion, cleared with cough, unlabored breathing effort.  Audible air exchange noted all lung fields.  Expiratory wheezes throughout.  Prolonged expiratory phase bilaterally.  Heart: RRR, normal S1 and S2, no S3, no rub.  Abdomen: soft, non-tender, no palpable bladder, present bowel sounds to auscultation ×4.  No guarding or rigidity.  No CVA tenderness.  Extremities: no clubbing, cyanosis.  Symmetric muscle strength and development.  Nonpitting edematous changes noted to the mid tibias bilaterally, brawny changes associated with venous stasis, chronic in nature.  Ambulates independently.  Impaired range of motion to right shoulder.  Apley scratch test limited due to discomfort of the right shoulder, normal deltoid tone/development.  Negative pushoff, positive empty can sign, as well as shrug sign.  Zuniga/Neer painful, cross body abduction painful as well.  Symmetric  strength bilaterally.  De Quervain's test positive to the right hand.  Neuro: normal speech and mental status.  Cranial nerves II through XII intact.  No anosmia. DTR 2+; proprioception intact.  No focal motor/sensory deficits.        Assessment and Plan      Assessment/Plan:   Diagnoses and all orders for this visit:    1. Type 2 diabetes mellitus with hyperglycemia, without long-term current use of insulin (Primary)  -     POC Glycosylated Hemoglobin (Hb A1C)    2. Paroxysmal atrial fibrillation    3. Chronic anticoagulation    4. Essential hypertension    5. Diabetic amyotrophy associated with type 2 diabetes mellitus  -     oxyCODONE (Roxicodone) 5 MG immediate release tablet; Take 1 tablet by  mouth Every 8 (Eight) Hours As Needed for Moderate Pain.  Dispense: 45 tablet; Refill: 0    6. Mixed urge and stress incontinence  -     Mirabegron ER (MYRBETRIQ) 50 MG tablet sustained-release 24 hour 24 hr tablet; Take 50 mg by mouth Daily.  Dispense: 30 tablet; Refill: 0    7. Living accommodation issues  -     Ambulatory Referral to Social Care Services (Amb Case Mgmt)    8. Adult situational stress disorder  -     Ambulatory Referral to Social Care Services (Amb Case Mgmt)    9. Non-healing open wound of heel, right, initial encounter    Not currently scheduled for f/u appt with podiatry d/t cost of visits.  Continue already established wound care.    Pt has considerable situational stress as related to recent separation and pending divorce.  I have place a referral to ambulatory case management to assist with living issues.    Discussed need for stress/anxiety reducing techniques such as prayer/meditation/breathing and counting exercises and avoidance of stress producing environments/situations; will follow clinically.    VSS, appears HD asymptomatic.    HbA1c sig improved today, now doen to 7.0; urine microalbumin normal.    Still having pain to R arm; has internal derangement of R shoulder; was scheduled for intervention by Dr. Lyons, but pt postponed d/t cost and no support assistance, as she is R arm dominant.          Discussion Summary:    Discussed plan of care in detail with pt today; pt verb understanding and agrees.    I spent 35 minutes caring for Shannon on this date of service. This time includes time spent by me in the following activities:preparing for the visit, performing a medically appropriate examination and/or evaluation , counseling and educating the patient/family/caregiver, ordering medications, tests, or procedures, documenting information in the medical record, independently interpreting results and communicating that information with the patient/family/caregiver, and care  coordination    I have reviewed and updated all copied forward information, as appropriate.  I attest to the accuracy and relevance of any unchanged information.    Follow up:  No follow-ups on file.     There are no Patient Instructions on file for this visit.    Humberto Wells DO  06/03/24  17:23 EDT

## 2024-06-04 ENCOUNTER — REFERRAL TRIAGE (OUTPATIENT)
Age: 74
End: 2024-06-04
Payer: MEDICARE

## 2024-06-04 RX ORDER — GLYBURIDE 1.25 MG/1
1.25 TABLET ORAL
Qty: 90 TABLET | Refills: 0 | Status: SHIPPED | OUTPATIENT
Start: 2024-06-04

## 2024-06-06 ENCOUNTER — PATIENT OUTREACH (OUTPATIENT)
Age: 74
End: 2024-06-06
Payer: MEDICARE

## 2024-06-06 NOTE — OUTREACH NOTE
SW attempted contact with [facility]. SW will attempt again in a couple of business days.     Bong WRIGHT -   Ambulatory Case Management    6/6/2024, 11:40 EDT

## 2024-06-10 ENCOUNTER — PATIENT OUTREACH (OUTPATIENT)
Age: 74
End: 2024-06-10
Payer: MEDICARE

## 2024-06-10 NOTE — OUTREACH NOTE
Social Work Assessment  Questions/Answers      Flowsheet Row Most Recent Value   Referral Source physician   Reason for Consult community resources, financial concerns, housing concerns/homeless   Preferred Language English   People in Home alone   Current Living Arrangements home, not steady   Potentially Unsafe Housing Conditions none   In the past 12 months has the electric, gas, oil, or water company threatened to shut off services in your home? No   Primary Care Provided by self   Provides Primary Care For no one   Family Caregiver if Needed none   Quality of Family Relationships stressful, disruptive   Source of Income pension/long-term   Financial/Environmental Concerns other (see comments)   Application for Public Assistance obtained public assistance pending number        SDOH updated and reviewed with the patient during this program:  --      Disabilities      --      Received from [email or web address]    Employment      Financial Resource Strain: Not on file      --      Received from [email or web address], [facility]    Food Insecurity      --     Health Literacy: Unknown (6/10/2024)    Education     Preferred Language: English      --     Housing Stability: At Risk (6/10/2024)    Housing Stability     Current Living Arrangements: home;not steady     Potentially Unsafe Housing Conditions: none      --     Interpersonal Safety: Not At Risk (10/17/2023)    Abuse Screen     Unsafe at Home or Work/School: no     Feels Threatened by Someone?: no     Does Anyone Keep You from Contacting Others or Doint Things Outside the Home?: no     Physical Sign of Abuse Present: no        --      Received from [email or web address], [facility]    Family and Community Support        --     Tobacco Use: Medium Risk (6/3/2024)    Patient History     Smoking Tobacco Use: Former     Smokeless Tobacco Use: Never     Passive Exposure: Past         --     Utilities: Not At Risk (6/10/2024)    [facility] Utilities     Threatened with loss of utilities: No     Patient Outreach    SW spoke with pt re her referral. Pt stated that she was looking into housing options due to a recent separation from her  and losing home. SW offered to send her a list of income-based housing options for her county. Pt expressed thanks for this. SW also discussed that the site [employer] may be helpful in finding an interim room for rent. SW and pt discussed that she has been in contact with , applied for SNAP, is applying for Extra Help program, and intends to pursue SSI benefits. Pt son is also ill and pt was emotional on phone call. Pt reported she see's a therapist. SW suggested to call the clinic and inquire about urgent sessions for support. Pt agreeable to this plan. SW will F/u next month to make sure she received mailed materials.     Bong WRIGHT -   Ambulatory Case Management    6/10/2024, 14:43 EDT

## 2024-06-18 ENCOUNTER — TELEPHONE (OUTPATIENT)
Dept: FAMILY MEDICINE CLINIC | Facility: CLINIC | Age: 74
End: 2024-06-18
Payer: MEDICARE

## 2024-06-18 ENCOUNTER — PATIENT OUTREACH (OUTPATIENT)
Age: 74
End: 2024-06-18
Payer: MEDICARE

## 2024-06-18 NOTE — OUTREACH NOTE
Patient Outreach    SW spoke with pt to F/u on prior linkages and offer any additional assistance needed. Pt stated that in the interim of receiving the list of apartments mailed to her, she has found a place to live. Pt stated she has got her SNAP benefits approved and had an interview with the SSA yesterday and is waiting on a determination. SW inquired about further resources needed, but pt reported none at this time. Pt was in good spirits during the call. SW will D/c due to initial outreach goal being met.     Bong WRIGHT -   Ambulatory Case Management    6/18/2024, 11:05 EDT

## 2024-06-21 ENCOUNTER — TELEPHONE (OUTPATIENT)
Dept: FAMILY MEDICINE CLINIC | Facility: CLINIC | Age: 74
End: 2024-06-21
Payer: MEDICARE

## 2024-06-21 NOTE — TELEPHONE ENCOUNTER
I contacted patient today due to receiving an order from [facility] for Cardiac Clearance to hold the Xarelto (3 days prior and 24 hours after) Patient advised that Dr. Shirley stopped prescribing her Xarelto. Patient had canceled her 6/28 appointment. Will she still need cardiac clearance in order to hold this medication? She would like a call back after this matter is resolved.

## 2024-06-25 DIAGNOSIS — K58.1 IRRITABLE BOWEL SYNDROME WITH CONSTIPATION: ICD-10-CM

## 2024-06-26 ENCOUNTER — TELEPHONE (OUTPATIENT)
Dept: FAMILY MEDICINE CLINIC | Facility: CLINIC | Age: 74
End: 2024-06-26
Payer: MEDICARE

## 2024-06-26 ENCOUNTER — ANTICOAGULATION VISIT (OUTPATIENT)
Dept: FAMILY MEDICINE CLINIC | Facility: CLINIC | Age: 74
End: 2024-06-26
Payer: MEDICARE

## 2024-06-26 RX ORDER — FLUTICASONE FUROATE, UMECLIDINIUM BROMIDE AND VILANTEROL TRIFENATATE 200; 62.5; 25 UG/1; UG/1; UG/1
POWDER RESPIRATORY (INHALATION)
Qty: 60 EACH | Refills: 2 | Status: SHIPPED | OUTPATIENT
Start: 2024-06-26

## 2024-06-26 RX ORDER — LINACLOTIDE 145 UG/1
145 CAPSULE, GELATIN COATED ORAL
Qty: 90 CAPSULE | Refills: 1 | Status: SHIPPED | OUTPATIENT
Start: 2024-06-26

## 2024-07-01 ENCOUNTER — TELEPHONE (OUTPATIENT)
Dept: FAMILY MEDICINE CLINIC | Facility: CLINIC | Age: 74
End: 2024-07-01

## 2024-07-01 NOTE — TELEPHONE ENCOUNTER
Caller: [facility]    Relationship: Other    Best call back number: [phone]    What form or medical record are you requesting: CARDIAC CLEARANCE    Who is requesting this form or medical record from you: [facility]    How would you like to receive the form or medical records (pick-up, mail, fax): FAX  If fax, what is the fax number: [phone]  If mail, what is the address:   If pick-up, provide patient with address and location details    Timeframe paperwork needed: AS SOON AS POSSIBLE    Additional notes:

## 2024-07-03 NOTE — TELEPHONE ENCOUNTER
Caller: [facility]    Relationship to patient: Other    Best call back number: [phone]     Patient is needing: CAYETANO WITH [facility] CALLED TO CHECK IN ON THE STATUS OF THIS PAPERWORK    PLEASE ADVISE

## 2024-07-03 NOTE — TELEPHONE ENCOUNTER
CAYETANO RETURNED A CALL TO [place]. SHE IS ASKING ABOUT GETTING A CARDIAC CLEARANCE FOR THIS PATIENT. SHE SAYS SHE SPOKE WITH SOMEONE RECENTLY AND WE WERE GOING TO HAVE PATIENT COME IN FOR A PREOP APPT. PLEASE GIVE HER A CALL BACK ASAP.

## 2024-07-08 NOTE — TELEPHONE ENCOUNTER
CAYETANO CALLED AGAIN TODAY TO SPEAK WITH [facility]UA[facility]. I FOUND THE FORM IN DR. SOLIS BOX. IT HAD BEEN PUT IN THE SCAN PILE, THEN RETURNED BACK TO HIS BOX (I GUESS SOMEONE WAS UNSURE WHAT TO DO WITH IT). I AM SENDING MGR ROGELIO, A MESSAGE TO SEE IF WE CAN WORK PATIENT IN FOR PRE-OP APPT. I CALLED PATIENT AND SHE HAS SEVERAL APPOINTMENTS TO GO TO, BUT HOPING TO COME IN ON 7/15 (SHE ALSO HAS A 2:45 APPT WITH [facility] THAT DAY)

## 2024-07-08 NOTE — TELEPHONE ENCOUNTER
LM FOR CAYETANO THAT PRE-OP APPT WAS SCHEDULED FOR 7/15/24 AT 3:15PM WITH DR. SOLIS. (I CALLED PATIENT AND CONFIRMED APPT WITH HER ALSO).

## 2024-07-15 ENCOUNTER — OFFICE VISIT (OUTPATIENT)
Dept: BEHAVIORAL HEALTH | Facility: CLINIC | Age: 74
End: 2024-07-15
Payer: MEDICARE

## 2024-07-15 ENCOUNTER — OFFICE VISIT (OUTPATIENT)
Dept: FAMILY MEDICINE CLINIC | Facility: CLINIC | Age: 74
End: 2024-07-15
Payer: MEDICARE

## 2024-07-15 VITALS
DIASTOLIC BLOOD PRESSURE: 82 MMHG | TEMPERATURE: 97.2 F | SYSTOLIC BLOOD PRESSURE: 132 MMHG | HEIGHT: 67 IN | BODY MASS INDEX: 37.67 KG/M2 | HEART RATE: 96 BPM | WEIGHT: 240 LBS | RESPIRATION RATE: 16 BRPM | OXYGEN SATURATION: 98 %

## 2024-07-15 VITALS
HEART RATE: 62 BPM | SYSTOLIC BLOOD PRESSURE: 116 MMHG | OXYGEN SATURATION: 98 % | DIASTOLIC BLOOD PRESSURE: 72 MMHG | HEIGHT: 67 IN | WEIGHT: 240 LBS | BODY MASS INDEX: 37.67 KG/M2

## 2024-07-15 DIAGNOSIS — M75.41 IMPINGEMENT SYNDROME OF RIGHT SHOULDER: Chronic | ICD-10-CM

## 2024-07-15 DIAGNOSIS — Z79.82 LONG TERM (CURRENT) USE OF ASPIRIN: ICD-10-CM

## 2024-07-15 DIAGNOSIS — Z79.01 CHRONIC ANTICOAGULATION: Chronic | ICD-10-CM

## 2024-07-15 DIAGNOSIS — J44.89 CHRONIC OBSTRUCTIVE BRONCHITIS: ICD-10-CM

## 2024-07-15 DIAGNOSIS — E11.65 TYPE 2 DIABETES MELLITUS WITH HYPERGLYCEMIA, WITHOUT LONG-TERM CURRENT USE OF INSULIN: Chronic | ICD-10-CM

## 2024-07-15 DIAGNOSIS — M75.101 RIGHT ROTATOR CUFF TEAR ARTHROPATHY: ICD-10-CM

## 2024-07-15 DIAGNOSIS — F33.0 MILD EPISODE OF RECURRENT MAJOR DEPRESSIVE DISORDER: Primary | ICD-10-CM

## 2024-07-15 DIAGNOSIS — M12.811 RIGHT ROTATOR CUFF TEAR ARTHROPATHY: ICD-10-CM

## 2024-07-15 DIAGNOSIS — E11.44 DIABETIC AMYOTROPHY ASSOCIATED WITH TYPE 2 DIABETES MELLITUS: ICD-10-CM

## 2024-07-15 DIAGNOSIS — I10 ESSENTIAL HYPERTENSION: Primary | ICD-10-CM

## 2024-07-15 DIAGNOSIS — M51.36 OTHER INTERVERTEBRAL DISC DEGENERATION, LUMBAR REGION: ICD-10-CM

## 2024-07-15 PROCEDURE — 96372 THER/PROPH/DIAG INJ SC/IM: CPT | Performed by: FAMILY MEDICINE

## 2024-07-15 PROCEDURE — 1125F AMNT PAIN NOTED PAIN PRSNT: CPT | Performed by: FAMILY MEDICINE

## 2024-07-15 PROCEDURE — 3075F SYST BP GE 130 - 139MM HG: CPT | Performed by: FAMILY MEDICINE

## 2024-07-15 PROCEDURE — 3079F DIAST BP 80-89 MM HG: CPT | Performed by: FAMILY MEDICINE

## 2024-07-15 PROCEDURE — 3074F SYST BP LT 130 MM HG: CPT

## 2024-07-15 PROCEDURE — 3051F HG A1C>EQUAL 7.0%<8.0%: CPT | Performed by: FAMILY MEDICINE

## 2024-07-15 PROCEDURE — 99214 OFFICE O/P EST MOD 30 MIN: CPT | Performed by: FAMILY MEDICINE

## 2024-07-15 PROCEDURE — 90792 PSYCH DIAG EVAL W/MED SRVCS: CPT

## 2024-07-15 PROCEDURE — 3078F DIAST BP <80 MM HG: CPT

## 2024-07-15 RX ORDER — BUPROPION HYDROCHLORIDE 150 MG/1
150 TABLET ORAL EVERY MORNING
Qty: 30 TABLET | Refills: 1 | Status: SHIPPED | OUTPATIENT
Start: 2024-07-15

## 2024-07-15 RX ORDER — KETOROLAC TROMETHAMINE 30 MG/ML
30 INJECTION, SOLUTION INTRAMUSCULAR; INTRAVENOUS ONCE
Status: COMPLETED | OUTPATIENT
Start: 2024-07-15 | End: 2024-07-15

## 2024-07-15 RX ORDER — METHYLPREDNISOLONE ACETATE 40 MG/ML
40 INJECTION, SUSPENSION INTRA-ARTICULAR; INTRALESIONAL; INTRAMUSCULAR; SOFT TISSUE ONCE
Status: COMPLETED | OUTPATIENT
Start: 2024-07-15 | End: 2024-07-15

## 2024-07-15 RX ORDER — BUPROPION HYDROCHLORIDE 150 MG/1
150 TABLET ORAL EVERY MORNING
Qty: 30 TABLET | Refills: 1 | Status: SHIPPED | OUTPATIENT
Start: 2024-07-15 | End: 2024-07-15 | Stop reason: SDUPTHER

## 2024-07-15 RX ORDER — GABAPENTIN 600 MG/1
600 TABLET ORAL 2 TIMES DAILY
Qty: 60 TABLET | Refills: 0 | Status: SHIPPED | OUTPATIENT
Start: 2024-07-15

## 2024-07-15 RX ADMIN — METHYLPREDNISOLONE ACETATE 40 MG: 40 INJECTION, SUSPENSION INTRA-ARTICULAR; INTRALESIONAL; INTRAMUSCULAR; SOFT TISSUE at 17:21

## 2024-07-15 RX ADMIN — KETOROLAC TROMETHAMINE 30 MG: 30 INJECTION, SOLUTION INTRAMUSCULAR; INTRAVENOUS at 17:20
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
## 2024-07-15 NOTE — PROGRESS NOTES
Established Patient        Chief Complaint:   Chief Complaint   Patient presents with    Pre-op Exam     Pt is here for cardiac clearance for a surgery and would like some gabapentin for pain     Med Refill     Pt is requesting Gabapentin 600 mg for pain from a fall.         Shannon Servin is a 74 y.o. female    History of Present Illness:     MRI planned for 7/18/24; will see Dr. Dangelo in f/u on 7/19/24; with surgical planning/scheduling for spinal cord stimulator soon there after.      Subjective     The following portions of the patient's history were reviewed and updated as appropriate: allergies, current medications, past family history, past medical history, past social history, past surgical history and problem list.    Allergies   Allergen Reactions    Metformin Hallucinations     Other reaction(s): Hallucinations     Review of Systems:    Constitutional: Negative for fever. Negative for chills, diaphoresis; malaise/fatigue of chronic nature, and without unexpected weight change.   HENT: No dysphagia; no changes to vision/hearing/smell/taste; no epistaxis  Eyes: Negative for redness and visual disturbance.   Respiratory: Chronic cough, history of hemoptysis.  Cardiovascular: Negative for chest pain and palpitations.   Gastrointestinal: Denies BRB/BTS.  No abdominal discomfort.  Endocrine: Negative for cold intolerance and heat intolerance.   Genitourinary: Negative for difficulty urinating, dysuria and frequency.   Musculoskeletal: Chronic arthralgias, back pain and myalgias.  Significant limitation to range of motion of right shoulder.  Painful paresthesia to right thumb as per above.  Skin: Chronic thickened/yellow discoloration to the numerous toes of bilateral feet, worse to the bilateral great toes.  Pain on palpation of the right calcaneal pad.  Bakari/Roth sign negative.  Noted wound to right heel.  Neurological: Negative for syncope, weakness.  As per above  "otherwise.  Hematological: Negative for adenopathy. Does not bruise/bleed easily.   Psychiatric/Behavioral: Negative for confusion. The patient is not nervous/anxious.    Objective     Physical Exam   Vital Signs: /82   Pulse 96   Temp 97.2 °F (36.2 °C) (Temporal)   Resp 16   Ht 170.2 cm (67\")   Wt 109 kg (240 lb)   SpO2 98%   BMI 37.59 kg/m²     General Appearance: alert, oriented x 3, no acute distress.  Pleasant and interactive during questioning/examination.  Well-nourished and developed female.  Skin: warm and dry.  Stasis dermatitis noted additionally.  There is a noted small eschar to the calcaneal area of the right heel.  Does appear localized only to the superficial most aspect.  No subcutaneous fat exposure.  HEENT: Atraumatic.  pupils round and reactive to light and accommodation, oral mucosa pink and moist.  Nares patent without epistaxis.  External auditory canals are patent tympanic membranes intact.  Neck: supple, no JVD, trachea midline.  No thyromegaly  Lungs: Rhonchus, referred, upper airway congestion, cleared with cough, unlabored breathing effort.  Audible air exchange noted all lung fields.  Expiratory wheezes throughout.  Prolonged expiratory phase bilaterally.  Heart: RRR, normal S1 and S2, no S3, no rub.  Abdomen: soft, non-tender, no palpable bladder, present bowel sounds to auscultation ×4.  No guarding or rigidity.  No CVA tenderness.  Extremities: no clubbing, cyanosis.  Symmetric muscle strength and development.  Nonpitting edematous changes noted to the mid tibias bilaterally, brawny changes associated with venous stasis, chronic in nature.  Ambulates independently.  Impaired range of motion to right shoulder.  Apley scratch test limited due to discomfort of the right shoulder, normal deltoid tone/development.  Negative pushoff, positive empty can sign, as well as shrug sign.  Zuniga/Neer painful, cross body abduction painful as well.  Symmetric  strength bilaterally. "  De Quervain's test positive to the right hand.  Neuro: normal speech and mental status.  Cranial nerves II through XII intact.  No anosmia. DTR 2+; proprioception intact.  No focal motor/sensory deficits.        Assessment and Plan      Assessment/Plan:   Diagnoses and all orders for this visit:    1. Essential hypertension (Primary)    2. Chronic anticoagulation    3. Type 2 diabetes mellitus with hyperglycemia, without long-term current use of insulin    4. Diabetic amyotrophy associated with type 2 diabetes mellitus    5. Chronic obstructive bronchitis    6. Other intervertebral disc degeneration, lumbar region  -     gabapentin (NEURONTIN) 600 MG tablet; Take 1 tablet by mouth 2 (Two) Times a Day.  Dispense: 60 tablet; Refill: 0    7. Long term (current) use of aspirin    8. Impingement syndrome of right shoulder  -     gabapentin (NEURONTIN) 600 MG tablet; Take 1 tablet by mouth 2 (Two) Times a Day.  Dispense: 60 tablet; Refill: 0    9. Right rotator cuff tear arthropathy  -     gabapentin (NEURONTIN) 600 MG tablet; Take 1 tablet by mouth 2 (Two) Times a Day.  Dispense: 60 tablet; Refill: 0      Once surgical date is established, will plan on holding Xarelto two full days prior to date of surgery, may resume immediately afterwards.    VSS, BP and HR @ goal; appears HD asymptomatic; no signs of unstable angina.    No active signs of bleeding.    Ketoralac 30 mg IM and Depomedrol 40 mg IM today in office.    Aspirin to be held for 5 full days prior to surgery, may be resumed immediately afterwards.    Discussion Summary:    Discussed plan of care in detail with pt today; pt verb understanding and agrees.    I spent 35 minutes caring for Shannon on this date of service. This time includes time spent by me in the following activities:preparing for the visit, performing a medically appropriate examination and/or evaluation , counseling and educating the patient/family/caregiver, ordering medications, tests, or  procedures, documenting information in the medical record, independently interpreting results and communicating that information with the patient/family/caregiver, and care coordination    I have reviewed and updated all copied forward information, as appropriate.  I attest to the accuracy and relevance of any unchanged information.    Follow up:  Return in about 3 months (around 10/15/2024) for Medicare Wellness, Subsequent.     There are no Patient Instructions on file for this visit.    Humberto Wells DO  07/15/24  16:26 EDT

## 2024-07-26 ENCOUNTER — TELEPHONE (OUTPATIENT)
Dept: PULMONOLOGY | Facility: CLINIC | Age: 74
End: 2024-07-26

## 2024-07-26 NOTE — TELEPHONE ENCOUNTER
Hub staff attempted to follow warm transfer process and was unsuccessful     Caller: Shannon Servin    Relationship to patient: Self    Best call back number: [phone]     Patient is needing: PATIENT ACCIDENTLY CANCELLED HER VISIT WITH [facility] ON 8/2/24 VIA TEXT. I TRIED TO RESCHEDULE HER, BUT APPOINTMENT HAS ALREADY BEEN FILLED. IS THERE ANY WAY SHE CAN STILL BE SEEN ON 8/2. PLEASE CALL BACK TO ADVISE WITH SCHEDULING.

## 2024-07-29 DIAGNOSIS — K58.1 IRRITABLE BOWEL SYNDROME WITH CONSTIPATION: ICD-10-CM

## 2024-07-30 ENCOUNTER — OFFICE VISIT (OUTPATIENT)
Dept: BEHAVIORAL HEALTH | Facility: CLINIC | Age: 74
End: 2024-07-30
Payer: MEDICARE

## 2024-07-30 DIAGNOSIS — F43.9 TRAUMA AND STRESSOR-RELATED DISORDER: ICD-10-CM

## 2024-07-30 DIAGNOSIS — F43.23 ADJUSTMENT DISORDER WITH MIXED ANXIETY AND DEPRESSED MOOD: Primary | ICD-10-CM

## 2024-07-30 DIAGNOSIS — R45.4 ANGER: ICD-10-CM

## 2024-07-31 ENCOUNTER — TELEPHONE (OUTPATIENT)
Dept: PULMONOLOGY | Facility: CLINIC | Age: 74
End: 2024-07-31
Payer: MEDICARE
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
## 2024-07-31 NOTE — TELEPHONE ENCOUNTER
Called patient to reschedule appt from 08/02/2024 to 08/16/2024 due to provider being out of office.  Appt reminder mailed.

## 2024-08-16 ENCOUNTER — OFFICE VISIT (OUTPATIENT)
Dept: PULMONOLOGY | Facility: CLINIC | Age: 74
End: 2024-08-16
Payer: MEDICARE

## 2024-08-16 VITALS
RESPIRATION RATE: 18 BRPM | OXYGEN SATURATION: 94 % | DIASTOLIC BLOOD PRESSURE: 78 MMHG | BODY MASS INDEX: 37.67 KG/M2 | SYSTOLIC BLOOD PRESSURE: 134 MMHG | HEART RATE: 55 BPM | WEIGHT: 240 LBS | HEIGHT: 67 IN

## 2024-08-16 DIAGNOSIS — G47.33 OSA (OBSTRUCTIVE SLEEP APNEA): ICD-10-CM

## 2024-08-16 DIAGNOSIS — J44.9 CHRONIC OBSTRUCTIVE PULMONARY DISEASE, UNSPECIFIED COPD TYPE: Primary | ICD-10-CM

## 2024-08-16 PROCEDURE — 3078F DIAST BP <80 MM HG: CPT | Performed by: NURSE PRACTITIONER

## 2024-08-16 PROCEDURE — 3075F SYST BP GE 130 - 139MM HG: CPT | Performed by: NURSE PRACTITIONER

## 2024-08-16 PROCEDURE — 99213 OFFICE O/P EST LOW 20 MIN: CPT | Performed by: NURSE PRACTITIONER

## 2024-08-16 RX ORDER — HYDROCODONE BITARTRATE AND ACETAMINOPHEN 5; 325 MG/1; MG/1
TABLET ORAL
COMMUNITY

## 2024-08-16 RX ORDER — NEOMYCIN SULFATE, POLYMYXIN B SULFATE, AND DEXAMETHASONE 3.5; 10000; 1 MG/G; [USP'U]/G; MG/G
OINTMENT OPHTHALMIC
COMMUNITY
Start: 2024-07-29

## 2024-08-16 RX ORDER — FUROSEMIDE 20 MG
1 TABLET ORAL 2 TIMES DAILY
COMMUNITY

## 2024-08-16 RX ORDER — PREDNISONE 20 MG/1
2 TABLET ORAL DAILY
COMMUNITY
End: 2024-08-27

## 2024-08-16 RX ORDER — LIFITEGRAST 50 MG/ML
SOLUTION/ DROPS OPHTHALMIC
COMMUNITY
Start: 2024-07-29 | End: 2024-08-29

## 2024-08-16 RX ORDER — AMLODIPINE BESYLATE 5 MG/1
1 TABLET ORAL DAILY
COMMUNITY

## 2024-08-16 NOTE — PROGRESS NOTES
Follow Up Office Visit      Patient Name: Shannon Servin    Chief Complaint:    Chief Complaint   Patient presents with    Breathing Problem    Follow-up       History of Present Illness: Shannon Servin is a 74 y.o. female who is here today for follow up of COPD.  Since last visit, she has had stable symptoms with change from Trelegy to Stiolto.  She has not been needing to use her short acting beta agonist.  Continues on NIV nightly.    Associated Symptoms: Exertional dyspnea, chronic dry cough, occasional wheezing, no fevers, no hemoptysis  Modifying Factors: exertion and when she talks a lot  Supplemental Oxygen: No    Subjective      Review of Systems:  Review of Systems   Constitutional:  Negative for fever and unexpected weight change.   Respiratory:  Positive for cough, shortness of breath and wheezing.    Cardiovascular:  Negative for chest pain and leg swelling.        Past Medical History:   Past Medical History:   Diagnosis Date    Afib     Arthritis     Asthma COPD    Colon polyp 2021    COPD (chronic obstructive pulmonary disease) april 2022 ?????    Depression     Diabetes mellitus     Edema     Elevated cholesterol     Fibromyalgia, primary 2020    GERD (gastroesophageal reflux disease) 9-    History of recurrent UTIs     Hypertension     Impaired functional mobility, balance, gait, and endurance     Kidney calculi     Low back pain     Migraines     Pneumonia 1995    Seasonal allergies     Sleep apnea     Urinary incontinence     Urinary tract infection worst-2020    had them all my life       Past Surgical History:   Past Surgical History:   Procedure Laterality Date    ANGIOPLASTY AORTIC N/A 11/30/2022    Procedure: MESENTERIC ANGIOGRAM WITH INFERIOR MESENTERIC ARTERY STENT;  Surgeon: Parker Shah MD;  Location: [facility];  Service: Vascular;  Laterality: N/A;  FLUORO 6 min 30 sec  DOSE 691mGy  CONTRAST 35ml     COLON SURGERY  colonoscopy in 2021    COLONOSCOPY N/A  2021    Procedure: COLONOSCOPY WITH BIOPSY, COLD SNARE POLYPECTOMY, HOT SNARE POLYPECTOMY;  Surgeon: Cam Parra MD;  Location: [facility] ENDOSCOPY;  Service: Gastroenterology;  Laterality: N/A;    CYSTOSCOPY      HERNIA REPAIR      HYSTERECTOMY      INTERSTIM PLACEMENT N/A 2023    Procedure: INTERSTIM STAGES 1 AND 2 LEAD AND GENERATOR PLACEMENT;  Surgeon: Marek Saab MD;  Location: [facility] OR;  Service: Urology;  Laterality: N/A;    KIDNEY STONE SURGERY      OVARIAN CYST REMOVAL      SUBTOTAL HYSTERECTOMY  ????    TOTAL ABDOMINAL HYSTERECTOMY WITH SALPINGO OOPHORECTOMY      URETEROSCOPY LASER LITHOTRIPSY WITH STENT INSERTION Left 2020    Procedure: URETEROSCOPY, LEFT RETROGRADE PYLEOGERAM WITH STENT INSERTION;  Surgeon: Mulugeta Saldana MD;  Location: [facility] OR;  Service: Urology;  Laterality: Left;       Family History:   Family History   Problem Relation Age of Onset    Cancer Other     Diabetes Other     Migraines Other     Alcohol abuse Father         1    Diabetes Father     Cancer Father        Social History:   Social History     Socioeconomic History    Marital status:    Tobacco Use    Smoking status: Former     Current packs/day: 0.00     Average packs/day: 1.5 packs/day for 33.2 years (49.7 ttl pk-yrs)     Types: Cigarettes     Start date: 1965     Quit date: 3/1/1998     Years since quittin.5     Passive exposure: Past    Smokeless tobacco: Never   Vaping Use    Vaping status: Never Used   Substance and Sexual Activity    Alcohol use: Yes     Comment: maybe twice a month    Drug use: Never    Sexual activity: Not Currently     Partners: Male     Birth control/protection: Hysterectomy     Comment: hysterectomy       Current Medications:     Current Outpatient Medications:     amLODIPine (NORVASC) 5 MG tablet, Take 1 tablet by mouth Daily., Disp: , Rfl:     atorvastatin (LIPITOR) 80 MG tablet, Take 1 tablet by mouth Daily., Disp: 90  tablet, Rfl: 3    buPROPion XL (Wellbutrin XL) 150 MG 24 hr tablet, Take 1 tablet by mouth Every Morning., Disp: 30 tablet, Rfl: 1    Docusate Calcium (STOOL SOFTENER PO), Take 240 mg by mouth Daily., Disp: , Rfl:     enalapril (VASOTEC) 20 MG tablet, Take 1 tablet by mouth Every 12 (Twelve) Hours., Disp: 180 tablet, Rfl: 3    furosemide (LASIX) 20 MG tablet, Take 1 tablet by mouth 2 (Two) Times a Day., Disp: , Rfl:     gabapentin (NEURONTIN) 600 MG tablet, Take 1 tablet by mouth 2 (Two) Times a Day., Disp: 60 tablet, Rfl: 0    glucose blood (Accu-Chek Magdalena Plus) test strip, 1 each by Other route 2 (Two) Times a Day. Use as instructed, Disp: 200 each, Rfl: 1    glucose blood test strip, 1 each by Other route 2 (Two) Times a Day., Disp: 100 each, Rfl: 5    glucose monitor monitoring kit, Use 1 each As Needed (blood glucose monitoring)., Disp: 1 each, Rfl: 0    glyburide (DIAbeta) 1.25 MG tablet, TAKE 1 TABLET BY MOUTH DAILY WITH BREAKFAST., Disp: 90 tablet, Rfl: 0    HYDROcodone-acetaminophen (NORCO) 5-325 MG per tablet, TAKE 1 TABLET EVERTY 6 HOURS AS NEEDED FOR PAIN, Disp: , Rfl:     ipratropium-albuterol (COMBIVENT RESPIMAT)  MCG/ACT inhaler, Inhale 1 puff 4 (Four) Times a Day As Needed for Wheezing or Shortness of Air., Disp: 1 g, Rfl: 5    ipratropium-albuterol (DUO-NEB) 0.5-2.5 mg/3 ml nebulizer, Inhale 1 vial (3 mL) by nebulization Every 6 (Six) Hours As Needed for Wheezing or Shortness of Air., Disp: 360 mL, Rfl: 1    Lancets misc, Use 1 Units 2 (Two) Times a Day., Disp: 200 each, Rfl: 1    linaclotide (Linzess) 145 MCG capsule capsule, Take 1 capsule by mouth Every Morning Before Breakfast., Disp: 90 capsule, Rfl: 1    Mirabegron ER (MYRBETRIQ) 50 MG tablet sustained-release 24 hour 24 hr tablet, Take 50 mg by mouth Daily., Disp: 30 tablet, Rfl: 0    montelukast (SINGULAIR) 10 MG tablet, Take 1 tablet by mouth Daily., Disp: 90 tablet, Rfl: 1    neomycin-polymyxin-dexamethamethasone (POLYDEX)  3.5-16002-9.1 ointment ophthalmic ointment, , Disp: , Rfl:     ondansetron (Zofran) 4 MG tablet, Take 1 tablet by mouth Every 8 (Eight) Hours As Needed for Nausea or Vomiting., Disp: 270 tablet, Rfl: 0    oxyCODONE (Roxicodone) 5 MG immediate release tablet, Take 1 tablet by mouth Every 8 (Eight) Hours As Needed for Moderate Pain., Disp: 45 tablet, Rfl: 0    pantoprazole (PROTONIX) 20 MG EC tablet, Take 1 tablet by mouth Daily., Disp: 90 tablet, Rfl: 3    pregabalin (LYRICA) 100 MG capsule, Take 1 capsule by mouth 3 (Three) Times a Day., Disp: 90 capsule, Rfl: 2    promethazine (PHENERGAN) 25 MG tablet, Take 1 tablet by mouth Every 6 (Six) Hours As Needed., Disp: , Rfl:     rivaroxaban (XARELTO) 20 MG tablet, Take 1 tablet by mouth Daily., Disp: 90 tablet, Rfl: 3    rOPINIRole (REQUIP) 0.5 MG tablet, Take 1 tablet by mouth Every Night., Disp: 90 tablet, Rfl: 3    Santyl 250 UNIT/GM ointment, , Disp: , Rfl:     senna (SENOKOT) 8.6 MG tablet, Take 1 tablet by mouth Daily., Disp: , Rfl:     sotalol (BETAPACE) 80 MG tablet, Take 1 tablet by mouth Every 12 (Twelve) Hours., Disp: 180 tablet, Rfl: 3    spironolactone (ALDACTONE) 25 MG tablet, Take 2 tablets by mouth 2 (Two) Times a Day., Disp: 180 tablet, Rfl: 3    tiotropium bromide-olodaterol (STIOLTO RESPIMAT) 2.5-2.5 MCG/ACT aerosol solution inhaler, Inhale 2 puffs Daily., Disp: 1 each, Rfl: 5    tiZANidine (ZANAFLEX) 4 MG tablet, Take 1 tablet by mouth At Night As Needed for Muscle Spasms., Disp: 90 tablet, Rfl: 3    traZODone (DESYREL) 50 MG tablet, Take 1 tablet by mouth Every Night., Disp: 90 tablet, Rfl: 1    Xiidra 5 % ophthalmic solution, , Disp: , Rfl:     predniSONE (DELTASONE) 20 MG tablet, Take 2 tablets by mouth Daily. (Patient not taking: Reported on 8/16/2024), Disp: , Rfl:     Current Facility-Administered Medications:     cyanocobalamin injection 1,000 mcg, 1,000 mcg, Intramuscular, Q28 Days, Humberto Wells DO, 1,000 mcg at 06/03/22 1702  "    Allergies:   Allergies   Allergen Reactions    Metformin Hallucinations     Other reaction(s): Hallucinations       Objective     Physical Exam:  Vital Signs:   Vitals:    08/16/24 1410   BP: 134/78   Pulse: 55   Resp: 18   SpO2: 94%   Weight: 109 kg (240 lb)   Height: 170.2 cm (67\")     Body mass index is 37.59 kg/m².    Physical Exam  Vitals reviewed.   Constitutional:       General: She is not in acute distress.     Appearance: She is not toxic-appearing.   HENT:      Head: Normocephalic and atraumatic.      Mouth/Throat:      Mouth: Mucous membranes are moist.   Eyes:      Conjunctiva/sclera: Conjunctivae normal.   Cardiovascular:      Rate and Rhythm: Normal rate.      Heart sounds: Normal heart sounds.   Pulmonary:      Effort: Pulmonary effort is normal.      Breath sounds: Normal breath sounds.   Abdominal:      General: There is no distension.      Palpations: Abdomen is soft.   Musculoskeletal:      Cervical back: Neck supple.      Comments: Decondition   Skin:     General: Skin is warm and dry.      Findings: No rash.   Neurological:      General: No focal deficit present.      Mental Status: She is alert and oriented to person, place, and time.   Psychiatric:         Mood and Affect: Mood normal.         Behavior: Behavior normal.       Assessment / Plan      Assessment/Plan:   Diagnoses and all orders for this visit:    1. Chronic obstructive pulmonary disease, unspecified COPD type (Primary)  Stable symptoms.  Continue current inhaled regimen.  Patient declines perform 6-minute walk test today.    2. FLORI (obstructive sleep apnea)  Continues on NIV nightly with good clinical response.       Follow Up:   January 2025 as previously scheduled  The patient was counseled on diagnostic results, risks and benefits of treatment options, risk factor modifications and the importance of treatment compliance. The patient was advised to contact the clinic with concerns or worsening symptoms.     Deepthi" MAICO Burden   Pulmonary Medicine [place]     This document has been electronically signed by MAICO Reveles  August 16, 2024

## 2024-08-19 ENCOUNTER — TELEPHONE (OUTPATIENT)
Dept: FAMILY MEDICINE CLINIC | Facility: CLINIC | Age: 74
End: 2024-08-19
Payer: MEDICARE

## 2024-08-19 DIAGNOSIS — N39.46 MIXED URGE AND STRESS INCONTINENCE: ICD-10-CM

## 2024-08-19 RX ORDER — MIRABEGRON 50 MG/1
50 TABLET, EXTENDED RELEASE ORAL DAILY
Qty: 90 TABLET | Refills: 0 | Status: SHIPPED | OUTPATIENT
Start: 2024-08-19

## 2024-08-19 NOTE — TELEPHONE ENCOUNTER
----- Message from [facility] Hingi sent at 8/17/2024 12:03 PM EDT -----  Regarding: Myrbetriq  Contact: [phone]  Lane. I have taken all of the samples of Myrbetriq that you gave me. If you want me to keep elie.g it then I will need a script   Since I moved I am using [facility] in [place]. Thanks either way.

## 2024-08-21 ENCOUNTER — TELEPHONE (OUTPATIENT)
Dept: FAMILY MEDICINE CLINIC | Facility: CLINIC | Age: 74
End: 2024-08-21
Payer: MEDICARE

## 2024-08-22 ENCOUNTER — TELEPHONE (OUTPATIENT)
Dept: PULMONOLOGY | Facility: CLINIC | Age: 74
End: 2024-08-22
Payer: MEDICARE

## 2024-08-22 NOTE — TELEPHONE ENCOUNTER
Caller: QUEENIE, APRIL    Relationship to patient: [employer]    Best call back number: [phone]    Patient is needing: SENDING A NEW RX FOR VENT

## 2024-08-28 ENCOUNTER — TELEPHONE (OUTPATIENT)
Dept: CARDIOLOGY | Facility: CLINIC | Age: 74
End: 2024-08-28
Payer: MEDICARE

## 2024-08-28 NOTE — TELEPHONE ENCOUNTER
REQUEST FOR CARDIAC CLEARANCE    Caller name: Shannon Servin     Phone Number: [phone]     Surgeon's name: N/A bux pain management IN [place]     Type of planned surgery: pain pump in back, carpal tunnel syndrome    Date of planned surgery: 11/22/24 BUT TRYING TO GET HER IN NEXT MONTH     Type of anesthesia: N/A    Have you been experiencing chest pain or shortness of breath? NO     Is your doctor requesting for you to stop any of your medications prior to your surgery? BLOOD THINNER     Where should we fax the clearance to? N/A, WILL CALL US BACK     LAST SEEN 7/19/23

## 2024-08-29 ENCOUNTER — OFFICE VISIT (OUTPATIENT)
Dept: CARDIOLOGY | Facility: CLINIC | Age: 74
End: 2024-08-29
Payer: MEDICARE

## 2024-08-29 VITALS
HEIGHT: 67 IN | DIASTOLIC BLOOD PRESSURE: 60 MMHG | WEIGHT: 240 LBS | SYSTOLIC BLOOD PRESSURE: 110 MMHG | BODY MASS INDEX: 37.67 KG/M2 | OXYGEN SATURATION: 94 % | HEART RATE: 64 BPM

## 2024-08-29 DIAGNOSIS — Z01.810 PREOPERATIVE CARDIOVASCULAR EXAMINATION: Primary | ICD-10-CM

## 2024-08-29 DIAGNOSIS — J44.9 COPD, SEVERE: ICD-10-CM

## 2024-08-29 DIAGNOSIS — I10 ESSENTIAL HYPERTENSION: ICD-10-CM

## 2024-08-29 DIAGNOSIS — I48.0 PAROXYSMAL ATRIAL FIBRILLATION: ICD-10-CM

## 2024-08-29 DIAGNOSIS — Z79.01 CHRONIC ANTICOAGULATION: Chronic | ICD-10-CM

## 2024-08-29 DIAGNOSIS — E11.65 TYPE 2 DIABETES MELLITUS WITH HYPERGLYCEMIA, WITHOUT LONG-TERM CURRENT USE OF INSULIN: Chronic | ICD-10-CM

## 2024-08-29 NOTE — ASSESSMENT & PLAN NOTE
-Maintaining sinus rhythm with Sotalol therapy  -EKG today noted sinus rhythm with 1st degree AV block similar to prior EKG  -Anticoagulated with Xarelto chronically

## 2024-08-29 NOTE — PROGRESS NOTES
[facility]    Office Consult     Shannon Servin  [id number]  2024    Referred By: No ref. provider found    Chief Complaint   Patient presents with    Follow-up    Pre-op Exam       Subjective     History of Present Illness:   Shannon Servin is a 74 y.o. female who presents to the Cardiology Clinic for preoperative cardiac examination for upcoming elective procedures for pain pump placement, cataract surgery, and carpal tunnel surgery and annual follow up.  The patient has a past medical history of paroxysmal atrial fibrillation anticoagulated with Xarelto, hypertension, hyperlipidemia, and type 2 diabetes mellitus.  She has previously maintained sinus rhythm on Sotalol therapy. She also has previously had a mesenteric artery stent placed by Dr. Dave in .  She states she has been feeling well overall since last visit with several life changes including a recent divorce and moving into a new place living on her own.  She is ambulating with a cane and rollator if needed for longer distances.  She specifically denies any chest pain, shortness of breath different from her baseline, palpitations, or lower extremity edema.  She is trying to lose weight currently and would like to schedule these surgeries as her insurance may change next year. She continues to take her medications as directed without any difficulty.  She completes her ADLs on her own and does not ambulate very far at baseline due to arthritic changes and advanced COPD.    Past Cardiac Testin. Last Coronary Angio: None  2. Prior Stress Testin2023--Left ventricular ejection fraction is normal.  Myocardial perfusion imaging indicates a normal myocardial perfusion study with no evidence of ischemia.  3. Last Echo: 2023--Left ventricular systolic function is normal. Estimated left ventricular EF = 65% Left ventricular ejection fraction appears to be 61 - 65%.  Left ventricular diastolic function is  consistent with (grade I) impaired relaxation.  4. Prior Holter Monitor: 7/2023--Normal monitor study    Review of Systems   Constitutional:  Negative for activity change and fatigue.   Respiratory:  Negative for chest tightness and shortness of breath.    Cardiovascular:  Negative for chest pain, palpitations and leg swelling.   Neurological:  Negative for dizziness.   All other systems reviewed and are negative.       Past Medical History:   Diagnosis Date    Afib     Arthritis     Asthma COPD    Colon polyp 2021    COPD (chronic obstructive pulmonary disease) april 2022 ?????    Depression     Diabetes mellitus     Edema     Elevated cholesterol     Fibromyalgia, primary 2020    GERD (gastroesophageal reflux disease) 9-    History of recurrent UTIs     Hypertension     Impaired functional mobility, balance, gait, and endurance     Kidney calculi     Low back pain     Migraines     Pneumonia 1995    Seasonal allergies     Sleep apnea     Urinary incontinence     Urinary tract infection worst-2020    had them all my life       Past Surgical History:   Procedure Laterality Date    ANGIOPLASTY AORTIC N/A 11/30/2022    Procedure: MESENTERIC ANGIOGRAM WITH INFERIOR MESENTERIC ARTERY STENT;  Surgeon: Parker Shah MD;  Location: [facility];  Service: Vascular;  Laterality: N/A;  FLUORO 6 min 30 sec  DOSE 691mGy  CONTRAST 35ml     COLON SURGERY  colonoscopy in 2021    COLONOSCOPY N/A 06/01/2021    Procedure: COLONOSCOPY WITH BIOPSY, COLD SNARE POLYPECTOMY, HOT SNARE POLYPECTOMY;  Surgeon: Cam Parra MD;  Location: [facility] ENDOSCOPY;  Service: Gastroenterology;  Laterality: N/A;    CYSTOSCOPY  2000's    HERNIA REPAIR  1975    HYSTERECTOMY      INTERSTIM PLACEMENT N/A 01/25/2023    Procedure: INTERSTIM STAGES 1 AND 2 LEAD AND GENERATOR PLACEMENT;  Surgeon: Marek Saab MD;  Location: [facility] OR;  Service: Urology;  Laterality: N/A;    KIDNEY STONE SURGERY  2020    OVARIAN CYST REMOVAL       SUBTOTAL HYSTERECTOMY  ????    TOTAL ABDOMINAL HYSTERECTOMY WITH SALPINGO OOPHORECTOMY      URETEROSCOPY LASER LITHOTRIPSY WITH STENT INSERTION Left 2020    Procedure: URETEROSCOPY, LEFT RETROGRADE PYLEOGERAM WITH STENT INSERTION;  Surgeon: Mulugeta Saldana MD;  Location: [facility];  Service: Urology;  Laterality: Left;       Family History   Problem Relation Age of Onset    Cancer Other     Diabetes Other     Migraines Other     Alcohol abuse Father         1    Diabetes Father     Cancer Father        Social History     Socioeconomic History    Marital status:    Tobacco Use    Smoking status: Former     Current packs/day: 0.00     Average packs/day: 1.5 packs/day for 33.2 years (49.7 ttl pk-yrs)     Types: Cigarettes     Start date: 1965     Quit date: 3/1/1998     Years since quittin.5     Passive exposure: Past    Smokeless tobacco: Never   Vaping Use    Vaping status: Never Used   Substance and Sexual Activity    Alcohol use: Yes     Comment: maybe twice a month    Drug use: Never    Sexual activity: Not Currently     Partners: Male     Birth control/protection: Hysterectomy     Comment: hysterectomy         Current Outpatient Medications:     amLODIPine (NORVASC) 5 MG tablet, Take 1 tablet by mouth Daily., Disp: , Rfl:     atorvastatin (LIPITOR) 80 MG tablet, Take 1 tablet by mouth Daily., Disp: 90 tablet, Rfl: 3    buPROPion XL (Wellbutrin XL) 150 MG 24 hr tablet, Take 1 tablet by mouth Every Morning., Disp: 30 tablet, Rfl: 1    Coenzyme Q10 (CO Q 10 PO), Take  by mouth., Disp: , Rfl:     Docusate Calcium (STOOL SOFTENER PO), Take 240 mg by mouth Daily., Disp: , Rfl:     enalapril (VASOTEC) 20 MG tablet, Take 1 tablet by mouth Every 12 (Twelve) Hours., Disp: 180 tablet, Rfl: 3    furosemide (LASIX) 20 MG tablet, Take 1 tablet by mouth 2 (Two) Times a Day., Disp: , Rfl:     gabapentin (NEURONTIN) 600 MG tablet, Take 1 tablet by mouth 2 (Two) Times a Day., Disp: 60 tablet, Rfl: 0     glucose blood (Accu-Chek Magdalena Plus) test strip, 1 each by Other route 2 (Two) Times a Day. Use as instructed, Disp: 200 each, Rfl: 1    glucose blood test strip, 1 each by Other route 2 (Two) Times a Day., Disp: 100 each, Rfl: 5    glucose monitor monitoring kit, Use 1 each As Needed (blood glucose monitoring)., Disp: 1 each, Rfl: 0    glyburide (DIAbeta) 1.25 MG tablet, TAKE 1 TABLET BY MOUTH DAILY WITH BREAKFAST., Disp: 90 tablet, Rfl: 0    HYDROcodone-acetaminophen (NORCO) 5-325 MG per tablet, TAKE 1 TABLET EVERTY 6 HOURS AS NEEDED FOR PAIN, Disp: , Rfl:     ipratropium-albuterol (COMBIVENT RESPIMAT)  MCG/ACT inhaler, Inhale 1 puff 4 (Four) Times a Day As Needed for Wheezing or Shortness of Air., Disp: 1 g, Rfl: 5    ipratropium-albuterol (DUO-NEB) 0.5-2.5 mg/3 ml nebulizer, Inhale 1 vial (3 mL) by nebulization Every 6 (Six) Hours As Needed for Wheezing or Shortness of Air., Disp: 360 mL, Rfl: 1    Lancets misc, Use 1 Units 2 (Two) Times a Day., Disp: 200 each, Rfl: 1    linaclotide (Linzess) 145 MCG capsule capsule, Take 1 capsule by mouth Every Morning Before Breakfast., Disp: 90 capsule, Rfl: 1    Mirabegron ER (MYRBETRIQ) 50 MG tablet sustained-release 24 hour 24 hr tablet, Take 50 mg by mouth Daily., Disp: 90 tablet, Rfl: 0    montelukast (SINGULAIR) 10 MG tablet, Take 1 tablet by mouth Daily., Disp: 90 tablet, Rfl: 1    neomycin-polymyxin-dexamethamethasone (POLYDEX) 3.5-91492-8.1 ointment ophthalmic ointment, , Disp: , Rfl:     ondansetron (Zofran) 4 MG tablet, Take 1 tablet by mouth Every 8 (Eight) Hours As Needed for Nausea or Vomiting., Disp: 270 tablet, Rfl: 0    oxyCODONE (Roxicodone) 5 MG immediate release tablet, Take 1 tablet by mouth Every 8 (Eight) Hours As Needed for Moderate Pain., Disp: 45 tablet, Rfl: 0    pantoprazole (PROTONIX) 20 MG EC tablet, Take 1 tablet by mouth Daily., Disp: 90 tablet, Rfl: 3    pregabalin (LYRICA) 100 MG capsule, Take 1 capsule by mouth 3 (Three) Times a Day.,  "Disp: 90 capsule, Rfl: 2    Probiotic Product (PROBIOTIC PO), Take  by mouth., Disp: , Rfl:     promethazine (PHENERGAN) 25 MG tablet, Take 1 tablet by mouth Every 6 (Six) Hours As Needed., Disp: , Rfl:     rivaroxaban (XARELTO) 20 MG tablet, Take 1 tablet by mouth Daily., Disp: 90 tablet, Rfl: 3    rOPINIRole (REQUIP) 0.5 MG tablet, Take 1 tablet by mouth Every Night., Disp: 90 tablet, Rfl: 3    Santyl 250 UNIT/GM ointment, , Disp: , Rfl:     senna (SENOKOT) 8.6 MG tablet, Take 1 tablet by mouth Daily., Disp: , Rfl:     sotalol (BETAPACE) 80 MG tablet, Take 1 tablet by mouth Every 12 (Twelve) Hours., Disp: 180 tablet, Rfl: 3    spironolactone (ALDACTONE) 25 MG tablet, Take 2 tablets by mouth 2 (Two) Times a Day., Disp: 180 tablet, Rfl: 3    tiotropium bromide-olodaterol (STIOLTO RESPIMAT) 2.5-2.5 MCG/ACT aerosol solution inhaler, Inhale 2 puffs Daily., Disp: 1 each, Rfl: 5    tiZANidine (ZANAFLEX) 4 MG tablet, Take 1 tablet by mouth At Night As Needed for Muscle Spasms., Disp: 90 tablet, Rfl: 3    traZODone (DESYREL) 50 MG tablet, Take 1 tablet by mouth Every Night., Disp: 90 tablet, Rfl: 1    MAGNESIUM PO, Take  by mouth., Disp: , Rfl:     Xiidra 5 % ophthalmic solution, , Disp: , Rfl:     Current Facility-Administered Medications:     cyanocobalamin injection 1,000 mcg, 1,000 mcg, Intramuscular, Q28 Days, Humberto Wells K, DO, 1,000 mcg at 06/03/22 1706      Allergies   Allergen Reactions    Metformin Hallucinations     Other reaction(s): Hallucinations       Objective     Vitals:    08/29/24 1113   BP: 110/60   BP Location: Left arm   Patient Position: Sitting   Cuff Size: Adult   Pulse: 64   SpO2: 94%   Weight: 109 kg (240 lb)   Height: 170.2 cm (67\")     Body mass index is 37.59 kg/m².    Physical Exam  Vitals and nursing note reviewed.   Constitutional:       General: She is not in acute distress.     Appearance: Normal appearance. She is obese. She is not ill-appearing or toxic-appearing.      Comments: " Ambulating with a cane today   HENT:      Head: Normocephalic.      Mouth/Throat:      Mouth: Mucous membranes are moist.   Eyes:      Pupils: Pupils are equal, round, and reactive to light.   Cardiovascular:      Rate and Rhythm: Normal rate and regular rhythm.      Pulses: Normal pulses.      Heart sounds: Normal heart sounds. No murmur heard.  Pulmonary:      Effort: Pulmonary effort is normal.      Breath sounds: Normal breath sounds. No wheezing, rhonchi or rales.   Musculoskeletal:      Right lower leg: No edema.      Left lower leg: No edema.   Skin:     General: Skin is warm and dry.      Capillary Refill: Capillary refill takes less than 2 seconds.   Neurological:      Mental Status: She is alert and oriented to person, place, and time. Mental status is at baseline.   Psychiatric:         Mood and Affect: Mood normal.         Behavior: Behavior normal.         Thought Content: Thought content normal.         Results Review:   I reviewed the patient's new clinical results.      ECG 12 Lead    Date/Time: 8/29/2024 11:48 AM  Performed by: Deepthi Black APRN    Authorized by: Deepthi Black APRN  Comparison: compared with previous ECG   Similar to previous ECG  Rhythm: sinus rhythm  Rate: normal  Conduction: 1st degree AV block  Comments: Low voltage QRS, Borderline EKG          Assessment & Plan  Preoperative cardiovascular examination  -Presents today for preoperative cardiac risk assessment in consideration of undergoing pain pump placement, cataract surgery, and carpal tunnel surgery in the near future  -Previous stent placement to mesenteric artery by Dr. Dave in 2022  -ECG today shows sinus rhythm with 1st degree AV block  -No chest pain or exertional anginal symptoms  -No evidence of decompensated CHF, valvulopathy, or arrhythmia  -The patient is currently at low risk for adverse perioperative cardiac events with a low risk revised cardiac risk index of 0.4%.  Given her baseline functional  status is < 4 METS without anginal symptoms but she had a normal stress test 7/2023, it is reasonable to proceed with the proposed procedure without further cardiac testing or interventions  -Follow up in one year with Dr. Shirley    Paroxysmal atrial fibrillation  -Maintaining sinus rhythm with Sotalol therapy  -EKG today noted sinus rhythm with 1st degree AV block similar to prior EKG  -Anticoagulated with Xarelto chronically    Essential hypertension  -Blood pressure well controlled today  -Continue current medications  Chronic anticoagulation  -On Xarelto  -May hold for 2 days prior to surgery and restart post-op day 1 following surgery    Type 2 diabetes mellitus with hyperglycemia, without long-term current use of insulin  6/2024--A1c-7  -Managed by PCP    COPD, severe  -Followed by Pulmonology who will need to clear the patient prior to this surgery given severe COPD    Body mass index (BMI) 36.0-36.9, adult  -Has lost about 20 pounds recently  -Diet and exercise recommended  -BMI-37      Orders Placed This Encounter   Procedures    ECG 12 Lead        Preventative Cardiology:   Tobacco Cessation: N/A   Advance Care Planning: ACP discussion was held with the patient during this visit. Patient does not have an advance directive, declines further assistance.     Follow Up:   Return in about 1 year (around 8/29/2025) for Next scheduled follow up--Breeding.      Thank you for allowing me to participate in the care of your patient. Please to not hesitate to contact me with additional questions or concerns.     MAICO Jara

## 2024-08-29 NOTE — ASSESSMENT & PLAN NOTE
-Followed by Pulmonology who will need to clear the patient prior to this surgery given severe COPD

## 2024-08-29 NOTE — ASSESSMENT & PLAN NOTE
-Presents today for preoperative cardiac risk assessment in consideration of undergoing pain pump placement, cataract surgery, and carpal tunnel surgery in the near future  -Previous stent placement to mesenteric artery by Dr. Dave in 2022  -ECG today shows sinus rhythm with 1st degree AV block  -No chest pain or exertional anginal symptoms  -No evidence of decompensated CHF, valvulopathy, or arrhythmia  -The patient is currently at low risk for adverse perioperative cardiac events with a low risk revised cardiac risk index of 0.4%.  Given her baseline functional status is < 4 METS without anginal symptoms but she had a normal stress test 7/2023, it is reasonable to proceed with the proposed procedure without further cardiac testing or interventions  -Follow up in one year with Dr. Shirley

## 2024-09-03 RX ORDER — GLYBURIDE 1.25 MG/1
1.25 TABLET ORAL
Qty: 90 TABLET | Refills: 0 | Status: SHIPPED | OUTPATIENT
Start: 2024-09-03

## 2024-09-05 ENCOUNTER — PRE-ADMISSION TESTING (OUTPATIENT)
Dept: PREADMISSION TESTING | Facility: HOSPITAL | Age: 74
End: 2024-09-05
Payer: MEDICARE

## 2024-09-05 VITALS — WEIGHT: 240 LBS | BODY MASS INDEX: 37.67 KG/M2 | HEIGHT: 67 IN

## 2024-09-05 LAB
ANION GAP SERPL CALCULATED.3IONS-SCNC: 10.7 MMOL/L (ref 5–15)
BUN SERPL-MCNC: 14 MG/DL (ref 8–23)
BUN/CREAT SERPL: 14.4 (ref 7–25)
CALCIUM SPEC-SCNC: 10.4 MG/DL (ref 8.6–10.5)
CHLORIDE SERPL-SCNC: 103 MMOL/L (ref 98–107)
CO2 SERPL-SCNC: 25.3 MMOL/L (ref 22–29)
CREAT SERPL-MCNC: 0.97 MG/DL (ref 0.57–1)
DEPRECATED RDW RBC AUTO: 45.8 FL (ref 37–54)
EGFRCR SERPLBLD CKD-EPI 2021: 61.1 ML/MIN/1.73
ERYTHROCYTE [DISTWIDTH] IN BLOOD BY AUTOMATED COUNT: 13.6 % (ref 12.3–15.4)
GLUCOSE SERPL-MCNC: 85 MG/DL (ref 65–99)
HCT VFR BLD AUTO: 46.7 % (ref 34–46.6)
HGB BLD-MCNC: 15.3 G/DL (ref 12–15.9)
MCH RBC QN AUTO: 29.8 PG (ref 26.6–33)
MCHC RBC AUTO-ENTMCNC: 32.8 G/DL (ref 31.5–35.7)
MCV RBC AUTO: 90.9 FL (ref 79–97)
PLATELET # BLD AUTO: 179 10*3/MM3 (ref 140–450)
PMV BLD AUTO: 11.6 FL (ref 6–12)
POTASSIUM SERPL-SCNC: 4.9 MMOL/L (ref 3.5–5.2)
RBC # BLD AUTO: 5.14 10*6/MM3 (ref 3.77–5.28)
SODIUM SERPL-SCNC: 139 MMOL/L (ref 136–145)
WBC NRBC COR # BLD AUTO: 8.33 10*3/MM3 (ref 3.4–10.8)

## 2024-09-05 PROCEDURE — 80048 BASIC METABOLIC PNL TOTAL CA: CPT

## 2024-09-05 PROCEDURE — 36415 COLL VENOUS BLD VENIPUNCTURE: CPT

## 2024-09-05 PROCEDURE — 85027 COMPLETE CBC AUTOMATED: CPT

## 2024-09-05 RX ORDER — AMMONIUM LACTATE 120 MG/G
1 CREAM TOPICAL AS NEEDED
COMMUNITY

## 2024-09-05 RX ORDER — OXYCODONE AND ACETAMINOPHEN 7.5; 325 MG/1; MG/1
1 TABLET ORAL EVERY 4 HOURS PRN
COMMUNITY

## 2024-09-05 RX ORDER — FLUCONAZOLE 150 MG/1
150 TABLET ORAL AS NEEDED
COMMUNITY

## 2024-09-05 RX ORDER — DOCUSATE CALCIUM 240 MG
240 CAPSULE ORAL NIGHTLY
COMMUNITY

## 2024-09-05 NOTE — DISCHARGE INSTRUCTIONS
Pre-Admission testing appointment completed today for patient's upcoming procedure here at [facility].    PAT PASS reviewed with patient and they verbalize understanding of the following:     Do not eat or drink anything after midnight the night before procedure unless otherwise instructed by physician/surgeon's office, this includes no gum, candy, mints, tobacco products or e-cigarettes.  Do not shave the area to be operated on at least 48 hours prior to procedure.  Do not wear makeup, lotion, hair products, or nail polish.  Do not wear any jewelry and remove all piercings.  Do not wear any adhesive if you wear dentures.  Do not wear contacts; bring in glasses if needed.  Only take medications on the morning of procedure as instructed by PAT nurse per anesthesia guidelines or as instructed by physician's office.  If you are on any blood thinners reach out to the physician/surgeon's office for instructions on when/if they will need to be stopped prior to procedure.  Bring in picture ID and insurance card, advanced directive copies if applicable, CPAP/BIPAP/Inhalers if indicated morning of procedure, leave any other valuables at home.  Ensure you have arranged for someone to drive you home the day of your procedure and someone to care for you at home afterwards. It is recommended that you do not drive, drink alcohol, or make any major legal decisions for at least 24 hours after your procedure is complete.  Chlorhexadine sponge along with instruction/information sheet given to patient. Readybath wipes given in lieu of CHG if patient has CHG allergy. Instructed patient to date, time, and initial the verification sheet once skin prep has been completed, and to return to [facility] the day of the procedure.     Introduction to anesthesia video watched by patient during appointment and anesthesia FAQ tip sheet given to patient.  Instructions and information given to patient about parking, hospital entrance, and  registration location.Chlorhexidine sponge along with instruction/verification sheet given to pt.  Instructed pt to date, time, and initial the verification sheet once skin prep has been  completed, and to return to Same Day Surgery the day of the procedure.  Pt. Verbalizes understanding.

## 2024-09-10 ENCOUNTER — ANTICOAGULATION VISIT (OUTPATIENT)
Dept: FAMILY MEDICINE CLINIC | Facility: CLINIC | Age: 74
End: 2024-09-10
Payer: MEDICARE

## 2024-09-10 DIAGNOSIS — F33.0 MILD EPISODE OF RECURRENT MAJOR DEPRESSIVE DISORDER: ICD-10-CM

## 2024-09-10 PROCEDURE — 36416 COLLJ CAPILLARY BLOOD SPEC: CPT | Performed by: FAMILY MEDICINE

## 2024-09-10 PROCEDURE — 85610 PROTHROMBIN TIME: CPT | Performed by: FAMILY MEDICINE

## 2024-09-11 RX ORDER — BUPROPION HYDROCHLORIDE 150 MG/1
150 TABLET ORAL EVERY MORNING
Qty: 30 TABLET | Refills: 1 | Status: SHIPPED | OUTPATIENT
Start: 2024-09-11

## 2024-09-19 ENCOUNTER — ANESTHESIA EVENT (OUTPATIENT)
Dept: PERIOP | Facility: HOSPITAL | Age: 74
End: 2024-09-19
Payer: MEDICARE

## 2024-09-20 ENCOUNTER — ANESTHESIA (OUTPATIENT)
Dept: PERIOP | Facility: HOSPITAL | Age: 74
End: 2024-09-20
Payer: MEDICARE

## 2024-09-20 ENCOUNTER — HOSPITAL ENCOUNTER (OUTPATIENT)
Facility: HOSPITAL | Age: 74
Setting detail: HOSPITAL OUTPATIENT SURGERY
Discharge: HOME | End: 2024-09-20
Attending: ORTHOPAEDIC SURGERY | Admitting: ORTHOPAEDIC SURGERY
Payer: MEDICARE

## 2024-09-20 VITALS
HEART RATE: 60 BPM | OXYGEN SATURATION: 93 % | SYSTOLIC BLOOD PRESSURE: 91 MMHG | RESPIRATION RATE: 16 BRPM | DIASTOLIC BLOOD PRESSURE: 49 MMHG | TEMPERATURE: 97.5 F

## 2024-09-20 LAB — GLUCOSE BLDC GLUCOMTR-MCNC: 124 MG/DL (ref 70–130)

## 2024-09-20 PROCEDURE — 25010000002 MIDAZOLAM PER 1MG: Performed by: NURSE ANESTHETIST, CERTIFIED REGISTERED

## 2024-09-20 PROCEDURE — 25010000002 BUPIVACAINE (PF) 0.5 % SOLUTION: Performed by: ORTHOPAEDIC SURGERY

## 2024-09-20 PROCEDURE — 82948 REAGENT STRIP/BLOOD GLUCOSE: CPT

## 2024-09-20 PROCEDURE — 25810000003 LACTATED RINGERS PER 1000 ML: Performed by: ORTHOPAEDIC SURGERY

## 2024-09-20 PROCEDURE — 25010000002 DEXAMETHASONE PER 1 MG: Performed by: NURSE ANESTHETIST, CERTIFIED REGISTERED

## 2024-09-20 PROCEDURE — 25010000002 PROPOFOL 10 MG/ML EMULSION: Performed by: NURSE ANESTHETIST, CERTIFIED REGISTERED

## 2024-09-20 PROCEDURE — 25010000002 FENTANYL CITRATE (PF) 50 MCG/ML SOLUTION PREFILLED SYRINGE: Performed by: NURSE ANESTHETIST, CERTIFIED REGISTERED

## 2024-09-20 PROCEDURE — 25010000002 CEFAZOLIN PER 500 MG: Performed by: ORTHOPAEDIC SURGERY

## 2024-09-20 PROCEDURE — 25010000002 ONDANSETRON PER 1 MG: Performed by: NURSE ANESTHETIST, CERTIFIED REGISTERED

## 2024-09-20 RX ORDER — CLINDAMYCIN PHOSPHATE 900 MG/50ML
900 INJECTION, SOLUTION INTRAVENOUS ONCE
Status: CANCELLED | OUTPATIENT
Start: 2024-09-20 | End: 2024-09-20

## 2024-09-20 RX ORDER — FENTANYL CITRATE 50 UG/ML
INJECTION, SOLUTION INTRAMUSCULAR; INTRAVENOUS AS NEEDED
Status: DISCONTINUED | OUTPATIENT
Start: 2024-09-20 | End: 2024-09-20 | Stop reason: SURG

## 2024-09-20 RX ORDER — IBUPROFEN 600 MG/1
600 TABLET, FILM COATED ORAL EVERY 6 HOURS PRN
Qty: 60 TABLET | Refills: 0 | Status: SHIPPED | OUTPATIENT
Start: 2024-09-20

## 2024-09-20 RX ORDER — DEXAMETHASONE SODIUM PHOSPHATE 4 MG/ML
INJECTION, SOLUTION INTRA-ARTICULAR; INTRALESIONAL; INTRAMUSCULAR; INTRAVENOUS; SOFT TISSUE AS NEEDED
Status: DISCONTINUED | OUTPATIENT
Start: 2024-09-20 | End: 2024-09-20 | Stop reason: SURG

## 2024-09-20 RX ORDER — PROPOFOL 10 MG/ML
VIAL (ML) INTRAVENOUS AS NEEDED
Status: DISCONTINUED | OUTPATIENT
Start: 2024-09-20 | End: 2024-09-20 | Stop reason: SURG

## 2024-09-20 RX ORDER — IPRATROPIUM BROMIDE AND ALBUTEROL SULFATE 2.5; .5 MG/3ML; MG/3ML
3 SOLUTION RESPIRATORY (INHALATION) ONCE AS NEEDED
Status: DISCONTINUED | OUTPATIENT
Start: 2024-09-20 | End: 2024-09-20 | Stop reason: HOSPADM

## 2024-09-20 RX ORDER — TRANEXAMIC ACID 10 MG/ML
1000 INJECTION, SOLUTION INTRAVENOUS ONCE
Status: CANCELLED | OUTPATIENT
Start: 2024-09-20 | End: 2024-09-20

## 2024-09-20 RX ORDER — ONDANSETRON 2 MG/ML
INJECTION INTRAMUSCULAR; INTRAVENOUS AS NEEDED
Status: DISCONTINUED | OUTPATIENT
Start: 2024-09-20 | End: 2024-09-20 | Stop reason: SURG

## 2024-09-20 RX ORDER — HYDROCODONE BITARTRATE AND ACETAMINOPHEN 5; 325 MG/1; MG/1
1 TABLET ORAL EVERY 6 HOURS PRN
Qty: 20 TABLET | Refills: 0 | Status: SHIPPED | OUTPATIENT
Start: 2024-09-20

## 2024-09-20 RX ORDER — BUPIVACAINE HYDROCHLORIDE 5 MG/ML
INJECTION, SOLUTION EPIDURAL; INTRACAUDAL AS NEEDED
Status: DISCONTINUED | OUTPATIENT
Start: 2024-09-20 | End: 2024-09-20 | Stop reason: HOSPADM

## 2024-09-20 RX ORDER — SODIUM CHLORIDE, SODIUM LACTATE, POTASSIUM CHLORIDE, CALCIUM CHLORIDE 600; 310; 30; 20 MG/100ML; MG/100ML; MG/100ML; MG/100ML
1000 INJECTION, SOLUTION INTRAVENOUS CONTINUOUS
Status: DISCONTINUED | OUTPATIENT
Start: 2024-09-20 | End: 2024-09-20 | Stop reason: HOSPADM

## 2024-09-20 RX ORDER — MIDAZOLAM HYDROCHLORIDE 2 MG/2ML
INJECTION, SOLUTION INTRAMUSCULAR; INTRAVENOUS AS NEEDED
Status: DISCONTINUED | OUTPATIENT
Start: 2024-09-20 | End: 2024-09-20 | Stop reason: SURG

## 2024-09-20 RX ADMIN — FENTANYL CITRATE 25 MCG: 50 INJECTION, SOLUTION INTRAMUSCULAR; INTRAVENOUS at 11:51

## 2024-09-20 RX ADMIN — LIDOCAINE HYDROCHLORIDE 60 MG: 20 INJECTION, SOLUTION INTRAVENOUS at 11:37

## 2024-09-20 RX ADMIN — ONDANSETRON 4 MG: 2 INJECTION INTRAMUSCULAR; INTRAVENOUS at 11:44

## 2024-09-20 RX ADMIN — PROPOFOL 20 MG: 10 INJECTION, EMULSION INTRAVENOUS at 11:51

## 2024-09-20 RX ADMIN — SODIUM CHLORIDE, POTASSIUM CHLORIDE, SODIUM LACTATE AND CALCIUM CHLORIDE 1000 ML: 600; 310; 30; 20 INJECTION, SOLUTION INTRAVENOUS at 11:29

## 2024-09-20 RX ADMIN — PROPOFOL 75 MCG/KG/MIN: 10 INJECTION, EMULSION INTRAVENOUS at 11:40

## 2024-09-20 RX ADMIN — DEXAMETHASONE SODIUM PHOSPHATE 4 MG: 4 INJECTION, SOLUTION INTRA-ARTICULAR; INTRALESIONAL; INTRAMUSCULAR; INTRAVENOUS; SOFT TISSUE at 11:44

## 2024-09-20 RX ADMIN — SODIUM CHLORIDE 2 G: 9 INJECTION, SOLUTION INTRAVENOUS at 11:34

## 2024-09-20 RX ADMIN — MIDAZOLAM HYDROCHLORIDE 1 MG: 1 INJECTION, SOLUTION INTRAMUSCULAR; INTRAVENOUS at 11:37

## 2024-09-20 RX ADMIN — PROPOFOL 40 MG: 10 INJECTION, EMULSION INTRAVENOUS at 11:37

## 2024-09-25 DIAGNOSIS — M12.811 RIGHT ROTATOR CUFF TEAR ARTHROPATHY: ICD-10-CM

## 2024-09-25 DIAGNOSIS — M25.511 ARTHRALGIA OF RIGHT SHOULDER REGION: ICD-10-CM

## 2024-09-25 DIAGNOSIS — F33.0 MILD EPISODE OF RECURRENT MAJOR DEPRESSIVE DISORDER: ICD-10-CM

## 2024-09-25 DIAGNOSIS — N39.46 MIXED URGE AND STRESS INCONTINENCE: ICD-10-CM

## 2024-09-25 DIAGNOSIS — E11.44 DIABETIC AMYOTROPHY ASSOCIATED WITH TYPE 2 DIABETES MELLITUS: ICD-10-CM

## 2024-09-25 DIAGNOSIS — M75.41 IMPINGEMENT SYNDROME OF RIGHT SHOULDER: Chronic | ICD-10-CM

## 2024-09-25 DIAGNOSIS — M75.101 RIGHT ROTATOR CUFF TEAR ARTHROPATHY: ICD-10-CM

## 2024-09-25 RX ORDER — BUPROPION HYDROCHLORIDE 150 MG/1
150 TABLET ORAL EVERY MORNING
Qty: 30 TABLET | Refills: 1 | OUTPATIENT
Start: 2024-09-25

## 2024-09-26 RX ORDER — MIRABEGRON 50 MG/1
50 TABLET, EXTENDED RELEASE ORAL DAILY
Qty: 90 TABLET | Refills: 0 | Status: SHIPPED | OUTPATIENT
Start: 2024-09-26

## 2024-09-26 RX ORDER — PREGABALIN 100 MG/1
100 CAPSULE ORAL 3 TIMES DAILY
Qty: 90 CAPSULE | Refills: 2 | Status: SHIPPED | OUTPATIENT
Start: 2024-09-26

## 2024-10-02 ENCOUNTER — PATIENT MESSAGE (OUTPATIENT)
Dept: FAMILY MEDICINE CLINIC | Facility: CLINIC | Age: 74
End: 2024-10-02
Payer: MEDICARE

## 2024-10-02 DIAGNOSIS — Z79.01 CHRONIC ANTICOAGULATION: Chronic | ICD-10-CM

## 2024-10-02 RX ORDER — MONTELUKAST SODIUM 10 MG/1
10 TABLET ORAL DAILY
Qty: 90 TABLET | Refills: 1 | Status: SHIPPED | OUTPATIENT
Start: 2024-10-02

## 2024-10-02 NOTE — TELEPHONE ENCOUNTER
From: Shannon Servin  To: Humberto Wells  Sent: 10/2/2024 11:13 AM EDT  Subject: Xarelto    I need to get my script of Xarelto transfered to [facility], but since it was filled by [facility], they won't transfer it. Can you write me a new script for Xarelto to [facility]? I would really appreciate it. Thanks

## 2024-10-04 ENCOUNTER — TELEPHONE (OUTPATIENT)
Dept: CARDIOLOGY | Facility: CLINIC | Age: 74
End: 2024-10-04
Payer: MEDICARE

## 2024-10-04 NOTE — TELEPHONE ENCOUNTER
WAI WITH [facility] CALLED IN REGARDS TO GETTING CARDIAC CLEARANCE FOR THIS PT OF DR. CHURCH. SHE ALSO WANTED TO KNOW IF WE COULD PUT IN A RECOMMENDATION IF SHE CAN STOP HER XARELTO OR NOT. PLEASE ADVISE.     PHONE: [phone]  FAX: [phone]

## 2024-10-04 NOTE — TELEPHONE ENCOUNTER
Addressed in appointment with Deepthi Black on 8/29/24. The office visit note was faxed to provider.

## 2024-10-07 ENCOUNTER — TELEPHONE (OUTPATIENT)
Dept: INTERNAL MEDICINE | Facility: CLINIC | Age: 74
End: 2024-10-07
Payer: MEDICARE

## 2024-10-07 NOTE — TELEPHONE ENCOUNTER
Patient wanted Nathalia to know that she appreciates everything she has done for her, but she doesn't feel like her services are needed anymore.  She wanted to make sure that Nathalia knew how much she appreciates her.

## 2024-10-11 ENCOUNTER — HOSPITAL ENCOUNTER (OUTPATIENT)
Age: 74
Discharge: HOME | End: 2024-10-11
Payer: MEDICARE

## 2024-10-11 VITALS
OXYGEN SATURATION: 96 % | HEART RATE: 66 BPM | RESPIRATION RATE: 18 BRPM | DIASTOLIC BLOOD PRESSURE: 71 MMHG | SYSTOLIC BLOOD PRESSURE: 104 MMHG | TEMPERATURE: 97.7 F

## 2024-10-11 VITALS
OXYGEN SATURATION: 98 % | DIASTOLIC BLOOD PRESSURE: 84 MMHG | TEMPERATURE: 97.7 F | RESPIRATION RATE: 18 BRPM | SYSTOLIC BLOOD PRESSURE: 121 MMHG | HEART RATE: 70 BPM

## 2024-10-11 VITALS
DIASTOLIC BLOOD PRESSURE: 64 MMHG | SYSTOLIC BLOOD PRESSURE: 95 MMHG | TEMPERATURE: 97.7 F | RESPIRATION RATE: 16 BRPM | HEART RATE: 66 BPM | OXYGEN SATURATION: 95 %

## 2024-10-11 VITALS
RESPIRATION RATE: 18 BRPM | DIASTOLIC BLOOD PRESSURE: 51 MMHG | HEART RATE: 73 BPM | TEMPERATURE: 97.7 F | OXYGEN SATURATION: 98 % | SYSTOLIC BLOOD PRESSURE: 142 MMHG

## 2024-10-11 VITALS
TEMPERATURE: 97.7 F | OXYGEN SATURATION: 98 % | SYSTOLIC BLOOD PRESSURE: 121 MMHG | RESPIRATION RATE: 18 BRPM | DIASTOLIC BLOOD PRESSURE: 84 MMHG | HEART RATE: 70 BPM

## 2024-10-11 VITALS — BODY MASS INDEX: 34.9 KG/M2

## 2024-10-11 VITALS
SYSTOLIC BLOOD PRESSURE: 99 MMHG | HEART RATE: 64 BPM | TEMPERATURE: 97.7 F | RESPIRATION RATE: 18 BRPM | OXYGEN SATURATION: 98 % | DIASTOLIC BLOOD PRESSURE: 46 MMHG

## 2024-10-11 VITALS
TEMPERATURE: 97.7 F | RESPIRATION RATE: 18 BRPM | DIASTOLIC BLOOD PRESSURE: 80 MMHG | OXYGEN SATURATION: 98 % | HEART RATE: 69 BPM | SYSTOLIC BLOOD PRESSURE: 120 MMHG

## 2024-10-11 VITALS
OXYGEN SATURATION: 98 % | SYSTOLIC BLOOD PRESSURE: 114 MMHG | TEMPERATURE: 97.7 F | RESPIRATION RATE: 18 BRPM | DIASTOLIC BLOOD PRESSURE: 67 MMHG | HEART RATE: 75 BPM

## 2024-10-11 VITALS
TEMPERATURE: 97.34 F | OXYGEN SATURATION: 98 % | DIASTOLIC BLOOD PRESSURE: 59 MMHG | HEART RATE: 65 BPM | RESPIRATION RATE: 18 BRPM | SYSTOLIC BLOOD PRESSURE: 111 MMHG

## 2024-10-11 VITALS
TEMPERATURE: 97.7 F | OXYGEN SATURATION: 97 % | SYSTOLIC BLOOD PRESSURE: 116 MMHG | DIASTOLIC BLOOD PRESSURE: 71 MMHG | HEART RATE: 67 BPM | RESPIRATION RATE: 18 BRPM

## 2024-10-11 DIAGNOSIS — M51.16: Primary | ICD-10-CM

## 2024-10-11 LAB — GLUCOSE BLDC GLUCOMTR-MCNC: 102 MG/DL (ref 70–110)

## 2024-10-11 PROCEDURE — 62362 IMPLANT SPINE INFUSION PUMP: CPT

## 2024-10-11 PROCEDURE — 80053 COMPREHEN METABOLIC PANEL: CPT

## 2024-10-11 PROCEDURE — 96374 THER/PROPH/DIAG INJ IV PUSH: CPT

## 2024-10-11 PROCEDURE — 80307 DRUG TEST PRSMV CHEM ANLYZR: CPT

## 2024-10-11 PROCEDURE — 62350 IMPLANT SPINAL CANAL CATH: CPT

## 2024-10-11 PROCEDURE — 85025 COMPLETE CBC W/AUTO DIFF WBC: CPT

## 2024-10-11 PROCEDURE — C1772 INFUSION PUMP, PROGRAMMABLE: HCPCS

## 2024-10-11 PROCEDURE — C1755 CATHETER, INTRASPINAL: HCPCS

## 2024-10-11 PROCEDURE — 82962 GLUCOSE BLOOD TEST: CPT

## 2024-10-22 ENCOUNTER — OFFICE VISIT (OUTPATIENT)
Dept: FAMILY MEDICINE CLINIC | Facility: CLINIC | Age: 74
End: 2024-10-22
Payer: MEDICARE

## 2024-10-22 VITALS
DIASTOLIC BLOOD PRESSURE: 80 MMHG | SYSTOLIC BLOOD PRESSURE: 90 MMHG | WEIGHT: 223 LBS | HEART RATE: 62 BPM | HEIGHT: 67 IN | BODY MASS INDEX: 35 KG/M2 | OXYGEN SATURATION: 95 %

## 2024-10-22 DIAGNOSIS — Z79.01 CHRONIC ANTICOAGULATION: Chronic | ICD-10-CM

## 2024-10-22 DIAGNOSIS — E11.65 TYPE 2 DIABETES MELLITUS WITH HYPERGLYCEMIA, WITHOUT LONG-TERM CURRENT USE OF INSULIN: Chronic | ICD-10-CM

## 2024-10-22 DIAGNOSIS — Z23 NEED FOR INFLUENZA VACCINATION: ICD-10-CM

## 2024-10-22 DIAGNOSIS — D48.9 NEOPLASM, UNCERTAIN WHETHER BENIGN OR MALIGNANT: Primary | ICD-10-CM

## 2024-10-22 DIAGNOSIS — E78.5 DYSLIPIDEMIA: Chronic | ICD-10-CM

## 2024-10-22 DIAGNOSIS — E11.44 DIABETIC AMYOTROPHY ASSOCIATED WITH TYPE 2 DIABETES MELLITUS: ICD-10-CM

## 2024-10-22 DIAGNOSIS — I48.0 PAROXYSMAL ATRIAL FIBRILLATION: ICD-10-CM

## 2024-10-22 DIAGNOSIS — I10 ESSENTIAL HYPERTENSION: ICD-10-CM

## 2024-10-22 PROCEDURE — 1125F AMNT PAIN NOTED PAIN PRSNT: CPT | Performed by: FAMILY MEDICINE

## 2024-10-22 PROCEDURE — 99214 OFFICE O/P EST MOD 30 MIN: CPT | Performed by: FAMILY MEDICINE

## 2024-10-22 PROCEDURE — 3079F DIAST BP 80-89 MM HG: CPT | Performed by: FAMILY MEDICINE

## 2024-10-22 PROCEDURE — 90662 IIV NO PRSV INCREASED AG IM: CPT | Performed by: FAMILY MEDICINE

## 2024-10-22 PROCEDURE — 3051F HG A1C>EQUAL 7.0%<8.0%: CPT | Performed by: FAMILY MEDICINE

## 2024-10-22 PROCEDURE — G0008 ADMIN INFLUENZA VIRUS VAC: HCPCS | Performed by: FAMILY MEDICINE

## 2024-10-22 PROCEDURE — 3074F SYST BP LT 130 MM HG: CPT | Performed by: FAMILY MEDICINE

## 2024-10-22 RX ORDER — CYCLOSPORINE 0 G/ML
SOLUTION/ DROPS OPHTHALMIC; TOPICAL
COMMUNITY
Start: 2024-08-30

## 2024-10-22 RX ORDER — ATORVASTATIN CALCIUM 40 MG/1
40 TABLET, FILM COATED ORAL DAILY
Qty: 90 TABLET | Refills: 2 | Status: SHIPPED | OUTPATIENT
Start: 2024-10-22

## 2024-10-22 RX ORDER — LIFITEGRAST 50 MG/ML
SOLUTION/ DROPS OPHTHALMIC
COMMUNITY

## 2024-10-22 RX ORDER — SULFAMETHOXAZOLE/TRIMETHOPRIM 800-160 MG
TABLET ORAL
COMMUNITY
Start: 2024-10-11

## 2024-10-22 RX ORDER — ALBUTEROL SULFATE AND BUDESONIDE 90; 80 UG/1; UG/1
AEROSOL, METERED RESPIRATORY (INHALATION)
COMMUNITY
Start: 2024-06-25

## 2024-10-22 RX ORDER — ERYTHROMYCIN 5 MG/G
OINTMENT OPHTHALMIC
COMMUNITY
Start: 2024-08-29

## 2024-10-22 NOTE — PROGRESS NOTES
Established Patient        Chief Complaint:   Chief Complaint   Patient presents with    Mass     Growth on chest; itchy        Shannon Iza Servin is a 74 y.o. female    History of Present Illness:   Answers submitted by the patient for this visit:  Other (Submitted on 10/15/2024)  Please describe your symptoms.: I have a growth on my chest & I want it checked out.  Have you had these symptoms before?: No  How long have you been having these symptoms?: Greater than 2 weeks  Please list any medications you are currently taking for this condition.: None  Please describe any probable cause for these symptoms. : It itches all the time.  Primary Reason for Visit (Submitted on 10/15/2024)  What is the primary reason for your visit?: Problem Not Listed    Here today for evaluation of an atypical enlarging lesion to the upper chest, as well as chronic comorbid conditions atrial fibrillation, hypertension, anticoagulation, dyslipidemia, diabetic amyotrophy and diabetes mellitus.    She denies any episodes of cyclical/persistent hypoglycemia.  Has had several orthopedic surgical interventions since last visit.    She denies any active bleeding.  Denies chest pain, syncope, palpitations or vertigo.  No hemoptysis or epistaxis.  Denies any hematuria/dysuria.  Denies BRB/BTS.    Irregular lesion to left upper chest is enlarging, has discoloration.  It is raised.  Has been present and enlarging for a few years.          Subjective     The following portions of the patient's history were reviewed and updated as appropriate: allergies, current medications, past family history, past medical history, past social history, past surgical history and problem list.    Allergies   Allergen Reactions    Metformin Hallucinations     Other reaction(s): Hallucinations    Codeine Other (See Comments)     constipation     Review of Systems:    Constitutional: Negative for fever. Negative for chills, diaphoresis;  "malaise/fatigue of chronic nature, and without unexpected weight change.   HENT: No dysphagia; no changes to vision/hearing/smell/taste; no epistaxis  Eyes: Negative for redness and visual disturbance.   Respiratory: Chronic cough, history of hemoptysis.  Cardiovascular: Negative for chest pain and palpitations.   Gastrointestinal: Denies BRB/BTS.  No abdominal discomfort.  Endocrine: Negative for cold intolerance and heat intolerance.   Genitourinary: Negative for difficulty urinating, dysuria and frequency.   Musculoskeletal: Chronic arthralgias, back pain and myalgias.  Significant limitation to range of motion of right shoulder.  Painful paresthesia to right thumb as per above.  Skin: Enlarging discolored lesion to the upper chest, in a sun exposed area of the body.  Neurological: Negative for syncope, weakness.  As per above otherwise.  Hematological: Negative for adenopathy. Does not bruise/bleed easily.   Psychiatric/Behavioral: Negative for confusion. The patient is not nervous/anxious.    Objective     Physical Exam   Vital Signs: BP 90/80   Pulse 62   Ht 170.2 cm (67\")   Wt 101 kg (223 lb)   SpO2 95%   BMI 34.93 kg/m²   BMI is >= 30 and <35. (Class 1 Obesity). The following options were offered after discussion;: exercise counseling/recommendations and nutrition counseling/recommendations      General Appearance: alert, oriented x 3, no acute distress.  Pleasant and interactive during questioning/examination.  Well-nourished and developed female.  Skin: warm and dry.  Stasis dermatitis noted additionally.  Irregular, raised, lesion to the upper chest, just left of midline.  Atypical pigmentation, as well as irregular contour.  Approximately 6 mm in longest diameter.  HEENT: Atraumatic.  pupils round and reactive to light and accommodation, oral mucosa pink and moist.  Nares patent without epistaxis.  External auditory canals are patent tympanic membranes intact.  Neck: supple, no JVD, trachea midline. "  No thyromegaly  Lungs: Rhonchus, referred, upper airway congestion, cleared with cough, unlabored breathing effort.  Audible air exchange noted all lung fields.  Expiratory wheezes throughout.  Prolonged expiratory phase bilaterally.  Heart: RRR, normal S1 and S2, no S3, no rub.  Abdomen: soft, non-tender, no palpable bladder, present bowel sounds to auscultation ×4.  No guarding or rigidity.  No CVA tenderness.  Extremities: no clubbing, cyanosis.  Symmetric muscle strength and development.  Nonpitting edematous changes noted to the mid tibias bilaterally, brawny changes associated with venous stasis, chronic in nature.  Ambulates independently.  Impaired range of motion to right shoulder.  Apley scratch test limited due to discomfort of the right shoulder, normal deltoid tone/development.  Negative pushoff, positive empty can sign, as well as shrug sign.  Zuniga/Neer painful, cross body abduction painful as well.  Symmetric  strength bilaterally.  De Quervain's test positive to the right hand.  Neuro: normal speech and mental status.  Cranial nerves II through XII intact.  No anosmia. DTR 2+; proprioception intact.  No focal motor/sensory deficits.        Assessment and Plan      Assessment/Plan:   Diagnoses and all orders for this visit:    1. Neoplasm, uncertain whether benign or malignant (Primary)  -     Ambulatory Referral to General Surgery    2. Paroxysmal atrial fibrillation    3. Essential hypertension    4. Chronic anticoagulation    5. Dyslipidemia  -     atorvastatin (LIPITOR) 40 MG tablet; Take 1 tablet by mouth Daily.  Dispense: 90 tablet; Refill: 2    6. Need for influenza vaccination  -     Fluzone High-Dose 65+yrs (8521-3252)    7. Diabetic amyotrophy associated with type 2 diabetes mellitus    8. Type 2 diabetes mellitus with hyperglycemia, without long-term current use of insulin      Referral to surgery for probable excisional biopsy of atypical skin lesion to left upper chest..    Dec  dose of atorvastatin to 40 mg daily; hopeful to improved myalgias/arthralgias.    VSS, appears HD asymptomatic; HR and BP @ goal.    Hemoglobin A1c and urine microalbumin at follow-up visit.  Continue healthy dietary choices, avoid prolonged fasting periods as best able.  Maintain appropriate hydration status.  Continue home blood glucose monitoring.    Discussion Summary:    Discussed plan of care in detail with pt today; pt verb understanding and agrees.    I spent 35 minutes caring for Shannon on this date of service. This time includes time spent by me in the following activities:preparing for the visit, performing a medically appropriate examination and/or evaluation , counseling and educating the patient/family/caregiver, ordering medications, tests, or procedures, documenting information in the medical record, and care coordination    I confirm accuracy of unchanged data/findings which have been carried forward from previous visit, as well as I have updated appropriately those that have changed.      Follow up:  Return in about 6 weeks (around 12/3/2024) for Medicare Wellness, Subsequent.     There are no Patient Instructions on file for this visit.    Humberto Wells DO  10/22/24  19:38 EDT

## 2024-10-23 DIAGNOSIS — G25.81 RESTLESS LEGS SYNDROME: ICD-10-CM

## 2024-10-23 NOTE — TELEPHONE ENCOUNTER
Caller: [facility] - [place], [place] - [address] - [phone] [place] [phone] FX    Relationship: Pharmacy    Best call back number: [phone]     Requested Prescriptions:   Requested Prescriptions     Pending Prescriptions Disp Refills    tiZANidine (ZANAFLEX) 4 MG tablet 90 tablet 3     Sig: Take 1 tablet by mouth At Night As Needed for Muscle Spasms.        Pharmacy where request should be sent: [facility] - [place], [address] - [phone] [facility] [phone] FX     Last office visit with prescribing clinician: 10/22/2024   Last telemedicine visit with prescribing clinician: Visit date not found   Next office visit with prescribing clinician: 12/3/2024       Does the patient have less than a 3 day supply:  [x] Yes  [] No    Would you like a call back once the refill request has been completed: [] Yes [x] No    If the office needs to give you a call back, can they leave a voicemail: [] Yes [x] No    Toni Montes Rep   10/23/24 10:58 EDT

## 2024-10-24 ENCOUNTER — TELEPHONE (OUTPATIENT)
Dept: FAMILY MEDICINE CLINIC | Facility: CLINIC | Age: 74
End: 2024-10-24
Payer: MEDICARE

## 2024-10-24 NOTE — TELEPHONE ENCOUNTER
XAVIER CALLED AND SAID SHE HAS FAXED THE MAP 10 FORM/WAIVER ON 9/25 & 10/8. SHE ASKED IF WE HAVE THEM, AND CAN FAX THEM BACK TO HER ASAP. PLEASE CALL & GIVE HER AN UPDATE.
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
## 2024-10-29 DIAGNOSIS — R60.0 BILATERAL LOWER EXTREMITY EDEMA: ICD-10-CM

## 2024-10-29 RX ORDER — SPIRONOLACTONE 25 MG/1
50 TABLET ORAL 2 TIMES DAILY
Qty: 360 TABLET | Refills: 0 | Status: SHIPPED | OUTPATIENT
Start: 2024-10-29

## 2024-10-30 ENCOUNTER — PATIENT ROUNDING (BHMG ONLY) (OUTPATIENT)
Dept: SURGERY | Facility: CLINIC | Age: 74
End: 2024-10-30
Payer: MEDICARE

## 2024-10-30 ENCOUNTER — OFFICE VISIT (OUTPATIENT)
Dept: SURGERY | Facility: CLINIC | Age: 74
End: 2024-10-30
Payer: MEDICARE

## 2024-10-30 VITALS
WEIGHT: 232.6 LBS | DIASTOLIC BLOOD PRESSURE: 58 MMHG | BODY MASS INDEX: 36.51 KG/M2 | SYSTOLIC BLOOD PRESSURE: 104 MMHG | HEIGHT: 67 IN | TEMPERATURE: 98.4 F | OXYGEN SATURATION: 94 % | HEART RATE: 86 BPM

## 2024-10-30 DIAGNOSIS — R60.0 BILATERAL LOWER EXTREMITY EDEMA: ICD-10-CM

## 2024-10-30 DIAGNOSIS — D49.2 SKIN NEOPLASM: Primary | ICD-10-CM

## 2024-10-30 PROCEDURE — 3078F DIAST BP <80 MM HG: CPT | Performed by: SURGERY

## 2024-10-30 PROCEDURE — 99203 OFFICE O/P NEW LOW 30 MIN: CPT | Performed by: SURGERY

## 2024-10-30 PROCEDURE — 1160F RVW MEDS BY RX/DR IN RCRD: CPT | Performed by: SURGERY

## 2024-10-30 PROCEDURE — 1159F MED LIST DOCD IN RCRD: CPT | Performed by: SURGERY

## 2024-10-30 PROCEDURE — 3074F SYST BP LT 130 MM HG: CPT | Performed by: SURGERY

## 2024-10-30 RX ORDER — SPIRONOLACTONE 25 MG/1
50 TABLET ORAL 2 TIMES DAILY
Qty: 360 TABLET | Refills: 0 | OUTPATIENT
Start: 2024-10-30

## 2024-10-30 NOTE — PROGRESS NOTES
October 30, 2024    Hello, may I speak with Shannon Servin?    My name is JAMISON    I am  with [facility]  [address]  [facility] [address] [phone].    Before we get started may I verify your date of birth? 1950    I am calling to officially welcome you to our practice and ask about your recent visit. Is this a good time to talk? yes    Tell me about your visit with us. What things went well?  EVERYTHING WAS AWESOME AND SHORT.       We're always looking for ways to make our patients' experiences even better. Do you have recommendations on ways we may improve?  no    Overall were you satisfied with your first visit to our practice? yes       I appreciate you taking the time to speak with me today. Is there anything else I can do for you? no      Thank you, and have a great day.

## 2024-11-02 DIAGNOSIS — E11.65 TYPE 2 DIABETES MELLITUS WITH HYPERGLYCEMIA, WITHOUT LONG-TERM CURRENT USE OF INSULIN: Chronic | ICD-10-CM

## 2024-11-02 DIAGNOSIS — I10 ESSENTIAL HYPERTENSION: ICD-10-CM

## 2024-11-04 RX ORDER — ENALAPRIL MALEATE 20 MG/1
20 TABLET ORAL EVERY 12 HOURS SCHEDULED
Qty: 180 TABLET | Refills: 3 | Status: SHIPPED | OUTPATIENT
Start: 2024-11-04

## 2024-11-13 DIAGNOSIS — K58.1 IRRITABLE BOWEL SYNDROME WITH CONSTIPATION: ICD-10-CM

## 2024-11-13 RX ORDER — LINACLOTIDE 145 UG/1
145 CAPSULE, GELATIN COATED ORAL
Qty: 90 CAPSULE | Refills: 1 | Status: SHIPPED | OUTPATIENT
Start: 2024-11-13

## 2024-11-14 ENCOUNTER — TRANSCRIBE ORDERS (OUTPATIENT)
Dept: PHYSICAL THERAPY | Facility: CLINIC | Age: 74
End: 2024-11-14
Payer: MEDICARE

## 2024-11-14 DIAGNOSIS — R26.81 UNSTEADINESS ON FEET: Primary | ICD-10-CM

## 2024-11-19 ENCOUNTER — PREP FOR SURGERY (OUTPATIENT)
Dept: OTHER | Facility: HOSPITAL | Age: 74
End: 2024-11-19
Payer: MEDICARE

## 2024-11-19 DIAGNOSIS — H25.11 NUCLEAR SCLEROTIC CATARACT OF RIGHT EYE: Primary | ICD-10-CM

## 2024-11-19 RX ORDER — TETRACAINE HYDROCHLORIDE 5 MG/ML
1 SOLUTION OPHTHALMIC SEE ADMIN INSTRUCTIONS
OUTPATIENT
Start: 2024-11-19

## 2024-11-19 RX ORDER — CYCLOPENT/TROPIC/PHEN/KETR/WAT 1%-1%-2.5%
1 DROPS (EA) OPHTHALMIC (EYE)
OUTPATIENT
Start: 2024-11-19 | End: 2024-11-19

## 2024-11-19 RX ORDER — SODIUM CHLORIDE 0.9 % (FLUSH) 0.9 %
1-10 SYRINGE (ML) INJECTION AS NEEDED
OUTPATIENT
Start: 2024-11-19

## 2024-11-19 RX ORDER — SODIUM CHLORIDE 0.9 % (FLUSH) 0.9 %
10 SYRINGE (ML) INJECTION EVERY 12 HOURS SCHEDULED
OUTPATIENT
Start: 2024-11-19

## 2024-11-19 RX ORDER — PREDNISOLONE ACETATE 10 MG/ML
1 SUSPENSION/ DROPS OPHTHALMIC SEE ADMIN INSTRUCTIONS
OUTPATIENT
Start: 2024-11-19

## 2024-11-26 RX ORDER — BUPROPION HYDROCHLORIDE 150 MG/1
150 TABLET, EXTENDED RELEASE ORAL 2 TIMES DAILY
COMMUNITY

## 2024-11-26 RX ORDER — TRAZODONE HYDROCHLORIDE 50 MG/1
50 TABLET, FILM COATED ORAL NIGHTLY
COMMUNITY

## 2024-11-26 NOTE — PRE-PROCEDURE INSTRUCTIONS
PAT phone history completed with pt for upcoming procedure on 12/2/24, with Dr. Martins.     PAT PASS GIVEN/REVIEWED WITH PT.  VERBALIZED UNDERSTANDING OF THE FOLLOWING:  DO NOT EAT, DRINK, SMOKE, USE SMOKELESS TOBACCO OR CHEW GUM AFTER MIDNIGHT THE NIGHT BEFORE SURGERY.  THIS ALSO INCLUDES HARD CANDIES AND MINTS.    DO NOT SHAVE THE AREA TO BE OPERATED ON AT LEAST 48 HOURS PRIOR TO THE PROCEDURE.  DO NOT WEAR MAKE UP OR NAIL POLISH.  DO NOT LEAVE IN ANY PIERCING OR WEAR JEWELRY THE DAY OF SURGERY.      DO NOT USE ADHESIVES IF YOU WEAR DENTURES.    DO NOT WEAR EYE CONTACTS; BRING IN YOUR GLASSES.    ONLY TAKE MEDICATION THE MORNING OF YOUR PROCEDURE IF INSTRUCTED BY YOUR SURGEON WITH ENOUGH WATER TO SWALLOW THE MEDICATION.  IF YOUR SURGEON DID NOT SPECIFY WHICH MEDICATIONS TO TAKE, YOU WILL NEED TO CALL THEIR OFFICE FOR FURTHER INSTRUCTIONS AND DO AS THEY INSTRUCT.    LEAVE ANYTHING YOU CONSIDER VALUABLE AT HOME.    YOU WILL NEED TO ARRANGE FOR SOMEONE TO DRIVE YOU HOME AFTER SURGERY.  IT IS RECOMMENDED THAT YOU DO NOT DRIVE, WORK, DRINK ALCOHOL OR MAKE MAJOR DECISIONS FOR AT LEAST 24 HOURS AFTER YOUR PROCEDURE IS COMPLETE.      THE DAY OF YOUR PROCEDURE, BRING IN THE FOLLOWING IF APPLICABLE:   PICTURE ID AND INSURANCE/MEDICARE OR MEDICAID CARDS/ANY CO-PAY THAT MAY BE DUE   COPY OF ADVANCED DIRECTIVE/LIVING WILL/POWER OR    CPAP/BIPAP/INHALERS   SKIN PREP SHEET   YOUR PREADMISSION TESTING PASS (IF NOT A PHONE HISTORY)    Medication instructions given to pt by RN per anesthesia protocol.  Pt referred back to surgeon for further instructions if he/she is on any blood thinners.

## 2024-12-02 ENCOUNTER — ANESTHESIA (OUTPATIENT)
Dept: PERIOP | Facility: HOSPITAL | Age: 74
End: 2024-12-02
Payer: MEDICARE

## 2024-12-02 ENCOUNTER — ANESTHESIA EVENT (OUTPATIENT)
Dept: PERIOP | Facility: HOSPITAL | Age: 74
End: 2024-12-02
Payer: MEDICARE

## 2024-12-02 ENCOUNTER — HOSPITAL ENCOUNTER (OUTPATIENT)
Facility: HOSPITAL | Age: 74
Setting detail: HOSPITAL OUTPATIENT SURGERY
Discharge: HOME | End: 2024-12-02
Attending: OPHTHALMOLOGY | Admitting: OPHTHALMOLOGY
Payer: MEDICARE

## 2024-12-02 VITALS
HEIGHT: 69 IN | TEMPERATURE: 97.8 F | OXYGEN SATURATION: 95 % | WEIGHT: 222.6 LBS | RESPIRATION RATE: 18 BRPM | SYSTOLIC BLOOD PRESSURE: 109 MMHG | DIASTOLIC BLOOD PRESSURE: 49 MMHG | BODY MASS INDEX: 32.97 KG/M2 | HEART RATE: 54 BPM

## 2024-12-02 DIAGNOSIS — H25.11 NUCLEAR SCLEROTIC CATARACT OF RIGHT EYE: ICD-10-CM

## 2024-12-02 LAB — GLUCOSE BLDC GLUCOMTR-MCNC: 84 MG/DL (ref 70–130)

## 2024-12-02 PROCEDURE — 25010000002 LIDOCAINE HCL (PF) 4 % SOLUTION: Performed by: OPHTHALMOLOGY

## 2024-12-02 PROCEDURE — V2632 POST CHMBR INTRAOCULAR LENS: HCPCS | Performed by: OPHTHALMOLOGY

## 2024-12-02 PROCEDURE — 82948 REAGENT STRIP/BLOOD GLUCOSE: CPT | Performed by: OPHTHALMOLOGY

## 2024-12-02 PROCEDURE — 25010000002 FENTANYL CITRATE (PF) 50 MCG/ML SOLUTION PREFILLED SYRINGE: Performed by: NURSE ANESTHETIST, CERTIFIED REGISTERED

## 2024-12-02 PROCEDURE — 25010000002 MIDAZOLAM PER 1MG: Performed by: NURSE ANESTHETIST, CERTIFIED REGISTERED

## 2024-12-02 DEVICE — LENS MONOFOCAL IQ CC60WF225: Type: IMPLANTABLE DEVICE | Site: POSTERIOR CHAMBER | Status: FUNCTIONAL

## 2024-12-02 RX ORDER — ACETAZOLAMIDE 500 MG/1
500 CAPSULE, EXTENDED RELEASE ORAL ONCE
Status: COMPLETED | OUTPATIENT
Start: 2024-12-02 | End: 2024-12-02

## 2024-12-02 RX ORDER — TETRACAINE HYDROCHLORIDE 5 MG/ML
1 SOLUTION OPHTHALMIC SEE ADMIN INSTRUCTIONS
Status: COMPLETED | OUTPATIENT
Start: 2024-12-02 | End: 2024-12-02

## 2024-12-02 RX ORDER — BALANCED SALT SOLUTION 6.4; .75; .48; .3; 3.9; 1.7 MG/ML; MG/ML; MG/ML; MG/ML; MG/ML; MG/ML
SOLUTION OPHTHALMIC AS NEEDED
Status: DISCONTINUED | OUTPATIENT
Start: 2024-12-02 | End: 2024-12-02 | Stop reason: HOSPADM

## 2024-12-02 RX ORDER — POVIDONE-IODINE 5 %
SOLUTION, NON-ORAL OPHTHALMIC (EYE) AS NEEDED
Status: DISCONTINUED | OUTPATIENT
Start: 2024-12-02 | End: 2024-12-02 | Stop reason: HOSPADM

## 2024-12-02 RX ORDER — SODIUM CHLORIDE 0.9 % (FLUSH) 0.9 %
10 SYRINGE (ML) INJECTION EVERY 12 HOURS SCHEDULED
Status: DISCONTINUED | OUTPATIENT
Start: 2024-12-02 | End: 2024-12-02 | Stop reason: HOSPADM

## 2024-12-02 RX ORDER — SODIUM CHLORIDE 0.9 % (FLUSH) 0.9 %
1-10 SYRINGE (ML) INJECTION AS NEEDED
Status: DISCONTINUED | OUTPATIENT
Start: 2024-12-02 | End: 2024-12-02 | Stop reason: HOSPADM

## 2024-12-02 RX ORDER — FENTANYL CITRATE 50 UG/ML
INJECTION, SOLUTION INTRAMUSCULAR; INTRAVENOUS AS NEEDED
Status: DISCONTINUED | OUTPATIENT
Start: 2024-12-02 | End: 2024-12-02 | Stop reason: SURG

## 2024-12-02 RX ORDER — TETRACAINE HYDROCHLORIDE 5 MG/ML
SOLUTION OPHTHALMIC AS NEEDED
Status: DISCONTINUED | OUTPATIENT
Start: 2024-12-02 | End: 2024-12-02 | Stop reason: HOSPADM

## 2024-12-02 RX ORDER — PREDNISOLONE ACETATE 10 MG/ML
SUSPENSION/ DROPS OPHTHALMIC AS NEEDED
Status: DISCONTINUED | OUTPATIENT
Start: 2024-12-02 | End: 2024-12-02 | Stop reason: HOSPADM

## 2024-12-02 RX ORDER — PREDNISOLONE ACETATE 10 MG/ML
1 SUSPENSION/ DROPS OPHTHALMIC SEE ADMIN INSTRUCTIONS
Status: DISCONTINUED | OUTPATIENT
Start: 2024-12-02 | End: 2024-12-02 | Stop reason: HOSPADM

## 2024-12-02 RX ORDER — CYCLOPENT/TROPIC/PHEN/KETR/WAT 1%-1%-2.5%
1 DROPS (EA) OPHTHALMIC (EYE)
Status: COMPLETED | OUTPATIENT
Start: 2024-12-02 | End: 2024-12-02

## 2024-12-02 RX ORDER — MIDAZOLAM HYDROCHLORIDE 2 MG/2ML
INJECTION, SOLUTION INTRAMUSCULAR; INTRAVENOUS AS NEEDED
Status: DISCONTINUED | OUTPATIENT
Start: 2024-12-02 | End: 2024-12-02 | Stop reason: SURG

## 2024-12-02 RX ORDER — LIDOCAINE HYDROCHLORIDE 40 MG/ML
INJECTION, SOLUTION RETROBULBAR AS NEEDED
Status: DISCONTINUED | OUTPATIENT
Start: 2024-12-02 | End: 2024-12-02 | Stop reason: HOSPADM

## 2024-12-02 RX ORDER — NEOMYCIN SULFATE, POLYMYXIN B SULFATE, AND DEXAMETHASONE 3.5; 10000; 1 MG/G; [USP'U]/G; MG/G
OINTMENT OPHTHALMIC AS NEEDED
Status: DISCONTINUED | OUTPATIENT
Start: 2024-12-02 | End: 2024-12-02 | Stop reason: HOSPADM

## 2024-12-02 RX ADMIN — Medication 1 DROP: at 09:56

## 2024-12-02 RX ADMIN — MIDAZOLAM HYDROCHLORIDE 2 MG: 1 INJECTION, SOLUTION INTRAMUSCULAR; INTRAVENOUS at 10:27

## 2024-12-02 RX ADMIN — FENTANYL CITRATE 50 MCG: 50 INJECTION, SOLUTION INTRAMUSCULAR; INTRAVENOUS at 10:27

## 2024-12-02 RX ADMIN — TETRACAINE HYDROCHLORIDE 1 DROP: 5 SOLUTION OPHTHALMIC at 09:45

## 2024-12-02 RX ADMIN — Medication 1 DROP: at 09:46

## 2024-12-02 RX ADMIN — Medication 15 MG: at 10:27

## 2024-12-02 RX ADMIN — ACETAZOLAMIDE 500 MG: 500 CAPSULE ORAL at 10:53

## 2024-12-02 RX ADMIN — Medication 1 DROP: at 09:51

## 2024-12-02 RX ADMIN — TETRACAINE HYDROCHLORIDE 1 DROP: 5 SOLUTION OPHTHALMIC at 09:44
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
## 2024-12-02 NOTE — ANESTHESIA POSTPROCEDURE EVALUATION
Patient: Shannon Servin    Procedure Summary       Date: 12/02/24 Room / Location: [facility] OR 3 / [facility] OR    Anesthesia Start: 1023 Anesthesia Stop: 1038    Procedure: CATARACT PHACO EXTRACTION WITH INTRAOCULAR LENS IMPLANT RIGHT (Right: Eye) Diagnosis:       Nuclear sclerotic cataract of right eye      (Nuclear sclerotic cataract of right eye [H25.11])    Surgeons: Ruiz Martins MD Provider: Celestine Allison CRNA    Anesthesia Type: general ASA Status: 3            Anesthesia Type: general    Vitals  HR 58  Sat 98  /49  Resp 12  Temp 98        Post Anesthesia Care and Evaluation    Patient location during evaluation: bedside  Patient participation: complete - patient participated  Level of consciousness: awake and alert  Pain score: 0  Pain management: adequate    Airway patency: patent  Anesthetic complications: No anesthetic complications  PONV Status: none  Cardiovascular status: acceptable  Respiratory status: acceptable  Hydration status: acceptable

## 2024-12-02 NOTE — ANESTHESIA PREPROCEDURE EVALUATION
Anesthesia Evaluation     Patient summary reviewed and Nursing notes reviewed   no history of anesthetic complications:   NPO Solid Status: > 8 hours  NPO Liquid Status: > 8 hours           Airway   Mallampati: II  TM distance: >3 FB  Neck ROM: full  Possible difficult intubation  Dental - normal exam     Pulmonary - normal exam   (+) pneumonia resolved , a smoker Current, cigarettes, COPD severe, asthma,sleep apnea on CPAP    ROS comment: Chronic bronchitis  Cardiovascular - normal exam    ECG reviewed  PT is on anticoagulation therapy  Patient on routine beta blocker  Rhythm: regular  Rate: normal    (+) hypertension well controlled 2 medications or greater, dysrhythmias, hyperlipidemia    ROS comment: - 08/29/2024 11:48 08/29/2024  SR with first degree av block    Stress Test 2023   ·  Left ventricular ejection fraction is normal.  ·  Myocardial perfusion imaging indicates a normal myocardial perfusion study with no evidence of ischemia.  ·  Impressions are consistent with a low risk study.  ·  Findings consistent with a normal ECG stress test.      Neuro/Psych  (+) headaches (migraines), numbness, psychiatric history (stress disorder) Anxiety  GI/Hepatic/Renal/Endo    (+) obesity, GERD well controlled, renal disease- stones, diabetes mellitus type 2 poorly controlled using insulin    Musculoskeletal     (+) back pain (lumbar ddd), myalgias  Abdominal  - normal exam   Substance History   (+) drug use (current opioid use)     OB/GYN negative ob/gyn ROS         Other   arthritis,     ROS/Med Hx Other: Arthrosclerosis mesenteric artery    RLS                Anesthesia Plan    ASA 3     general     (Risks and benefits discussed including risk of aspiration, recall and dental damage. All patient questions answered.    Will continue with plan of care.)    Anesthetic plan, risks, benefits, and alternatives have been provided, discussed and informed consent has been obtained with: patient.  Pre-procedure education  provided    CODE STATUS:

## 2024-12-02 NOTE — DISCHARGE INSTRUCTIONS
Formerly [facility], [facility]  [address] [place] [address]  (P): [phone]           (F): [phone]    Shannon Servin  PATIENT NAME:__________________________________    Right Eye    POST OPERATIVE INSTRUCTIONS    You have received anesthesia today. DO NOT drive, drink alcohol, sign legal documents.   After surgery, your eye will not hurt. It may feel scratchy, sticky or uncomfortable. Your eye will be sensitive to light.  Most people see better 1-3 days after the procedure, but it could take 3 weeks to get the full benefits and reach your visual potential. If your vision is blurry for a few days it is normal, and means you may have swelling outside or inside the eye. For some patients, a bubble is placed and there will be blurriness.   You should receive a post-op kit with tape and an eye shield. Wear the shield for the first 3 nights after surgery to keep you from rubbing the eye.  Most people are able to return to their normal routine 1-3 days after surgery, however, due to the brain adjusting to your new vision you may have trouble judging distances and want to be careful when driving and going up and downstairs.   You can shower and wash your hair the day after surgery. Keep water, shampoo, hair spray and shaving lotion out of the eye, especially for the first week.  During the first week, you should AVOID:   Rubbing or putting pressure on your eye.  Eye make-up, face cream or lotion, hair coloring or perms  Strenuous activities, such as running or lifting weights, as to avoid sweat from getting in the eye. Avoid swimming, hot tubs, fumes or christopher conditions.   Keep your head above your heart (no hanging the head down for periods of time).  Some discomfort and blurred vision after surgery are normal, but if you have any unusual pain, swelling, bleeding or sudden decrease in vision, contact our office immediately. Emergency assistance is available at any time by calling:    Dr. Ruiz Almaraz Dr.  Celio Martins    [phone]    If unable to reach call [facility] @ [phone]      POST OPERATIVE DROP INSTRUCTIONS  You have been prescribed eye drops to use after surgery, please follow these instructions:  PLACE A FALLON IN THE DAY COLUMN EACH TIME YOU USE TO KEEP ON SCHEDULE. WAIT 5 MINUTES IN BETWEEN DROPS    Prednisolone (PINK TOP) SHAKE WELL BEFORE USE   GIVEN TO YOU BY THE HOSPITAL    WEEK 1-USE 4  (FOUR) TIMES A DAY DAY 1   DAY 2 DAY 3 DAY 4 DAY 5 DAY 6 DAY 7     WEEK 2-USE 3 (THREE)  TIMES A DAY DAY 1 DAY 2 DAY 3 DAY 4 DAY 5 DAY 6 DAY 7     WEEK 3-USE 2 (TWO) TIMES A DAY DAY 1 DAY 2 DAY 3 DAY 4 DAY 5 DAY 6 DAY 7     WEEK 4-USE 1 (ONE)TIME A DAY DAY 1 DAY 2 DAY 3 DAY 4 DAY 5 DAY 6 DAY 7         Ketorolac (GRAY TOP) PRESCRIBED TO YOUR PHARMACY    WEEK 1-USE 4  (FOUR) TIMES A DAY DAY 1   DAY 2 DAY 3 DAY 4 DAY 5 DAY 6 DAY 7     WEEK 2-USE 3 (THREE)  TIMES A DAY DAY 1 DAY 2 DAY 3 DAY 4 DAY 5 DAY 6 DAY 7     WEEK 3-USE 2 (TWO) TIMES A DAY DAY 1 DAY 2 DAY 3 DAY 4 DAY 5 DAY 6 DAY 7     WEEK 4-USE 1 (ONE)TIME A DAY DAY 1 DAY 2 DAY 3 DAY 4 DAY 5 DAY 6 DAY 7       Moxifloxacin (TAN TOP) PRESCRIBED TO YOUR PHARMACY    WEEK 1-USE 4  (FOUR) TIMES A DAY DAY 1   DAY 2 DAY 3 DAY 4 DAY 5 DAY 6 DAY 7     Please follow all post op instructions and follow up appointment time from your physician's office included in your discharge packet.  . No pushing, pulling, tugging,  heavy lifting, or strenuous activity.  No major decision making, driving, or drinking alcoholic beverages for 24 hours. ( due to the medications you have  received)  Always use good hand hygiene/washing techniques.  NO driving while taking pain medications.    * if you have an incision:  Check your incision area every day for signs of infection.   Check for:  * more redness, swelling, or pain  *more fluid or blood  *warmth  *pus or bad smell    To assist you in voiding:  Drink plenty of  fluids  Listen to running water while attempting to void.    If you are unable to urinate and you have an uncomfortable urge to void or it has been   6 hours since you were discharged, return to the Emergency Room

## 2024-12-02 NOTE — OP NOTE
OPERATIVE NOTE    Date of Procedure: 12/2/2024  Patient Name: Shannon Servin  Patient MRN: [id number]  YOB: 1950     Preoperative Diagnosis: Right nuclear sclerotic cataract.     Postoperative Diagnosis: Right nuclear sclerotic cataract.     Procedure Performed: Phacoemulsification with implantation of a foldable posterior chamber intraocular lens, Right eye.     Surgeon: Ruiz Martins MD    Anesthesia:  Monitored Anesthesia Care (MAC)      Brief History and Indication: The patient presents with a history of past progressive loss of vision.  Patient was diagnosed with cataract and requests removal for increased ability to read and see.     Operation Description: The patient was taken to the OR and prepped and draped in the usual sterile ophthalmic fashion. A lid speculum was placed in the eye.  A #75 blade was then used to make a stab incision two o’clock hours from the intended temporal clear cornea groove. The anterior chamber was then inflated with a Viscoelastic. A metal microkeratome blade was then used to enter the anterior chamber at the temporal clear cornea site. A three level tunnel incision was made. A curvilinear capsulorrhexis was then performed with a bent cystotome needle and capsulorrhexis forceps.  BSS on a 30 gauge bent cannula was used to hydro-dissect, and hydro-delineate the lens. Good fluid waves and hydro-delineation were noted. Phacoemulsification was then used to remove nuclear material without complications. The residual cortical and lenticular material was then removed with irrigation and aspiration. Viscoelastics were then used to inflate the bag in a soft shell technique. A PCIOL was injected into the bag. Post-implantation, there were no rents or tears in the bag and the lens was noted to be stable and centered. The residual Viscoelastic was then removed with irrigation and aspiration.  The wound was checked and found to be without leaks. Therefore a Polydex ointment  and one drop of Durezol eye drop was placed in the eye.     Implant Information:   Implant Name Type Inv. Item Serial No.  Lot No. LRB No. Used Action   LENS MONOFOCAL IQ [id number] - [id number] - [id number] Implant LENS MONOFOCAL IQ [id number]  Right 1 Implanted       Complications: None    Estimated Blood Loss:  Less than 1 cc.       []   Changed to complex procedure due to: []  hypermature, white cataract. Blue dye was used. []   small iris. A Malyugin Ring was used.    Discharge and Condition  The patient was transported to same day surgery in excellent condition and scheduled for follow-up tomorrow morning. The patient was given instructions on use of eye drops for the operative eye and was specifically instructed to call Dr. Martins at his office or home for any nausea, vomiting, headache, decreased visual acuity, or pain, or if the patient had any general concerns.    Ruiz Martins MD  12/2/2024

## 2024-12-05 ENCOUNTER — PREP FOR SURGERY (OUTPATIENT)
Dept: OTHER | Facility: HOSPITAL | Age: 74
End: 2024-12-05
Payer: MEDICARE

## 2024-12-05 DIAGNOSIS — H25.12 NUCLEAR SCLEROTIC CATARACT OF LEFT EYE: Primary | ICD-10-CM

## 2024-12-05 RX ORDER — SODIUM CHLORIDE 0.9 % (FLUSH) 0.9 %
1-10 SYRINGE (ML) INJECTION AS NEEDED
Status: CANCELLED | OUTPATIENT
Start: 2024-12-05

## 2024-12-05 RX ORDER — SODIUM CHLORIDE 0.9 % (FLUSH) 0.9 %
10 SYRINGE (ML) INJECTION EVERY 12 HOURS SCHEDULED
Status: CANCELLED | OUTPATIENT
Start: 2024-12-05

## 2024-12-05 RX ORDER — TETRACAINE HYDROCHLORIDE 5 MG/ML
1 SOLUTION OPHTHALMIC SEE ADMIN INSTRUCTIONS
Status: CANCELLED | OUTPATIENT
Start: 2024-12-05

## 2024-12-05 RX ORDER — CYCLOPENT/TROPIC/PHEN/KETR/WAT 1%-1%-2.5%
1 DROPS (EA) OPHTHALMIC (EYE)
Status: CANCELLED | OUTPATIENT
Start: 2024-12-05 | End: 2024-12-05

## 2024-12-05 RX ORDER — PREDNISOLONE ACETATE 10 MG/ML
1 SUSPENSION/ DROPS OPHTHALMIC SEE ADMIN INSTRUCTIONS
Status: CANCELLED | OUTPATIENT
Start: 2024-12-05

## 2024-12-11 RX ORDER — GLYBURIDE 1.25 MG/1
1.25 TABLET ORAL
Qty: 90 TABLET | Refills: 2 | Status: SHIPPED | OUTPATIENT
Start: 2024-12-11

## 2024-12-13 NOTE — PRE-PROCEDURE INSTRUCTIONS
PAT phone history completed with patient for upcoming procedure on 12/16/24 with Dr. Martins.    PAT PASS reviewed with patient and they verbalize understanding of the following:     Do not eat or drink anything after midnight the night before procedure unless otherwise instructed by physician/surgeon's office, this includes no gum, candy, mints, tobacco products or e-cigarettes.  Do not shave the area to be operated on at least 48 hours prior to procedure.  Do not wear makeup, lotion, hair products, or nail polish.  Do not wear any jewelry and remove all piercings.  Do not wear any adhesive if you wear dentures.  Do not wear contacts; bring in glasses if needed.  Only take medications on the morning of procedure as instructed by PAT nurse per anesthesia guidelines or as instructed by physician's office.  If you are on any blood thinners reach out to the physician/surgeon's office for instructions on when/if they will need to be stopped prior to procedure.  Bring in picture ID and insurance card, advanced directive copies if applicable, CPAP/BIPAP/Inhalers if indicated morning of procedure, leave any other valuables at home.  Ensure you have arranged for someone to drive you home the day of your procedure and someone to care for you at home afterwards. It is recommended that you do not drive, drink alcohol, or make any major legal decisions for at least 24 hours after your procedure is complete.  ERAS instructions given unless otherwise instructed per surgeon's orders.    Instructions given on hospital entrance and registration location.

## 2024-12-16 ENCOUNTER — ANESTHESIA EVENT (OUTPATIENT)
Dept: PERIOP | Facility: HOSPITAL | Age: 74
End: 2024-12-16
Payer: MEDICARE

## 2024-12-16 ENCOUNTER — HOSPITAL ENCOUNTER (OUTPATIENT)
Facility: HOSPITAL | Age: 74
Setting detail: HOSPITAL OUTPATIENT SURGERY
Discharge: HOME | End: 2024-12-16
Attending: OPHTHALMOLOGY | Admitting: OPHTHALMOLOGY
Payer: MEDICARE

## 2024-12-16 ENCOUNTER — ANESTHESIA (OUTPATIENT)
Dept: PERIOP | Facility: HOSPITAL | Age: 74
End: 2024-12-16
Payer: MEDICARE

## 2024-12-16 VITALS
BODY MASS INDEX: 32.88 KG/M2 | SYSTOLIC BLOOD PRESSURE: 128 MMHG | HEIGHT: 69 IN | WEIGHT: 222 LBS | TEMPERATURE: 97.4 F | RESPIRATION RATE: 20 BRPM | DIASTOLIC BLOOD PRESSURE: 54 MMHG | OXYGEN SATURATION: 94 % | HEART RATE: 53 BPM

## 2024-12-16 DIAGNOSIS — H25.12 NUCLEAR SCLEROTIC CATARACT OF LEFT EYE: ICD-10-CM

## 2024-12-16 PROCEDURE — 25010000002 FENTANYL CITRATE (PF) 50 MCG/ML SOLUTION PREFILLED SYRINGE: Performed by: NURSE ANESTHETIST, CERTIFIED REGISTERED

## 2024-12-16 PROCEDURE — 25010000002 MIDAZOLAM PER 1MG: Performed by: NURSE ANESTHETIST, CERTIFIED REGISTERED

## 2024-12-16 PROCEDURE — V2632 POST CHMBR INTRAOCULAR LENS: HCPCS | Performed by: OPHTHALMOLOGY

## 2024-12-16 PROCEDURE — 25010000002 LIDOCAINE HCL (PF) 4 % SOLUTION: Performed by: OPHTHALMOLOGY

## 2024-12-16 DEVICE — LENS MONOFOCAL IQ CC60WF220: Type: IMPLANTABLE DEVICE | Site: POSTERIOR CHAMBER | Status: FUNCTIONAL

## 2024-12-16 RX ORDER — MIDAZOLAM HYDROCHLORIDE 2 MG/2ML
INJECTION, SOLUTION INTRAMUSCULAR; INTRAVENOUS AS NEEDED
Status: DISCONTINUED | OUTPATIENT
Start: 2024-12-16 | End: 2024-12-16 | Stop reason: SURG

## 2024-12-16 RX ORDER — BALANCED SALT SOLUTION 6.4; .75; .48; .3; 3.9; 1.7 MG/ML; MG/ML; MG/ML; MG/ML; MG/ML; MG/ML
SOLUTION OPHTHALMIC AS NEEDED
Status: DISCONTINUED | OUTPATIENT
Start: 2024-12-16 | End: 2024-12-16 | Stop reason: HOSPADM

## 2024-12-16 RX ORDER — SODIUM CHLORIDE 0.9 % (FLUSH) 0.9 %
1-10 SYRINGE (ML) INJECTION AS NEEDED
Status: DISCONTINUED | OUTPATIENT
Start: 2024-12-16 | End: 2024-12-16 | Stop reason: HOSPADM

## 2024-12-16 RX ORDER — TETRACAINE HYDROCHLORIDE 5 MG/ML
SOLUTION OPHTHALMIC AS NEEDED
Status: DISCONTINUED | OUTPATIENT
Start: 2024-12-16 | End: 2024-12-16 | Stop reason: HOSPADM

## 2024-12-16 RX ORDER — FENTANYL CITRATE 50 UG/ML
INJECTION, SOLUTION INTRAMUSCULAR; INTRAVENOUS AS NEEDED
Status: DISCONTINUED | OUTPATIENT
Start: 2024-12-16 | End: 2024-12-16 | Stop reason: SURG

## 2024-12-16 RX ORDER — SODIUM CHLORIDE 0.9 % (FLUSH) 0.9 %
10 SYRINGE (ML) INJECTION EVERY 12 HOURS SCHEDULED
Status: DISCONTINUED | OUTPATIENT
Start: 2024-12-16 | End: 2024-12-16 | Stop reason: HOSPADM

## 2024-12-16 RX ORDER — PREDNISOLONE ACETATE 10 MG/ML
1 SUSPENSION/ DROPS OPHTHALMIC 4 TIMES DAILY
Start: 2024-12-16

## 2024-12-16 RX ORDER — TETRACAINE HYDROCHLORIDE 5 MG/ML
1 SOLUTION OPHTHALMIC SEE ADMIN INSTRUCTIONS
Status: COMPLETED | OUTPATIENT
Start: 2024-12-16 | End: 2024-12-16

## 2024-12-16 RX ORDER — NEOMYCIN SULFATE, POLYMYXIN B SULFATE, AND DEXAMETHASONE 3.5; 10000; 1 MG/G; [USP'U]/G; MG/G
OINTMENT OPHTHALMIC AS NEEDED
Status: DISCONTINUED | OUTPATIENT
Start: 2024-12-16 | End: 2024-12-16 | Stop reason: HOSPADM

## 2024-12-16 RX ORDER — ACETAZOLAMIDE 500 MG/1
500 CAPSULE, EXTENDED RELEASE ORAL ONCE
Status: COMPLETED | OUTPATIENT
Start: 2024-12-16 | End: 2024-12-16

## 2024-12-16 RX ORDER — PREDNISOLONE ACETATE 10 MG/ML
1 SUSPENSION/ DROPS OPHTHALMIC SEE ADMIN INSTRUCTIONS
Status: DISCONTINUED | OUTPATIENT
Start: 2024-12-16 | End: 2024-12-16 | Stop reason: HOSPADM

## 2024-12-16 RX ORDER — TETRACAINE HYDROCHLORIDE 5 MG/ML
1 SOLUTION OPHTHALMIC SEE ADMIN INSTRUCTIONS
Status: DISCONTINUED | OUTPATIENT
Start: 2024-12-16 | End: 2024-12-16 | Stop reason: HOSPADM

## 2024-12-16 RX ORDER — PREDNISOLONE ACETATE 10 MG/ML
SUSPENSION/ DROPS OPHTHALMIC AS NEEDED
Status: DISCONTINUED | OUTPATIENT
Start: 2024-12-16 | End: 2024-12-16 | Stop reason: HOSPADM

## 2024-12-16 RX ORDER — POVIDONE-IODINE 5 %
SOLUTION, NON-ORAL OPHTHALMIC (EYE) AS NEEDED
Status: DISCONTINUED | OUTPATIENT
Start: 2024-12-16 | End: 2024-12-16 | Stop reason: HOSPADM

## 2024-12-16 RX ORDER — LIDOCAINE HYDROCHLORIDE 40 MG/ML
INJECTION, SOLUTION RETROBULBAR AS NEEDED
Status: DISCONTINUED | OUTPATIENT
Start: 2024-12-16 | End: 2024-12-16 | Stop reason: HOSPADM

## 2024-12-16 RX ORDER — CYCLOPENT/TROPIC/PHEN/KETR/WAT 1%-1%-2.5%
1 DROPS (EA) OPHTHALMIC (EYE)
Status: DISCONTINUED | OUTPATIENT
Start: 2024-12-16 | End: 2024-12-16 | Stop reason: HOSPADM

## 2024-12-16 RX ORDER — CYCLOPENT/TROPIC/PHEN/KETR/WAT 1%-1%-2.5%
1 DROPS (EA) OPHTHALMIC (EYE)
Status: COMPLETED | OUTPATIENT
Start: 2024-12-16 | End: 2024-12-16

## 2024-12-16 RX ADMIN — ACETAZOLAMIDE 500 MG: 500 CAPSULE ORAL at 10:40

## 2024-12-16 RX ADMIN — Medication 1 DROP: at 08:10

## 2024-12-16 RX ADMIN — TETRACAINE HYDROCHLORIDE 1 DROP: 5 SOLUTION OPHTHALMIC at 07:59

## 2024-12-16 RX ADMIN — Medication 10 MG: at 10:10

## 2024-12-16 RX ADMIN — MIDAZOLAM HYDROCHLORIDE 2 MG: 1 INJECTION, SOLUTION INTRAMUSCULAR; INTRAVENOUS at 10:10

## 2024-12-16 RX ADMIN — TETRACAINE HYDROCHLORIDE 1 DROP: 5 SOLUTION OPHTHALMIC at 07:58

## 2024-12-16 RX ADMIN — Medication 1 DROP: at 08:05

## 2024-12-16 RX ADMIN — FENTANYL CITRATE 50 MCG: 50 INJECTION, SOLUTION INTRAMUSCULAR; INTRAVENOUS at 10:10

## 2024-12-16 RX ADMIN — Medication 1 DROP: at 08:00

## 2024-12-16 RX ADMIN — Medication 10 MG: at 10:15

## 2024-12-16 NOTE — OP NOTE
OPERATIVE NOTE    Date of Procedure: 12/16/2024  Patient Name: Shannon Servin  Patient MRN: [id number]  YOB: 1950     Preoperative Diagnosis: Left nuclear sclerotic cataract.     Postoperative Diagnosis: Left nuclear sclerotic cataract.     Procedure Performed: Phacoemulsification with implantation of a foldable posterior chamber intraocular lens, Left eye.     Surgeon: Ruiz Martins MD    Anesthesia:  Monitored Anesthesia Care (MAC)      Brief History and Indication: The patient presents with a history of past progressive loss of vision.  Patient was diagnosed with cataract and requests removal for increased ability to read and see.     Operation Description: The patient was taken to the OR and prepped and draped in the usual sterile ophthalmic fashion. A lid speculum was placed in the eye.  A #75 blade was then used to make a stab incision two o’clock hours from the intended temporal clear cornea groove. The anterior chamber was then inflated with a Viscoelastic. A metal microkeratome blade was then used to enter the anterior chamber at the temporal clear cornea site. A three level tunnel incision was made. A curvilinear capsulorrhexis was then performed with a bent cystotome needle and capsulorrhexis forceps.  BSS on a 30 gauge bent cannula was used to hydro-dissect, and hydro-delineate the lens. Good fluid waves and hydro-delineation were noted. Phacoemulsification was then used to remove nuclear material without complications. The residual cortical and lenticular material was then removed with irrigation and aspiration. Viscoelastics were then used to inflate the bag in a soft shell technique. A PCIOL was injected into the bag. Post-implantation, there were no rents or tears in the bag and the lens was noted to be stable and centered. The residual Viscoelastic was then removed with irrigation and aspiration.  The wound was checked and found to be without leaks. Therefore a Polydex ointment and  one drop of Durezol eye drop was placed in the eye.     Implant Information:   Implant Name Type Inv. Item Serial No.  Lot No. LRB No. Used Action   LENS MONOFOCAL IQ [id number] - [id number] Implant LENS MONOFOCAL IQ [id number]  MAE  Left 1 Implanted       Complications: None    Estimated Blood Loss:  Less than 1 cc.       []   Changed to complex procedure due to: []  hypermature, white cataract. Blue dye was used. []   small iris. A Malyugin Ring was used.    Discharge and Condition  The patient was transported to same day surgery in excellent condition and scheduled for follow-up tomorrow morning. The patient was given instructions on use of eye drops for the operative eye and was specifically instructed to call Dr. Martins at his office or home for any nausea, vomiting, headache, decreased visual acuity, or pain, or if the patient had any general concerns.    Ruiz Martins MD  12/16/2024

## 2024-12-16 NOTE — ANESTHESIA PREPROCEDURE EVALUATION
Anesthesia Evaluation     Patient summary reviewed and Nursing notes reviewed   NPO Solid Status: > 8 hours  NPO Liquid Status: > 4 hours           Airway   Mallampati: II  TM distance: >3 FB  Neck ROM: full  Possible difficult intubation  Dental - normal exam     Pulmonary - normal exam   (+) pneumonia resolved , a smoker Former, Abstained day of surgery, cigarettes, COPD severe,sleep apnea on CPAP  Cardiovascular - normal exam    ECG reviewed  PT is on anticoagulation therapy  Rhythm: regular  Rate: normal    (+) hypertension well controlled 2 medications or greater, dysrhythmias Paroxysmal Atrial Fib, hyperlipidemia    ROS comment: Cardiac workup     Last Coronary Angio: None  2. Prior Stress Testin2023--Left ventricular ejection fraction is normal.  Myocardial perfusion imaging indicates a normal myocardial perfusion study with no evidence of ischemia.  3. Last Echo: 2023--Left ventricular systolic function is normal. Estimated left ventricular EF = 65% Left ventricular ejection fraction appears to be 61 - 65%.  Left ventricular diastolic function is consistent with (grade I) impaired relaxation.  4. Prior Holter Monitor: 2023--Normal monitor study    EKG 2024    SR with 1st degree AV block      Neuro/Psych  (+) headaches (migraines), numbness, psychiatric history (stress disorder) Depression  GI/Hepatic/Renal/Endo    (+) obesity (bmi 32.78), GERD well controlled, renal disease- stones, diabetes mellitus type 2 well controlled    Musculoskeletal     (+) back pain (sciatica), gait problem, myalgias  Abdominal  - normal exam   Substance History   (+) alcohol use     OB/GYN          Other   arthritis,     ROS/Med Hx Other: rls                Anesthesia Plan    ASA 3     MAC     (Risks and benefits discussed including risk of aspiration, recall and dental damage. All patient questions answered.    Will continue with plan of care.)  intravenous induction     Anesthetic plan, risks, benefits, and  alternatives have been provided, discussed and informed consent has been obtained with: patient.  Pre-procedure education provided  Plan discussed with CRNA.    CODE STATUS:

## 2024-12-16 NOTE — ANESTHESIA POSTPROCEDURE EVALUATION
Patient: Shannon Servin    Procedure Summary       Date: 12/16/24 Room / Location: [facility] OR 3 /  [facility]    Anesthesia Start: 1006 Anesthesia Stop: 1028    Procedure: CATARACT PHACO EXTRACTION WITH INTRAOCULAR LENS IMPLANT LEFT COMPLICATED WITH MALYUGIN RING (Left: Eye) Diagnosis:       Nuclear sclerotic cataract of left eye      (Nuclear sclerotic cataract of left eye [H25.12])    Surgeons: Ruiz Martins MD Provider: Cindy Forrest CRNA    Anesthesia Type: MAC ASA Status: 3            Anesthesia Type: MAC    Vitals  Vitals Value Taken Time   /54 12/16/24 1050   Temp 97.4 °F (36.3 °C) 12/16/24 1030   Pulse 53 12/16/24 1050   Resp 20 12/16/24 1050   SpO2 94 % 12/16/24 1050           Post Anesthesia Care and Evaluation    Patient location during evaluation: bedside  Patient participation: complete - patient participated  Level of consciousness: awake and alert  Pain score: 0  Pain management: satisfactory to patient    Airway patency: patent  Anesthetic complications: No anesthetic complications  PONV Status: none  Cardiovascular status: acceptable and stable  Respiratory status: acceptable  Hydration status: acceptable    Comments: Vitals signs as noted in nursing documentation as per protocol.

## 2024-12-16 NOTE — DISCHARGE INSTRUCTIONS
Formerly [facility], [facility]  [address] [place] [address]  (P): [phone]           (F): [phone]    Shannon Servin  PATIENT NAME:__________________________________    Left Eye    POST OPERATIVE INSTRUCTIONS    You have received anesthesia today. DO NOT drive, drink alcohol, sign legal documents.   After surgery, your eye will not hurt. It may feel scratchy, sticky or uncomfortable. Your eye will be sensitive to light.  Most people see better 1-3 days after the procedure, but it could take 3 weeks to get the full benefits and reach your visual potential. If your vision is blurry for a few days it is normal, and means you may have swelling outside or inside the eye. For some patients, a bubble is placed and there will be blurriness.   You should receive a post-op kit with tape and an eye shield. Wear the shield for the first 3 nights after surgery to keep you from rubbing the eye.  Most people are able to return to their normal routine 1-3 days after surgery, however, due to the brain adjusting to your new vision you may have trouble judging distances and want to be careful when driving and going up and downstairs.   You can shower and wash your hair the day after surgery. Keep water, shampoo, hair spray and shaving lotion out of the eye, especially for the first week.  During the first week, you should AVOID:   Rubbing or putting pressure on your eye.  Eye make-up, face cream or lotion, hair coloring or perms  Strenuous activities, such as running or lifting weights, as to avoid sweat from getting in the eye. Avoid swimming, hot tubs, fumes or christopher conditions.   Keep your head above your heart (no hanging the head down for periods of time).  Some discomfort and blurred vision after surgery are normal, but if you have any unusual pain, swelling, bleeding or sudden decrease in vision, contact our office immediately. Emergency assistance is available at any time by calling:    Dr. Ruiz Almaraz Dr.  Celio Martins    [phone]    If unable to reach call [facility] @ [phone]      POST OPERATIVE DROP INSTRUCTIONS  You have been prescribed eye drops to use after surgery, please follow these instructions:  PLACE A FALLON IN THE DAY COLUMN EACH TIME YOU USE TO KEEP ON SCHEDULE. WAIT 5 MINUTES IN BETWEEN DROPS    Prednisolone (PINK TOP) SHAKE WELL BEFORE USE   GIVEN TO YOU BY THE HOSPITAL    WEEK 1-USE 4  (FOUR) TIMES A DAY DAY 1   DAY 2 DAY 3 DAY 4 DAY 5 DAY 6 DAY 7     WEEK 2-USE 3 (THREE)  TIMES A DAY DAY 1 DAY 2 DAY 3 DAY 4 DAY 5 DAY 6 DAY 7     WEEK 3-USE 2 (TWO) TIMES A DAY DAY 1 DAY 2 DAY 3 DAY 4 DAY 5 DAY 6 DAY 7     WEEK 4-USE 1 (ONE)TIME A DAY DAY 1 DAY 2 DAY 3 DAY 4 DAY 5 DAY 6 DAY 7         Ketorolac (GRAY TOP) PRESCRIBED TO YOUR PHARMACY    WEEK 1-USE 4  (FOUR) TIMES A DAY DAY 1   DAY 2 DAY 3 DAY 4 DAY 5 DAY 6 DAY 7     WEEK 2-USE 3 (THREE)  TIMES A DAY DAY 1 DAY 2 DAY 3 DAY 4 DAY 5 DAY 6 DAY 7     WEEK 3-USE 2 (TWO) TIMES A DAY DAY 1 DAY 2 DAY 3 DAY 4 DAY 5 DAY 6 DAY 7     WEEK 4-USE 1 (ONE)TIME A DAY DAY 1 DAY 2 DAY 3 DAY 4 DAY 5 DAY 6 DAY 7       Moxifloxacin (TAN TOP) PRESCRIBED TO YOUR PHARMACY    WEEK 1-USE 4  (FOUR) TIMES A DAY DAY 1   DAY 2 DAY 3 DAY 4 DAY 5 DAY 6 DAY 7
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
## 2024-12-16 NOTE — OP NOTE
OPERATIVE NOTE    Date of Procedure: 12/16/2024  Patient Name: Shannon Servin  Patient MRN: [id number]  YOB: 1950     Preoperative Diagnosis: Left nuclear sclerotic cataract.     Postoperative Diagnosis: Left nuclear sclerotic cataract.     Procedure Performed: Phacoemulsification with implantation of a foldable posterior chamber intraocular lens, Left eye.     Surgeon: Ruiz Martins MD    Anesthesia:  Monitored Anesthesia Care (MAC)      Brief History and Indication: The patient presents with a history of past progressive loss of vision.  Patient was diagnosed with cataract and requests removal for increased ability to read and see.     Operation Description: The patient was taken to the OR and prepped and draped in the usual sterile ophthalmic fashion. A lid speculum was placed in the eye.  A #75 blade was then used to make a stab incision two o’clock hours from the intended temporal clear cornea groove. The anterior chamber was then inflated with a Viscoelastic. A metal microkeratome blade was then used to enter the anterior chamber at the temporal clear cornea site. A three level tunnel incision was made. A curvilinear capsulorrhexis was then performed with a bent cystotome needle and capsulorrhexis forceps.  BSS on a 30 gauge bent cannula was used to hydro-dissect, and hydro-delineate the lens. Good fluid waves and hydro-delineation were noted. Phacoemulsification was then used to remove nuclear material without complications. The residual cortical and lenticular material was then removed with irrigation and aspiration. Viscoelastics were then used to inflate the bag in a soft shell technique. A PCIOL was injected into the bag. Post-implantation, there were no rents or tears in the bag and the lens was noted to be stable and centered. The residual Viscoelastic was then removed with irrigation and aspiration.  The wound was checked and found to be without leaks. Therefore a Polydex ointment and  one drop of Durezol eye drop was placed in the eye.     Implant Information:   Implant Name Type Inv. Item Serial No.  Lot No. LRB No. Used Action   LENS MONOFOCAL IQ [id number] - [id number] Implant LENS MONOFOCAL IQ [id number]  MAE  Left 1 Implanted       Complications: None    Estimated Blood Loss:  Less than 1 cc.       [x]   Changed to complex procedure due to: []  hypermature, white cataract. Blue dye was used. [x]   small iris. A Malyugin Ring was used.    Discharge and Condition  The patient was transported to same day surgery in excellent condition and scheduled for follow-up tomorrow morning. The patient was given instructions on use of eye drops for the operative eye and was specifically instructed to call Dr. Martins at his office or home for any nausea, vomiting, headache, decreased visual acuity, or pain, or if the patient had any general concerns.    Ruiz Martins MD  12/16/2024

## 2024-12-26 ENCOUNTER — TELEPHONE (OUTPATIENT)
Dept: PHYSICAL THERAPY | Facility: OTHER | Age: 74
End: 2024-12-26
Payer: MEDICARE

## 2024-12-26 NOTE — TELEPHONE ENCOUNTER
Caller: Shannon Servin    Relationship: Self    What was the call regarding: PATIENT CANCELLED APPT TODAY BECAUSE THEY CANT MAKE IT

## 2025-01-02 ENCOUNTER — HOSPITAL ENCOUNTER (EMERGENCY)
Facility: HOSPITAL | Age: 75
Discharge: HOME | End: 2025-01-02
Attending: STUDENT IN AN ORGANIZED HEALTH CARE EDUCATION/TRAINING PROGRAM
Payer: MEDICARE

## 2025-01-02 ENCOUNTER — APPOINTMENT (OUTPATIENT)
Dept: CT IMAGING | Facility: HOSPITAL | Age: 75
End: 2025-01-02
Payer: MEDICARE

## 2025-01-02 ENCOUNTER — OFFICE VISIT (OUTPATIENT)
Age: 75
End: 2025-01-02
Payer: MEDICARE

## 2025-01-02 VITALS
BODY MASS INDEX: 34.47 KG/M2 | TEMPERATURE: 98 F | OXYGEN SATURATION: 95 % | HEIGHT: 67 IN | DIASTOLIC BLOOD PRESSURE: 65 MMHG | WEIGHT: 219.6 LBS | HEART RATE: 65 BPM | SYSTOLIC BLOOD PRESSURE: 112 MMHG | RESPIRATION RATE: 16 BRPM

## 2025-01-02 VITALS
HEART RATE: 75 BPM | DIASTOLIC BLOOD PRESSURE: 49 MMHG | TEMPERATURE: 98.9 F | BODY MASS INDEX: 32.78 KG/M2 | SYSTOLIC BLOOD PRESSURE: 122 MMHG | OXYGEN SATURATION: 99 % | HEIGHT: 69 IN

## 2025-01-02 DIAGNOSIS — R30.0 DYSURIA: ICD-10-CM

## 2025-01-02 DIAGNOSIS — N20.0 NEPHROLITHIASIS: ICD-10-CM

## 2025-01-02 DIAGNOSIS — N39.0 URINARY TRACT INFECTION WITH HEMATURIA, SITE UNSPECIFIED: Primary | ICD-10-CM

## 2025-01-02 DIAGNOSIS — M54.50 ACUTE BILATERAL LOW BACK PAIN WITHOUT SCIATICA: Primary | ICD-10-CM

## 2025-01-02 DIAGNOSIS — R31.9 URINARY TRACT INFECTION WITH HEMATURIA, SITE UNSPECIFIED: Primary | ICD-10-CM

## 2025-01-02 LAB
ALBUMIN SERPL-MCNC: 4.2 G/DL (ref 3.5–5.2)
ALBUMIN/GLOB SERPL: 1.4 G/DL
ALP SERPL-CCNC: 93 U/L (ref 39–117)
ALT SERPL W P-5'-P-CCNC: 17 U/L (ref 1–33)
ANION GAP SERPL CALCULATED.3IONS-SCNC: 9.3 MMOL/L (ref 5–15)
AST SERPL-CCNC: 18 U/L (ref 1–32)
BACTERIA UR QL AUTO: ABNORMAL /HPF
BASOPHILS # BLD AUTO: 0.03 10*3/MM3 (ref 0–0.2)
BASOPHILS NFR BLD AUTO: 0.3 % (ref 0–1.5)
BILIRUB BLD-MCNC: NEGATIVE MG/DL
BILIRUB SERPL-MCNC: 0.2 MG/DL (ref 0–1.2)
BILIRUB UR QL STRIP: NEGATIVE
BUN SERPL-MCNC: 24 MG/DL (ref 8–23)
BUN/CREAT SERPL: 26.7 (ref 7–25)
CALCIUM SPEC-SCNC: 9.4 MG/DL (ref 8.6–10.5)
CHLORIDE SERPL-SCNC: 104 MMOL/L (ref 98–107)
CLARITY UR: ABNORMAL
CLARITY, POC: CLEAR
CO2 SERPL-SCNC: 24.7 MMOL/L (ref 22–29)
COLOR UR: ABNORMAL
COLOR UR: YELLOW
CREAT SERPL-MCNC: 0.9 MG/DL (ref 0.57–1)
DEPRECATED RDW RBC AUTO: 43.8 FL (ref 37–54)
EGFRCR SERPLBLD CKD-EPI 2021: 66.8 ML/MIN/1.73
EOSINOPHIL # BLD AUTO: 0.13 10*3/MM3 (ref 0–0.4)
EOSINOPHIL NFR BLD AUTO: 1.3 % (ref 0.3–6.2)
ERYTHROCYTE [DISTWIDTH] IN BLOOD BY AUTOMATED COUNT: 12.7 % (ref 12.3–15.4)
EXPIRATION DATE: ABNORMAL
GLOBULIN UR ELPH-MCNC: 3.1 GM/DL
GLUCOSE SERPL-MCNC: 59 MG/DL (ref 65–99)
GLUCOSE UR STRIP-MCNC: NEGATIVE MG/DL
GLUCOSE UR STRIP-MCNC: NEGATIVE MG/DL
HCT VFR BLD AUTO: 46.9 % (ref 34–46.6)
HGB BLD-MCNC: 15.2 G/DL (ref 12–15.9)
HGB UR QL STRIP.AUTO: ABNORMAL
HYALINE CASTS UR QL AUTO: ABNORMAL /LPF
IMM GRANULOCYTES # BLD AUTO: 0.02 10*3/MM3 (ref 0–0.05)
IMM GRANULOCYTES NFR BLD AUTO: 0.2 % (ref 0–0.5)
KETONES UR QL STRIP: ABNORMAL
KETONES UR QL: NEGATIVE
LEUKOCYTE EST, POC: ABNORMAL
LEUKOCYTE ESTERASE UR QL STRIP.AUTO: ABNORMAL
LIPASE SERPL-CCNC: 60 U/L (ref 13–60)
LYMPHOCYTES # BLD AUTO: 3.46 10*3/MM3 (ref 0.7–3.1)
LYMPHOCYTES NFR BLD AUTO: 34.3 % (ref 19.6–45.3)
Lab: ABNORMAL
MCH RBC QN AUTO: 30.2 PG (ref 26.6–33)
MCHC RBC AUTO-ENTMCNC: 32.4 G/DL (ref 31.5–35.7)
MCV RBC AUTO: 93.2 FL (ref 79–97)
MONOCYTES # BLD AUTO: 0.68 10*3/MM3 (ref 0.1–0.9)
MONOCYTES NFR BLD AUTO: 6.7 % (ref 5–12)
NEUTROPHILS NFR BLD AUTO: 5.77 10*3/MM3 (ref 1.7–7)
NEUTROPHILS NFR BLD AUTO: 57.2 % (ref 42.7–76)
NITRITE UR QL STRIP: NEGATIVE
NITRITE UR-MCNC: NEGATIVE MG/ML
NRBC BLD AUTO-RTO: 0 /100 WBC (ref 0–0.2)
PH UR STRIP.AUTO: <=5 [PH] (ref 5–8)
PH UR: 6 [PH] (ref 5–8)
PLATELET # BLD AUTO: 164 10*3/MM3 (ref 140–450)
PMV BLD AUTO: 10.1 FL (ref 6–12)
POTASSIUM SERPL-SCNC: 5.2 MMOL/L (ref 3.5–5.2)
PROT SERPL-MCNC: 7.3 G/DL (ref 6–8.5)
PROT UR QL STRIP: ABNORMAL
PROT UR STRIP-MCNC: NEGATIVE MG/DL
RBC # BLD AUTO: 5.03 10*6/MM3 (ref 3.77–5.28)
RBC # UR STRIP: ABNORMAL /HPF
RBC # UR STRIP: ABNORMAL /UL
REF LAB TEST METHOD: ABNORMAL
SODIUM SERPL-SCNC: 138 MMOL/L (ref 136–145)
SP GR UR STRIP: 1.02 (ref 1–1.03)
SP GR UR: 1.02 (ref 1–1.03)
SQUAMOUS #/AREA URNS HPF: ABNORMAL /HPF
TRANS CELLS #/AREA URNS HPF: ABNORMAL /HPF
UROBILINOGEN UR QL STRIP: ABNORMAL
UROBILINOGEN UR QL: NORMAL
WBC # UR STRIP: ABNORMAL /HPF
WBC NRBC COR # BLD AUTO: 10.09 10*3/MM3 (ref 3.4–10.8)

## 2025-01-02 PROCEDURE — 81001 URINALYSIS AUTO W/SCOPE: CPT | Performed by: STUDENT IN AN ORGANIZED HEALTH CARE EDUCATION/TRAINING PROGRAM

## 2025-01-02 PROCEDURE — 87086 URINE CULTURE/COLONY COUNT: CPT | Performed by: STUDENT IN AN ORGANIZED HEALTH CARE EDUCATION/TRAINING PROGRAM

## 2025-01-02 PROCEDURE — 74177 CT ABD & PELVIS W/CONTRAST: CPT

## 2025-01-02 PROCEDURE — 80053 COMPREHEN METABOLIC PANEL: CPT | Performed by: STUDENT IN AN ORGANIZED HEALTH CARE EDUCATION/TRAINING PROGRAM

## 2025-01-02 PROCEDURE — 25510000001 IOPAMIDOL 61 % SOLUTION: Performed by: STUDENT IN AN ORGANIZED HEALTH CARE EDUCATION/TRAINING PROGRAM

## 2025-01-02 PROCEDURE — 83690 ASSAY OF LIPASE: CPT | Performed by: STUDENT IN AN ORGANIZED HEALTH CARE EDUCATION/TRAINING PROGRAM

## 2025-01-02 PROCEDURE — 87077 CULTURE AEROBIC IDENTIFY: CPT | Performed by: STUDENT IN AN ORGANIZED HEALTH CARE EDUCATION/TRAINING PROGRAM

## 2025-01-02 PROCEDURE — 99285 EMERGENCY DEPT VISIT HI MDM: CPT | Performed by: STUDENT IN AN ORGANIZED HEALTH CARE EDUCATION/TRAINING PROGRAM

## 2025-01-02 PROCEDURE — 87186 SC STD MICRODIL/AGAR DIL: CPT | Performed by: STUDENT IN AN ORGANIZED HEALTH CARE EDUCATION/TRAINING PROGRAM

## 2025-01-02 PROCEDURE — 85025 COMPLETE CBC W/AUTO DIFF WBC: CPT | Performed by: STUDENT IN AN ORGANIZED HEALTH CARE EDUCATION/TRAINING PROGRAM

## 2025-01-02 RX ORDER — NITROFURANTOIN 25; 75 MG/1; MG/1
100 CAPSULE ORAL 2 TIMES DAILY
COMMUNITY
Start: 2024-12-27

## 2025-01-02 RX ORDER — KETOROLAC TROMETHAMINE 15 MG/ML
15 INJECTION, SOLUTION INTRAMUSCULAR; INTRAVENOUS EVERY 6 HOURS PRN
Status: COMPLETED | OUTPATIENT
Start: 2025-01-02 | End: 2025-01-02

## 2025-01-02 RX ORDER — CEPHALEXIN 500 MG/1
500 CAPSULE ORAL 2 TIMES DAILY
Qty: 10 CAPSULE | Refills: 0 | Status: SHIPPED | OUTPATIENT
Start: 2025-01-02 | End: 2025-01-07

## 2025-01-02 RX ORDER — IOPAMIDOL 612 MG/ML
100 INJECTION, SOLUTION INTRAVASCULAR
Status: COMPLETED | OUTPATIENT
Start: 2025-01-02 | End: 2025-01-02

## 2025-01-02 RX ADMIN — KETOROLAC TROMETHAMINE 15 MG: 15 INJECTION, SOLUTION INTRAMUSCULAR; INTRAVENOUS at 12:26

## 2025-01-02 RX ADMIN — IOPAMIDOL 100 ML: 612 INJECTION, SOLUTION INTRAVENOUS at 16:30

## 2025-01-02 RX ADMIN — CEPHALEXIN 500 MG: 250 CAPSULE ORAL at 17:58

## 2025-01-02 NOTE — DISCHARGE INSTRUCTIONS
Take antibiotic as prescribed for urinary tract infection  You have kidney stones on the left kidney however none were found in the right  Please follow-up with urology as instructed  Do not hesitate to return if symptoms recur or worsen
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
## 2025-01-02 NOTE — PROGRESS NOTES
Follow Up Office Visit      Date: 2025   Patient Name: Shannon Servin  : 1950   MRN: [id number]     Chief Complaint:    Chief Complaint   Patient presents with    Back Pain       History of Present Illness: Shannon Servin is a 74 y.o. female who is here today for evaluation of back pain. She reports that this has been going on for about 2 weeks now and has continually gotten worse. She was treated for UTI by home health with Macrobid however reports no improvement in her symptoms. She reports that the pain is in her right lower back however it is typically in her left lower back. She also notes dysuria for the past few months. She notes that her urine has been extremely dark when it used to be clear. She describes it as looking as if someone poured cola into the toilet when she pees. She does note concern because she has previously been septic from a UTI.    Subjective      Review of Systems:   Review of Systems   Constitutional:  Positive for diaphoresis. Negative for chills.   HENT:  Negative for congestion.    Respiratory:  Negative for cough and shortness of breath.    Cardiovascular:  Negative for chest pain.   Genitourinary:  Positive for dysuria, flank pain, frequency, hematuria and urgency.        I have reviewed the patients family history, social history, past medical history, past surgical history and have updated it as appropriate.     Medications:     Current Outpatient Medications:     Airsupra 90-80 MCG/ACT aerosol, , Disp: , Rfl:     ammonium lactate (AMLACTIN) 12 % cream, Apply 1 g topically to the appropriate area as directed As Needed for Dry Skin., Disp: , Rfl:     atorvastatin (LIPITOR) 40 MG tablet, Take 1 tablet by mouth Daily. (Patient taking differently: Take 2 tablets by mouth Daily.), Disp: 90 tablet, Rfl: 2    buPROPion SR (WELLBUTRIN SR) 150 MG 12 hr tablet, Take 1 tablet by mouth 2 (Two) Times a Day., Disp: , Rfl:     Cequa 0.09 % solution, , Disp: , Rfl:      Coenzyme Q10 (CO Q 10 PO), Take  by mouth., Disp: , Rfl:     Docusate Calcium (STOOL SOFTENER PO), Take 240 mg by mouth Daily., Disp: , Rfl:     docusate calcium (SURFAK) 240 MG capsule, Take 1 capsule by mouth Every Night., Disp: , Rfl:     enalapril (VASOTEC) 20 MG tablet, TAKE 1 TABLET BY MOUTH EVERY 12 (TWELVE) HOURS., Disp: 180 tablet, Rfl: 3    erythromycin (ROMYCIN) 5 MG/GM ophthalmic ointment, , Disp: , Rfl:     fluconazole (DIFLUCAN) 150 MG tablet, Take 1 tablet by mouth As Needed., Disp: , Rfl:     furosemide (LASIX) 20 MG tablet, Take 2 tablets by mouth 2 (Two) Times a Day., Disp: , Rfl:     gabapentin (NEURONTIN) 600 MG tablet, Take 1 tablet by mouth 2 (Two) Times a Day. (Patient taking differently: Take 1 tablet by mouth As Needed.), Disp: 60 tablet, Rfl: 0    glucose blood (Accu-Chek Magdalena Plus) test strip, 1 each by Other route 2 (Two) Times a Day. Use as instructed, Disp: 200 each, Rfl: 1    glucose blood test strip, 1 each by Other route 2 (Two) Times a Day., Disp: 100 each, Rfl: 5    glucose monitor monitoring kit, Use 1 each As Needed (blood glucose monitoring)., Disp: 1 each, Rfl: 0    glyburide (DIAbeta) 1.25 MG tablet, Take 1 tablet by mouth Daily With Breakfast., Disp: 90 tablet, Rfl: 2    HYDROcodone-acetaminophen (NORCO) 5-325 MG per tablet, Take 1 tablet by mouth Every 6 (Six) Hours As Needed for pain, Disp: 20 tablet, Rfl: 0    ibuprofen (ADVIL,MOTRIN) 600 MG tablet, Take 1 tablet by mouth Every 6 (Six) Hours As Needed for Mild Pain., Disp: 60 tablet, Rfl: 0    ipratropium-albuterol (DUO-NEB) 0.5-2.5 mg/3 ml nebulizer, Inhale 1 vial (3 mL) by nebulization Every 6 (Six) Hours As Needed for Wheezing or Shortness of Air., Disp: 360 mL, Rfl: 1    Lancets misc, Use 1 Units 2 (Two) Times a Day., Disp: 200 each, Rfl: 1    Lifitegrast (Xiidra) 5 % ophthalmic solution, , Disp: , Rfl:     Linzess 145 MCG capsule capsule, TAKE 1 CAPSULE BY MOUTH EVERY MORNING BEFORE BREAKFAST. (Patient taking  differently: Take 1 capsule by mouth As Needed.), Disp: 90 capsule, Rfl: 1    MAGNESIUM PO, Take  by mouth Daily., Disp: , Rfl:     Mirabegron ER (MYRBETRIQ) 50 MG tablet sustained-release 24 hour 24 hr tablet, Take 50 mg by mouth Daily., Disp: 90 tablet, Rfl: 0    montelukast (SINGULAIR) 10 MG tablet, Take 1 tablet by mouth Daily., Disp: 90 tablet, Rfl: 1    naloxone (NARCAN) 4 MG/0.1ML nasal spray, Call 911. Don't prime. [place] in 1 nostril for overdose. Repeat in 2-3 minutes in other nostril if no or minimal breathing/responsiveness., Disp: 2 each, Rfl: 0    nitrofurantoin, macrocrystal-monohydrate, (MACROBID) 100 MG capsule, Take 1 capsule by mouth 2 (Two) Times a Day., Disp: , Rfl:     ondansetron (Zofran) 4 MG tablet, Take 1 tablet by mouth Every 8 (Eight) Hours As Needed for Nausea or Vomiting. (Patient taking differently: Take 2 tablets by mouth Every 8 (Eight) Hours As Needed for Nausea or Vomiting.), Disp: 270 tablet, Rfl: 0    oxyCODONE (Roxicodone) 5 MG immediate release tablet, Take 1 tablet by mouth Every 8 (Eight) Hours As Needed for Moderate Pain., Disp: 45 tablet, Rfl: 0    pantoprazole (PROTONIX) 20 MG EC tablet, Take 1 tablet by mouth Daily., Disp: 90 tablet, Rfl: 3    prednisoLONE acetate (Pred Forte) 1 % ophthalmic suspension, Administer 1 drop into the left eye 4 (Four) Times a Day., Disp: , Rfl:     pregabalin (LYRICA) 100 MG capsule, Take 1 capsule by mouth 3 (Three) Times a Day., Disp: 90 capsule, Rfl: 2    Probiotic Product (PROBIOTIC PO), Take  by mouth., Disp: , Rfl:     promethazine (PHENERGAN) 25 MG tablet, Take 1 tablet by mouth Every 6 (Six) Hours As Needed., Disp: , Rfl:     rivaroxaban (XARELTO) 20 MG tablet, Take 1 tablet by mouth Daily., Disp: 90 tablet, Rfl: 3    rOPINIRole (REQUIP) 0.5 MG tablet, Take 1 tablet by mouth Every Night., Disp: 90 tablet, Rfl: 3    senna (SENOKOT) 8.6 MG tablet, Take 2 tablets by mouth Every Night., Disp: , Rfl:     sotalol (BETAPACE) 80 MG tablet,  "Take 1 tablet by mouth Every 12 (Twelve) Hours., Disp: 180 tablet, Rfl: 3    spironolactone (ALDACTONE) 25 MG tablet, TAKE TWO TABLETS BY MOUTH TWO TIMES A DAY, Disp: 360 tablet, Rfl: 0    sulfamethoxazole-trimethoprim (BACTRIM DS,SEPTRA DS) 800-160 MG per tablet, , Disp: , Rfl:     tiotropium bromide-olodaterol (STIOLTO RESPIMAT) 2.5-2.5 MCG/ACT aerosol solution inhaler, Inhale 2 puffs Daily., Disp: 1 each, Rfl: 5    tiZANidine (ZANAFLEX) 4 MG tablet, Take 1 tablet by mouth At Night As Needed for Muscle Spasms., Disp: 90 tablet, Rfl: 3    traZODone (DESYREL) 50 MG tablet, Take 1 tablet by mouth Every Night., Disp: , Rfl:     Current Facility-Administered Medications:     cyanocobalamin injection 1,000 mcg, 1,000 mcg, Intramuscular, Q28 Days, Humberto Wells DO, 1,000 mcg at 06/03/22 1706    Allergies:   Allergies   Allergen Reactions    Metformin Hallucinations     Other reaction(s): Hallucinations    Codeine Other (See Comments)     constipation       Objective     Physical Exam: Please see above  Vital Signs:   Vitals:    01/02/25 1149   BP: 122/49   Pulse: 75   Temp: 98.9 °F (37.2 °C)   SpO2: 99%   Weight: Comment: Patient refused   Height: 175.3 cm (69\")     Body mass index is 32.78 kg/m².          Physical Exam  Vitals and nursing note reviewed.   Constitutional:       Appearance: She is not toxic-appearing.      Comments: Appears uncomfortable   HENT:      Head: Atraumatic.      Mouth/Throat:      Mouth: Mucous membranes are moist.      Pharynx: Oropharynx is clear.   Eyes:      Pupils: Pupils are equal, round, and reactive to light.   Cardiovascular:      Rate and Rhythm: Normal rate and regular rhythm.      Heart sounds: No murmur heard.  Pulmonary:      Effort: Pulmonary effort is normal.      Breath sounds: Normal breath sounds.   Abdominal:      Palpations: Abdomen is soft.      Tenderness: There is no abdominal tenderness. There is no right CVA tenderness or left CVA tenderness.   Musculoskeletal:    "      General: Normal range of motion.      Cervical back: Normal range of motion.      Right lower leg: No edema.      Left lower leg: No edema.      Comments: Tenderness to palpation of bilateral lower back; pain pump present left lower back   Skin:     General: Skin is warm and dry.   Neurological:      General: No focal deficit present.      Mental Status: She is alert and oriented to person, place, and time.      Gait: Gait is intact.   Psychiatric:         Mood and Affect: Mood normal.         Behavior: Behavior normal.         Procedures    Results:   Labs:   Hemoglobin A1C   Date Value Ref Range Status   06/03/2024 7.0 (A) 4.5 - 5.7 % Final        POCT Results (if applicable):   Results for orders placed or performed in visit on 01/02/25   POC Urinalysis Dipstick, Automated    Collection Time: 01/02/25 12:24 PM    Specimen: Urine   Result Value Ref Range    Color Yellow Yellow, Straw, Dark Yellow, Anayeli    Clarity, UA Clear Clear    Specific Gravity  1.025 1.005 - 1.030    pH, Urine 6.0 5.0 - 8.0    Leukocytes Moderate (2+) (A) Negative    Nitrite, UA Negative Negative    Protein, POC Negative Negative mg/dL    Glucose, UA Negative Negative mg/dL    Ketones, UA Negative Negative    Urobilinogen, UA Normal Normal, 0.2 E.U./dL    Bilirubin Negative Negative    Blood, UA 3+ (A) Negative    Lot Number [id number]     Expiration Date 10/11/2025        Imaging:   No valid procedures specified.     Measures:   Smoking Cessation:   Former smoker.      Assessment / Plan      Assessment/Plan:   Diagnoses and all orders for this visit:    1. Acute bilateral low back pain without sciatica (Primary)  -     ketorolac (TORADOL) injection 15 mg    2. Dysuria  -     POC Urinalysis Dipstick, Automated  -     Urine Culture - Urine, Urine, Clean Catch; Future  -     Urine Culture - Urine, Urine, Clean Catch    Etiology for patient's symptoms at this time however I do suspect potential urinary tract infection versus kidney stone.   She does have a history of kidney stones leading to urosepsis.  She is hemodynamically stable in office today as well as afebrile.  - Patient previously admitted to the hospital septic from UTI (in 2020) with obstructing left sided stone where stent was placed. It does not appear that the stent was removed prior to discharge and plans were made to follow up in 3 months for replacement however patient does not remember ever having this removed/replaced  and I cannot find documentation to support removal either. At this time urine grew E. Coli however blood cultures grew Pseudomonas. Discharge Summary by Mulugeta Saldana MD (02/26/2020 10:10)   I did discuss options with the patient including the ordering of an outpatient CT stone protocol and administration of Toradol given previous labs in September were within normal limits.  She would like the Toradol injection however at this point given her discomfort she would like to present to the emergency department for further evaluation and management particularly in light of her history.  I called and discussed her case with a provider in the emergency department who is expecting her arrival.      Follow Up:   No follow-ups on file..    Patient or patient representative verbalized consent for the use of Ambient Listening during the visit with  Mayra Sotelo MD for chart documentation. 1/2/2025  12:51 EST    Mayra Sotelo MD  [facility] 1

## 2025-01-04 LAB
BACTERIA SPEC AEROBE CULT: ABNORMAL

## 2025-01-04 NOTE — PROGRESS NOTES
Patient has pan susceptibility to cephalosporin class.  Patient is on the appropriate antibiotic selection.

## 2025-01-10 ENCOUNTER — OFFICE VISIT (OUTPATIENT)
Age: 75
End: 2025-01-10
Payer: MEDICARE

## 2025-01-10 VITALS
SYSTOLIC BLOOD PRESSURE: 120 MMHG | OXYGEN SATURATION: 94 % | DIASTOLIC BLOOD PRESSURE: 60 MMHG | HEART RATE: 67 BPM | TEMPERATURE: 97.7 F

## 2025-01-10 DIAGNOSIS — N39.0 RECURRENT UTI: ICD-10-CM

## 2025-01-10 DIAGNOSIS — R39.9 LOWER URINARY TRACT SYMPTOMS (LUTS): ICD-10-CM

## 2025-01-10 DIAGNOSIS — E11.65 TYPE 2 DIABETES MELLITUS WITH HYPERGLYCEMIA, WITHOUT LONG-TERM CURRENT USE OF INSULIN: Primary | ICD-10-CM

## 2025-01-10 LAB
BILIRUB BLD-MCNC: NEGATIVE MG/DL
CLARITY, POC: ABNORMAL
COLOR UR: YELLOW
EXPIRATION DATE: ABNORMAL
EXPIRATION DATE: ABNORMAL
GLUCOSE UR STRIP-MCNC: NEGATIVE MG/DL
HBA1C MFR BLD: 6 % (ref 4.5–5.7)
KETONES UR QL: NEGATIVE
LEUKOCYTE EST, POC: NEGATIVE
Lab: ABNORMAL
Lab: ABNORMAL
NITRITE UR-MCNC: NEGATIVE MG/ML
PH UR: 5.5 [PH] (ref 5–8)
PROT UR STRIP-MCNC: NEGATIVE MG/DL
RBC # UR STRIP: ABNORMAL /UL
SP GR UR: 1.03 (ref 1–1.03)
UROBILINOGEN UR QL: NORMAL

## 2025-01-10 PROCEDURE — 87186 SC STD MICRODIL/AGAR DIL: CPT | Performed by: FAMILY MEDICINE

## 2025-01-10 PROCEDURE — 99214 OFFICE O/P EST MOD 30 MIN: CPT | Performed by: FAMILY MEDICINE

## 2025-01-10 PROCEDURE — 1125F AMNT PAIN NOTED PAIN PRSNT: CPT | Performed by: FAMILY MEDICINE

## 2025-01-10 PROCEDURE — 87077 CULTURE AEROBIC IDENTIFY: CPT | Performed by: FAMILY MEDICINE

## 2025-01-10 PROCEDURE — 3074F SYST BP LT 130 MM HG: CPT | Performed by: FAMILY MEDICINE

## 2025-01-10 PROCEDURE — 81003 URINALYSIS AUTO W/O SCOPE: CPT | Performed by: FAMILY MEDICINE

## 2025-01-10 PROCEDURE — 83036 HEMOGLOBIN GLYCOSYLATED A1C: CPT | Performed by: FAMILY MEDICINE

## 2025-01-10 PROCEDURE — 87086 URINE CULTURE/COLONY COUNT: CPT | Performed by: FAMILY MEDICINE

## 2025-01-10 PROCEDURE — 3078F DIAST BP <80 MM HG: CPT | Performed by: FAMILY MEDICINE

## 2025-01-10 PROCEDURE — 3044F HG A1C LEVEL LT 7.0%: CPT | Performed by: FAMILY MEDICINE

## 2025-01-10 RX ORDER — MONTELUKAST SODIUM 10 MG/1
10 TABLET ORAL DAILY
Qty: 90 TABLET | Refills: 1 | Status: SHIPPED | OUTPATIENT
Start: 2025-01-10

## 2025-01-10 RX ORDER — FLUCONAZOLE 100 MG/1
100 TABLET ORAL DAILY
Qty: 3 TABLET | Refills: 0 | Status: SHIPPED | OUTPATIENT
Start: 2025-01-10 | End: 2025-01-13

## 2025-01-10 RX ORDER — CEFDINIR 300 MG/1
300 CAPSULE ORAL 2 TIMES DAILY
Qty: 14 CAPSULE | Refills: 0 | Status: SHIPPED | OUTPATIENT
Start: 2025-01-10 | End: 2025-01-13 | Stop reason: SDUPTHER

## 2025-01-10 NOTE — TELEPHONE ENCOUNTER
Rx Refill Note  Requested Prescriptions     Pending Prescriptions Disp Refills    montelukast (SINGULAIR) 10 MG tablet [Pharmacy Med Name: MONTELUKAST SOD 10 MG TAB 10 Tablet] 90 tablet 1     Sig: TAKE 1 TABLET BY MOUTH DAILY.      Last office visit with prescribing clinician: 1/10/2025   Last telemedicine visit with prescribing clinician: Visit date not found   Next office visit with prescribing clinician: 4/14/2025                         Would you like a call back once the refill request has been completed: [] Yes [] No    If the office needs to give you a call back, can they leave a voicemail: [] Yes [] No    Susie Will MA  01/10/25, 12:01 EST

## 2025-01-12 LAB — BACTERIA SPEC AEROBE CULT: ABNORMAL

## 2025-01-13 ENCOUNTER — OFFICE VISIT (OUTPATIENT)
Dept: UROLOGY | Facility: CLINIC | Age: 75
End: 2025-01-13
Payer: MEDICARE

## 2025-01-13 VITALS
HEIGHT: 67 IN | BODY MASS INDEX: 34.37 KG/M2 | SYSTOLIC BLOOD PRESSURE: 118 MMHG | OXYGEN SATURATION: 95 % | HEART RATE: 65 BPM | DIASTOLIC BLOOD PRESSURE: 78 MMHG | WEIGHT: 219 LBS | TEMPERATURE: 97 F

## 2025-01-13 DIAGNOSIS — N39.0 RECURRENT UTI: ICD-10-CM

## 2025-01-13 DIAGNOSIS — N20.0 NEPHROLITHIASIS: Primary | ICD-10-CM

## 2025-01-13 PROCEDURE — 1160F RVW MEDS BY RX/DR IN RCRD: CPT | Performed by: UROLOGY

## 2025-01-13 PROCEDURE — G2211 COMPLEX E/M VISIT ADD ON: HCPCS | Performed by: UROLOGY

## 2025-01-13 PROCEDURE — 99214 OFFICE O/P EST MOD 30 MIN: CPT | Performed by: UROLOGY

## 2025-01-13 PROCEDURE — 3078F DIAST BP <80 MM HG: CPT | Performed by: UROLOGY

## 2025-01-13 PROCEDURE — 3074F SYST BP LT 130 MM HG: CPT | Performed by: UROLOGY

## 2025-01-13 PROCEDURE — 1159F MED LIST DOCD IN RCRD: CPT | Performed by: UROLOGY

## 2025-01-13 RX ORDER — CEFDINIR 300 MG/1
300 CAPSULE ORAL DAILY
Qty: 30 CAPSULE | Refills: 0 | Status: SHIPPED | OUTPATIENT
Start: 2025-01-13
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
## 2025-01-20 ENCOUNTER — TELEPHONE (OUTPATIENT)
Dept: UROLOGY | Facility: CLINIC | Age: 75
End: 2025-01-20
Payer: MEDICARE

## 2025-01-20 NOTE — TELEPHONE ENCOUNTER
Spoke with Bev Sahu's office, requested pulmonary clearance for surgery with Dr. Saab 01/28/2025.  Left Ureteroscopy Laser Lithotripsy & Stent under general anesthesia.

## 2025-01-23 ENCOUNTER — ANTICOAGULATION VISIT (OUTPATIENT)
Dept: FAMILY MEDICINE CLINIC | Facility: CLINIC | Age: 75
End: 2025-01-23
Payer: MEDICARE

## 2025-01-23 ENCOUNTER — TREATMENT (OUTPATIENT)
Dept: PHYSICAL THERAPY | Facility: CLINIC | Age: 75
End: 2025-01-23
Payer: MEDICARE

## 2025-01-23 DIAGNOSIS — R26.89 BALANCE DISORDER: Primary | ICD-10-CM

## 2025-01-23 LAB
INR PPP: 1.6 (ref 2–3)
INR PPP: 1.6 (ref 2–3)

## 2025-01-23 PROCEDURE — 85610 PROTHROMBIN TIME: CPT | Performed by: FAMILY MEDICINE

## 2025-01-23 PROCEDURE — 97161 PT EVAL LOW COMPLEX 20 MIN: CPT

## 2025-01-23 PROCEDURE — 97535 SELF CARE MNGMENT TRAINING: CPT

## 2025-01-23 PROCEDURE — 36416 COLLJ CAPILLARY BLOOD SPEC: CPT | Performed by: FAMILY MEDICINE

## 2025-01-23 NOTE — PROGRESS NOTES
Physical Therapy Initial Evaluation and Plan of Care  [address] [place] [address]      Patient: Shannon Servin   : 1950  Diagnosis/ICD-10 Code:  Balance disorder [R26.89]  Referring practitioner: Reagan Davis MD  Date of Initial Visit: 2025  Today's Date: 2025  Patient seen for 1 session         Visit Diagnoses:    ICD-10-CM ICD-9-CM   1. Balance disorder  R26.89 781.99         Subjective Questionnaire: LEFS:       Subjective Evaluation    History of Present Illness  Mechanism of injury: Pt arrived 15 min late for the appt and had to fill out paperwork for 10 min (Pt's are requested to arrive 10 min early to fill out paperwork). Pt with significant difficulty rising out of a chair with arms. Pt took 4 min to ambulate to the mat with a decreased stride, step to gait pattern, antalgic on R. Pt was requested to sit on mat so that her hips could be elevated to assist with rising. Pt states that she is unable to lay down on the mat. Pt also stated she was unable to perform exercise because she has a kidney stone and terrible back pain right now. Pt proceeded to discuss not having assistance or food and no one to take care of her, that she was needing to rely on her ex- to help her and that she hated that. Pt stated that PT could not possibly understand what she was going through. Discussed with pt that due to her upcoming surgery, her inability to move around safely in the clinic and inability to exercise that she is not appropriate for outpt PT. Recommended she consider Home Health PT. PT voiced concern that pt is still driving due to the medications she is on and her reaction time. As PT was wrapping up pt stated she was having a low blood sugar issue. PT asked pt if she was able to have peanut butter and crackers. Pt stated yes. PT retrieved PB and crackers for pt as well as water to assist pt through glycemic crisis. PT sat with pt during this to make sure she was  safe. Once pt felt better, she was assisted with donning her jacket and was escorted to the parking lot to her car.      Patient Occupation: retired Pain  Aggravating factors: stairs and ambulation (sit to stand)    Social Support  Lives with: alone             Objective          Ambulation   Weight-Bearing Status   Assistive device used: single point cane    Additional Weight-Bearing Status Details  Cane was at too high of a height for the pt but PT did not adjust secondary to pt agitation  Pt does not use the cane with the tip on the floor but on the side of the tip at an angle    Observational Gait   Gait: antalgic   Decreased walking speed, stride length and right step length.   Left foot contact pattern: foot flat  Right foot contact pattern: foot flat  Base of support: increased    Additional Observational Gait Details  Pt with step to gait pattern  Pt without obvious LOB or sway during ambulation    Functional Assessment     Comments  Pt with great difficulty and multiple attempts coming to stand from a standard chair with arms  Pt with small LOB while standing up from mat and trying to reach for her coat before coming to a complete stand     General Comments     Lumbar Comments  Due to time constraints and pt's glycemic crisis evaluation was limited but pt noted to have significant muscle wasting of B LE R>L     Pt education provided for safety concerns around her house, while driving and with ambulation.    Assessment & Plan       Assessment  Impairments: abnormal gait, abnormal muscle tone, activity intolerance, impaired balance and impaired physical strength   Functional limitations: walking and standing   Assessment details: Pt is a 73 YO F who presents to the clinic for balance deficits. Due to pt's multiple co-morbidities and severity of balance/gait deficits she is not appropriate or safe for outpt PT. Recommend home health PT.  Barriers to therapy: severity of multiple co-morbidities, age  Prognosis:  poor    Plan  Therapy options: will not be seen for skilled therapy services        History # of Personal Factors and/or Comorbidities: HIGH (3+)  Examination of Body System(s): # of elements: LOW (1-2)  Clinical Presentation: EVOLVING  Clinical Decision Making: LOW       Timed:         Manual Therapy:         mins  31037;     Therapeutic Exercise:         mins  19861;     Neuromuscular Juani:       mins  14088;    Therapeutic Activity:          mins  44298;     Gait Training:          mins  50156;     Ultrasound:          mins  83668;    Ionto                                   mins   75126  Self Care                       10     mins   82286  Canalith Repos         mins 40764      Un-Timed:  Electrical Stimulation:         mins  99202 ( );  Dry Needling          mins self-pay  Traction          mins 87503  Low Eval   30      Mins  82693  Mod Eval          Mins  96688  High Eval                            Mins  10970        Timed Treatment:   10   mins   Total Treatment:     40   mins      PT: Lila Gibson PT     License Number: [id number]    Electronically signed by Lila Gibson PT, 01/23/25, 3:53 PM EST    Certification Period: 1/26/2025 thru 4/25/2025  I certify that the therapy services are furnished while this patient is under my care.  The services outlined above are required by this patient, and will be reviewed every 90 days.         Physician Signature:__________________________________________________    PHYSICIAN: Reagan Davis MD  NPI: [id number]      DATE:     Please sign and return via fax to [email or web address] . Thank you, [facility].

## 2025-01-27 NOTE — PROGRESS NOTES
Established Patient        Chief Complaint:   Chief Complaint   Patient presents with    Urinary Tract Infection     Blood in urine, burning when urinating. Pain in lower right side of back.         Shannon Servin is a 74 y.o. female    History of Present Illness:   Answers submitted by the patient for this visit:  Primary Reason for Visit (Submitted on 1/8/2025)  What is the primary reason for your visit?: Painful Urination  Painful Urination Questionnaire (Submitted on 1/8/2025)  Chief Complaint: Dysuria  Chronicity: recurrent  Onset: 1 to 4 weeks ago  Frequency: constantly  Progression since onset: improving  Pain quality: burning  Pain - numeric: 4/10  Fever: no fever  Sexually active?: No  History of pyelonephritis?: Yes  hematuria: Yes  frequency: Yes  flank pain: Yes  urgency: Yes  Additional Information: You have fecords of er visit, slso am having severe pain in back    Here today in follow-up of right flank pain, as well as burning with urination.  Patient is a diabetic, known history of recurrent UTI.  Patient reports symptoms are consistent with past acute episode of urinary tract infection.  Tolerating nutritional intake.  She denies fever, chills or night sweats.  She does admit to some mild nausea.        Subjective     The following portions of the patient's history were reviewed and updated as appropriate: allergies, current medications, past family history, past medical history, past social history, past surgical history and problem list.    Allergies   Allergen Reactions    Metformin Hallucinations     Other reaction(s): Hallucinations    Codeine Other (See Comments)     constipation     Review of Systems:    Constitutional: Negative for fever. Negative for chills, diaphoresis; malaise/fatigue of chronic nature, and without unexpected weight change.   HENT: No dysphagia; no changes to vision/hearing/smell/taste; no epistaxis  Eyes: Negative for redness and visual  disturbance.   Respiratory: Chronic cough, history of hemoptysis.  Cardiovascular: Negative for chest pain and palpitations.   Gastrointestinal: Denies BRB/BTS.  No abdominal discomfort.  Endocrine: Negative for cold intolerance and heat intolerance.   Genitourinary: As per above.  Musculoskeletal: Chronic arthralgias, back pain and myalgias.  Significant limitation to range of motion of right shoulder.  Painful paresthesia to right thumb as per above.  Skin: Chronic thickened/yellow discoloration to the numerous toes of bilateral feet, worse to the bilateral great toes.  Pain on palpation of the right calcaneal pad.  Bakari/Roth sign negative.  Noted wound to right heel.  Neurological: Negative for syncope, weakness.  As per above otherwise.  Hematological: Negative for adenopathy. Does not bruise/bleed easily.   Psychiatric/Behavioral: Negative for confusion. The patient is not nervous/anxious.    Objective     Physical Exam   Vital Signs: /60   Pulse 67   Temp 97.7 °F (36.5 °C)   SpO2 94%     General Appearance: alert, oriented x 3, no acute distress.  Pleasant and interactive during questioning/examination.  Well-nourished and developed female.  Skin: warm and dry.  Stasis dermatitis noted additionally.  There is a noted small eschar to the calcaneal area of the right heel.  Does appear localized only to the superficial most aspect.  No subcutaneous fat exposure.  HEENT: Atraumatic.  pupils round and reactive to light and accommodation, oral mucosa pink and moist.  Nares patent without epistaxis.  External auditory canals are patent tympanic membranes intact.  Neck: supple, no JVD, trachea midline.  No thyromegaly  Lungs: Rhonchus, referred, upper airway congestion, cleared with cough, unlabored breathing effort.  Audible air exchange noted all lung fields.  Expiratory wheezes throughout.  Prolonged expiratory phase bilaterally.  Heart: RRR, normal S1 and S2, no S3, no rub.  Abdomen: soft,  non-tender, no palpable bladder, present bowel sounds to auscultation ×4.  No guarding or rigidity.  No CVA tenderness.  Extremities: no clubbing, cyanosis.  Symmetric muscle strength and development.  Nonpitting edematous changes noted to the mid tibias bilaterally, brawny changes associated with venous stasis, chronic in nature.  Ambulates independently.  Impaired range of motion to right shoulder.  Apley scratch test limited due to discomfort of the right shoulder, normal deltoid tone/development.  Negative pushoff, positive empty can sign, as well as shrug sign.  Zuniga/Neer painful, cross body abduction painful as well.  Symmetric  strength bilaterally.  De Quervain's test positive to the right hand.  Neuro: normal speech and mental status.  Cranial nerves II through XII intact.  No anosmia. DTR 2+; proprioception intact.  No focal motor/sensory deficits.        Assessment and Plan      Assessment/Plan:   Diagnoses and all orders for this visit:    1. Type 2 diabetes mellitus with hyperglycemia, without long-term current use of insulin (Primary)  -     POC Glycosylated Hemoglobin (Hb A1C)    2. Lower urinary tract symptoms (LUTS)  -     POCT urinalysis dipstick, automated  -     Urine Culture - Urine, Urine, Clean Catch    3. Recurrent UTI  -     Discontinue: cefdinir (OMNICEF) 300 MG capsule; Take 1 capsule by mouth 2 (Two) Times a Day.  Dispense: 14 capsule; Refill: 0  -     fluconazole (Diflucan) 100 MG tablet; Take 1 tablet by mouth Daily for 3 days.  Dispense: 3 tablet; Refill: 0  -     Urine Culture - Urine, Urine, Clean Catch    Plan treatment of lower urinary tract symptoms/recurrent UTI with a course of cefdinir.  Also provided a 3-day course of fluconazole due to recurrence of urogenital candidiasis with use of antibiotics.  Urine culture will be obtained, plan to follow results of culture.    Hemoglobin A1c demonstrates excellent blood glucose control.  Continue healthy dietary choices and avoid  prolonged fasting periods.  Maintain appropriate hydration status.      Discussion Summary:    Discussed plan of care in detail with pt today; pt verb understanding and agrees.    I spent 30 minutes caring for Shannon on this date of service. This time includes time spent by me in the following activities:preparing for the visit, performing a medically appropriate examination and/or evaluation , counseling and educating the patient/family/caregiver, ordering medications, tests, or procedures, documenting information in the medical record, independently interpreting results and communicating that information with the patient/family/caregiver, and care coordination    I confirm accuracy of unchanged data/findings which have been carried forward from previous visit, as well as I have updated appropriately those that have changed.      Follow up:  No follow-ups on file.     There are no Patient Instructions on file for this visit.    Humberto Wells DO  01/27/25  13:45 EST

## 2025-01-28 ENCOUNTER — OUTSIDE FACILITY SERVICE (OUTPATIENT)
Dept: UROLOGY | Facility: CLINIC | Age: 75
End: 2025-01-28
Payer: MEDICARE

## 2025-01-28 DIAGNOSIS — N20.0 NEPHROLITHIASIS: Primary | ICD-10-CM

## 2025-01-28 RX ORDER — TAMSULOSIN HYDROCHLORIDE 0.4 MG/1
1 CAPSULE ORAL NIGHTLY
Qty: 10 CAPSULE | Refills: 0 | Status: SHIPPED | OUTPATIENT
Start: 2025-01-28

## 2025-01-28 RX ORDER — CEFDINIR 300 MG/1
300 CAPSULE ORAL 2 TIMES DAILY
Qty: 14 CAPSULE | Refills: 0 | Status: SHIPPED | OUTPATIENT
Start: 2025-01-28 | End: 2025-02-04

## 2025-01-28 RX ORDER — ACETAMINOPHEN 500 MG
1000 TABLET ORAL EVERY 6 HOURS
Qty: 30 TABLET | Refills: 0 | Status: SHIPPED | OUTPATIENT
Start: 2025-01-28 | End: 2025-02-01

## 2025-02-03 DIAGNOSIS — M12.811 RIGHT ROTATOR CUFF TEAR ARTHROPATHY: ICD-10-CM

## 2025-02-03 DIAGNOSIS — R60.0 BILATERAL LOWER EXTREMITY EDEMA: ICD-10-CM

## 2025-02-03 DIAGNOSIS — G25.81 RESTLESS LEGS SYNDROME: ICD-10-CM

## 2025-02-03 DIAGNOSIS — M75.41 IMPINGEMENT SYNDROME OF RIGHT SHOULDER: Chronic | ICD-10-CM

## 2025-02-03 DIAGNOSIS — E11.44 DIABETIC AMYOTROPHY ASSOCIATED WITH TYPE 2 DIABETES MELLITUS: ICD-10-CM

## 2025-02-03 DIAGNOSIS — M25.511 ARTHRALGIA OF RIGHT SHOULDER REGION: ICD-10-CM

## 2025-02-03 DIAGNOSIS — M75.101 RIGHT ROTATOR CUFF TEAR ARTHROPATHY: ICD-10-CM

## 2025-02-04 RX ORDER — ROPINIROLE 0.5 MG/1
0.5 TABLET, FILM COATED ORAL
Qty: 90 TABLET | Refills: 0 | Status: SHIPPED | OUTPATIENT
Start: 2025-02-04

## 2025-02-04 RX ORDER — PREGABALIN 100 MG/1
100 CAPSULE ORAL 3 TIMES DAILY
Qty: 90 CAPSULE | Refills: 2 | Status: SHIPPED | OUTPATIENT
Start: 2025-02-04

## 2025-02-04 RX ORDER — SPIRONOLACTONE 25 MG/1
50 TABLET ORAL 2 TIMES DAILY
Qty: 360 TABLET | Refills: 0 | Status: SHIPPED | OUTPATIENT
Start: 2025-02-04

## 2025-02-06 ENCOUNTER — PROCEDURE VISIT (OUTPATIENT)
Dept: UROLOGY | Facility: CLINIC | Age: 75
End: 2025-02-06
Payer: MEDICARE

## 2025-02-06 DIAGNOSIS — N20.0 NEPHROLITHIASIS: Primary | ICD-10-CM

## 2025-02-06 NOTE — PROGRESS NOTES
Preoperative diagnosis  Foreign body in genitourinary tract    Postoperative diagnosis  Foreign body in genitourinary tract    Procedure  Flexible cystourethroscopy with stent removal    Attending surgeon  Marek Saab MD    Anesthesia  2% lidocaine jelly intraurethrally    Complications  None    Indications  74 y.o. female who is status post ureteroscopy with laser lithotripsy who presents for stent removal.    Procedure  Detailed information of all possible complications and side effects were discussed with the patient.  Informed consent was obtained. Patient was given one dose of antibiotics. The patient was placed in supine position and a timeout was performed. The patient was prepped and draped in sterile fashion.  Next, 2% lidocaine jelly was bluntly injected per urethra without difficulty. The 14 Tamazight flexible cystoscope was passed through the urethra and into the bladder.  The stent was visualized, grasped and removed in its entirety.  The patient tolerated the procedure well.    Plan  1. Provided education regarding water intake of at least 2 liters per day  2. F/u in 8 weeks with a renal ultrasound with GIANCARLO   3.  Stop antibiotic

## 2025-02-12 DIAGNOSIS — N39.46 MIXED URGE AND STRESS INCONTINENCE: ICD-10-CM

## 2025-02-17 RX ORDER — MIRABEGRON 50 MG/1
50 TABLET, FILM COATED, EXTENDED RELEASE ORAL DAILY
Qty: 60 TABLET | Refills: 0 | Status: SHIPPED | OUTPATIENT
Start: 2025-02-17

## 2025-02-17 NOTE — TELEPHONE ENCOUNTER
Rx Refill Note  Requested Prescriptions     Pending Prescriptions Disp Refills    Myrbetriq 50 MG tablet sustained-release 24 hour 24 hr tablet [Pharmacy Med Name: MYRBETRIQ ER 50 MG TABLET 50 Tablet] 60 tablet 0     Sig: TAKE 50 MG BY MOUTH DAILY.      Last office visit with prescribing clinician: 1/10/2025   Last telemedicine visit with prescribing clinician: Visit date not found   Next office visit with prescribing clinician: 4/14/2025                         Would you like a call back once the refill request has been completed: [] Yes [] No    If the office needs to give you a call back, can they leave a voicemail: [] Yes [] No    Susie Will MA  02/17/25, 07:45 EST

## 2025-02-18 DIAGNOSIS — N39.46 MIXED URGE AND STRESS INCONTINENCE: ICD-10-CM

## 2025-02-20 RX ORDER — MIRABEGRON 50 MG/1
50 TABLET, FILM COATED, EXTENDED RELEASE ORAL DAILY
Qty: 60 TABLET | Refills: 0 | OUTPATIENT
Start: 2025-02-20

## 2025-02-20 NOTE — TELEPHONE ENCOUNTER
Rx Refill Note  Requested Prescriptions     Pending Prescriptions Disp Refills    Myrbetriq 50 MG tablet sustained-release 24 hour 24 hr tablet 60 tablet 0     Sig: Take 50 mg by mouth Daily.      Last office visit with prescribing clinician: 1/10/2025   Last telemedicine visit with prescribing clinician: Visit date not found   Next office visit with prescribing clinician: 4/14/2025                         Would you like a call back once the refill request has been completed: [] Yes [] No    If the office needs to give you a call back, can they leave a voicemail: [] Yes [] No    Susie Will MA  02/20/25, 14:15 EST

## 2025-02-25 RX ORDER — TRAZODONE HYDROCHLORIDE 50 MG/1
50 TABLET ORAL NIGHTLY
Qty: 30 TABLET | Refills: 2 | Status: SHIPPED | OUTPATIENT
Start: 2025-02-25

## 2025-02-25 NOTE — TELEPHONE ENCOUNTER
Rx Refill Note  Requested Prescriptions     Pending Prescriptions Disp Refills    traZODone (DESYREL) 50 MG tablet       Sig: Take 1 tablet by mouth Every Night.      Last office visit with prescribing clinician: 1/10/2025   Last telemedicine visit with prescribing clinician: Visit date not found   Next office visit with prescribing clinician: 4/14/2025                         Would you like a call back once the refill request has been completed: [] Yes [] No    If the office needs to give you a call back, can they leave a voicemail: [] Yes [] No    Susie Will MA  02/25/25, 07:43 EST

## 2025-02-27 ENCOUNTER — ANTICOAGULATION VISIT (OUTPATIENT)
Dept: FAMILY MEDICINE CLINIC | Facility: CLINIC | Age: 75
End: 2025-02-27
Payer: MEDICARE

## 2025-02-27 LAB — INR PPP: 1.6 (ref 2–3)

## 2025-03-05 NOTE — TELEPHONE ENCOUNTER
[facility] [address] [facility] [phone] [facility] [phone] FX    Relationship: Pharmacy    Requested Prescriptions:   Requested Prescriptions     Pending Prescriptions Disp Refills    sotalol (BETAPACE) 80 MG tablet 180 tablet 3     Sig: Take 1 tablet by mouth Every 12 (Twelve) Hours.        Pharmacy where request should be sent:      Last office visit with prescribing clinician: 1/10/2025   Last telemedicine visit with prescribing clinician: Visit date not found   Next office visit with prescribing clinician: 4/14/2025     Additional details provided by patient: PATIENT NEEDS NEW SCRIPT SENT TO NEW PHARMACY.     Does the patient have less than a 3 day supply:  [] Yes  [x] No    Would you like a call back once the refill request has been completed: [] Yes [x] No    If the office needs to give you a call back, can they leave a voicemail: [] Yes [x] No    Toni Obrien Rep   03/05/25 12:40 EST

## 2025-03-06 RX ORDER — SOTALOL HYDROCHLORIDE 80 MG/1
80 TABLET ORAL EVERY 12 HOURS SCHEDULED
Qty: 180 TABLET | Refills: 3 | Status: SHIPPED | OUTPATIENT
Start: 2025-03-06

## 2025-03-11 DIAGNOSIS — R10.84 ABDOMINAL PAIN, GENERALIZED: ICD-10-CM

## 2025-03-11 DIAGNOSIS — K21.9 GERD WITHOUT ESOPHAGITIS: ICD-10-CM

## 2025-03-11 DIAGNOSIS — R10.13 ABDOMINAL PAIN, EPIGASTRIC: ICD-10-CM

## 2025-03-11 RX ORDER — PANTOPRAZOLE SODIUM 20 MG/1
20 TABLET, DELAYED RELEASE ORAL DAILY
Qty: 90 TABLET | Refills: 3 | Status: SHIPPED | OUTPATIENT
Start: 2025-03-11

## 2025-03-11 NOTE — TELEPHONE ENCOUNTER
Rx Refill Note  Requested Prescriptions     Pending Prescriptions Disp Refills    pantoprazole (PROTONIX) 20 MG EC tablet 90 tablet 3     Sig: Take 1 tablet by mouth Daily.      Last office visit with prescribing clinician: 1/10/2025   Last telemedicine visit with prescribing clinician: Visit date not found   Next office visit with prescribing clinician: 4/14/2025                         Would you like a call back once the refill request has been completed: [] Yes [] No    If the office needs to give you a call back, can they leave a voicemail: [] Yes [] No    Susie Will MA  03/11/25, 11:56 EDT

## 2025-03-20 ENCOUNTER — TELEPHONE (OUTPATIENT)
Dept: CARDIOLOGY | Facility: CLINIC | Age: 75
End: 2025-03-20
Payer: MEDICARE

## 2025-03-20 NOTE — TELEPHONE ENCOUNTER
Pt called states she has an infection in her foot and the only antibiotic that will fight this is Levofloxacin. The prescribing provider wants her to stop her Sotalol for 14 days while taking this. Pt wants to know if this is ok. Please advise.

## 2025-04-01 ENCOUNTER — ANTICOAGULATION VISIT (OUTPATIENT)
Dept: FAMILY MEDICINE CLINIC | Facility: CLINIC | Age: 75
End: 2025-04-01
Payer: MEDICARE

## 2025-04-01 LAB — INR PPP: 1.6 (ref 2–3)

## 2025-04-08 ENCOUNTER — TELEPHONE (OUTPATIENT)
Age: 75
End: 2025-04-08

## 2025-04-08 NOTE — TELEPHONE ENCOUNTER
Caller: RUI WITH [facility]    Best call back number:[phone]     What was the call regarding: PAGE WITH [facility] STATED THAT PATIENT IS TAKING HER MEDICATION     Linzess 145 MCG capsule capsule     EVERY 3 DAYS AND IS HAVING MORE CONSTIPATION WITH FREQUENT URINATION WITH NO PAIN OR ODOR

## 2025-04-13 ENCOUNTER — HOSPITAL ENCOUNTER (OUTPATIENT)
Dept: ULTRASOUND IMAGING | Facility: HOSPITAL | Age: 75
Discharge: HOME | End: 2025-04-13
Admitting: UROLOGY
Payer: MEDICARE

## 2025-04-13 DIAGNOSIS — N20.0 NEPHROLITHIASIS: ICD-10-CM

## 2025-04-13 PROCEDURE — 76775 US EXAM ABDO BACK WALL LIM: CPT

## 2025-04-14 ENCOUNTER — OFFICE VISIT (OUTPATIENT)
Age: 75
End: 2025-04-14
Payer: MEDICARE

## 2025-04-14 VITALS
DIASTOLIC BLOOD PRESSURE: 80 MMHG | BODY MASS INDEX: 30.76 KG/M2 | WEIGHT: 196 LBS | HEIGHT: 67 IN | SYSTOLIC BLOOD PRESSURE: 110 MMHG | OXYGEN SATURATION: 93 % | HEART RATE: 69 BPM

## 2025-04-14 DIAGNOSIS — E11.44 DIABETIC AMYOTROPHY ASSOCIATED WITH TYPE 2 DIABETES MELLITUS: ICD-10-CM

## 2025-04-14 DIAGNOSIS — E78.5 DYSLIPIDEMIA: Chronic | ICD-10-CM

## 2025-04-14 DIAGNOSIS — I10 ESSENTIAL HYPERTENSION: ICD-10-CM

## 2025-04-14 DIAGNOSIS — Z79.01 CHRONIC ANTICOAGULATION: Chronic | ICD-10-CM

## 2025-04-14 DIAGNOSIS — I48.0 PAROXYSMAL ATRIAL FIBRILLATION: ICD-10-CM

## 2025-04-14 DIAGNOSIS — E11.65 TYPE 2 DIABETES MELLITUS WITH HYPERGLYCEMIA, WITHOUT LONG-TERM CURRENT USE OF INSULIN: Primary | Chronic | ICD-10-CM

## 2025-04-14 LAB
EXPIRATION DATE: NORMAL
HBA1C MFR BLD: 5.7 % (ref 4.5–5.7)
Lab: NORMAL

## 2025-04-14 RX ORDER — ATORVASTATIN CALCIUM 80 MG/1
80 TABLET, FILM COATED ORAL DAILY
Qty: 90 TABLET | Refills: 3 | Status: SHIPPED | OUTPATIENT
Start: 2025-04-14

## 2025-04-14 RX ORDER — TRAMADOL HYDROCHLORIDE 50 MG/1
1 TABLET ORAL 3 TIMES DAILY
COMMUNITY

## 2025-04-14 RX ORDER — ALBUTEROL SULFATE 90 UG/1
INHALANT RESPIRATORY (INHALATION)
COMMUNITY

## 2025-04-14 RX ORDER — ENALAPRIL MALEATE 10 MG/1
TABLET ORAL
COMMUNITY
Start: 2025-01-13 | End: 2025-04-14 | Stop reason: SDUPTHER

## 2025-04-14 RX ORDER — ENALAPRIL MALEATE 10 MG/1
10 TABLET ORAL DAILY
Qty: 90 TABLET | Refills: 3 | Status: SHIPPED | OUTPATIENT
Start: 2025-04-14

## 2025-04-15 ENCOUNTER — OFFICE VISIT (OUTPATIENT)
Dept: UROLOGY | Facility: CLINIC | Age: 75
End: 2025-04-15
Payer: MEDICARE

## 2025-04-15 VITALS
OXYGEN SATURATION: 96 % | TEMPERATURE: 97.8 F | BODY MASS INDEX: 30.76 KG/M2 | HEIGHT: 67 IN | DIASTOLIC BLOOD PRESSURE: 68 MMHG | HEART RATE: 60 BPM | WEIGHT: 196 LBS | SYSTOLIC BLOOD PRESSURE: 112 MMHG

## 2025-04-15 DIAGNOSIS — K59.00 CONSTIPATION, UNSPECIFIED CONSTIPATION TYPE: ICD-10-CM

## 2025-04-15 DIAGNOSIS — E55.9 VITAMIN D DEFICIENCY: ICD-10-CM

## 2025-04-15 DIAGNOSIS — R10.32 LEFT LOWER QUADRANT PAIN: ICD-10-CM

## 2025-04-15 DIAGNOSIS — N20.0 NEPHROLITHIASIS: Primary | ICD-10-CM

## 2025-04-15 PROBLEM — E11.9 TYPE 2 DIABETES MELLITUS: Status: ACTIVE | Noted: 2024-05-17

## 2025-04-15 PROBLEM — I73.9 PVD (PERIPHERAL VASCULAR DISEASE): Status: ACTIVE | Noted: 2025-04-15

## 2025-04-15 PROBLEM — G89.4 CHRONIC PAIN DISORDER: Status: ACTIVE | Noted: 2024-05-17

## 2025-04-15 PROBLEM — I70.244 ATHEROSCLEROSIS OF NATIVE ARTERY OF LEFT LOWER EXTREMITY WITH ULCERATION OF HEEL: Status: ACTIVE | Noted: 2025-04-15

## 2025-04-15 LAB
BILIRUB BLD-MCNC: NEGATIVE MG/DL
CLARITY, POC: CLEAR
COLOR UR: YELLOW
EXPIRATION DATE: ABNORMAL
GLUCOSE UR STRIP-MCNC: NEGATIVE MG/DL
KETONES UR QL: NEGATIVE
LEUKOCYTE EST, POC: ABNORMAL
Lab: ABNORMAL
NITRITE UR-MCNC: NEGATIVE MG/ML
PH UR: 5.5 [PH] (ref 5–8)
PROT UR STRIP-MCNC: NEGATIVE MG/DL
RBC # UR STRIP: NEGATIVE /UL
SP GR UR: 1.02 (ref 1–1.03)
UROBILINOGEN UR QL: ABNORMAL
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
## 2025-04-28 DIAGNOSIS — N39.46 MIXED URGE AND STRESS INCONTINENCE: ICD-10-CM

## 2025-04-28 RX ORDER — MIRABEGRON 50 MG/1
50 TABLET, FILM COATED, EXTENDED RELEASE ORAL DAILY
Qty: 60 TABLET | Refills: 0 | Status: SHIPPED | OUTPATIENT
Start: 2025-04-28

## 2025-04-28 NOTE — TELEPHONE ENCOUNTER
Rx Refill Note  Requested Prescriptions     Pending Prescriptions Disp Refills    Myrbetriq 50 MG tablet sustained-release 24 hour 24 hr tablet [Pharmacy Med Name: Myrbetriq 50 mg tablet,extended release] 60 tablet 0     Sig: TAKE ONE TABLET BY MOUTH EVERY DAY      Last office visit with prescribing clinician: 4/14/2025   Last telemedicine visit with prescribing clinician: Visit date not found   Next office visit with prescribing clinician: 7/8/2025                         Would you like a call back once the refill request has been completed: [] Yes [] No    If the office needs to give you a call back, can they leave a voicemail: [] Yes [] No    Blanche Gaines MA  04/28/25, 10:31 EDT

## 2025-04-29 NOTE — PROGRESS NOTES
Established Patient        Chief Complaint:   Chief Complaint   Patient presents with    Diabetes        Shannon Servin is a 74 y.o. female    History of Present Illness:   Answers submitted by the patient for this visit:  Diabetes Questionnaire (Submitted on 4/8/2025)  Chief Complaint: Diabetes problem  Diabetes type: type 2  MedicAlert ID: No  Disease duration: 10 Years  Treatment compliance: all of the time  Symptom course: stable  Home blood tests: 1-2 x per day  High score: 130-140  Below 70: occasionally  blurred vision: No  foot paresthesias: Yes  foot ulcerations: Yes  polydipsia: No  polyuria: Yes  weight loss: Yes  blackouts: No  hospitalization: No  nocturnal hypoglycemia: No  required assistance: No  required glucagon: No  confusion: No  headaches: No  speech difficulty: No  sweats: Yes  tremors: Yes  Current diet: generally healthy  Meal planning: carbohydrate counting, calorie counting, avoidance of concentrated sweets  Exercise: never  Eye exam current: Yes  Sees podiatrist: Yes    Here today in scheduled follow-up visit of diabetes mellitus, atrial fibrillation, hypertension, chronic anticoagulation, diabetic amyotrophy and dyslipidemia.    Patient denies cyclical/persistent hypoglycemic episodes.    Denies chest pain, syncope, palpitations or vertigo.  Denies fever, chills or night sweats.  Maintains an active lifestyle, balanced dietary intake; reports good hydration habits.  Denies hematuria/dysuria.  Denies any BRB/BTS.  No new rashes.  Denies orthopnea, epistaxis or hemoptysis.        Subjective     The following portions of the patient's history were reviewed and updated as appropriate: allergies, current medications, past family history, past medical history, past social history, past surgical history and problem list.    Allergies   Allergen Reactions    Metformin Hallucinations     Other reaction(s): Hallucinations    Codeine Other (See Comments)     constipation  "    Review of Systems:    Constitutional: Negative for fever. Negative for chills, diaphoresis; malaise/fatigue of chronic nature, and without unexpected weight change.   HENT: No dysphagia; no changes to vision/hearing/smell/taste; no epistaxis  Eyes: Negative for redness and visual disturbance.   Respiratory: Chronic cough, history of hemoptysis.  Cardiovascular: Negative for chest pain and palpitations.   Gastrointestinal: Denies BRB/BTS.  No abdominal discomfort.  Endocrine: Negative for cold intolerance and heat intolerance.   Genitourinary: Negative for difficulty urinating, dysuria and frequency.   Musculoskeletal: Chronic arthralgias, back pain and myalgias.  Significant limitation to range of motion of right shoulder.  Painful paresthesia to right thumb as per above.  Skin: Chronic thickened/yellow discoloration to the numerous toes of bilateral feet, worse to the bilateral great toes.  Pain on palpation of the right calcaneal pad.  Bakari/Roth sign negative.  Noted wound to right heel.  Neurological: Negative for syncope, weakness.  As per above otherwise.  Hematological: Negative for adenopathy. Does not bruise/bleed easily.   Psychiatric/Behavioral: Negative for confusion. The patient is not nervous/anxious.    Objective     Physical Exam   Vital Signs: /80   Pulse 69   Ht 170.2 cm (67\")   Wt 88.9 kg (196 lb)   SpO2 93%   BMI 30.70 kg/m²     General Appearance: alert, oriented x 3, no acute distress.  Pleasant and interactive during questioning/examination.  Well-nourished and developed female.  Skin: warm and dry.  Stasis dermatitis noted additionally.  There is a noted small eschar to the calcaneal area of the right heel.  Does appear localized only to the superficial most aspect.  No subcutaneous fat exposure.  HEENT: Atraumatic.  pupils round and reactive to light and accommodation, oral mucosa pink and moist.  Nares patent without epistaxis.  External auditory canals are patent " tympanic membranes intact.  Neck: supple, no JVD, trachea midline.  No thyromegaly  Lungs: Rhonchus, referred, upper airway congestion, cleared with cough, unlabored breathing effort.  Audible air exchange noted all lung fields.  Expiratory wheezes throughout.  Prolonged expiratory phase bilaterally.  Heart: RRR, normal S1 and S2, no S3, no rub.  Abdomen: soft, non-tender, no palpable bladder, present bowel sounds to auscultation ×4.  No guarding or rigidity.  No CVA tenderness.  Extremities: no clubbing, cyanosis.  Symmetric muscle strength and development.  Nonpitting edematous changes noted to the mid tibias bilaterally, brawny changes associated with venous stasis, chronic in nature.  Ambulates independently.  Impaired range of motion to right shoulder.  Apley scratch test limited due to discomfort of the right shoulder, normal deltoid tone/development.  Negative pushoff, positive empty can sign, as well as shrug sign.  Zuniga/Neer painful, cross body abduction painful as well.  Symmetric  strength bilaterally.  De Quervain's test positive to the right hand.  Neuro: normal speech and mental status.  Cranial nerves II through XII intact.  No anosmia. DTR 2+; proprioception intact.  No focal motor/sensory deficits.        Assessment and Plan      Assessment/Plan:   Diagnoses and all orders for this visit:    1. Type 2 diabetes mellitus with hyperglycemia, without long-term current use of insulin (Primary)  -     POC Glycosylated Hemoglobin (Hb A1C)  -     enalapril (VASOTEC) 10 MG tablet; Take 1 tablet by mouth Daily.  Dispense: 90 tablet; Refill: 3    2. Paroxysmal atrial fibrillation    3. Essential hypertension  -     enalapril (VASOTEC) 10 MG tablet; Take 1 tablet by mouth Daily.  Dispense: 90 tablet; Refill: 3    4. Chronic anticoagulation    5. Diabetic amyotrophy associated with type 2 diabetes mellitus    6. Dyslipidemia  -     atorvastatin (LIPITOR) 80 MG tablet; Take 1 tablet by mouth Daily.   Dispense: 90 tablet; Refill: 3    Vital signs demonstrate hemodynamic stability, blood pressure is at goal.    Refill provided for enalapril.    Hemoglobin A1c demonstrates excellent blood glucose control.  Continue healthy dietary choices and maintain appropriate hydration status.    Pain appears clinically well-controlled.    Heart rate well-controlled, demonstrates no findings of active bleeding.      Discussion Summary:    Discussed plan of care in detail with pt today; pt verb understanding and agrees.    I spent 35 minutes caring for Shannon on this date of service. This time includes time spent by me in the following activities:preparing for the visit, performing a medically appropriate examination and/or evaluation , counseling and educating the patient/family/caregiver, ordering medications, tests, or procedures, documenting information in the medical record, independently interpreting results and communicating that information with the patient/family/caregiver, and care coordination    I confirm accuracy of unchanged data/findings which have been carried forward from previous visit, as well as I have updated appropriately those that have changed.      Follow up:  Return in about 3 months (around 7/14/2025) for Medicare Wellness, Subsequent.     There are no Patient Instructions on file for this visit.    Humberto Wells DO  04/29/25  18:06 EDT

## 2025-05-06 ENCOUNTER — ANTICOAGULATION VISIT (OUTPATIENT)
Dept: FAMILY MEDICINE CLINIC | Facility: CLINIC | Age: 75
End: 2025-05-06
Payer: MEDICARE

## 2025-05-06 LAB — INR PPP: 1.6 (ref 2–3)

## 2025-05-07 DIAGNOSIS — E11.44 DIABETIC AMYOTROPHY ASSOCIATED WITH TYPE 2 DIABETES MELLITUS: ICD-10-CM

## 2025-05-07 DIAGNOSIS — G25.81 RESTLESS LEGS SYNDROME: ICD-10-CM

## 2025-05-07 DIAGNOSIS — R60.0 BILATERAL LOWER EXTREMITY EDEMA: ICD-10-CM

## 2025-05-07 RX ORDER — SPIRONOLACTONE 25 MG/1
50 TABLET ORAL 2 TIMES DAILY
Qty: 360 TABLET | Refills: 3 | Status: SHIPPED | OUTPATIENT
Start: 2025-05-07 | End: 2025-05-09 | Stop reason: SDUPTHER

## 2025-05-07 RX ORDER — ROPINIROLE 0.5 MG/1
0.5 TABLET, FILM COATED ORAL
Qty: 90 TABLET | Refills: 3 | Status: SHIPPED | OUTPATIENT
Start: 2025-05-07 | End: 2025-05-09 | Stop reason: SDUPTHER

## 2025-05-07 NOTE — TELEPHONE ENCOUNTER
Caller: Shannon Servin    Relationship: Self    Best call back number: [phone]     Requested Prescriptions:   Requested Prescriptions     Pending Prescriptions Disp Refills    spironolactone (ALDACTONE) 25 MG tablet 360 tablet 0     Sig: Take 2 tablets by mouth 2 (Two) Times a Day.    rOPINIRole (REQUIP) 0.5 MG tablet 90 tablet 0     Sig: Take 1 tablet by mouth every night at bedtime.      PATIENT HAS 1 PILL LEFT AND REQUESTING REFILLS BE CALLED IN TODAY PLEASE      Pharmacy where request should be sent: [facility] [address] [phone] [facility] [phone]      Last office visit with prescribing clinician: 4/14/2025   Last telemedicine visit with prescribing clinician: Visit date not found   Next office visit with prescribing clinician: 7/8/2025         Does the patient have less than a 3 day supply:  [x] Yes  [] No    Would you like a call back once the refill request has been completed: [] Yes [x] No    If the office needs to give you a call back, can they leave a voicemail: [] Yes [x] No    Toni Lugo Rep   05/07/25 09:58 EDT

## 2025-05-09 DIAGNOSIS — G25.81 RESTLESS LEGS SYNDROME: ICD-10-CM

## 2025-05-09 DIAGNOSIS — R60.0 BILATERAL LOWER EXTREMITY EDEMA: ICD-10-CM

## 2025-05-09 DIAGNOSIS — E78.5 DYSLIPIDEMIA: Chronic | ICD-10-CM

## 2025-05-09 DIAGNOSIS — E11.44 DIABETIC AMYOTROPHY ASSOCIATED WITH TYPE 2 DIABETES MELLITUS: ICD-10-CM

## 2025-05-09 RX ORDER — SPIRONOLACTONE 25 MG/1
50 TABLET ORAL 2 TIMES DAILY
Qty: 360 TABLET | Refills: 3 | Status: SHIPPED | OUTPATIENT
Start: 2025-05-09

## 2025-05-09 RX ORDER — ROPINIROLE 0.5 MG/1
0.5 TABLET, FILM COATED ORAL
Qty: 90 TABLET | Refills: 3 | Status: SHIPPED | OUTPATIENT
Start: 2025-05-09

## 2025-05-09 RX ORDER — ATORVASTATIN CALCIUM 40 MG/1
40 TABLET, FILM COATED ORAL DAILY
Qty: 90 TABLET | Refills: 3 | Status: SHIPPED | OUTPATIENT
Start: 2025-05-09

## 2025-05-09 RX ORDER — GLYBURIDE 1.25 MG/1
1.25 TABLET ORAL
Qty: 90 TABLET | Refills: 2 | Status: SHIPPED | OUTPATIENT
Start: 2025-05-09

## 2025-05-09 RX ORDER — SPIRONOLACTONE 25 MG/1
50 TABLET ORAL 2 TIMES DAILY
Qty: 360 TABLET | Refills: 3 | Status: SHIPPED | OUTPATIENT
Start: 2025-05-09 | End: 2025-05-09 | Stop reason: SDUPTHER

## 2025-05-09 RX ORDER — ROPINIROLE 0.5 MG/1
0.5 TABLET, FILM COATED ORAL
Qty: 90 TABLET | Refills: 3 | Status: SHIPPED | OUTPATIENT
Start: 2025-05-09 | End: 2025-05-09 | Stop reason: SDUPTHER

## 2025-05-09 NOTE — TELEPHONE ENCOUNTER
Caller: Shannon Servin    Relationship: Self    Best call back number: [phone]     Requested Prescriptions:   Requested Prescriptions     Pending Prescriptions Disp Refills    glyburide (DIAbeta) 1.25 MG tablet 90 tablet 2     Sig: Take 1 tablet by mouth Daily With Breakfast.    rOPINIRole (REQUIP) 0.5 MG tablet 90 tablet 3     Sig: Take 1 tablet by mouth every night at bedtime.    spironolactone (ALDACTONE) 25 MG tablet 360 tablet 3     Sig: Take 2 tablets by mouth 2 (Two) Times a Day.        Pharmacy where request should be sent: [facility] [address] [phone] [facility] [phone]      Last office visit with prescribing clinician: 4/14/2025   Last telemedicine visit with prescribing clinician: Visit date not found   Next office visit with prescribing clinician: 7/8/2025     Additional details provided by patient: PLEASE RESEND THE PRESCRIPTIONS. [facility] DID NOT GET THEM. PLEASE SEND A REPLACEMENT FOR THE GLYBURIDE AS IT IS NO LONGER IN FORMULARY AND NEEDS TO BE REPLACED.    Does the patient have less than a 3 day supply:  [x] Yes OUT    Toni Willson Rep   05/09/25 10:36 EDT

## 2025-05-09 NOTE — TELEPHONE ENCOUNTER
Rx Refill Note  Requested Prescriptions     Pending Prescriptions Disp Refills    glyburide (DIAbeta) 1.25 MG tablet 90 tablet 2     Sig: Take 1 tablet by mouth Daily With Breakfast.    rOPINIRole (REQUIP) 0.5 MG tablet 90 tablet 3     Sig: Take 1 tablet by mouth every night at bedtime.    spironolactone (ALDACTONE) 25 MG tablet 360 tablet 3     Sig: Take 2 tablets by mouth 2 (Two) Times a Day.      Last office visit with prescribing clinician: 4/14/2025   Last telemedicine visit with prescribing clinician: Visit date not found   Next office visit with prescribing clinician: 7/8/2025                         Would you like a call back once the refill request has been completed: [] Yes [] No    If the office needs to give you a call back, can they leave a voicemail: [] Yes [] No    Blanche Gaines MA  05/09/25, 12:59 EDT

## 2025-05-14 ENCOUNTER — TELEPHONE (OUTPATIENT)
Age: 75
End: 2025-05-14
Payer: MEDICARE

## 2025-05-14 NOTE — TELEPHONE ENCOUNTER
Patient called today to get an update on the glyburide as her insurance is no longer covering the medicine and she needed a replacement. The last communication was on the 5th of May and the patient has had no update since. She wanted to voice her frustration about the situation as this is a medication for her diabetes and she stated she feels like she is being pushed to the side about it.

## 2025-05-15 RX ORDER — GLIMEPIRIDE 1 MG/1
1 TABLET ORAL
Qty: 90 TABLET | Refills: 1 | Status: SHIPPED | OUTPATIENT
Start: 2025-05-15

## 2025-05-20 ENCOUNTER — PRIOR AUTHORIZATION (OUTPATIENT)
Age: 75
End: 2025-05-20
Payer: MEDICARE

## 2025-05-21 DIAGNOSIS — M25.511 ARTHRALGIA OF RIGHT SHOULDER REGION: ICD-10-CM

## 2025-05-21 DIAGNOSIS — M75.41 IMPINGEMENT SYNDROME OF RIGHT SHOULDER: Chronic | ICD-10-CM

## 2025-05-21 DIAGNOSIS — M12.811 RIGHT ROTATOR CUFF TEAR ARTHROPATHY: ICD-10-CM

## 2025-05-21 DIAGNOSIS — E11.44 DIABETIC AMYOTROPHY ASSOCIATED WITH TYPE 2 DIABETES MELLITUS: ICD-10-CM

## 2025-05-21 DIAGNOSIS — M75.101 RIGHT ROTATOR CUFF TEAR ARTHROPATHY: ICD-10-CM

## 2025-05-22 RX ORDER — PREGABALIN 100 MG/1
100 CAPSULE ORAL 3 TIMES DAILY
Qty: 90 CAPSULE | Refills: 1 | Status: SHIPPED | OUTPATIENT
Start: 2025-05-22

## 2025-05-22 RX ORDER — TRAZODONE HYDROCHLORIDE 50 MG/1
50 TABLET ORAL
Qty: 90 TABLET | Refills: 2 | Status: SHIPPED | OUTPATIENT
Start: 2025-05-22

## 2025-05-22 NOTE — TELEPHONE ENCOUNTER
Rx Refill Note  Requested Prescriptions     Pending Prescriptions Disp Refills    pregabalin (LYRICA) 100 MG capsule [Pharmacy Med Name: pregabalin 100 mg capsule] 90 capsule 1     Sig: TAKE ONE CAPSULE BY MOUTH THREE TIMES DAILY    traZODone (DESYREL) 50 MG tablet [Pharmacy Med Name: trazodone 50 mg tablet] 30 tablet 2     Sig: TAKE ONE TABLET BY MOUTH EVERY NIGHT      Last office visit with prescribing clinician: 4/14/2025   Last telemedicine visit with prescribing clinician: Visit date not found   Next office visit with prescribing clinician: 7/8/2025                         Would you like a call back once the refill request has been completed: [] Yes [] No    If the office needs to give you a call back, can they leave a voicemail: [] Yes [] No    Blanche Gaines MA  05/22/25, 10:42 EDT

## 2025-06-02 RX ORDER — DOCUSATE CALCIUM 240 MG
240 CAPSULE ORAL NIGHTLY
Status: CANCELLED | OUTPATIENT
Start: 2025-06-02

## 2025-06-02 RX ORDER — TRAZODONE HYDROCHLORIDE 50 MG/1
50 TABLET ORAL
Qty: 90 TABLET | Refills: 2 | Status: CANCELLED | OUTPATIENT
Start: 2025-06-02

## 2025-06-05 ENCOUNTER — TELEMEDICINE (OUTPATIENT)
Dept: UROLOGY | Facility: CLINIC | Age: 75
End: 2025-06-05
Payer: MEDICARE

## 2025-06-05 DIAGNOSIS — N20.0 NEPHROLITHIASIS: Primary | ICD-10-CM

## 2025-06-05 DIAGNOSIS — N39.46 MIXED URGE AND STRESS INCONTINENCE: ICD-10-CM

## 2025-06-05 RX ORDER — BUPROPION HYDROCHLORIDE 150 MG/1
150 TABLET ORAL EVERY MORNING
COMMUNITY
Start: 2025-05-27
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
## 2025-06-09 ENCOUNTER — ANTICOAGULATION VISIT (OUTPATIENT)
Dept: FAMILY MEDICINE CLINIC | Facility: CLINIC | Age: 75
End: 2025-06-09
Payer: MEDICARE

## 2025-06-09 LAB — INR PPP: 1.6 (ref 2–3)

## 2025-06-09 PROCEDURE — 36416 COLLJ CAPILLARY BLOOD SPEC: CPT | Performed by: FAMILY MEDICINE

## 2025-06-09 PROCEDURE — 85610 PROTHROMBIN TIME: CPT | Performed by: FAMILY MEDICINE

## 2025-06-23 ENCOUNTER — TELEPHONE (OUTPATIENT)
Dept: UROLOGY | Facility: CLINIC | Age: 75
End: 2025-06-23

## 2025-06-23 NOTE — TELEPHONE ENCOUNTER
Hub staff attempted to follow warm transfer process and was unsuccessful     Caller: Shannon Servin     Relationship to patient: SELF    Best call back number: [phone]     Patient is needing: PT HAS A MYCHART APPT SCHEDULED FOR 6-25-25. SHE IS ASKING IF IT IS NECESSARY.    SHE STATED SHE WAS SUPPOSED TO HAVE DONE A URINE TEST BUT NEVER RECEIVED THE KIT.    PLEASE GIVE PT A CALL TO DISCUSS.           Yes

## 2025-06-24 DIAGNOSIS — N39.46 MIXED URGE AND STRESS INCONTINENCE: ICD-10-CM

## 2025-06-24 RX ORDER — MIRABEGRON 50 MG/1
50 TABLET, FILM COATED, EXTENDED RELEASE ORAL DAILY
Qty: 60 TABLET | Refills: 0 | OUTPATIENT
Start: 2025-06-24

## 2025-06-24 RX ORDER — BUPROPION HYDROCHLORIDE 150 MG/1
150 TABLET ORAL EVERY MORNING
Qty: 90 TABLET | Refills: 1 | Status: SHIPPED | OUTPATIENT
Start: 2025-06-24

## 2025-06-24 NOTE — TELEPHONE ENCOUNTER
Rx Refill Note  Requested Prescriptions     Pending Prescriptions Disp Refills    buPROPion XL (WELLBUTRIN XL) 150 MG 24 hr tablet       Sig: Take 1 tablet by mouth Every Morning.      Last office visit with prescribing clinician: 4/14/2025   Last telemedicine visit with prescribing clinician: Visit date not found   Next office visit with prescribing clinician: 7/8/2025                         Would you like a call back once the refill request has been completed: [] Yes [] No    If the office needs to give you a call back, can they leave a voicemail: [] Yes [] No    Blanche Gaines MA  06/24/25, 14:32 EDT

## 2025-06-27 DIAGNOSIS — N39.46 MIXED URGE AND STRESS INCONTINENCE: ICD-10-CM

## 2025-06-27 RX ORDER — MIRABEGRON 50 MG/1
50 TABLET, FILM COATED, EXTENDED RELEASE ORAL DAILY
Qty: 60 TABLET | Refills: 0 | Status: SHIPPED | OUTPATIENT
Start: 2025-06-27

## 2025-06-27 NOTE — TELEPHONE ENCOUNTER
Rx Refill Note  Requested Prescriptions     Pending Prescriptions Disp Refills    Mirabegron ER (Myrbetriq) 50 MG tablet sustained-release 24 hour 24 hr tablet 60 tablet 0     Sig: Take 50 mg by mouth Daily.      Last office visit with prescribing clinician: 4/14/2025   Last telemedicine visit with prescribing clinician: Visit date not found   Next office visit with prescribing clinician: 7/8/2025                         Would you like a call back once the refill request has been completed: [] Yes [] No    If the office needs to give you a call back, can they leave a voicemail: [] Yes [] No    Blanche Gaines MA  06/27/25, 16:56 EDT

## 2025-07-03 ENCOUNTER — TELEPHONE (OUTPATIENT)
Age: 75
End: 2025-07-03
Payer: MEDICARE

## 2025-07-03 NOTE — TELEPHONE ENCOUNTER
PT CALLED - SHE HAS SHINGLES.  HAS HAD IT BEFORE AND THEY CALLED IN MEDICINE FOR HER THE DOCTOR THAT HAD CALLED IT IN BEFORE IS OUT OF [place]OWN AND SHE WOULD LIKE FOR YOU TO SEND HER SOMETHING IN IF POSSIBLE.  SHE SAID TO TELL YOU HER TIMING SUCKS TO HAVE SHINGLES WITH HOLIDAY WEEKEND.   Yes

## 2025-07-08 ENCOUNTER — OFFICE VISIT (OUTPATIENT)
Age: 75
End: 2025-07-08
Payer: MEDICARE

## 2025-07-08 VITALS
HEART RATE: 73 BPM | BODY MASS INDEX: 33.43 KG/M2 | HEIGHT: 67 IN | WEIGHT: 213 LBS | SYSTOLIC BLOOD PRESSURE: 114 MMHG | OXYGEN SATURATION: 92 % | DIASTOLIC BLOOD PRESSURE: 60 MMHG

## 2025-07-08 DIAGNOSIS — I73.9 PVD (PERIPHERAL VASCULAR DISEASE): ICD-10-CM

## 2025-07-08 DIAGNOSIS — G47.33 OBSTRUCTIVE SLEEP APNEA OF ADULT: ICD-10-CM

## 2025-07-08 DIAGNOSIS — Z78.9 IMPAIRED MOBILITY AND ADLS: ICD-10-CM

## 2025-07-08 DIAGNOSIS — E78.5 DYSLIPIDEMIA: Chronic | ICD-10-CM

## 2025-07-08 DIAGNOSIS — I48.0 PAROXYSMAL ATRIAL FIBRILLATION: ICD-10-CM

## 2025-07-08 DIAGNOSIS — N39.46 MIXED URGE AND STRESS INCONTINENCE: ICD-10-CM

## 2025-07-08 DIAGNOSIS — I70.244 ATHEROSCLEROSIS OF NATIVE ARTERY OF LEFT LOWER EXTREMITY WITH ULCERATION OF HEEL: ICD-10-CM

## 2025-07-08 DIAGNOSIS — Z91.81 AT HIGH RISK FOR FALLS: ICD-10-CM

## 2025-07-08 DIAGNOSIS — Z79.01 CHRONIC ANTICOAGULATION: ICD-10-CM

## 2025-07-08 DIAGNOSIS — J96.11 CHRONIC RESPIRATORY FAILURE WITH HYPOXIA: ICD-10-CM

## 2025-07-08 DIAGNOSIS — M25.511 ARTHRALGIA OF RIGHT SHOULDER REGION: ICD-10-CM

## 2025-07-08 DIAGNOSIS — E11.44 DIABETIC AMYOTROPHY ASSOCIATED WITH TYPE 2 DIABETES MELLITUS: ICD-10-CM

## 2025-07-08 DIAGNOSIS — Z00.00 MEDICARE ANNUAL WELLNESS VISIT, SUBSEQUENT: Primary | ICD-10-CM

## 2025-07-08 DIAGNOSIS — I10 ESSENTIAL HYPERTENSION: ICD-10-CM

## 2025-07-08 DIAGNOSIS — N39.0 RECURRENT UTI: ICD-10-CM

## 2025-07-08 DIAGNOSIS — M75.101 RIGHT ROTATOR CUFF TEAR ARTHROPATHY: ICD-10-CM

## 2025-07-08 DIAGNOSIS — M25.50 POLYARTHRALGIA: ICD-10-CM

## 2025-07-08 DIAGNOSIS — I79.8 OTHER DISORDERS OF ARTERIES, ARTERIOLES AND CAPILLARIES IN DISEASES CLASSIFIED ELSEWHERE: ICD-10-CM

## 2025-07-08 DIAGNOSIS — B02.9 HERPES ZOSTER WITHOUT COMPLICATION: ICD-10-CM

## 2025-07-08 DIAGNOSIS — M75.41 IMPINGEMENT SYNDROME OF RIGHT SHOULDER: Chronic | ICD-10-CM

## 2025-07-08 DIAGNOSIS — Z78.0 POSTMENOPAUSAL: ICD-10-CM

## 2025-07-08 DIAGNOSIS — Z13.820 ENCOUNTER FOR SCREENING FOR OSTEOPOROSIS: ICD-10-CM

## 2025-07-08 DIAGNOSIS — Z11.3 SCREEN FOR STD (SEXUALLY TRANSMITTED DISEASE): ICD-10-CM

## 2025-07-08 DIAGNOSIS — E11.65 TYPE 2 DIABETES MELLITUS WITH HYPERGLYCEMIA, WITHOUT LONG-TERM CURRENT USE OF INSULIN: Chronic | ICD-10-CM

## 2025-07-08 DIAGNOSIS — M62.84 SARCOPENIA: ICD-10-CM

## 2025-07-08 DIAGNOSIS — Z74.09 IMPAIRED MOBILITY AND ADLS: ICD-10-CM

## 2025-07-08 DIAGNOSIS — M12.811 RIGHT ROTATOR CUFF TEAR ARTHROPATHY: ICD-10-CM

## 2025-07-08 LAB
BASOPHILS # BLD AUTO: 0.03 10*3/MM3 (ref 0–0.2)
BASOPHILS NFR BLD AUTO: 0.4 % (ref 0–1.5)
DEPRECATED RDW RBC AUTO: 40.8 FL (ref 37–54)
EOSINOPHIL # BLD AUTO: 0.21 10*3/MM3 (ref 0–0.4)
EOSINOPHIL NFR BLD AUTO: 2.5 % (ref 0.3–6.2)
ERYTHROCYTE [DISTWIDTH] IN BLOOD BY AUTOMATED COUNT: 12.4 % (ref 12.3–15.4)
EXPIRATION DATE: ABNORMAL
HAV IGM SERPL QL IA: NORMAL
HBA1C MFR BLD: 5.9 % (ref 4.5–5.7)
HBV CORE IGM SERPL QL IA: NORMAL
HBV SURFACE AG SERPL QL IA: NORMAL
HCT VFR BLD AUTO: 44.5 % (ref 34–46.6)
HCV AB SER QL: NORMAL
HGB BLD-MCNC: 15.1 G/DL (ref 12–15.9)
HIV 1+2 AB+HIV1 P24 AG SERPL QL IA: NORMAL
IMM GRANULOCYTES # BLD AUTO: 0.02 10*3/MM3 (ref 0–0.05)
IMM GRANULOCYTES NFR BLD AUTO: 0.2 % (ref 0–0.5)
LYMPHOCYTES # BLD AUTO: 2.82 10*3/MM3 (ref 0.7–3.1)
LYMPHOCYTES NFR BLD AUTO: 33 % (ref 19.6–45.3)
Lab: ABNORMAL
MCH RBC QN AUTO: 30.4 PG (ref 26.6–33)
MCHC RBC AUTO-ENTMCNC: 33.9 G/DL (ref 31.5–35.7)
MCV RBC AUTO: 89.7 FL (ref 79–97)
MONOCYTES # BLD AUTO: 0.6 10*3/MM3 (ref 0.1–0.9)
MONOCYTES NFR BLD AUTO: 7 % (ref 5–12)
NEUTROPHILS NFR BLD AUTO: 4.87 10*3/MM3 (ref 1.7–7)
NEUTROPHILS NFR BLD AUTO: 56.9 % (ref 42.7–76)
NRBC BLD AUTO-RTO: 0 /100 WBC (ref 0–0.2)
PLATELET # BLD AUTO: 181 10*3/MM3 (ref 140–450)
PMV BLD AUTO: 11.9 FL (ref 6–12)
RBC # BLD AUTO: 4.96 10*6/MM3 (ref 3.77–5.28)
WBC NRBC COR # BLD AUTO: 8.55 10*3/MM3 (ref 3.4–10.8)

## 2025-07-08 PROCEDURE — 86592 SYPHILIS TEST NON-TREP QUAL: CPT | Performed by: FAMILY MEDICINE

## 2025-07-08 PROCEDURE — G0432 EIA HIV-1/HIV-2 SCREEN: HCPCS | Performed by: FAMILY MEDICINE

## 2025-07-08 PROCEDURE — 80074 ACUTE HEPATITIS PANEL: CPT | Performed by: FAMILY MEDICINE

## 2025-07-08 PROCEDURE — 85025 COMPLETE CBC W/AUTO DIFF WBC: CPT | Performed by: FAMILY MEDICINE

## 2025-07-08 RX ORDER — GABAPENTIN 300 MG/1
300 CAPSULE ORAL 3 TIMES DAILY
Qty: 90 CAPSULE | Refills: 2 | Status: SHIPPED | OUTPATIENT
Start: 2025-07-08

## 2025-07-08 RX ORDER — VALACYCLOVIR HYDROCHLORIDE 1 G/1
1000 TABLET, FILM COATED ORAL 3 TIMES DAILY
Qty: 21 TABLET | Refills: 0 | Status: SHIPPED | OUTPATIENT
Start: 2025-07-08 | End: 2025-07-15

## 2025-07-08 RX ORDER — PREGABALIN 100 MG/1
100 CAPSULE ORAL 2 TIMES DAILY
Qty: 60 CAPSULE | Refills: 2 | Status: SHIPPED | OUTPATIENT
Start: 2025-07-08

## 2025-07-08 NOTE — PROGRESS NOTES
Subjective   The ABCs of the Annual Wellness Visit  Medicare Wellness Visit      Shannon Servin is a 75 y.o. patient who presents for a Medicare Wellness Visit.    The following portions of the patient's history were reviewed and   updated as appropriate: allergies, current medications, past family history, past medical history, past social history, past surgical history, and problem list.    Compared to one year ago, the patient's physical   health is worse.  Compared to one year ago, the patient's mental   health is the same.    Recent Hospitalizations:  She was not admitted to the hospital during the last year.     Current Medical Providers:  Patient Care Team:  Humberto Wells DO as PCP - General (Family Medicine)  Susie Kulkarni PA-C as Physician Assistant (Physician Assistant)  Marissa Vang APRN as Nurse Practitioner (Behavioral Health)    Outpatient Medications Prior to Visit   Medication Sig Dispense Refill    Airsupra 90-80 MCG/ACT aerosol       albuterol sulfate  (90 Base) MCG/ACT inhaler Inhale 2 puffs into the lungs every four to six hours      ammonium lactate (AMLACTIN) 12 % cream Apply 1 g topically to the appropriate area as directed As Needed for Dry Skin.      atorvastatin (LIPITOR) 40 MG tablet Take 1 tablet by mouth Daily. 90 tablet 3    buPROPion XL (WELLBUTRIN XL) 150 MG 24 hr tablet Take 1 tablet by mouth Every Morning. 90 tablet 1    Cequa 0.09 % solution       Coenzyme Q10 (CO Q 10 PO) Take  by mouth.      Docusate Calcium (STOOL SOFTENER PO) Take 240 mg by mouth Daily.      docusate calcium (SURFAK) 240 MG capsule Take 1 capsule by mouth Every Night.      enalapril (VASOTEC) 10 MG tablet Take 1 tablet by mouth Daily. 90 tablet 3    erythromycin (ROMYCIN) 5 MG/GM ophthalmic ointment       glucose blood (Accu-Chek Magdalena Plus) test strip 1 each by Other route 2 (Two) Times a Day. Use as instructed 200 each 1    glucose blood test strip 1 each by Other route 2  (Two) Times a Day. 100 each 5    glucose monitor monitoring kit Use 1 each As Needed (blood glucose monitoring). 1 each 0    ibuprofen (ADVIL,MOTRIN) 600 MG tablet Take 1 tablet by mouth Every 6 (Six) Hours As Needed for Mild Pain. 60 tablet 0    ipratropium-albuterol (DUO-NEB) 0.5-2.5 mg/3 ml nebulizer Inhale 1 vial (3 mL) by nebulization Every 6 (Six) Hours As Needed for Wheezing or Shortness of Air. 360 mL 1    Lancets misc Use 1 Units 2 (Two) Times a Day. 200 each 1    Lifitegrast (Xiidra) 5 % ophthalmic solution       Linzess 145 MCG capsule capsule TAKE 1 CAPSULE BY MOUTH EVERY MORNING BEFORE BREAKFAST. (Patient taking differently: Take 1 capsule by mouth As Needed.) 90 capsule 1    MAGNESIUM PO Take  by mouth Daily.      Mirabegron ER (Myrbetriq) 50 MG tablet sustained-release 24 hour 24 hr tablet Take 50 mg by mouth Daily. 60 tablet 0    montelukast (SINGULAIR) 10 MG tablet TAKE 1 TABLET BY MOUTH DAILY. 90 tablet 1    naloxone (NARCAN) 4 MG/0.1ML nasal spray Call 911. Don't prime. Uledi in 1 nostril for overdose. Repeat in 2-3 minutes in other nostril if no or minimal breathing/responsiveness. 2 each 0    ondansetron (Zofran) 4 MG tablet Take 1 tablet by mouth Every 8 (Eight) Hours As Needed for Nausea or Vomiting. (Patient taking differently: Take 2 tablets by mouth Every 8 (Eight) Hours As Needed for Nausea or Vomiting.) 270 tablet 0    oxyCODONE (Roxicodone) 5 MG immediate release tablet Take 1 tablet by mouth Every 8 (Eight) Hours As Needed for Moderate Pain. 45 tablet 0    pantoprazole (PROTONIX) 20 MG EC tablet Take 1 tablet by mouth Daily. 90 tablet 3    prednisoLONE acetate (Pred Forte) 1 % ophthalmic suspension Administer 1 drop into the left eye 4 (Four) Times a Day.      Probiotic Product (PROBIOTIC PO) Take  by mouth.      promethazine (PHENERGAN) 25 MG tablet Take 1 tablet by mouth Every 6 (Six) Hours As Needed.      rivaroxaban (XARELTO) 20 MG tablet Take 1 tablet by mouth Daily. 90 tablet 3     rOPINIRole (REQUIP) 0.5 MG tablet Take 1 tablet by mouth every night at bedtime. 90 tablet 3    senna (SENOKOT) 8.6 MG tablet Take 2 tablets by mouth Every Night.      sotalol (BETAPACE) 80 MG tablet Take 1 tablet by mouth Every 12 (Twelve) Hours. 180 tablet 3    spironolactone (ALDACTONE) 25 MG tablet Take 2 tablets by mouth 2 (Two) Times a Day. 360 tablet 3    tiotropium bromide-olodaterol (STIOLTO RESPIMAT) 2.5-2.5 MCG/ACT aerosol solution inhaler Inhale 2 puffs Daily. 1 each 5    tiZANidine (ZANAFLEX) 4 MG tablet Take 1 tablet by mouth At Night As Needed for Muscle Spasms. 90 tablet 3    traMADol (ULTRAM) 50 MG tablet Take 1 tablet by mouth 3 (Three) Times a Day.      traZODone (DESYREL) 50 MG tablet TAKE ONE TABLET BY MOUTH EVERY NIGHT 90 tablet 2    gabapentin (NEURONTIN) 600 MG tablet Take 1 tablet by mouth 2 (Two) Times a Day. (Patient taking differently: Take 1 tablet by mouth As Needed.) 60 tablet 0    HYDROcodone-acetaminophen (NORCO) 5-325 MG per tablet Take 1 tablet by mouth Every 6 (Six) Hours As Needed for pain 20 tablet 0    pregabalin (LYRICA) 100 MG capsule TAKE ONE CAPSULE BY MOUTH THREE TIMES DAILY 90 capsule 1    glimepiride (Amaryl) 1 MG tablet Take 1 tablet by mouth Every Morning Before Breakfast. 90 tablet 1     Facility-Administered Medications Prior to Visit   Medication Dose Route Frequency Provider Last Rate Last Admin    cyanocobalamin injection 1,000 mcg  1,000 mcg Intramuscular Q28 Days Humberto Wells DO   1,000 mcg at 06/03/22 1706     Opioid medication/s are on active medication list.  and I have evaluated her active treatment plan and pain score trends (see table).  Vitals:    07/08/25 1505   PainSc: 10-Worst pain ever     I have reviewed the chart for potential of high risk medication and harmful drug interactions in the elderly.        Aspirin is not on active medication list.  Aspirin use is not indicated based on review of current medical condition/s. Risk of harm outweighs  potential benefits.  .    Patient Active Problem List   Diagnosis    Paroxysmal atrial fibrillation    Essential hypertension    Left lower quadrant pain    Chronic constipation    Preoperative cardiovascular examination    Bilateral lower extremity edema    Chronic anticoagulation    Dyslipidemia    Type 2 diabetes mellitus with hyperglycemia, without long-term current use of insulin    Chronic allergic rhinitis    Primary insomnia    Obstructive sleep apnea of adult    Keratosis of plantar aspect of foot    Adult situational stress disorder    Nocturnal hypoxia    Has daytime drowsiness    Malaise and fatigue    Onychomycosis of toenail    Chronic obstructive bronchitis    Vitamin B12 deficiency    Migraine without aura and without status migrainosus, not intractable    Diabetic amyotrophy associated with type 2 diabetes mellitus    Restless legs syndrome    Abdominal pain, epigastric    Body mass index (BMI) 36.0-36.9, adult    Nephrolithiasis    Long term (current) use of aspirin    Other intervertebral disc degeneration, lumbar region    Presence of urogenital implants    Primary generalized (osteo)arthritis    Sciatica, unspecified side    Impingement syndrome of right shoulder    Arteriosclerosis, mesenteric artery    Recurrent UTI    Mixed urge and stress incontinence    Right rotator cuff tear arthropathy    Arthralgia of right shoulder region    Drug reaction    Calcaneal spur of foot, right    Bunion of great toe of right foot    Personal history of tobacco use, presenting hazards to health    Irritable bowel syndrome with constipation    De Quervain's tenosynovitis, right    COPD, severe    Breast reconstruction deformity    Presence of silicone breast implant    Chronic respiratory failure with hypoxia    Living accommodation issues    Neoplasm, uncertain whether benign or malignant    Nuclear sclerotic cataract of right eye    Nuclear sclerotic cataract of left eye    Chronic pain disorder     "Atherosclerosis of native artery of left lower extremity with ulceration of heel    Type 2 diabetes mellitus    PVD (peripheral vascular disease)    Vitamin D deficiency     Advance Care Planning Advance Directive is on file.  ACP discussion was held with the patient during this visit. Patient has an advance directive in EMR which is still valid.       Review of Systems:     Constitutional: Negative for fever. Negative for chills, diaphoresis; malaise/fatigue of chronic nature, and without unexpected weight change.   HENT: No dysphagia; no changes to vision/hearing/smell/taste; no epistaxis  Eyes: Negative for redness and visual disturbance.   Respiratory: Chronic cough, history of hemoptysis.  Cardiovascular: Negative for chest pain and palpitations.   Gastrointestinal: Denies BRB/BTS.  No abdominal discomfort.  Endocrine: Negative for cold intolerance and heat intolerance.   Genitourinary: Negative for difficulty urinating, dysuria and frequency.   Musculoskeletal: Chronic arthralgias, back pain and myalgias.  Significant limitation to range of motion of right shoulder.  Painful paresthesia to right thumb as per above.  Skin: Chronic thickened/yellow discoloration to the numerous toes of bilateral feet, worse to the bilateral great toes.  Pain on palpation of the right calcaneal pad.  Bakari/Roth sign negative.  Noted wound to right heel.  Neurological: Negative for syncope, weakness.  As per above otherwise.  Hematological: Negative for adenopathy. Does not bruise/bleed easily.   Psychiatric/Behavioral: Negative for confusion. The patient is not nervous/anxious.      Objective   Vitals:    07/08/25 1505   BP: 114/60   Pulse: 73   SpO2: 92%   Weight: 96.6 kg (213 lb)   Height: 170.2 cm (67\")   PainSc: 10-Worst pain ever     General Appearance: alert, oriented x 3, no acute distress.  Pleasant and interactive during questioning/examination.  Well-nourished and developed female.  Skin: warm and dry.  Stasis " "dermatitis noted additionally.  There is a noted small eschar to the calcaneal area of the right heel.  Does appear localized only to the superficial most aspect.  No subcutaneous fat exposure.  HEENT: Atraumatic.  pupils round and reactive to light and accommodation, oral mucosa pink and moist.  Nares patent without epistaxis.  External auditory canals are patent tympanic membranes intact.  Neck: supple, no JVD, trachea midline.  No thyromegaly  Lungs: Rhonchus, referred, upper airway congestion, cleared with cough, unlabored breathing effort.  Audible air exchange noted all lung fields.  Expiratory wheezes throughout.  Prolonged expiratory phase bilaterally.  Heart: RRR, normal S1 and S2, no S3, no rub.  Abdomen: soft, non-tender, no palpable bladder, present bowel sounds to auscultation ×4.  No guarding or rigidity.  No CVA tenderness.  Extremities: no clubbing, cyanosis.  Symmetric muscle strength and development.  Nonpitting edematous changes noted to the mid tibias bilaterally, brawny changes associated with venous stasis, chronic in nature.  Ambulates independently.  Impaired range of motion to right shoulder.  Apley scratch test limited due to discomfort of the right shoulder, normal deltoid tone/development.  Negative pushoff, positive empty can sign, as well as shrug sign.  Zuniga/Neer painful, cross body abduction painful as well.  Symmetric  strength bilaterally.  De Quervain's test positive to the right hand.  Neuro: normal speech and mental status.  Cranial nerves II through XII intact.  No anosmia. DTR 2+; proprioception intact.  No focal motor/sensory deficits.    Estimated body mass index is 33.36 kg/m² as calculated from the following:    Height as of this encounter: 170.2 cm (67\").    Weight as of this encounter: 96.6 kg (213 lb).                Does the patient have evidence of cognitive impairment? No  Lab Results   Component Value Date    HGBA1C 5.9 (A) 07/08/2025                           "                                                                     Health  Risk Assessment    Smoking Status:  Social History     Tobacco Use   Smoking Status Former    Current packs/day: 0.00    Average packs/day: 1.5 packs/day for 33.3 years (49.9 ttl pk-yrs)    Types: Cigarettes    Start date: 1965    Quit date: 3/1/1998    Years since quittin.3    Passive exposure: Past   Smokeless Tobacco Never     Alcohol Consumption:  Social History     Substance and Sexual Activity   Alcohol Use Yes    Comment: maybe twice a month       Fall Risk Screen  STEADI Fall Risk Assessment was completed, and patient is at HIGH risk for falls. Assessment completed on:2025    Depression Screening   Little interest or pleasure in doing things? Not at all   Feeling down, depressed, or hopeless? Not at all   PHQ-2 Total Score 0      Health Habits and Functional and Cognitive Screenin/6/2025     9:11 AM   Functional & Cognitive Status   Do you have difficulty preparing food and eating? Yes   Do you have difficulty bathing yourself, getting dressed or grooming yourself? Yes   Do you have difficulty using the toilet? No   Do you have difficulty moving around from place to place? Yes   Do you have trouble with steps or getting out of a bed or a chair? Yes   Current Diet Low Carb Diet   Dental Exam Not up to date   Eye Exam Up to date   Exercise (times per week) 0 times per week   Current Exercises Include No Regular Exercise   Do you need help using the phone?  No   Are you deaf or do you have serious difficulty hearing?  No   Do you need help to go to places out of walking distance? Yes   Do you need help shopping? Yes   Do you need help preparing meals?  No   Do you need help with housework?  Yes   Do you need help with laundry? Yes   Do you need help taking your medications? No   Do you need help managing money? No   Do you ever drive or ride in a car without wearing a seat belt? No   Have you felt unusual fatigue  (could be tiredness), stress, anger or loneliness in the last month? Yes   Who do you live with? Alone   If you need help, do you have trouble finding someone available to you? Yes   Have you been bothered in the last four weeks by sexual problems? No   Do you have difficulty concentrating, remembering or making decisions? Yes           Age-appropriate Screening Schedule:  Refer to the list below for future screening recommendations based on patient's age, sex and/or medical conditions. Orders for these recommended tests are listed in the plan section. The patient has been provided with a written plan.    Health Maintenance List  Health Maintenance   Topic Date Due    DXA SCAN  Never done    URINE MICROALBUMIN-CREATININE RATIO (uACR)  Never done    HEPATITIS C SCREENING  Never done    ZOSTER VACCINE (2 of 2) 09/24/2022    RSV Vaccine - Adults (1 - 1-dose 75+ series) Never done    COVID-19 Vaccine (5 - 2024-25 season) 09/01/2024    ANNUAL WELLNESS VISIT  10/02/2024    DIABETIC FOOT EXAM  02/13/2025    DIABETIC EYE EXAM  07/29/2025    INFLUENZA VACCINE  10/01/2025    HEMOGLOBIN A1C  01/08/2026    TDAP/TD VACCINES (2 - Td or Tdap) 10/02/2033    Pneumococcal Vaccine 50+  Completed    COLORECTAL CANCER SCREENING  Discontinued                                                                                                                                                CMS Preventative Services Quick Reference  Risk Factors Identified During Encounter  None Identified    The above risks/problems have been discussed with the patient.  Pertinent information has been shared with the patient in the After Visit Summary.  An After Visit Summary and PPPS were made available to the patient.    Follow Up:  Next Medicare Wellness visit to be scheduled in 1 year.     Assessment & Plan  Medicare annual wellness visit, subsequent    Orders:    Dexa Bone Density, Axial (Every 2 Years); Future  I have discussed age/gender specific  preventative healthcare issues in detail with patient today.  I have answered all of the questions.  Type 2 diabetes mellitus with hyperglycemia, without long-term current use of insulin  Diabetes is improving with lifestyle modifications.   Continue current treatment regimen.  Diabetes will be reassessed in 3 months    Orders:    POC Glycosylated Hemoglobin (Hb A1C)    POC Albumin/Creatinine Ratio Urine    Ambulatory Referral to Nutrition Services  Continue healthy dietary choices, avoid prolonged fasting periods as best able.  Maintain appropriate hydration status.  Hemoglobin A1c is approaching normal range.  Changes to treatment regimen can be made when needed.  Atherosclerosis of native artery of left lower extremity with ulceration of heel         Dyslipidemia    Orders:    Ambulatory Referral to Nutrition Services    Essential hypertension  Hypertension is stable and controlled  Continue current treatment regimen.  Blood pressure will be reassessed in 3 months.    Orders:    CBC w AUTO Differential    Ambulatory Referral to Nutrition Services    Paroxysmal atrial fibrillation    Orders:    CBC w AUTO Differential  Vital signs demonstrate hemodynamic stability, heart rate well-controlled.  PVD (peripheral vascular disease)    Orders:    US Carotid Bilateral; Future    Chronic anticoagulation    Orders:    CBC w AUTO Differential  No active signs of bleeding.  Diabetic amyotrophy associated with type 2 diabetes mellitus  Diabetes is improving with lifestyle modifications.   Continue current treatment regimen.  Diabetes will be reassessed in 3 months    Orders:    gabapentin (NEURONTIN) 300 MG capsule; Take 1 capsule by mouth 3 (Three) Times a Day.    pregabalin (LYRICA) 100 MG capsule; Take 1 capsule by mouth 2 (Two) Times a Day.  Adjustment made to treatment regimen today, plan to follow clinically.  Chronic respiratory failure with hypoxia         Obstructive sleep apnea of adult       NIPPV use.  Right  rotator cuff tear arthropathy    Orders:    pregabalin (LYRICA) 100 MG capsule; Take 1 capsule by mouth 2 (Two) Times a Day.    Mixed urge and stress incontinence         Recurrent UTI         Postmenopausal    Orders:    Dexa Bone Density, Axial (Every 2 Years); Future    Impaired mobility and ADLs         Polyarthralgia         Herpes zoster without complication    Orders:    valACYclovir (Valtrex) 1000 MG tablet; Take 1 tablet by mouth 3 (Three) Times a Day for 7 days.    Screen for STD (sexually transmitted disease)    Orders:    Hepatitis panel, acute    HIV-1/O/2 ANTIGEN/ANTIBODY, 4TH GENERATION    RPR    Encounter for screening for osteoporosis    Orders:    Dexa Bone Density, Axial (Every 2 Years); Future    At high risk for falls         Impingement syndrome of right shoulder    Orders:    pregabalin (LYRICA) 100 MG capsule; Take 1 capsule by mouth 2 (Two) Times a Day.    Arthralgia of right shoulder region    Orders:    pregabalin (LYRICA) 100 MG capsule; Take 1 capsule by mouth 2 (Two) Times a Day.    Other disorders of arteries, arterioles and capillaries in diseases classified elsewhere    Orders:    US Carotid Bilateral; Future    Sarcopenia    Orders:    Ambulatory Referral to Nutrition Services         Follow Up:  No follow-ups on file.        I confirm accuracy of unchanged data/findings which have been carried forward from previous visit, as well as I have updated appropriately those that have changed.

## 2025-07-08 NOTE — ASSESSMENT & PLAN NOTE
Hypertension is stable and controlled  Continue current treatment regimen.  Blood pressure will be reassessed in 3 months.    Orders:    CBC w AUTO Differential    Ambulatory Referral to Nutrition Services

## 2025-07-08 NOTE — PATIENT INSTRUCTIONS
Advance Care Planning and Advance Directives     You make decisions on a daily basis - decisions about where you want to live, your career, your home, your life. Perhaps one of the most important decisions you face is your choice for future medical care. Take time to talk with your family and your healthcare team and start planning today.  Advance Care Planning is a process that can help you:  Understand possible future healthcare decisions in light of your own experiences  Reflect on those decision in light of your goals and values  Discuss your decisions with those closest to you and the healthcare professionals that care for you  Make a plan by creating a document that reflects your wishes    Surrogate Decision Maker  In the event of a medical emergency, which has left you unable to communicate or to make your own decisions, you would need someone to make decisions for you.  It is important to discuss your preferences for medical treatment with this person while you are in good health.     Qualities of a surrogate decision maker:  Willing to take on this role and responsibility  Knows what you want for future medical care  Willing to follow your wishes even if they don't agree with them  Able to make difficult medical decisions under stressful circumstances    Advance Directives  These are legal documents you can create that will guide your healthcare team and decision maker(s) when needed. These documents can be stored in the electronic medical record.    Living Will - a legal document to guide your care if you have a terminal condition or a serious illness and are unable to communicate. States vary by statute in document names/types, but most forms may include one or more of the following:        -  Directions regarding life-prolonging treatments        -  Directions regarding artificially provided nutrition/hydration        -  Choosing a healthcare decision maker        -  Direction regarding organ/tissue  donation    Durable Power of  for Healthcare - this document names an -in-fact to make medical decisions for you, but it may also allow this person to make personal and financial decisions for you. Please seek the advice of an  if you need this type of document.    **Advance Directives are not required and no one may discriminate against you if you do not sign one.    Medical Orders  Many states allow specific forms/orders signed by your physician to record your wishes for medical treatment in your current state of health. This form, signed in personal communication with your physician, addresses resuscitation and other medical interventions that you may or may not want.      For more information or to schedule a time with a [facility] Advance Care Planning Facilitator contact: [facility] or call [phone] and someone will contact you directly.  Fall Prevention in the Home, Adult  Falls can cause injuries and affect people of all ages. There are many simple things that you can do to make your home safe and to help prevent falls.  If you need it, ask for help making these changes.  What actions can I take to prevent falls?  General information  Use good lighting in all rooms. Make sure to:  Replace any light bulbs that burn out.  Turn on lights if it is dark and use night-lights.  Keep items that you use often in easy-to-reach places. Lower the shelves around your home if needed.  Move furniture so that there are clear paths around it.  Do not keep throw rugs or other things on the floor that can make you trip.  If any of your floors are uneven, fix them.  Add color or contrast paint or tape to clearly danny and help you see:  Grab bars or handrails.  First and last steps of staircases.  Where the edge of each step is.  If you use a ladder or stepladder:  Make sure that it is fully opened. Do not climb a closed ladder.  Make sure the sides of the ladder are locked in  place.  Have someone hold the ladder while you use it.  Know where your pets are as you move through your home.  What can I do in the bathroom?         Keep the floor dry. Clean up any water that is on the floor right away.  Remove soap buildup in the bathtub or shower. Buildup makes bathtubs and showers slippery.  Use non-skid mats or decals on the floor of the bathtub or shower.  Attach bath mats securely with double-sided, non-slip rug tape.  If you need to sit down while you are in the shower, use a non-slip stool.  Install grab bars by the toilet and in the bathtub and shower. Do not use towel bars as grab bars.  What can I do in the bedroom?  Make sure that you have a light by your bed that is easy to reach.  Do not use any sheets or blankets on your bed that hang to the floor.  Have a firm bench or chair with side arms that you can use for support when you get dressed.  What can I do in the kitchen?  Clean up any spills right away.  If you need to reach something above you, use a sturdy step stool that has a grab bar.  Keep electrical cables out of the way.  Do not use floor polish or wax that makes floors slippery.  What can I do with my stairs?  Do not leave anything on the stairs.  Make sure that you have a light switch at the top and the bottom of the stairs. Have them installed if you do not have them.  Make sure that there are handrails on both sides of the stairs. Fix handrails that are broken or loose. Make sure that handrails are as long as the staircases.  Install non-slip stair treads on all stairs in your home if they do not have carpet.  Avoid having throw rugs at the top or bottom of stairs, or secure the rugs with carpet tape to prevent them from moving.  Choose a carpet design that does not hide the edge of steps on the stairs. Make sure that carpet is firmly attached to the stairs. Fix any carpet that is loose or worn.  What can I do on the outside of my home?  Use bright outdoor  lighting.  Repair the edges of walkways and driveways and fix any cracks. Clear paths of anything that can make you trip, such as tools or rocks.  Add color or contrast paint or tape to clearly danny and help you see high doorway thresholds.  Trim any bushes or trees on the main path into your home.  Check that handrails are securely fastened and in good repair. Both sides of all steps should have handrails.  Install guardrails along the edges of any raised decks or porches.  Have leaves, snow, and ice cleared regularly. Use sand, salt, or ice melt on walkways during winter months if you live where there is ice and snow.  In the garage, clean up any spills right away, including grease or oil spills.  What other actions can I take?  Review your medicines with your health care provider. Some medicines can make you confused or feel dizzy. This can increase your chance of falling.  Wear closed-toe shoes that fit well and support your feet. Wear shoes that have rubber soles and low heels.  Use a cane, walker, scooter, or crutches that help you move around if needed.  Talk with your provider about other ways that you can decrease your risk of falls. This may include seeing a physical therapist to learn to do exercises to improve movement and strength.  Where to find more information  Centers for Disease Control and PreventionERLINDA: cdc.gov  National [place] on Aging: ramón.nih.gov  National [place] on Aging: ramón.nih.gov  Contact a health care provider if:  You are afraid of falling at home.  You feel weak, drowsy, or dizzy at home.  You fall at home.  Get help right away if you:  Lose consciousness or have trouble moving after a fall.  Have a fall that causes a head injury.  These symptoms may be an emergency. Get help right away. Call 911.  Do not wait to see if the symptoms will go away.  Do not drive yourself to the hospital.  This information is not intended to replace advice given to you by your health care  provider. Make sure you discuss any questions you have with your health care provider.  Document Revised: 08/21/2023 Document Reviewed: 08/21/2023  Elsevier Patient Education © 2024 [employer]  Sit-to-Stand Exercise    The sit-to-stand exercise (also known as the chair stand or chair rise exercise) strengthens your lower body and helps you maintain or improve your mobility and independence. The end goal is to do the sit-to-stand exercise without using your hands. This will be easier as you become stronger. You should always talk with your health care provider before starting any exercise program, especially if you have had recent surgery.  Do the exercise exactly as told by your health care provider and adjust it as directed. It is normal to feel mild stretching, pulling, tightness, or discomfort as you do this exercise, but you should stop right away if you feel sudden pain or your pain gets worse. Do not begin doing this exercise until told by your health care provider.  What the sit-to-stand exercise does  The sit-to-stand exercise helps to strengthen the muscles in your thighs and the muscles in the center of your body that give you stability (core muscles). This exercise is especially helpful if:  You have had knee or hip surgery.  You have trouble getting up from a chair, out of a car, or off the toilet due to muscle weakness.  How to do the sit-to-stand exercise  Sit toward the front edge of a sturdy chair without armrests. Your knees should be bent and your feet should be flat on the floor and shoulder-width apart and underneath your hips.  Place your hands lightly on each side of the seat. Keep your back and neck as straight as possible, with your chest slightly forward.  Breathe in slowly. Lean forward and slightly shift your weight to the front of your feet.  Breathe out as you slowly stand up. Try not to support any weight with your hands.  Stand and pause for a full breath in and out.  Breathe in as  you sit down slowly. Tighten your core and abdominal muscles to control your lowering as much as possible. You should lower yourself back to the chair slowly, not just drop back into the seat.  Breathe out slowly.  Do this exercise 10-15 times. If needed, do it fewer times until you build up strength.  Rest for 1 minute, then do another set of 10-15 repetitions.  To change the difficulty of the sit-to-stand exercise  If the exercise is too difficult, use a chair with sturdy armrests, and push off the armrests to help you come to the standing position. You can also use the armrests to help slowly lower yourself back to sitting. As this gets easier, try to use your arms less. You can also place a firm cushion or pillow on the chair to make the surface higher.  If this exercise is too easy, do not use your arms to help raise or lower yourself. You can also wear a weighted vest, use hand weights, increase your repetitions, or try a lower chair.  General tips  You may feel tired when starting an exercise routine. This is normal.  You may have muscle soreness that lasts a few days. This is normal. As you get stronger, you may not feel muscle soreness.  Use smooth, steady movements.  Do not  hold your breath during strength exercises. This can cause unsafe changes in your blood pressure.  Breathe in slowly through your nose, and breathe out slowly through your mouth.  Summary  Strengthening your lower body is an important step to help you move safely and independently.  The sit-to-stand exercise helps strengthen the muscles in your thighs and core.  You should always talk with your health care provider before starting any exercise program, especially if you have had recent surgery.  This information is not intended to replace advice given to you by your health care provider. Make sure you discuss any questions you have with your health care provider.  Document Revised: 04/10/2022 Document Reviewed: 04/10/2022  Erika  Patient Education 2024 Elsevier Inc.    Medicare Wellness  Personal Prevention Plan of Service     Date of Office Visit:    Encounter Provider:  Humberto Wells DO  Place of Service:  [facility]  Patient Name: Shannon Servin  :  1950    As part of the Medicare Wellness portion of your visit today, we are providing you with this personalized preventive plan of services (PPPS). This plan is based upon recommendations of the [place] Preventive Services Task Force (USPSTF) and the Advisory Committee on Immunization Practices (ACIP).    This lists the preventive care services that should be considered, and provides dates of when you are due. Items listed as completed are up-to-date and do not require any further intervention.    Health Maintenance   Topic Date Due   • DXA SCAN  Never done   • URINE MICROALBUMIN-CREATININE RATIO (uACR)  Never done   • HEPATITIS C SCREENING  Never done   • ZOSTER VACCINE (2 of 2) 2022   • RSV Vaccine - Adults (1 - 1-dose 75+ series) Never done   • COVID-19 Vaccine ( season) 2024   • ANNUAL WELLNESS VISIT  10/02/2024   • DIABETIC FOOT EXAM  2025   • DIABETIC EYE EXAM  2025   • INFLUENZA VACCINE  10/01/2025   • HEMOGLOBIN A1C  2026   • TDAP/TD VACCINES (2 - Td or Tdap) 10/02/2033   • Pneumococcal Vaccine 50+  Completed   • COLORECTAL CANCER SCREENING  Discontinued       Orders Placed This Encounter   Procedures   • Hepatitis panel, acute     Release to patient:   Routine Release [[id number]]   • HIV-1/O/2 ANTIGEN/ANTIBODY, 4TH GENERATION     Release to patient:   Routine Release [[id number]]   • RPR     Release to patient:   Routine Release [[id number]]   • POC Glycosylated Hemoglobin (Hb A1C)     Release to patient:   Routine Release [[id number]]   • POC Albumin/Creatinine Ratio Urine     Release to patient:   Routine Release [[id number]]       No follow-ups on file.

## 2025-07-08 NOTE — ASSESSMENT & PLAN NOTE
Diabetes is improving with lifestyle modifications.   Continue current treatment regimen.  Diabetes will be reassessed in 3 months    Orders:    POC Glycosylated Hemoglobin (Hb A1C)    POC Albumin/Creatinine Ratio Urine    Ambulatory Referral to Nutrition Services  Continue healthy dietary choices, avoid prolonged fasting periods as best able.  Maintain appropriate hydration status.  Hemoglobin A1c is approaching normal range.  Changes to treatment regimen can be made when needed.

## 2025-07-08 NOTE — ASSESSMENT & PLAN NOTE
Orders:    CBC w AUTO Differential  Vital signs demonstrate hemodynamic stability, heart rate well-controlled.

## 2025-07-08 NOTE — ASSESSMENT & PLAN NOTE
Diabetes is improving with lifestyle modifications.   Continue current treatment regimen.  Diabetes will be reassessed in 3 months    Orders:    gabapentin (NEURONTIN) 300 MG capsule; Take 1 capsule by mouth 3 (Three) Times a Day.    pregabalin (LYRICA) 100 MG capsule; Take 1 capsule by mouth 2 (Two) Times a Day.  Adjustment made to treatment regimen today, plan to follow clinically.

## 2025-07-09 LAB — RPR SER QL: NORMAL

## 2025-07-11 ENCOUNTER — ANTICOAGULATION VISIT (OUTPATIENT)
Dept: FAMILY MEDICINE CLINIC | Facility: CLINIC | Age: 75
End: 2025-07-11
Payer: MEDICARE

## 2025-07-11 LAB — INR PPP: 1.6 (ref 2–3)

## 2025-07-21 ENCOUNTER — TELEPHONE (OUTPATIENT)
Age: 75
End: 2025-07-21

## 2025-07-23 ENCOUNTER — OFFICE VISIT (OUTPATIENT)
Dept: UROLOGY | Facility: CLINIC | Age: 75
End: 2025-07-23
Payer: MEDICARE

## 2025-07-23 VITALS
DIASTOLIC BLOOD PRESSURE: 78 MMHG | HEART RATE: 78 BPM | BODY MASS INDEX: 33.43 KG/M2 | WEIGHT: 213 LBS | OXYGEN SATURATION: 100 % | TEMPERATURE: 98.2 F | HEIGHT: 67 IN | SYSTOLIC BLOOD PRESSURE: 132 MMHG

## 2025-07-23 DIAGNOSIS — N39.0 RECURRENT UTI: Primary | ICD-10-CM

## 2025-07-23 DIAGNOSIS — N39.41 URGE INCONTINENCE OF URINE: ICD-10-CM

## 2025-07-23 DIAGNOSIS — Z45.42 ENCOUNTER FOR ADJUSTMENT AND MANAGEMENT OF NEUROSTIMULATOR: ICD-10-CM

## 2025-07-23 LAB
BILIRUB BLD-MCNC: NEGATIVE MG/DL
CLARITY, POC: ABNORMAL
COLOR UR: ABNORMAL
EXPIRATION DATE: ABNORMAL
GLUCOSE UR STRIP-MCNC: NEGATIVE MG/DL
KETONES UR QL: NEGATIVE
LEUKOCYTE EST, POC: ABNORMAL
Lab: ABNORMAL
NITRITE UR-MCNC: POSITIVE MG/ML
PH UR: 5.5 [PH] (ref 5–8)
PROT UR STRIP-MCNC: ABNORMAL MG/DL
RBC # UR STRIP: ABNORMAL /UL
SP GR UR: 1.02 (ref 1–1.03)
UROBILINOGEN UR QL: NORMAL

## 2025-07-23 RX ORDER — SULFAMETHOXAZOLE AND TRIMETHOPRIM 800; 160 MG/1; MG/1
1 TABLET ORAL 2 TIMES DAILY
Qty: 14 TABLET | Refills: 0 | Status: SHIPPED | OUTPATIENT
Start: 2025-07-23 | End: 2025-07-30
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
## 2025-07-24 ENCOUNTER — TELEPHONE (OUTPATIENT)
Dept: UROLOGY | Facility: CLINIC | Age: 75
End: 2025-07-24

## 2025-07-29 LAB
BACTERIA UR CULT: ABNORMAL
BACTERIA UR CULT: ABNORMAL
OTHER ANTIBIOTIC SUSC ISLT: ABNORMAL

## 2025-08-08 ENCOUNTER — OFFICE VISIT (OUTPATIENT)
Age: 75
End: 2025-08-08
Payer: MEDICARE

## 2025-08-08 VITALS
SYSTOLIC BLOOD PRESSURE: 110 MMHG | OXYGEN SATURATION: 91 % | HEART RATE: 82 BPM | HEIGHT: 67 IN | WEIGHT: 213 LBS | DIASTOLIC BLOOD PRESSURE: 70 MMHG | BODY MASS INDEX: 33.43 KG/M2

## 2025-08-08 DIAGNOSIS — E11.44 DIABETIC AMYOTROPHY ASSOCIATED WITH TYPE 2 DIABETES MELLITUS: ICD-10-CM

## 2025-08-08 DIAGNOSIS — M12.811 RIGHT ROTATOR CUFF TEAR ARTHROPATHY: ICD-10-CM

## 2025-08-08 DIAGNOSIS — M15.0 PRIMARY GENERALIZED (OSTEO)ARTHRITIS: ICD-10-CM

## 2025-08-08 DIAGNOSIS — M25.511 ARTHRALGIA OF RIGHT SHOULDER REGION: ICD-10-CM

## 2025-08-08 DIAGNOSIS — Z74.09 IMPAIRED MOBILITY AND ADLS: ICD-10-CM

## 2025-08-08 DIAGNOSIS — Z79.01 CHRONIC ANTICOAGULATION: Chronic | ICD-10-CM

## 2025-08-08 DIAGNOSIS — N39.46 MIXED URGE AND STRESS INCONTINENCE: ICD-10-CM

## 2025-08-08 DIAGNOSIS — K58.1 IRRITABLE BOWEL SYNDROME WITH CONSTIPATION: ICD-10-CM

## 2025-08-08 DIAGNOSIS — G47.33 SEVERE OBSTRUCTIVE SLEEP APNEA IN ADULT: ICD-10-CM

## 2025-08-08 DIAGNOSIS — J44.89 CHRONIC OBSTRUCTIVE BRONCHITIS: ICD-10-CM

## 2025-08-08 DIAGNOSIS — E11.65 TYPE 2 DIABETES MELLITUS WITH HYPERGLYCEMIA, WITHOUT LONG-TERM CURRENT USE OF INSULIN: Chronic | ICD-10-CM

## 2025-08-08 DIAGNOSIS — G47.33 OSA ON CPAP: Chronic | ICD-10-CM

## 2025-08-08 DIAGNOSIS — I95.1 ORTHOSTASIS: ICD-10-CM

## 2025-08-08 DIAGNOSIS — M75.41 IMPINGEMENT SYNDROME OF RIGHT SHOULDER: Chronic | ICD-10-CM

## 2025-08-08 DIAGNOSIS — Z78.9 IMPAIRED MOBILITY AND ADLS: ICD-10-CM

## 2025-08-08 DIAGNOSIS — M75.101 RIGHT ROTATOR CUFF TEAR ARTHROPATHY: ICD-10-CM

## 2025-08-08 DIAGNOSIS — J96.11 CHRONIC RESPIRATORY FAILURE WITH HYPOXIA: ICD-10-CM

## 2025-08-08 DIAGNOSIS — G57.12 MERALGIA PARESTHETICA OF LEFT SIDE: ICD-10-CM

## 2025-08-08 DIAGNOSIS — I48.0 PAROXYSMAL ATRIAL FIBRILLATION: Primary | ICD-10-CM

## 2025-08-08 PROCEDURE — 3074F SYST BP LT 130 MM HG: CPT | Performed by: FAMILY MEDICINE

## 2025-08-08 PROCEDURE — 96372 THER/PROPH/DIAG INJ SC/IM: CPT | Performed by: FAMILY MEDICINE

## 2025-08-08 PROCEDURE — 3078F DIAST BP <80 MM HG: CPT | Performed by: FAMILY MEDICINE

## 2025-08-08 PROCEDURE — 99214 OFFICE O/P EST MOD 30 MIN: CPT | Performed by: FAMILY MEDICINE

## 2025-08-08 PROCEDURE — 1125F AMNT PAIN NOTED PAIN PRSNT: CPT | Performed by: FAMILY MEDICINE

## 2025-08-08 RX ORDER — VIBEGRON 75 MG/1
1 TABLET, FILM COATED ORAL DAILY
Qty: 90 TABLET | Refills: 2 | Status: SHIPPED | OUTPATIENT
Start: 2025-08-08

## 2025-08-08 RX ORDER — METHYLPREDNISOLONE ACETATE 80 MG/ML
80 INJECTION, SUSPENSION INTRA-ARTICULAR; INTRALESIONAL; INTRAMUSCULAR; SOFT TISSUE ONCE
Status: COMPLETED | OUTPATIENT
Start: 2025-08-08 | End: 2025-08-08

## 2025-08-08 RX ADMIN — METHYLPREDNISOLONE ACETATE 80 MG: 80 INJECTION, SUSPENSION INTRA-ARTICULAR; INTRALESIONAL; INTRAMUSCULAR; SOFT TISSUE at 12:46

## 2025-08-12 ENCOUNTER — ANTICOAGULATION VISIT (OUTPATIENT)
Dept: FAMILY MEDICINE CLINIC | Facility: CLINIC | Age: 75
End: 2025-08-12
Payer: MEDICARE

## 2025-08-12 LAB — INR PPP: 1.6 (ref 2–3)

## 2025-08-12 PROCEDURE — 36416 COLLJ CAPILLARY BLOOD SPEC: CPT | Performed by: FAMILY MEDICINE

## 2025-08-12 PROCEDURE — 85610 PROTHROMBIN TIME: CPT | Performed by: FAMILY MEDICINE

## 2025-08-15 ENCOUNTER — OFFICE VISIT (OUTPATIENT)
Dept: PULMONOLOGY | Facility: CLINIC | Age: 75
End: 2025-08-15
Payer: MEDICARE

## 2025-08-15 VITALS
WEIGHT: 210 LBS | HEART RATE: 76 BPM | SYSTOLIC BLOOD PRESSURE: 102 MMHG | HEIGHT: 67 IN | BODY MASS INDEX: 32.96 KG/M2 | DIASTOLIC BLOOD PRESSURE: 60 MMHG | OXYGEN SATURATION: 97 %

## 2025-08-15 DIAGNOSIS — J44.9 COPD, SEVERE: Primary | ICD-10-CM

## 2025-08-15 DIAGNOSIS — R09.02 EXERCISE HYPOXEMIA: ICD-10-CM

## 2025-08-15 DIAGNOSIS — G47.34 NOCTURNAL HYPOXIA: ICD-10-CM

## 2025-08-15 DIAGNOSIS — J96.12 CHRONIC RESPIRATORY FAILURE WITH HYPERCAPNIA: ICD-10-CM

## 2025-08-15 DIAGNOSIS — J30.9 ALLERGIC RHINITIS, UNSPECIFIED SEASONALITY, UNSPECIFIED TRIGGER: ICD-10-CM

## 2025-08-15 PROCEDURE — 94664 DEMO&/EVAL PT USE INHALER: CPT | Performed by: NURSE PRACTITIONER

## 2025-08-15 PROCEDURE — 99215 OFFICE O/P EST HI 40 MIN: CPT | Performed by: NURSE PRACTITIONER

## 2025-08-15 PROCEDURE — 3078F DIAST BP <80 MM HG: CPT | Performed by: NURSE PRACTITIONER

## 2025-08-15 PROCEDURE — 94618 PULMONARY STRESS TESTING: CPT | Performed by: NURSE PRACTITIONER

## 2025-08-15 PROCEDURE — 3074F SYST BP LT 130 MM HG: CPT | Performed by: NURSE PRACTITIONER

## 2025-08-15 RX ORDER — AZELASTINE 1 MG/ML
2 SPRAY, METERED NASAL 2 TIMES DAILY
Qty: 30 ML | Refills: 5 | Status: SHIPPED | OUTPATIENT
Start: 2025-08-15

## 2025-08-27 ENCOUNTER — TELEMEDICINE (OUTPATIENT)
Dept: UROLOGY | Facility: CLINIC | Age: 75
End: 2025-08-27
Payer: MEDICARE

## 2025-08-27 DIAGNOSIS — N39.41 URGE INCONTINENCE OF URINE: Primary | ICD-10-CM

## (undated) DEVICE — GLIDEX™ COATED HYDROPHILIC GUIDEWIRE: Brand: MAGIC TORQUE™

## (undated) DEVICE — THE MONARCH® "D" CARTRIDGE IS A SINGLE-USE POLYPROPYLENE CARTRIDGE FOR POSTERIOR CHAMBER IOL DELIVERY: Brand: MONARCH® III

## (undated) DEVICE — STANDARD (U) BLADE ASSEMBLY 6PK: Brand: MICROAIRE®

## (undated) DEVICE — DRSNG SURESITE123 2.4X2.8IN

## (undated) DEVICE — SPNG GZ WOVN 4X4IN 12PLY 10/BX STRL

## (undated) DEVICE — DISPOSABLE TOURNIQUET CUFF SINGLE BLADDER, SINGLE PORT AND QUICK CONNECT CONNECTOR: Brand: COLOR CUFF

## (undated) DEVICE — PK ANGIO OR 10

## (undated) DEVICE — PK EXTRM UPPR 20

## (undated) DEVICE — GLV SURG BIOGEL PI ULTRATOUCH G SZ7.5 LF

## (undated) DEVICE — RADIFOCUS GLIDEWIRE ADVANTAGE GUIDEWIRE: Brand: GLIDEWIRE ADVANTAGE

## (undated) DEVICE — UNDERGLV SURG BIOGEL INDICAT PI SZ8 BLU

## (undated) DEVICE — SINGLE-USE POLYPECTOMY SNARE: Brand: CAPTIVATOR II

## (undated) DEVICE — GLV SURG SENSICARE PI LF PF 8 GRN STRL

## (undated) DEVICE — LBL STRL BLANK

## (undated) DEVICE — STRIP,CLOSURE,WOUND,MEDI-STRIP,1/2X4: Brand: MEDLINE

## (undated) DEVICE — INFLATION DEVICE: Brand: ENCORE™ 26

## (undated) DEVICE — R2P DESTINATION SLENDER GUIDING SHEATH: Brand: R2P DESTINATION SLENDER

## (undated) DEVICE — SHEET,DRAPE,70X100,STERILE: Brand: MEDLINE

## (undated) DEVICE — CATH GUIDE PIG 4F 65CM

## (undated) DEVICE — 450 ML BOTTLE OF 0.05% CHLORHEXIDINE GLUCONATE IN 99.95% STERILE WATER FOR IRRIGATION, USP AND APPLICATOR.: Brand: IRRISEPT ANTIMICROBIAL WOUND LAVAGE

## (undated) DEVICE — GLV SURG SENSICARE W/ALOE PF LF 7 STRL

## (undated) DEVICE — Device

## (undated) DEVICE — 1000 S-DRAPE TOWEL DRAPE 10/BX: Brand: STERI-DRAPE™

## (undated) DEVICE — SYR LUERLOK 5CC

## (undated) DEVICE — CLEARCUT® HP2 SLIT KNIFE INTREPID MICRO-COAXIAL SYSTEM 2.4 DB: Brand: CLEARCUT®; INTREPID

## (undated) DEVICE — HP CONCL INTREPID COAX I/A CRV .3MM

## (undated) DEVICE — RICH MAJOR PROCEDURE: Brand: MEDLINE INDUSTRIES, INC.

## (undated) DEVICE — ADHS LIQ MASTISOL 2/3ML

## (undated) DEVICE — RADIFOCUS GLIDEWIRE ADVANTAGE TRACK GUIDE: Brand: GLIDEWIRE ADVANTAGE TRACK

## (undated) DEVICE — SUT MNCRYL PLS ANTIB UD 4/0 PS2 18IN

## (undated) DEVICE — CANN IRR/INJ AIR ANT CHAMBER 6MM BEND 27G

## (undated) DEVICE — HYBRID TUBING/CAP SET FOR OLYMPUS® SCOPES: Brand: ERBE

## (undated) DEVICE — CATH DIAG EXPO .045 MPA2 5F 125CM

## (undated) DEVICE — SKIN AFFIX SURG ADHESIVE 72/CS 0.55ML: Brand: MEDLINE

## (undated) DEVICE — SYR LL TP 10ML STRL

## (undated) DEVICE — CLAVICLE STRAP: Brand: DEROYAL

## (undated) DEVICE — SOL IRRIG H2O 1000ML STRL

## (undated) DEVICE — TBG FLUID WARM ST SNGL

## (undated) DEVICE — AVANTI + 4F STD W/GW: Brand: AVANTI

## (undated) DEVICE — KT PT/PROG SMRT INTERSTIM/X NEUROSTM W/COMMUNIC

## (undated) DEVICE — INTENDED FOR TISSUE SEPARATION, AND OTHER PROCEDURES THAT REQUIRE A SHARP SURGICAL BLADE TO PUNCTURE OR CUT.: Brand: BARD-PARKER ® CARBON RIB-BACK BLADES

## (undated) DEVICE — SYR CONTRL LUERLOK 10CC

## (undated) DEVICE — TBG INJ CONTRL EXCITE RA 1200PSI 48IN

## (undated) DEVICE — NDL HYPO ECLPS SFTY 22G 1 1/2IN

## (undated) DEVICE — CVR HNDL LIGHT RIGID

## (undated) DEVICE — BLD BEAVER MICROSHARP 15D 3MM BLU LF

## (undated) DEVICE — CATH URETRL RUTNER UNIV WEDGE 5F70CM

## (undated) DEVICE — CVR TRANSD CIV FLX TPR 11.9 TO 3.8X61CM

## (undated) DEVICE — PAD ARMBRD SURG CONVOL 7.5X20X2IN

## (undated) DEVICE — RADIFOCUS OPTITORQUE ANGIOGRAPHIC CATHETER: Brand: OPTITORQUE

## (undated) DEVICE — CVR PROB ULTRASND/TRANSD W/GEL 18X120CM STRL

## (undated) DEVICE — SNAP KOVER: Brand: UNBRANDED

## (undated) DEVICE — GLV SURG SENSICARE PI LF PF 7.5 GRN STRL

## (undated) DEVICE — SUT VIC 3/0 SH 27IN J416H

## (undated) DEVICE — 3M™ IOBAN™ 2 ANTIMICROBIAL INCISE DRAPE 6650EZ: Brand: IOBAN™ 2

## (undated) DEVICE — HDRST POSTN SLOTTED A/

## (undated) DEVICE — QUICK CATCH IN-LINE SUCTION POLYP TRAP IS USED FOR SUCTION RETRIEVAL OF ENDOSCOPICALLY REMOVED POLYPS.

## (undated) DEVICE — VLV SXN AIR/H2O ORCAPOD3 1P/U STRL

## (undated) DEVICE — TOWEL,OR,DSP,ST,BLUE,STD,4/PK,20PK/CS: Brand: MEDLINE

## (undated) DEVICE — GLV SURG SENSICARE W/ALOE PF LF 8 STRL

## (undated) DEVICE — ANTIBACTERIAL UNDYED BRAIDED (POLYGLACTIN 910), SYNTHETIC ABSORBABLE SUTURE: Brand: COATED VICRYL

## (undated) DEVICE — RICH CYSTO: Brand: MEDLINE INDUSTRIES, INC.

## (undated) DEVICE — SLV SCD CALF HEMOFORCE DVT THERP REPROC MD

## (undated) DEVICE — CUFF SCD HEMOFORCE SEQ CALF STD MD

## (undated) DEVICE — LUBE JELLY PK/2.75GM STRL BX/144

## (undated) DEVICE — ENDOSCOPY PORT CONNECTOR FOR OLYMPUS® SCOPES: Brand: ERBE

## (undated) DEVICE — SOL IRR NACL 0.9PCT 3000ML

## (undated) DEVICE — GLV SURG BIOGEL LTX PF 7 1/2

## (undated) DEVICE — INTRO SHEATH PRELUDE IDEAL NITNL PALLADIUM 6F .018IN 7X40CM